# Patient Record
Sex: FEMALE | Race: BLACK OR AFRICAN AMERICAN | URBAN - METROPOLITAN AREA
[De-identification: names, ages, dates, MRNs, and addresses within clinical notes are randomized per-mention and may not be internally consistent; named-entity substitution may affect disease eponyms.]

---

## 2017-02-01 ENCOUNTER — OFFICE VISIT (OUTPATIENT)
Dept: FAMILY MEDICINE CLINIC | Age: 77
End: 2017-02-01

## 2017-02-01 VITALS
HEART RATE: 78 BPM | DIASTOLIC BLOOD PRESSURE: 88 MMHG | HEIGHT: 67 IN | WEIGHT: 196.8 LBS | RESPIRATION RATE: 20 BRPM | SYSTOLIC BLOOD PRESSURE: 132 MMHG | TEMPERATURE: 98.6 F | BODY MASS INDEX: 30.89 KG/M2

## 2017-02-01 DIAGNOSIS — M51.36 DDD (DEGENERATIVE DISC DISEASE), LUMBAR: Primary | Chronic | ICD-10-CM

## 2017-02-01 DIAGNOSIS — M54.50 CHRONIC RIGHT-SIDED LOW BACK PAIN WITHOUT SCIATICA: ICD-10-CM

## 2017-02-01 DIAGNOSIS — G89.29 CHRONIC RIGHT-SIDED LOW BACK PAIN WITHOUT SCIATICA: ICD-10-CM

## 2017-02-01 RX ORDER — ACETAMINOPHEN AND CODEINE PHOSPHATE 300; 15 MG/1; MG/1
1 TABLET ORAL
Qty: 30 TAB | Refills: 0 | Status: SHIPPED | OUTPATIENT
Start: 2017-02-01 | End: 2018-02-13 | Stop reason: SDUPTHER

## 2017-02-01 NOTE — PROGRESS NOTES
Assessment/Plan:    1. DDD (degenerative disc disease), lumbar and 2. Chronic right-sided low back pain without sciatica  -norco 5/325 ineffective. nsaids limited by CKD3. Unable to afford lidoderm patch. Failed PT. Trial tylenol #2  - acetaminophen-codeine (TYLENOL #2) 300-15 mg per tablet; Take 1 Tab by mouth daily as needed for Pain. Dispense: 30 Tab; Refill: 0      The plan was discussed with the patient. The patient verbalized understanding and is in agreement with the plan. All medication potential side effects were discussed with the patient. Health Maintenance:   Health Maintenance   Topic Date Due    Pneumococcal 65+ Low/Medium Risk (2 of 2 - PCV13) 05/22/2015    MEDICARE YEARLY EXAM  08/12/2017    GLAUCOMA SCREENING Q2Y  06/01/2018    COLONOSCOPY  12/14/2019    DTaP/Tdap/Td series (2 - Td) 10/20/2026    OSTEOPOROSIS SCREENING (DEXA)  Completed    ZOSTER VACCINE AGE 60>  Addressed    INFLUENZA AGE 9 TO ADULT  Completed     Mary Goodrich is a 78 y.o. female and presents with Back Pain     Subjective:  Pt has h/o R chronic back pain w/o sciatica secondary to DDD. Failed PT.  NSAIDs limited by CKD. She was given lidoderm patch, but was unable to afford. Stated norco 5/325 didn't work for her pain. She was given tylenol #2 but the script was reportedly stolen. She would like to give that a try. ROS:  Constitutional: No recent weight change. No weakness/fatigue. No f/c. Cardiovascular: No CP/palpitations. No CASTRO/orthopnea/PND. Respiratory: No cough/sputum, dyspnea, wheezing. Musculoskeletal: + joint pain/stiffness. + muscle pain/tenderness. Neurological: No seizures/numbness/weakness. No paresthesias. The problem list was updated as a part of today's visit.   Patient Active Problem List   Diagnosis Code    Muscle spasms of lower extremity M62.838    DDD (degenerative disc disease), lumbar M51.36    Hypertension I10    Hyperlipidemia E78.5    Post-menopausal Z78.0    Vitamin D deficiency E55.9    Keloid skin disorder L91.0    Osteoarthritis of right knee M17.9    Osteopenia M85.80    Complete rotator cuff tear of left shoulder M75.122    CKD (chronic kidney disease) N18.9    Chronic pain syndrome G89.4    Constipation K59.00    Advance care planning Z71.89    Ear itching L29.9       The PSH, FH were reviewed. SH:  Social History   Substance Use Topics    Smoking status: Never Smoker    Smokeless tobacco: Never Used    Alcohol use No       Medications/Allergies:  Current Outpatient Prescriptions on File Prior to Visit   Medication Sig Dispense Refill    acetaminophen-codeine (TYLENOL #2) 300-15 mg per tablet Take 1 Tab by mouth daily as needed for Pain. 30 Tab 0    metoprolol succinate (TOPROL-XL) 100 mg tablet TAKE 1 TABLET EVERY DAY 90 Tab 1    baclofen (LIORESAL) 10 mg tablet TAKE 1 TABLET THREE TIMES DAILY 90 Tab 0    hydroCHLOROthiazide (HYDRODIURIL) 25 mg tablet TAKE 1 TABLET EVERY DAY 90 Tab 1    lidocaine (LIDODERM) 5 % Apply patch to the affected area for 12 hours a day and remove for 12 hours a day. 30 Each 6    atorvastatin (LIPITOR) 10 mg tablet Take 1 Tab by mouth daily. 90 Tab 3    potassium chloride (K-DUR, KLOR-CON) 20 mEq tablet TAKE 1 TABLET TWICE DAILY (Patient taking differently: TAKE 1 TABLET daily) 180 Tab 1    furosemide (LASIX) 20 mg tablet TAKE 1 TABLET BY MOUTH EVERY DAY AS NEEDED FOR SWELLING 90 Tab 1    traZODone (DESYREL) 100 mg tablet Take 1 Tab by mouth nightly. 90 Tab 1    INULIN (FIBER GUMMIES PO) Take 2 Each by mouth daily.  MULTIVITAMIN (MULTIPLE VITAMINS PO) Take 2 Each by mouth daily. No current facility-administered medications on file prior to visit.          No Known Allergies    Objective:  Visit Vitals    /88 (BP 1 Location: Right arm, BP Patient Position: Sitting)    Pulse 78    Temp 98.6 °F (37 °C) (Oral)    Resp 20    Ht 5' 7\" (1.702 m)    Wt 196 lb 12.8 oz (89.3 kg)    BMI 30.82 kg/m2 Constitutional: Well developed, nourished, no distress, alert, obese habitus   CV: S1, S2.  RRR. No murmurs/rubs. No thrills palpated. No carotid bruits. Intact distal pulses. No edema. Pulm: No abnormalities on inspection. Clear to auscultation bilaterally. No wheezing/rhonchi. Normal effort. MS: Gait normal.  No vertebral body or paraspinal tenderness. Neuro: A/O x 3. No focal motor or sensory deficits.  Speech normal.

## 2017-02-01 NOTE — PROGRESS NOTES
Constance Singh is a 78 y.o. female  Chief Complaint   Patient presents with    Back Pain     1. Have you been to the ER, urgent care clinic since your last visit? Hospitalized since your last visit? No    2. Have you seen or consulted any other health care providers outside of the [address] [place] [address] since your last visit? Include any pap smears or colon screening.  No

## 2017-02-17 ENCOUNTER — TELEPHONE (OUTPATIENT)
Dept: FAMILY MEDICINE CLINIC | Age: 77
End: 2017-02-17

## 2017-02-17 NOTE — TELEPHONE ENCOUNTER
Pt is requesting another referral to Nephrology, Dr. Alexandrea Cheema. Pt has an appt 3/15/17. Pt has AndaO.

## 2017-05-18 ENCOUNTER — OFFICE VISIT (OUTPATIENT)
Dept: FAMILY MEDICINE CLINIC | Age: 77
End: 2017-05-18

## 2017-05-18 ENCOUNTER — HOSPITAL ENCOUNTER (OUTPATIENT)
Dept: LAB | Age: 77
Discharge: HOME | End: 2017-05-18

## 2017-05-18 VITALS
SYSTOLIC BLOOD PRESSURE: 132 MMHG | TEMPERATURE: 98.3 F | RESPIRATION RATE: 18 BRPM | WEIGHT: 188 LBS | DIASTOLIC BLOOD PRESSURE: 82 MMHG | HEART RATE: 64 BPM | HEIGHT: 67 IN | BODY MASS INDEX: 29.51 KG/M2 | OXYGEN SATURATION: 98 %

## 2017-05-18 DIAGNOSIS — E78.00 PURE HYPERCHOLESTEROLEMIA: Chronic | ICD-10-CM

## 2017-05-18 DIAGNOSIS — R05.9 COUGH: ICD-10-CM

## 2017-05-18 DIAGNOSIS — M17.11 PRIMARY OSTEOARTHRITIS OF RIGHT KNEE: Primary | ICD-10-CM

## 2017-05-18 DIAGNOSIS — L30.9 DERMATITIS: ICD-10-CM

## 2017-05-18 DIAGNOSIS — I10 ESSENTIAL HYPERTENSION: Chronic | ICD-10-CM

## 2017-05-18 DIAGNOSIS — G47.00 INSOMNIA, UNSPECIFIED TYPE: ICD-10-CM

## 2017-05-18 PROCEDURE — 99001 SPECIMEN HANDLING PT-LAB: CPT | Performed by: INTERNAL MEDICINE

## 2017-05-18 RX ORDER — CHLORPHENIRAMINE MALEATE 4 MG
TABLET ORAL 2 TIMES DAILY
Qty: 15 G | Refills: 0 | Status: SHIPPED | OUTPATIENT
Start: 2017-05-18 | End: 2017-05-30 | Stop reason: SDUPTHER

## 2017-05-18 RX ORDER — TRAZODONE HYDROCHLORIDE 100 MG/1
100 TABLET ORAL
Qty: 90 TAB | Refills: 1 | Status: SHIPPED | OUTPATIENT
Start: 2017-05-18 | End: 2018-04-24 | Stop reason: SDUPTHER

## 2017-05-18 RX ORDER — DOXYCYCLINE 100 MG/1
100 CAPSULE ORAL 2 TIMES DAILY
Qty: 14 CAP | Refills: 0 | Status: SHIPPED | OUTPATIENT
Start: 2017-05-18 | End: 2017-05-25

## 2017-05-18 NOTE — PROGRESS NOTES
Chief Complaint   Patient presents with    Cough     productive x 2 weeks    Knee Swelling       Assessment/Plan  1. Primary osteoarthritis of right knee- previously seen by Dr. Brooke Salcedo    2. Cough  - doxycycline (MONODOX) 100 mg capsule; Take 1 Cap by mouth two (2) times a day for 7 days. Dispense: 14 Cap; Refill: 0    3. Dermatitis  - clotrimazole (LOTRIMIN) 1 % topical cream; Apply  to affected area two (2) times a day. Dispense: 15 g; Refill: 0    4. Insomnia, unspecified type  -refilled  - traZODone (DESYREL) 100 mg tablet; Take 1 Tab by mouth nightly. Dispense: 90 Tab; Refill: 1      The plan was discussed with the patient. The patient verbalized understanding and is in agreement with the plan. All medication potential side effects were discussed with the patient. SUBJECTIVE:   Yumiko Meraz is a 78 y.o. female who complains of cough x 2 weeks. Started as a cold, but now has persistent cough. Cough is minimally productive. No f/c. No dyspnea. Pt c/o rash on neck and L arm. Itches. Has been present about a week. Pt c/o R knee pain. Pain is mostly on medial aspect. Has known OA of the knee. Worse with rainy weather. Review of Systems - ENT ROS: negative  Respiratory ROS: positive for - cough  Cardiovascular ROS: no chest pain or dyspnea on exertion  Gastrointestinal ROS: no abdominal pain, change in bowel habits, or black or bloody stools  Musculoskeletal ROS: negative  Neurological ROS: negative    Physical Examination:   Visit Vitals    /82 (BP 1 Location: Right arm, BP Patient Position: Sitting)    Pulse 64    Temp 98.3 °F (36.8 °C) (Oral)    Resp 18    Ht 5' 7\" (1.702 m)    Wt 188 lb (85.3 kg)    SpO2 98%    BMI 29.44 kg/m2       Constitutional: Well developed, nourished, no distress, alert   HENT: Exterior ears and tympanic membranes normal bilaterally. Supple neck. No thyromegaly or lymphadenopathy.  Oropharynx clear and moist mucous membranes. Eyes: Conjunctiva normal. PERRL. CV: S1, S2.  RRR. No murmurs/rubs. No thrills palpated. No carotid bruits. Intact distal pulses. No edema. Pulm: No abnormalities on inspection. Clear to auscultation bilaterally. No wheezing/rhonchi. Normal effort.      Skin: erythematous raised patches on neck with mild erythema        Frederick Gautam MD

## 2017-05-18 NOTE — PROGRESS NOTES
1. Have you been to the ER, urgent care clinic since your last visit? Hospitalized since your last visit? No     2. Have you seen or consulted any other health care providers outside of the [address] [place] [address] since your last visit? Include any pap smears or colon screening. No         Health Maintenance Due   Topic Date Due    Pneumococcal 65+ Low/Medium Risk (2 of 2 - PCV13) 05/22/2015         Pt also complains of ear itching. Pt is fasting.

## 2017-05-18 NOTE — MR AVS SNAPSHOT
Visit Information Date & Time Provider Department Dept. Phone Encounter #  
 5/18/2017  7:45 AM Arianne Alvarez, 3 [facility] [phone] Upcoming Health Maintenance Date Due Pneumococcal 65+ Low/Medium Risk (2 of 2 - PCV13) 5/22/2015 INFLUENZA AGE 9 TO ADULT 8/1/2017 MEDICARE YEARLY EXAM 8/12/2017 GLAUCOMA SCREENING Q2Y 6/1/2018 COLONOSCOPY 12/14/2019 DTaP/Tdap/Td series (2 - Td) 10/20/2026 Allergies as of 5/18/2017  Review Complete On: 5/18/2017 By: Arianne Alvarez MD  
 No Known Allergies Current Immunizations  Reviewed on 10/20/2016 Name Date Influenza High Dose Vaccine PF 10/20/2016, 9/8/2015  1:42 PM  
 Pneumococcal Polysaccharide (PPSV-23) 5/22/2014 TB Skin Test (PPD) Intradermal 9/8/2015  1:43 PM  
  
 Not reviewed this visit You Were Diagnosed With   
  
 Codes Comments Primary osteoarthritis of right knee    -  Primary ICD-10-CM: M17.11 ICD-9-CM: 715.16 Cough     ICD-10-CM: R05 ICD-9-CM: 786.2 Dermatitis     ICD-10-CM: L30.9 ICD-9-CM: 692.9 Insomnia, unspecified type     ICD-10-CM: G47.00 ICD-9-CM: 780.52 Pure hypercholesterolemia     ICD-10-CM: E78.00 ICD-9-CM: 272.0 Essential hypertension     ICD-10-CM: I10 
ICD-9-CM: 401.9 Vitals BP Pulse Temp Resp Height(growth percentile) Weight(growth percentile) 132/82 (BP 1 Location: Right arm, BP Patient Position: Sitting) 64 98.3 °F (36.8 °C) (Oral) 18 5' 7\" (1.702 m) 188 lb (85.3 kg) SpO2 BMI OB Status Smoking Status 98% 29.44 kg/m2 Hysterectomy Never Smoker BMI and BSA Data Body Mass Index Body Surface Area  
 29.44 kg/m 2 2.01 m 2 Preferred Pharmacy Pharmacy Name Phone [address], [address] [place] AT [address] [phone] Your Updated Medication List  
  
   
 This list is accurate as of: 5/18/17  8:02 AM.  Always use your most recent med list.  
  
  
  
  
 acetaminophen-codeine 300-15 mg per tablet Commonly known as:  TYLENOL #2 Take 1 Tab by mouth daily as needed for Pain. atorvastatin 10 mg tablet Commonly known as:  LIPITOR Take 1 Tab by mouth daily. baclofen 10 mg tablet Commonly known as:  LIORESAL  
TAKE 1 TABLET THREE TIMES DAILY  
  
 clotrimazole 1 % topical cream  
Commonly known as:  Martell Mcguire Apply  to affected area two (2) times a day. doxycycline 100 mg capsule Commonly known as:  Radha Lewis Take 1 Cap by mouth two (2) times a day for 7 days. FIBER GUMMIES PO Take 2 Each by mouth daily. furosemide 20 mg tablet Commonly known as:  LASIX TAKE 1 TABLET BY MOUTH EVERY DAY AS NEEDED FOR SWELLING  
  
 hydroCHLOROthiazide 25 mg tablet Commonly known as:  HYDRODIURIL  
TAKE 1 TABLET EVERY DAY  
  
 metoprolol succinate 100 mg tablet Commonly known as:  TOPROL-XL  
TAKE 1 TABLET EVERY DAY  
  
 MULTIPLE VITAMINS PO Take 2 Each by mouth daily. potassium chloride 20 mEq tablet Commonly known as:  K-DUR, KLOR-CON  
TAKE 1 TABLET TWICE DAILY  
  
 traZODone 100 mg tablet Commonly known as:  Washington Belling Take 1 Tab by mouth nightly. Prescriptions Sent to Pharmacy Refills  
 traZODone (DESYREL) 100 mg tablet 1 Sig: Take 1 Tab by mouth nightly. Class: Normal  
 Pharmacy: [facility] [address] [place] [address], [address] [place] [address] DR AT [facility] Ph #: [phone] Route: Oral  
 clotrimazole (LOTRIMIN) 1 % topical cream 0 Sig: Apply  to affected area two (2) times a day. Class: Normal  
 Pharmacy: [facility] [address] [place] [address], [address] [place] [address] DR AT [address] Ph #: [phone] Route: Topical  
 doxycycline (MONODOX) 100 mg capsule 0 Sig: Take 1 Cap by mouth two (2) times a day for 7 days. Class: Normal  
 Pharmacy: [facility] [id number] [facility], [address] [place] [address] DR AT [address] Ph #: [phone] Route: Oral  
  
We Performed the Following REFERRAL TO ORTHOPEDIC SURGERY [REF62 Custom] Comments:  
 Please evaluate patient for OA R knee. To-Do List   
 05/18/2017 Lab:  CBC W/O DIFF   
  
 05/18/2017 Lab:  LIPID PANEL   
  
 05/18/2017 Lab:  METABOLIC PANEL, COMPREHENSIVE Referral Information Referral ID Referred By Referred To  
  
 [id number] [facility], [address] [facility], [place]   
   [address] [facility] [address] [place] [address] 59, 70 [facility] Phone: [phone] Fax: [phone] Visits Status Start Date End Date 1 New Request 5/18/17 5/18/18 If your referral has a status of pending review or denied, additional information will be sent to support the outcome of this decision. Introducing [facility] HE[facility]! Norberto Santiago introduces [employer] patient portal. Now you can access parts of your medical record, email your doctor's office, and request medication refills online. 1. In your internet browser, go to [email or web address] 2. Click on the First Time User? Click Here link in the Sign In box. You will see the New Member Sign Up page. 3. Enter your [employer] Access Code exactly as it appears below. You will not need to use this code after youve completed the sign-up process. If you do not sign up before the expiration date, you must request a new code. · [employer] Access Code: [id number] Expires: 8/16/2017  7:36 AM 
 
4. Enter the last four digits of your Social Security Number (xxxx) and Date of Birth (mm/dd/yyyy) as indicated and click Submit. You will be taken to the next sign-up page. 5. Create a [employer] ID. This will be your [employer] login ID and cannot be changed, so think of one that is secure and easy to remember. 6. Create a [employer] password. You can change your password at any time. 7. Enter your Password Reset Question and Answer. This can be used at a later time if you forget your password. 8. Enter your e-mail address. You will receive e-mail notification when new information is available in [address] 9. Click Sign Up. You can now view and download portions of your medical record. 10. Click the Download Summary menu link to download a portable copy of your medical information. If you have questions, please visit the Frequently Asked Questions section of the [employer] website. Remember, [employer] is NOT to be used for urgent needs. For medical emergencies, dial 911. Now available from your iPhone and Android! Please provide this summary of care documentation to your next provider. Your primary care clinician is listed as Shanita 13. If you have any questions after today's visit, please call [phone].

## 2017-05-19 ENCOUNTER — TELEPHONE (OUTPATIENT)
Dept: FAMILY MEDICINE CLINIC | Age: 77
End: 2017-05-19

## 2017-05-19 DIAGNOSIS — R73.9 ELEVATED BLOOD SUGAR: Primary | ICD-10-CM

## 2017-05-19 LAB
ALBUMIN SERPL-MCNC: 3.9 G/DL (ref 3.5–4.8)
ALBUMIN/GLOB SERPL: 1.1 {RATIO} (ref 1.2–2.2)
ALP SERPL-CCNC: 54 IU/L (ref 39–117)
ALT SERPL-CCNC: 10 IU/L (ref 0–32)
AST SERPL-CCNC: 11 IU/L (ref 0–40)
BILIRUB SERPL-MCNC: 0.2 MG/DL (ref 0–1.2)
BUN SERPL-MCNC: 13 MG/DL (ref 8–27)
BUN/CREAT SERPL: 13 (ref 12–28)
CALCIUM SERPL-MCNC: 9.3 MG/DL (ref 8.7–10.3)
CHLORIDE SERPL-SCNC: 99 MMOL/L (ref 96–106)
CHOLEST SERPL-MCNC: 208 MG/DL (ref 100–199)
CO2 SERPL-SCNC: 27 MMOL/L (ref 18–29)
CREAT SERPL-MCNC: 0.98 MG/DL (ref 0.57–1)
ERYTHROCYTE [DISTWIDTH] IN BLOOD BY AUTOMATED COUNT: 13.7 % (ref 12.3–15.4)
GLOBULIN SER CALC-MCNC: 3.6 G/DL (ref 1.5–4.5)
GLUCOSE SERPL-MCNC: 109 MG/DL (ref 65–99)
HCT VFR BLD AUTO: 31.9 % (ref 34–46.6)
HDLC SERPL-MCNC: 41 MG/DL
HGB BLD-MCNC: 10.6 G/DL (ref 11.1–15.9)
INTERPRETATION, 910389: NORMAL
INTERPRETATION: NORMAL
LDLC SERPL CALC-MCNC: 132 MG/DL (ref 0–99)
MCH RBC QN AUTO: 29.4 PG (ref 26.6–33)
MCHC RBC AUTO-ENTMCNC: 33.2 G/DL (ref 31.5–35.7)
MCV RBC AUTO: 88 FL (ref 79–97)
PDF IMAGE, 910387: NORMAL
PLATELET # BLD AUTO: 227 X10E3/UL (ref 150–379)
POTASSIUM SERPL-SCNC: 3.9 MMOL/L (ref 3.5–5.2)
PROT SERPL-MCNC: 7.5 G/DL (ref 6–8.5)
RBC # BLD AUTO: 3.61 X10E6/UL (ref 3.77–5.28)
SODIUM SERPL-SCNC: 142 MMOL/L (ref 134–144)
TRIGL SERPL-MCNC: 175 MG/DL (ref 0–149)
VLDLC SERPL CALC-MCNC: 35 MG/DL (ref 5–40)
WBC # BLD AUTO: 3.6 X10E3/UL (ref 3.4–10.8)

## 2017-05-23 PROBLEM — R73.03 PREDIABETES: Status: ACTIVE | Noted: 2017-05-23

## 2017-05-23 LAB
HBA1C MFR BLD: 6 % (ref 4.8–5.6)
SPECIMEN STATUS REPORT, ROLRST: NORMAL

## 2017-05-23 NOTE — PROGRESS NOTES
Tell pt her labs show she has prediabetes, or is at risk for developing diabetes. She should watch her intake of sweets and carbs. We will recheck in 6 months.

## 2017-05-30 DIAGNOSIS — L30.9 DERMATITIS: ICD-10-CM

## 2017-05-30 NOTE — TELEPHONE ENCOUNTER
Pt called she states she needs another refill of clotrimazole and she also needs something different called in to help with her cough. She states that the doxycycline did not help her at all please advise.

## 2017-05-31 RX ORDER — CHLORPHENIRAMINE MALEATE 4 MG
TABLET ORAL 2 TIMES DAILY
Qty: 15 G | Refills: 0 | Status: SHIPPED | OUTPATIENT
Start: 2017-05-31 | End: 2018-05-03 | Stop reason: ALTCHOICE

## 2017-05-31 RX ORDER — PREDNISONE 10 MG/1
TABLET ORAL
Qty: 21 TAB | Refills: 0 | Status: SHIPPED | OUTPATIENT
Start: 2017-05-31 | End: 2017-09-01 | Stop reason: ALTCHOICE

## 2017-06-28 ENCOUNTER — TELEPHONE (OUTPATIENT)
Dept: FAMILY MEDICINE CLINIC | Age: 77
End: 2017-06-28

## 2017-06-28 NOTE — TELEPHONE ENCOUNTER
Pt called with Shortness of Breath complaint, transferred to Nurse [facility] to triage.

## 2017-06-28 NOTE — TELEPHONE ENCOUNTER
Spoke to pt and stated that she feels dizzy earlier but feels a little better. Advised pt to go to call 911 if feels worse or go to the ER if able. She agreed with the plan.

## 2017-07-03 DIAGNOSIS — I10 ESSENTIAL HYPERTENSION: ICD-10-CM

## 2017-07-03 RX ORDER — HYDROCHLOROTHIAZIDE 25 MG/1
TABLET ORAL
Qty: 90 TAB | Refills: 1 | Status: SHIPPED | OUTPATIENT
Start: 2017-07-03 | End: 2018-04-24 | Stop reason: SDUPTHER

## 2017-08-14 NOTE — TELEPHONE ENCOUNTER
Pt requesting RX refill(s) for:  Requested Prescriptions     Pending Prescriptions Disp Refills    furosemide (LASIX) 20 mg tablet 90 Tab 1     Sig: TAKE 1 TABLET BY MOUTH EVERY DAY AS NEEDED FOR SWELLING     Last refill: 05/16/16    Last visit: 05/18/17      Thank you    Graham Das LPN

## 2017-08-15 RX ORDER — FUROSEMIDE 20 MG/1
TABLET ORAL
Qty: 90 TAB | Refills: 1 | Status: SHIPPED | OUTPATIENT
Start: 2017-08-15 | End: 2018-02-15 | Stop reason: SDUPTHER

## 2017-09-01 ENCOUNTER — OFFICE VISIT (OUTPATIENT)
Dept: FAMILY MEDICINE CLINIC | Age: 77
End: 2017-09-01

## 2017-09-01 VITALS
BODY MASS INDEX: 30.39 KG/M2 | WEIGHT: 193.6 LBS | HEART RATE: 58 BPM | DIASTOLIC BLOOD PRESSURE: 68 MMHG | RESPIRATION RATE: 16 BRPM | OXYGEN SATURATION: 98 % | HEIGHT: 67 IN | TEMPERATURE: 98.8 F | SYSTOLIC BLOOD PRESSURE: 110 MMHG

## 2017-09-01 DIAGNOSIS — L30.9 DERMATITIS: Primary | ICD-10-CM

## 2017-09-01 DIAGNOSIS — I87.2 VENOUS INSUFFICIENCY: ICD-10-CM

## 2017-09-01 DIAGNOSIS — N64.4 BREAST PAIN: ICD-10-CM

## 2017-09-01 RX ORDER — TRIAMCINOLONE ACETONIDE 1 MG/G
CREAM TOPICAL
Qty: 15 G | Refills: 1 | Status: SHIPPED | OUTPATIENT
Start: 2017-09-01 | End: 2018-05-03 | Stop reason: ALTCHOICE

## 2017-09-01 RX ORDER — MELOXICAM 15 MG/1
15 TABLET ORAL DAILY
COMMUNITY
End: 2018-05-03 | Stop reason: ALTCHOICE

## 2017-09-01 NOTE — PATIENT INSTRUCTIONS
Try not to scratch itchy spots, apply cold compresses instead  Elevate the legs above horizontal as often as possible. Avoid salt and prolonged sitting and standing. Consider support hose or compression stockings. Triamcinolone cream - Apply sparingly to affected areas twice daily, do not use for more than 14 days consecutively without a break     Venous Skin Ulcer: Care Instructions  Your Care Instructions  A venous skin ulcer is a shallow wound that develops when the leg veins do not move blood back to the heart normally. Your veins have one-way valves that keep blood flowing toward the heart. When the valves are damaged, the blood can back up and pool in the vein. The blood may leak out of the vein into tissue around the vein. The tissue can break down and form an ulcer. The first sign of a venous skin ulcer is skin that turns dark red or purple over the area where the blood is leaking out of the vein. The skin also may become thick, dry, and itchy. Without treatment, an ulcer may form. The ulcer may be painful. Your leg also may swell and ache. If the ulcer becomes infected, the infection may cause an odor, and pus may drain from the ulcer. The area around the ulcer also may be more tender and red. Follow-up care is a key part of your treatment and safety. Be sure to make and go to all appointments, and call your doctor if you are having problems. It's also a good idea to know your test results and keep a list of the medicines you take. How can you care for yourself at home? · Follow your doctor's instructions on how to clean the ulcer and change the bandage. · If your doctor prescribed antibiotics, take them as directed. Do not stop taking them just because you feel better. You need to take the full course of antibiotics. · Lift your legs above the level of your heart as often as possible. For example, lie down and then prop up your legs with pillows. · Wear compression stockings or bandages.  They help the blood circulate in your legs. And they help prevent blood from pooling in your legs. But there are different types of stockings, and they need to fit right. So your doctor will recommend what you need. · After your ulcer has healed, continue to wear compression stockings. Take them off only when you bathe and sleep. Compression helps your blood circulate and helps prevent other ulcers from forming. · Walk daily. Walking helps your blood circulation. When should you call for help? Call your doctor now or seek immediate medical care if:  · You have symptoms of infection, such as:  ¨ Increased pain, swelling, warmth, or redness. ¨ Red streaks leading from the ulcer. ¨ Pus draining from the ulcer. ¨ A fever. Watch closely for changes in your health, and be sure to contact your doctor if:  · Your ulcer is not healing. · You have new ulcers. · The ulcer starts to bleed, and blood soaks through the bandage. Oozing small amounts of a mix of blood and fluid is normal.  · You have new bleeding. · You do not get better as expected. Where can you learn more? Go to http://kurtis-radha.info/. Enter Q195 in the search box to learn more about \"Venous Skin Ulcer: Care Instructions. \"  Current as of: March 20, 2017  Content Version: 11.3  © 6234-3433 [employer]. Care instructions adapted under license by [employer] (which disclaims liability or warranty for this information). If you have questions about a medical condition or this instruction, always ask your healthcare professional. [place] [address] any warranty or liability for your use of this information.

## 2017-09-01 NOTE — MR AVS SNAPSHOT
Visit Information Date & Time Provider Department Dept. Phone Encounter #  
 9/1/2017  3:30 PM Manasa Hewitt, 3 [facility] [phone] [id number] Upcoming Health Maintenance Date Due Pneumococcal 65+ Low/Medium Risk (2 of 2 - PCV13) 5/22/2015 INFLUENZA AGE 9 TO ADULT 8/1/2017 MEDICARE YEARLY EXAM 8/12/2017 GLAUCOMA SCREENING Q2Y 6/1/2018 COLONOSCOPY 12/14/2019 DTaP/Tdap/Td series (2 - Td) 10/20/2026 Allergies as of 9/1/2017  Review Complete On: 9/1/2017 By: Manasa Hewitt MD  
 No Known Allergies Current Immunizations  Reviewed on 10/20/2016 Name Date Influenza High Dose Vaccine PF 10/20/2016, 9/8/2015  1:42 PM  
 Pneumococcal Polysaccharide (PPSV-23) 5/22/2014 TB Skin Test (PPD) Intradermal 9/8/2015  1:43 PM  
  
 Not reviewed this visit You Were Diagnosed With   
  
 Codes Comments Dermatitis    -  Primary ICD-10-CM: L30.9 ICD-9-CM: 692.9 Venous insufficiency     ICD-10-CM: I87.2 ICD-9-CM: 459.81 Vitals BP Pulse Temp Resp Height(growth percentile) Weight(growth percentile) 110/68 (BP 1 Location: Left arm, BP Patient Position: Sitting) (!) 58 98.8 °F (37.1 °C) (Oral) 16 5' 7\" (1.702 m) 193 lb 9.6 oz (87.8 kg) SpO2 BMI OB Status Smoking Status 98% 30.32 kg/m2 Hysterectomy Never Smoker BMI and BSA Data Body Mass Index Body Surface Area  
 30.32 kg/m 2 2.04 m 2 Preferred Pharmacy Pharmacy Name Phone [address] [facility], [address] [phone] Your Updated Medication List  
  
   
This list is accurate as of: 9/1/17  4:02 PM.  Always use your most recent med list.  
  
  
  
  
 acetaminophen-codeine 300-15 mg per tablet Commonly known as:  TYLENOL #2 Take 1 Tab by mouth daily as needed for Pain. atorvastatin 10 mg tablet Commonly known as:  LIPITOR Take 1 Tab by mouth daily. clotrimazole 1 % topical cream  
Commonly known as:  Ashley Benton Apply  to affected area two (2) times a day. FIBER GUMMIES PO Take 2 Each by mouth daily. furosemide 20 mg tablet Commonly known as:  LASIX TAKE 1 TABLET BY MOUTH EVERY DAY AS NEEDED FOR SWELLING  
  
 hydroCHLOROthiazide 25 mg tablet Commonly known as:  HYDRODIURIL  
TAKE 1 TABLET EVERY DAY  
  
 meloxicam 15 mg tablet Commonly known as:  MOBIC Take 15 mg by mouth daily. metoprolol succinate 100 mg tablet Commonly known as:  TOPROL-XL  
TAKE 1 TABLET EVERY DAY  
  
 MULTIPLE VITAMINS PO Take 2 Each by mouth daily. potassium chloride 20 mEq tablet Commonly known as:  K-DUR, KLOR-CON  
TAKE 1 TABLET TWICE DAILY  
  
 traZODone 100 mg tablet Commonly known as:  Alanna Jensen Take 1 Tab by mouth nightly. triamcinolone acetonide 0.1 % topical cream  
Commonly known as:  KENALOG Apply sparingly to affected areas twice daily, do not use for more than 14 days consecutively without a break Prescriptions Sent to Pharmacy Refills  
 triamcinolone acetonide (KENALOG) 0.1 % topical cream 1 Sig: Apply sparingly to affected areas twice daily, do not use for more than 14 days consecutively without a break Class: Normal  
 Pharmacy: [facility] [address] [place] [address] [address] AT [address] [facility] [address] Ph #: [phone] Patient Instructions Try not to scratch itchy spots, apply cold compresses instead Elevate the legs above horizontal as often as possible. Avoid salt and prolonged sitting and standing. Consider support hose or compression stockings. Triamcinolone cream - Apply sparingly to affected areas twice daily, do not use for more than 14 days consecutively without a break Venous Skin Ulcer: Care Instructions Your Care Instructions A venous skin ulcer is a shallow wound that develops when the leg veins do not move blood back to the heart normally. Your veins have one-way valves that keep blood flowing toward the heart. When the valves are damaged, the blood can back up and pool in the vein. The blood may leak out of the vein into tissue around the vein. The tissue can break down and form an ulcer. The first sign of a venous skin ulcer is skin that turns dark red or purple over the area where the blood is leaking out of the vein. The skin also may become thick, dry, and itchy. Without treatment, an ulcer may form. The ulcer may be painful. Your leg also may swell and ache. If the ulcer becomes infected, the infection may cause an odor, and pus may drain from the ulcer. The area around the ulcer also may be more tender and red. Follow-up care is a key part of your treatment and safety. Be sure to make and go to all appointments, and call your doctor if you are having problems. It's also a good idea to know your test results and keep a list of the medicines you take. How can you care for yourself at home? · Follow your doctor's instructions on how to clean the ulcer and change the bandage. · If your doctor prescribed antibiotics, take them as directed. Do not stop taking them just because you feel better. You need to take the full course of antibiotics. · Lift your legs above the level of your heart as often as possible. For example, lie down and then prop up your legs with pillows. · Wear compression stockings or bandages. They help the blood circulate in your legs. And they help prevent blood from pooling in your legs. But there are different types of stockings, and they need to fit right. So your doctor will recommend what you need. · After your ulcer has healed, continue to wear compression stockings. Take them off only when you bathe and sleep. Compression helps your blood circulate and helps prevent other ulcers from forming. · Walk daily. Walking helps your blood circulation. When should you call for help? Call your doctor now or seek immediate medical care if: 
· You have symptoms of infection, such as: 
¨ Increased pain, swelling, warmth, or redness. ¨ Red streaks leading from the ulcer. ¨ Pus draining from the ulcer. ¨ A fever. Watch closely for changes in your health, and be sure to contact your doctor if: 
· Your ulcer is not healing. · You have new ulcers. · The ulcer starts to bleed, and blood soaks through the bandage. Oozing small amounts of a mix of blood and fluid is normal. 
· You have new bleeding. · You do not get better as expected. Where can you learn more? Go to http://kurtis-radha.info/. Enter R505 in the search box to learn more about \"Venous Skin Ulcer: Care Instructions. \" Current as of: March 20, 2017 Content Version: 11.3 © 3652-2123 [employer]. Care instructions adapted under license by [employer] (which disclaims liability or warranty for this information). If you have questions about a medical condition or this instruction, always ask your healthcare professional. [address] any warranty or liability for your use of this information. Introducing [facility]! Leslie Edgar introduces [employer] patient portal. Now you can access parts of your medical record, email your doctor's office, and request medication refills online. 1. In your internet browser, go to [email or web address] 2. Click on the First Time User? Click Here link in the Sign In box. You will see the New Member Sign Up page. 3. Enter your [employer] Access Code exactly as it appears below. You will not need to use this code after youve completed the sign-up process. If you do not sign up before the expiration date, you must request a new code. · [employer] Access Code: [email or web address] Expires: 11/30/2017  3:37 PM 
 
4.  Enter the last four digits of your Social Security Number (xxxx) and Date of Birth (mm/dd/yyyy) as indicated and click Submit. You will be taken to the next sign-up page. 5. Create a [facility] ID. This will be your [facility] login ID and cannot be changed, so think of one that is secure and easy to remember. 6. Create a [facility] password. You can change your password at any time. 7. Enter your Password Reset Question and Answer. This can be used at a later time if you forget your password. 8. Enter your e-mail address. You will receive e-mail notification when new information is available in [address] 9. Click Sign Up. You can now view and download portions of your medical record. 10. Click the Download Summary menu link to download a portable copy of your medical information. If you have questions, please visit the Frequently Asked Questions section of the [facility] website. Remember, [facility] is NOT to be used for urgent needs. For medical emergencies, dial 911. Now available from your iPhone and Android! Please provide this summary of care documentation to your next provider. Your primary care clinician is listed as Shanita 13. If you have any questions after today's visit, please call [phone].

## 2017-09-01 NOTE — PROGRESS NOTES
HISTORY OF PRESENT ILLNESS  Fredy Reddy is a 78 y.o. female. Skin Problem   The history is provided by the patient and medical records. This is a chronic problem. Episode onset: about a year ago. Patient Active Problem List   Diagnosis Code    Muscle spasms of lower extremity M62.838    DDD (degenerative disc disease), lumbar M51.36    Hypertension I10    Hyperlipidemia E78.5    Post-menopausal Z78.0    Vitamin D deficiency E55.9    Keloid skin disorder L91.0    Osteoarthritis of right knee M17.11    Osteopenia M85.80    Complete rotator cuff tear of left shoulder M75.122    CKD (chronic kidney disease) N18.9    Chronic pain syndrome G89.4    Constipation K59.00    Advance care planning Z71.89    Ear itching L29.9    Prediabetes R73.03       Current Outpatient Prescriptions:     meloxicam (MOBIC) 15 mg tablet, Take 15 mg by mouth daily. , Disp: , Rfl:     furosemide (LASIX) 20 mg tablet, TAKE 1 TABLET BY MOUTH EVERY DAY AS NEEDED FOR SWELLING, Disp: 90 Tab, Rfl: 1    hydroCHLOROthiazide (HYDRODIURIL) 25 mg tablet, TAKE 1 TABLET EVERY DAY, Disp: 90 Tab, Rfl: 1    clotrimazole (LOTRIMIN) 1 % topical cream, Apply  to affected area two (2) times a day., Disp: 15 g, Rfl: 0    traZODone (DESYREL) 100 mg tablet, Take 1 Tab by mouth nightly., Disp: 90 Tab, Rfl: 1    acetaminophen-codeine (TYLENOL #2) 300-15 mg per tablet, Take 1 Tab by mouth daily as needed for Pain., Disp: 30 Tab, Rfl: 0    metoprolol succinate (TOPROL-XL) 100 mg tablet, TAKE 1 TABLET EVERY DAY, Disp: 90 Tab, Rfl: 1    atorvastatin (LIPITOR) 10 mg tablet, Take 1 Tab by mouth daily. , Disp: 90 Tab, Rfl: 3    potassium chloride (K-DUR, KLOR-CON) 20 mEq tablet, TAKE 1 TABLET TWICE DAILY (Patient taking differently: TAKE 1 TABLET daily), Disp: 180 Tab, Rfl: 1    INULIN (FIBER GUMMIES PO), Take 2 Each by mouth daily. , Disp: , Rfl:     MULTIVITAMIN (MULTIPLE VITAMINS PO), Take 2 Each by mouth daily. , Disp: , Rfl:       Review of Systems   Skin:        Itchy rd spot left leg     Visit Vitals    /68 (BP 1 Location: Left arm, BP Patient Position: Sitting)    Pulse (!) 58    Temp 98.8 °F (37.1 °C) (Oral)    Resp 16    Ht 5' 7\" (1.702 m)    Wt 193 lb 9.6 oz (87.8 kg)    SpO2 98%    BMI 30.32 kg/m2       Physical Exam   Constitutional: She is oriented to person, place, and time. She appears well-developed and well-nourished. HENT:   Head: Normocephalic. Eyes: EOM are normal.   Neck: Neck supple. Cardiovascular: Normal rate and intact distal pulses. Varicosities, possibly developing venous perforators. Pulmonary/Chest: Effort normal.   Musculoskeletal: She exhibits edema (trace-1+ distal LE). Neurological: She is alert and oriented to person, place, and time. Skin: Skin is warm and dry. Annular, dime size area of hyperpigmentation/erythema medial left lower leg, possible early evidence of a developing stasis ulcer   Psychiatric: She has a normal mood and affect. Her behavior is normal.   Nursing note and vitals reviewed. ASSESSMENT and PLAN    ICD-10-CM ICD-9-CM    1. Dermatitis L30.9 692.9 triamcinolone acetonide (KENALOG) 0.1 % topical cream   2. Venous insufficiency I87.2 459.81    Try not to scratch itchy spots, apply cold compresses instead  Elevate the legs above horizontal as often as possible. Avoid salt and prolonged sitting and standing. Consider support hose or compression stockings. Triamcinolone cream - Apply very sparingly to affected areas twice daily, do not use for more than 14 days consecutively without a break-over use could thin the skin and exacerbate venous stasis skin damage.

## 2017-09-01 NOTE — PROGRESS NOTES
Laila Lin is a 78 y.o. female here for skin problem      1. Have you been to the ER, urgent care clinic or hospitalized since your last visit? YES urgent care    2. Have you seen or consulted any other health care providers outside of the [address] [place] [address] since your last visit (Include any pap smears or colon screening)? NO      Do you have an Advanced Directive? NO    Would you like information on Advanced Directives?  NO

## 2017-10-03 ENCOUNTER — TELEPHONE (OUTPATIENT)
Dept: FAMILY MEDICINE CLINIC | Age: 77
End: 2017-10-03

## 2017-10-03 NOTE — TELEPHONE ENCOUNTER
Pt called to request a referral to see Dr. [facility]. Pt states she is having breast pain and could not get in to see her normal doctor, Dr. Radha Dennison at that office.     Please generate appropriate referral.  Pt believes the phone # [phone]  Appt on 10/9

## 2017-10-09 NOTE — TELEPHONE ENCOUNTER
Pt called back very upset checking on this referral. Doesn't appear to have been completed and the receiving office states they haven't received. Pt is at their office now for her appointment.      Provided fax number as [phone]

## 2018-02-13 ENCOUNTER — OFFICE VISIT (OUTPATIENT)
Dept: FAMILY MEDICINE CLINIC | Age: 78
End: 2018-02-13

## 2018-02-13 VITALS
OXYGEN SATURATION: 98 % | HEART RATE: 64 BPM | TEMPERATURE: 98.1 F | BODY MASS INDEX: 29.66 KG/M2 | HEIGHT: 67 IN | DIASTOLIC BLOOD PRESSURE: 70 MMHG | SYSTOLIC BLOOD PRESSURE: 120 MMHG | RESPIRATION RATE: 17 BRPM | WEIGHT: 189 LBS

## 2018-02-13 DIAGNOSIS — M25.60 JOINT STIFFNESS: ICD-10-CM

## 2018-02-13 DIAGNOSIS — I10 ESSENTIAL HYPERTENSION: Chronic | ICD-10-CM

## 2018-02-13 DIAGNOSIS — E66.3 OVERWEIGHT: ICD-10-CM

## 2018-02-13 DIAGNOSIS — Z00.00 ROUTINE GENERAL MEDICAL EXAMINATION AT A HEALTH CARE FACILITY: ICD-10-CM

## 2018-02-13 DIAGNOSIS — E78.00 PURE HYPERCHOLESTEROLEMIA: Chronic | ICD-10-CM

## 2018-02-13 DIAGNOSIS — Z23 ENCOUNTER FOR IMMUNIZATION: ICD-10-CM

## 2018-02-13 DIAGNOSIS — R73.9 ELEVATED BLOOD SUGAR: Primary | ICD-10-CM

## 2018-02-13 DIAGNOSIS — M17.11 PRIMARY OSTEOARTHRITIS OF RIGHT KNEE: Chronic | ICD-10-CM

## 2018-02-13 PROBLEM — Z79.899 CONTROLLED SUBSTANCE AGREEMENT SIGNED: Status: ACTIVE | Noted: 2018-02-13

## 2018-02-13 LAB — HBA1C MFR BLD HPLC: 5.4 %

## 2018-02-13 RX ORDER — ACETAMINOPHEN AND CODEINE PHOSPHATE 300; 15 MG/1; MG/1
1 TABLET ORAL
Qty: 30 TAB | Refills: 0 | Status: SHIPPED | OUTPATIENT
Start: 2018-02-13 | End: 2018-04-24 | Stop reason: SDUPTHER

## 2018-02-13 NOTE — PROGRESS NOTES
Assessment/Plan:    1. Elevated blood sugar  -A1x 5.4.  - AMB POC HEMOGLOBIN A1C    2. Essential hypertension  -cont current  - METABOLIC PANEL, COMPREHENSIVE; Future  - LIPID PANEL; Future  - CBC W/O DIFF; Future    3. Routine general medical examination at a health care facility  - METABOLIC PANEL, COMPREHENSIVE; Future  - LIPID PANEL; Future  - CBC W/O DIFF; Future    4. Pure hypercholesterolemia    - LIPID PANEL; Future    5. Overweight  -work on wt loss    6. OA R knee and joint stiffness  - acetaminophen-codeine (TYLENOL #2) 300-15 mg tab; Take 1 Tab by mouth daily as needed for Pain. Dispense: 30 Tab; Refill: 0  - CYCLIC CITRUL PEPTIDE AB, IGG; Future  - RHEUMATOID FACTOR, IGM; Future      The plan was discussed with the patient. The patient verbalized understanding and is in agreement with the plan. All medication potential side effects were discussed with the patient. Health Maintenance:   Health Maintenance   Topic Date Due    Pneumococcal 65+ Low/Medium Risk (2 of 2 - PCV13) 05/22/2015    Influenza Age 9 to Adult  08/01/2017    MEDICARE YEARLY EXAM  08/12/2017    GLAUCOMA SCREENING Q2Y  06/01/2018    COLONOSCOPY  12/14/2019    DTaP/Tdap/Td series (2 - Td) 10/20/2026    OSTEOPOROSIS SCREENING (DEXA)  Completed    ZOSTER VACCINE AGE 60>  Addressed       Melecio Sagastume is a 78 y.o. female and presents with Arthritis     Subjective:  Prediabetes - A1c is 5.4. HTN - bp controlled. Compliant with meds. OA - on tylenol #2, she uses it sparingly. She has know DDD lumbar spine and OA R knee. States she has joint stiffness and pain. Has previously been seen by Dr. Lacie Spurling- given steroid injections w/o improvement. She also c/o finger pain. ROS:  Constitutional: No recent weight change. No weakness/fatigue. No f/c. Cardiovascular: No CP/palpitations. No CASTRO/orthopnea/PND. Respiratory: No cough/sputum, dyspnea, wheezing. Gastointestinal: No dysphagia, reflux. No n/v.   No constipation/diarrhea. No melena/rectal bleeding. Musculoskeletal: + joint pain/stiffness. No muscle pain/tenderness. The problem list was updated as a part of today's visit. Patient Active Problem List   Diagnosis Code    Muscle spasms of lower extremity M62.838    DDD (degenerative disc disease), lumbar M51.36    Hypertension I10    Hyperlipidemia E78.5    Post-menopausal Z78.0    Vitamin D deficiency E55.9    Keloid skin disorder L91.0    Osteoarthritis of right knee M17.11    Osteopenia M85.80    Complete rotator cuff tear of left shoulder M75.122    CKD (chronic kidney disease) N18.9    Chronic pain syndrome G89.4    Constipation K59.00    Advance care planning Z71.89    Ear itching L29.9    Prediabetes R73.03       The PSH, FH were reviewed. SH:  Social History   Substance Use Topics    Smoking status: Never Smoker    Smokeless tobacco: Never Used    Alcohol use No       Medications/Allergies:  Current Outpatient Prescriptions on File Prior to Visit   Medication Sig Dispense Refill    metoprolol succinate (TOPROL-XL) 100 mg tablet TAKE 1 TABLET BY MOUTH EVERY DAY 90 Tab 1    meloxicam (MOBIC) 15 mg tablet Take 15 mg by mouth daily.  triamcinolone acetonide (KENALOG) 0.1 % topical cream Apply sparingly to affected areas twice daily, do not use for more than 14 days consecutively without a break 15 g 1    furosemide (LASIX) 20 mg tablet TAKE 1 TABLET BY MOUTH EVERY DAY AS NEEDED FOR SWELLING 90 Tab 1    hydroCHLOROthiazide (HYDRODIURIL) 25 mg tablet TAKE 1 TABLET EVERY DAY 90 Tab 1    clotrimazole (LOTRIMIN) 1 % topical cream Apply  to affected area two (2) times a day. 15 g 0    traZODone (DESYREL) 100 mg tablet Take 1 Tab by mouth nightly. 90 Tab 1    acetaminophen-codeine (TYLENOL #2) 300-15 mg per tablet Take 1 Tab by mouth daily as needed for Pain. 30 Tab 0    atorvastatin (LIPITOR) 10 mg tablet Take 1 Tab by mouth daily.  90 Tab 3    potassium chloride (K-DUR, KLOR-CON) 20 mEq tablet TAKE 1 TABLET TWICE DAILY (Patient taking differently: TAKE 1 TABLET daily) 180 Tab 1    INULIN (FIBER GUMMIES PO) Take 2 Each by mouth daily.  MULTIVITAMIN (MULTIPLE VITAMINS PO) Take 2 Each by mouth daily. No current facility-administered medications on file prior to visit. No Known Allergies    Objective:  Visit Vitals    /70 (BP 1 Location: Left arm, BP Patient Position: Sitting)    Pulse 64    Temp 98.1 °F (36.7 °C) (Oral)    Resp 17    Ht 5' 7\" (1.702 m)    Wt 189 lb (85.7 kg)    SpO2 98%    BMI 29.6 kg/m2      Constitutional: Well developed, nourished, no distress, alert, obese habitus   CV: S1, S2.  RRR. No murmurs/rubs. No thrills palpated. No carotid bruits. Intact distal pulses. No edema. Pulm: No abnormalities on inspection. Clear to auscultation bilaterally. No wheezing/rhonchi. Normal effort. GI: Soft, nontender, nondistended. Normal active bowel sounds. Psych: Appropriate affect, judgement and insight. Short-term memory intact. MS: +crepitus R knee. No effusion. No medial or lateral joint line tenderness.

## 2018-02-13 NOTE — PROGRESS NOTES
Immunization/s administered 2/13/2018 by Kirsten Butt LPN with guardian's consent. Patient tolerated procedure well. No reactions noted. Influenza, left deltoid.

## 2018-02-13 NOTE — MR AVS SNAPSHOT
[address] [place] [address] [address] [facility] [address] [phone] Patient: Nelia Cruz MRN: [id number] :1940 Visit Information Date & Time Provider Department Dept. Phone Encounter #  
 2018 11:15 AM Dorothy Vyas, 3 [facility] [address] [phone] [id number] Follow-up Instructions Return in about 3 months (around 2018) for physical/mwv. Upcoming Health Maintenance Date Due Pneumococcal 65+ Low/Medium Risk (2 of 2 - PCV13) 2015 Influenza Age 5 to Adult 2017 MEDICARE YEARLY EXAM 2017 GLAUCOMA SCREENING Q2Y 2018 COLONOSCOPY 2019 DTaP/Tdap/Td series (2 - Td) 10/20/2026 Allergies as of 2018  Review Complete On: 2018 By: Dorothy Vyas MD  
 No Known Allergies Current Immunizations  Reviewed on 10/20/2016 Name Date Influenza High Dose Vaccine PF 10/20/2016, 2015  1:42 PM  
 Pneumococcal Polysaccharide (PPSV-23) 2014 TB Skin Test (PPD) Intradermal 2015  1:43 PM  
  
 Not reviewed this visit You Were Diagnosed With   
  
 Codes Comments Elevated blood sugar    -  Primary ICD-10-CM: R73.9 ICD-9-CM: 790.29 Essential hypertension     ICD-10-CM: I10 
ICD-9-CM: 401.9 Routine general medical examination at a health care facility     ICD-10-CM: Z00.00 ICD-9-CM: V70.0 Pure hypercholesterolemia     ICD-10-CM: E78.00 ICD-9-CM: 272.0 Overweight     ICD-10-CM: M41.4 ICD-9-CM: 278.02   
 DDD (degenerative disc disease), lumbar     ICD-10-CM: M51.36 
ICD-9-CM: 722.52 Chronic right-sided low back pain without sciatica     ICD-10-CM: M54.5, G89.29 ICD-9-CM: 724.2, 338.29   
 Joint stiffness     ICD-10-CM: M25.60 ICD-9-CM: 719.50 Vitals BP Pulse Temp Resp Height(growth percentile) Weight(growth percentile)  120/70 (BP 1 Location: Left arm, BP Patient Position: Sitting) 64 98.1 °F (36.7 °C) (Oral) 17 5' 7\" (1.702 m) 189 lb (85.7 kg) SpO2 BMI OB Status Smoking Status 98% 29.6 kg/m2 Hysterectomy Never Smoker Vitals History BMI and BSA Data Body Mass Index Body Surface Area  
 29.6 kg/m 2 2.01 m 2 Preferred Pharmacy Pharmacy Name Phone [id number] , [address] [facility] [place] AT [address] [facility] [address] [phone] Your Updated Medication List  
  
   
This list is accurate as of: 2/13/18 11:20 AM.  Always use your most recent med list.  
  
  
  
  
 acetaminophen-codeine 300-15 mg Tab Commonly known as:  TYLENOL #2 Take 1 Tab by mouth daily as needed for Pain. atorvastatin 10 mg tablet Commonly known as:  LIPITOR Take 1 Tab by mouth daily. clotrimazole 1 % topical cream  
Commonly known as:  Bonita Perez Apply  to affected area two (2) times a day. FIBER GUMMIES PO Take 2 Each by mouth daily. furosemide 20 mg tablet Commonly known as:  LASIX TAKE 1 TABLET BY MOUTH EVERY DAY AS NEEDED FOR SWELLING  
  
 hydroCHLOROthiazide 25 mg tablet Commonly known as:  HYDRODIURIL  
TAKE 1 TABLET EVERY DAY  
  
 meloxicam 15 mg tablet Commonly known as:  MOBIC Take 15 mg by mouth daily. metoprolol succinate 100 mg tablet Commonly known as:  TOPROL-XL  
TAKE 1 TABLET BY MOUTH EVERY DAY  
  
 MULTIPLE VITAMINS PO Take 2 Each by mouth daily. potassium chloride 20 mEq tablet Commonly known as:  K-DUR, KLOR-CON  
TAKE 1 TABLET TWICE DAILY  
  
 traZODone 100 mg tablet Commonly known as:  Hunter Katarzyna Take 1 Tab by mouth nightly. triamcinolone acetonide 0.1 % topical cream  
Commonly known as:  KENALOG Apply sparingly to affected areas twice daily, do not use for more than 14 days consecutively without a break Prescriptions Printed  Refills  
 acetaminophen-codeine (TYLENOL #2) 300-15 mg tab 0  
 Sig: Take 1 Tab by mouth daily as needed for Pain. Class: Print Route: Oral  
  
We Performed the Following AMB POC HEMOGLOBIN A1C [66484 CPT(R)] Follow-up Instructions Return in about 3 months (around 5/13/2018) for physical/mwv. To-Do List   
 02/13/2018 Lab:  CYCLIC CITRUL PEPTIDE AB, IGG   
  
 02/13/2018 Lab:  RHEUMATOID FACTOR, IGM   
  
 05/13/2018 Lab:  CBC W/O DIFF   
  
 05/13/2018 Lab:  LIPID PANEL   
  
 05/13/2018 Lab:  METABOLIC PANEL, COMPREHENSIVE Introducing [facility]! Honey Price introduces [employer] patient portal. Now you can access parts of your medical record, email your doctor's office, and request medication refills online. 1. In your internet browser, go to [email or web address] 2. Click on the First Time User? Click Here link in the Sign In box. You will see the New Member Sign Up page. 3. Enter your [employer] Access Code exactly as it appears below. You will not need to use this code after youve completed the sign-up process. If you do not sign up before the expiration date, you must request a new code. · [employer] Access Code: [email or web address] Expires: 5/14/2018 11:20 AM 
 
4. Enter the last four digits of your Social Security Number (xxxx) and Date of Birth (mm/dd/yyyy) as indicated and click Submit. You will be taken to the next sign-up page. 5. Create a [employer] ID. This will be your [employer] login ID and cannot be changed, so think of one that is secure and easy to remember. 6. Create a [employer] password. You can change your password at any time. 7. Enter your Password Reset Question and Answer. This can be used at a later time if you forget your password. 8. Enter your e-mail address. You will receive e-mail notification when new information is available in [address] 9. Click Sign Up. You can now view and download portions of your medical record. 10. Click the Download Summary menu link to download a portable copy of your medical information. If you have questions, please visit the Frequently Asked Questions section of the [employer] website. Remember, [employer] is NOT to be used for urgent needs. For medical emergencies, dial 911. Now available from your iPhone and Android! Please provide this summary of care documentation to your next provider. Your primary care clinician is listed as Shanita 13. If you have any questions after today's visit, please call [phone].

## 2018-02-16 RX ORDER — FUROSEMIDE 20 MG/1
TABLET ORAL
Qty: 90 TAB | Refills: 1 | Status: SHIPPED | OUTPATIENT
Start: 2018-02-16 | End: 2018-04-24 | Stop reason: SDUPTHER

## 2018-04-08 DIAGNOSIS — I10 ESSENTIAL HYPERTENSION: ICD-10-CM

## 2018-04-09 RX ORDER — METOPROLOL SUCCINATE 100 MG/1
TABLET, EXTENDED RELEASE ORAL
Qty: 90 TAB | Refills: 0 | Status: SHIPPED | OUTPATIENT
Start: 2018-04-09 | End: 2018-06-08 | Stop reason: SDUPTHER

## 2018-04-24 DIAGNOSIS — M17.11 PRIMARY OSTEOARTHRITIS OF RIGHT KNEE: Chronic | ICD-10-CM

## 2018-04-24 DIAGNOSIS — I10 ESSENTIAL HYPERTENSION: ICD-10-CM

## 2018-04-24 DIAGNOSIS — G47.00 INSOMNIA, UNSPECIFIED TYPE: ICD-10-CM

## 2018-04-25 NOTE — TELEPHONE ENCOUNTER
Called pt in regards to needing stronger pain med. Pt states she does use the tylenol 2 sparingly but it's all gone and she has continued knee pain and also finger pain. She said the tylenol doesn't do much and she is hoping you have an alternative. Pt also was scheduled for f/u 5/15 but she thought it is was a cpe. Rescheduled pt to 5/3/18, she will do labs prior.

## 2018-04-27 ENCOUNTER — HOSPITAL ENCOUNTER (OUTPATIENT)
Dept: LAB | Age: 78
Discharge: HOME | End: 2018-04-27

## 2018-04-27 PROCEDURE — 99001 SPECIMEN HANDLING PT-LAB: CPT | Performed by: INTERNAL MEDICINE

## 2018-04-29 LAB
ALBUMIN SERPL-MCNC: 4.2 G/DL (ref 3.5–4.8)
ALBUMIN/GLOB SERPL: 1.1 {RATIO} (ref 1.2–2.2)
ALP SERPL-CCNC: 61 IU/L (ref 39–117)
ALT SERPL-CCNC: 16 IU/L (ref 0–32)
AST SERPL-CCNC: 24 IU/L (ref 0–40)
BILIRUB SERPL-MCNC: 0.3 MG/DL (ref 0–1.2)
BUN SERPL-MCNC: 11 MG/DL (ref 8–27)
BUN/CREAT SERPL: 12 (ref 12–28)
CALCIUM SERPL-MCNC: 9.3 MG/DL (ref 8.7–10.3)
CCP IGA+IGG SERPL IA-ACNC: 12 UNITS (ref 0–19)
CHLORIDE SERPL-SCNC: 101 MMOL/L (ref 96–106)
CHOLEST SERPL-MCNC: 232 MG/DL (ref 100–199)
CO2 SERPL-SCNC: 24 MMOL/L (ref 18–29)
CREAT SERPL-MCNC: 0.89 MG/DL (ref 0.57–1)
ERYTHROCYTE [DISTWIDTH] IN BLOOD BY AUTOMATED COUNT: 14.1 % (ref 12.3–15.4)
GFR SERPLBLD CREATININE-BSD FMLA CKD-EPI: 64 ML/MIN/1.73
GFR SERPLBLD CREATININE-BSD FMLA CKD-EPI: 73 ML/MIN/1.73
GLOBULIN SER CALC-MCNC: 3.9 G/DL (ref 1.5–4.5)
GLUCOSE SERPL-MCNC: 101 MG/DL (ref 65–99)
HCT VFR BLD AUTO: 31.2 % (ref 34–46.6)
HDLC SERPL-MCNC: 39 MG/DL
HGB BLD-MCNC: 10.1 G/DL (ref 11.1–15.9)
INTERPRETATION, 910389: NORMAL
LDLC SERPL CALC-MCNC: 160 MG/DL (ref 0–99)
MCH RBC QN AUTO: 29.7 PG (ref 26.6–33)
MCHC RBC AUTO-ENTMCNC: 32.4 G/DL (ref 31.5–35.7)
MCV RBC AUTO: 92 FL (ref 79–97)
PLATELET # BLD AUTO: 256 X10E3/UL (ref 150–379)
POTASSIUM SERPL-SCNC: 4.4 MMOL/L (ref 3.5–5.2)
PROT SERPL-MCNC: 8.1 G/DL (ref 6–8.5)
RBC # BLD AUTO: 3.4 X10E6/UL (ref 3.77–5.28)
SODIUM SERPL-SCNC: 143 MMOL/L (ref 134–144)
TRIGL SERPL-MCNC: 165 MG/DL (ref 0–149)
VLDLC SERPL CALC-MCNC: 33 MG/DL (ref 5–40)
WBC # BLD AUTO: 3.4 X10E3/UL (ref 3.4–10.8)

## 2018-04-30 RX ORDER — HYDROCHLOROTHIAZIDE 25 MG/1
TABLET ORAL
Qty: 90 TAB | Refills: 1 | Status: SHIPPED | OUTPATIENT
Start: 2018-04-30 | End: 2018-05-03 | Stop reason: ALTCHOICE

## 2018-04-30 RX ORDER — FUROSEMIDE 20 MG/1
TABLET ORAL
Qty: 90 TAB | Refills: 1 | Status: SHIPPED | OUTPATIENT
Start: 2018-04-30 | End: 2018-09-06 | Stop reason: SDUPTHER

## 2018-04-30 RX ORDER — ACETAMINOPHEN AND CODEINE PHOSPHATE 300; 15 MG/1; MG/1
1 TABLET ORAL
Qty: 30 TAB | Refills: 0 | Status: SHIPPED | OUTPATIENT
Start: 2018-04-30 | End: 2018-05-03 | Stop reason: ALTCHOICE

## 2018-04-30 RX ORDER — POTASSIUM CHLORIDE 20 MEQ/1
TABLET, EXTENDED RELEASE ORAL
Qty: 180 TAB | Refills: 1 | Status: SHIPPED | OUTPATIENT
Start: 2018-04-30 | End: 2018-06-08 | Stop reason: SDUPTHER

## 2018-04-30 RX ORDER — TRAZODONE HYDROCHLORIDE 100 MG/1
100 TABLET ORAL
Qty: 90 TAB | Refills: 1 | Status: SHIPPED | OUTPATIENT
Start: 2018-04-30 | End: 2018-06-08 | Stop reason: SDUPTHER

## 2018-05-03 ENCOUNTER — OFFICE VISIT (OUTPATIENT)
Dept: FAMILY MEDICINE CLINIC | Age: 78
End: 2018-05-03

## 2018-05-03 VITALS
SYSTOLIC BLOOD PRESSURE: 132 MMHG | TEMPERATURE: 98.5 F | HEIGHT: 67 IN | HEART RATE: 59 BPM | DIASTOLIC BLOOD PRESSURE: 70 MMHG | BODY MASS INDEX: 29.66 KG/M2 | WEIGHT: 189 LBS | RESPIRATION RATE: 18 BRPM | OXYGEN SATURATION: 95 %

## 2018-05-03 DIAGNOSIS — Z00.00 MEDICARE ANNUAL WELLNESS VISIT, SUBSEQUENT: ICD-10-CM

## 2018-05-03 DIAGNOSIS — R25.2 MUSCLE CRAMP: ICD-10-CM

## 2018-05-03 DIAGNOSIS — M17.11 PRIMARY OSTEOARTHRITIS OF RIGHT KNEE: Chronic | ICD-10-CM

## 2018-05-03 DIAGNOSIS — I10 ESSENTIAL HYPERTENSION: Chronic | ICD-10-CM

## 2018-05-03 DIAGNOSIS — M25.561 CHRONIC PAIN OF RIGHT KNEE: Primary | ICD-10-CM

## 2018-05-03 DIAGNOSIS — E78.00 PURE HYPERCHOLESTEROLEMIA: Chronic | ICD-10-CM

## 2018-05-03 DIAGNOSIS — N18.30 STAGE 3 CHRONIC KIDNEY DISEASE (HCC): ICD-10-CM

## 2018-05-03 DIAGNOSIS — Z71.89 DNR (DO NOT RESUSCITATE) DISCUSSION: ICD-10-CM

## 2018-05-03 DIAGNOSIS — G89.29 CHRONIC PAIN OF RIGHT KNEE: Primary | ICD-10-CM

## 2018-05-03 PROBLEM — Z79.899 CONTROLLED SUBSTANCE AGREEMENT SIGNED: Status: RESOLVED | Noted: 2018-02-13 | Resolved: 2018-05-03

## 2018-05-03 RX ORDER — CALCIUM CARBONATE 600 MG
600 TABLET ORAL 2 TIMES DAILY
COMMUNITY

## 2018-05-03 RX ORDER — GLUCOSAMINE SULFATE 1500 MG
POWDER IN PACKET (EA) ORAL DAILY
COMMUNITY

## 2018-05-03 RX ORDER — ATORVASTATIN CALCIUM 10 MG/1
10 TABLET, FILM COATED ORAL DAILY
Qty: 90 TAB | Refills: 3 | Status: SHIPPED | OUTPATIENT
Start: 2018-05-03 | End: 2018-06-08 | Stop reason: SDUPTHER

## 2018-05-03 NOTE — PATIENT INSTRUCTIONS

## 2018-05-03 NOTE — PROGRESS NOTES
Assessment/Plan:    1. Chronic pain of right knee from OA knee- pt wishes to avoid surgery but states tylenol #2 is inadequate to control pain. She tries to avoid NSAIDs due to CKD. Will refer to pain management.   -     REFERRAL TO PAIN MANAGEMENT    2. Muscle cramp- ?side effect from hctz. Stop hctz. F/u in 1mo. 3. Pure hypercholesterolemia  -resume statin  -     atorvastatin (LIPITOR) 10 mg tablet; Take 1 Tab by mouth daily. 4. Essential hypertension  -f/u in 1mo for bp ck off of hctz    5. Medicare annual wellness visit, subsequent    6. Stage 3 chronic kidney disease  -stable    7. DNR (do not resuscitate) discussion  -     DO NOT RESUSCITATE        The plan was discussed with the patient. The patient verbalized understanding and is in agreement with the plan. All medication potential side effects were discussed with the patient. Health Maintenance:   Health Maintenance   Topic Date Due    Pneumococcal 65+ Low/Medium Risk (2 of 2 - PCV13) 05/22/2015    GLAUCOMA SCREENING Q2Y  06/01/2018    Influenza Age 5 to Adult  08/01/2018    MEDICARE YEARLY EXAM  05/04/2019    COLONOSCOPY  12/14/2019    DTaP/Tdap/Td series (2 - Td) 10/20/2026    Bone Densitometry (Dexa) Screening  Completed    ZOSTER VACCINE AGE 60>  Addressed       Dax Londono is a 78 y.o. female and presents with Hypertension and Leg Pain (cramping at night)     Subjective:  Pt c/o R knee pain, chronic. She also has chronic low back pain. She was given tylenol #2 2/2018, but now pt requesting something stronger. She saw Dr. Roxie Willis last year, given joint injection but never returned. She is trying to avoid surgery. Wants to manage her pain with pain meds. She also c/o leg cramping at night. On hctz and potassium. HTN - controlled. ROS:  Constitutional: No recent weight change. No weakness/fatigue. No f/c. Cardiovascular: No CP/palpitations. No CASTRO/orthopnea/PND. Respiratory: No cough/sputum, dyspnea, wheezing. Musculoskeletal: + joint pain/stiffness. No muscle pain/tenderness. Heme: + h/o anemia. No easy bleeding/bruising. Allergy/Immunology: No seasonal rhinitis. Denies frequent colds, sinus/ear infections. Neurological: No seizures/numbness/weakness. No paresthesias. Psychiatric:  No depression, anxiety. The problem list was updated as a part of today's visit. Patient Active Problem List   Diagnosis Code    Muscle spasms of lower extremity M62.838    DDD (degenerative disc disease), lumbar M51.36    Hypertension I10    Hyperlipidemia E78.5    Post-menopausal Z78.0    Vitamin D deficiency E55.9    Osteoarthritis of right knee M17.11    Osteopenia M85.80    Complete rotator cuff tear of left shoulder M75.122    CKD (chronic kidney disease) N18.9    Chronic pain syndrome G89.4    Constipation K59.00    Prediabetes R73.03    Overweight E66.3       The PSH, FH were reviewed. SH:  Social History   Substance Use Topics    Smoking status: Never Smoker    Smokeless tobacco: Never Used    Alcohol use No       Medications/Allergies:  Current Outpatient Prescriptions on File Prior to Visit   Medication Sig Dispense Refill    traZODone (DESYREL) 100 mg tablet Take 1 Tab by mouth nightly. 90 Tab 1    furosemide (LASIX) 20 mg tablet TAKE 1 TABLET EVERY DAY AS NEEDED FOR SWELLING 90 Tab 1    potassium chloride (K-DUR, KLOR-CON) 20 mEq tablet TAKE 1 TABLET daily 180 Tab 1    metoprolol succinate (TOPROL-XL) 100 mg tablet TAKE 1 TABLET BY MOUTH EVERY DAY 90 Tab 0     No current facility-administered medications on file prior to visit.          No Known Allergies    Objective:  Visit Vitals    /70 (BP 1 Location: Left arm, BP Patient Position: Sitting)    Pulse (!) 59    Temp 98.5 °F (36.9 °C) (Oral)    Resp 18    Ht 5' 7\" (1.702 m)    Wt 189 lb (85.7 kg)    SpO2 95%    BMI 29.6 kg/m2      Constitutional: Well developed, nourished, no distress, alert   HENT: Exterior ears and tympanic membranes normal bilaterally. Supple neck. No thyromegaly or lymphadenopathy. Oropharynx clear and moist mucous membranes. Eyes: Conjunctiva normal. PERRL. CV: S1, S2.  RRR. No murmurs/rubs. No thrills palpated. No carotid bruits. Intact distal pulses. No edema. Pulm: No abnormalities on inspection. Clear to auscultation bilaterally. No wheezing/rhonchi. Normal effort. GI: Soft, nontender, nondistended. Normal active bowel sounds. Neuro: A/O x 3. No focal motor or sensory deficits. Speech normal.   Skin: No lesions/rashes on inspection. Psych: Appropriate affect, judgement and insight. Short-term memory intact. Labwork and Ancillary Studies:    CBC w/Diff  Lab Results   Component Value Date/Time    WBC 3.4 04/27/2018 12:00 AM    HGB 10.1 (L) 04/27/2018 12:00 AM    PLATELET 662 04/66/1092 12:00 AM         Basic Metabolic Profile/LFTs  Lab Results   Component Value Date/Time    Sodium 143 04/27/2018 12:00 AM    Potassium 4.4 04/27/2018 12:00 AM    Chloride 101 04/27/2018 12:00 AM    CO2 24 04/27/2018 12:00 AM    Anion gap 7 02/29/2016 04:10 PM    Glucose 101 (H) 04/27/2018 12:00 AM    BUN 11 04/27/2018 12:00 AM    Creatinine 0.89 04/27/2018 12:00 AM    BUN/Creatinine ratio 12 04/27/2018 12:00 AM    GFR est AA 73 04/27/2018 12:00 AM    GFR est non-AA 64 04/27/2018 12:00 AM    Calcium 9.3 04/27/2018 12:00 AM      Lab Results   Component Value Date/Time    ALT (SGPT) 16 04/27/2018 12:00 AM    AST (SGOT) 24 04/27/2018 12:00 AM    Alk.  phosphatase 61 04/27/2018 12:00 AM    Bilirubin, total 0.3 04/27/2018 12:00 AM       Cholesterol  Lab Results   Component Value Date/Time    Cholesterol, total 232 (H) 04/27/2018 12:00 AM    HDL Cholesterol 39 (L) 04/27/2018 12:00 AM    LDL, calculated 160 (H) 04/27/2018 12:00 AM    Triglyceride 165 (H) 04/27/2018 12:00 AM           This is the Subsequent Medicare Annual Wellness Exam, performed 12 months or more after the Initial AWV or the last Subsequent OLGA    I have reviewed the patient's medical history in detail and updated the computerized patient record. History     Past Medical History:   Diagnosis Date    Arthritis     Cataract     Hypercholesterolemia     Hypertension       Past Surgical History:   Procedure Laterality Date    HX APPENDECTOMY      HX CATARACT REMOVAL      HX COLONOSCOPY  12/2009    Dr. Joon Sellers, 10 yr follow up    HX TOTAL ABDOMINAL HYSTERECTOMY       Current Outpatient Prescriptions   Medication Sig Dispense Refill    calcium carbonate (CALTREX) 600 mg calcium (1,500 mg) tablet Take 600 mg by mouth two (2) times a day.  folic acid/multivit-min/lutein (CENTRUM SILVER PO) Take  by mouth.  cholecalciferol (VITAMIN D3) 1,000 unit cap Take  by mouth daily.  atorvastatin (LIPITOR) 10 mg tablet Take 1 Tab by mouth daily. 90 Tab 3    traZODone (DESYREL) 100 mg tablet Take 1 Tab by mouth nightly.  90 Tab 1    furosemide (LASIX) 20 mg tablet TAKE 1 TABLET EVERY DAY AS NEEDED FOR SWELLING 90 Tab 1    potassium chloride (K-DUR, KLOR-CON) 20 mEq tablet TAKE 1 TABLET daily 180 Tab 1    metoprolol succinate (TOPROL-XL) 100 mg tablet TAKE 1 TABLET BY MOUTH EVERY DAY 90 Tab 0     No Known Allergies  Family History   Problem Relation Age of Onset    Diabetes Mother     Hypertension Mother     Cancer Sister      breast    Cancer Maternal Aunt      breast    Stroke Sister      Social History   Substance Use Topics    Smoking status: Never Smoker    Smokeless tobacco: Never Used    Alcohol use No     Patient Active Problem List   Diagnosis Code    Muscle spasms of lower extremity M62.838    DDD (degenerative disc disease), lumbar M51.36    Hypertension I10    Hyperlipidemia E78.5    Post-menopausal Z78.0    Vitamin D deficiency E55.9    Keloid skin disorder L91.0    Osteoarthritis of right knee M17.11    Osteopenia M85.80    Complete rotator cuff tear of left shoulder M75.122    CKD (chronic kidney disease) N18.9    Chronic pain syndrome G89.4    Constipation K59.00    Advance care planning Z71.89    Ear itching L29.9    Prediabetes R73.03    Overweight E66.3    Controlled substance agreement signed Z79.899       Depression Risk Factor Screening:     PHQ over the last two weeks 2/13/2018   Little interest or pleasure in doing things Not at all   Feeling down, depressed or hopeless Not at all   Total Score PHQ 2 0     Alcohol Risk Factor Screening: You do not drink alcohol or very rarely. Functional Ability and Level of Safety:   Hearing Loss  Hearing is good. Activities of Daily Living  The home contains: no safety equipment. Patient does total self care    Fall Risk  Fall Risk Assessment, last 12 mths 5/3/2018   Able to walk? Yes   Fall in past 12 months? No       Abuse Screen  Patient is not abused    Cognitive Screening   Evaluation of Cognitive Function:  Has your family/caregiver stated any concerns about your memory: no  Normal    Patient Care Team   Patient Care Team:  Jcarlos Rojas MD as PCP - General (Internal Medicine)  Yojana Calloway MD (Nephrology)  Miguelina Strickland MD as Consulting Provider (Obstetrics & Gynecology)  Shukri Champion LPN as Staff Nurse    Assessment/Plan   Education and counseling provided:  Are appropriate based on today's review and evaluation  End-of-Life planning (with patient's consent)    Diagnoses and all orders for this visit:    1. Chronic pain of right knee from OA knee- pt wishes to avoid surgery but states tylenol #2 is inadequate to control pain. She tries to avoid NSAIDs due to CKD. Will refer to pain management.   -     REFERRAL TO PAIN MANAGEMENT    2. Muscle cramp- ?side effect from hctz. Stop hctz. F/u in 1mo. 3. Pure hypercholesterolemia  -resume statin  -     atorvastatin (LIPITOR) 10 mg tablet; Take 1 Tab by mouth daily. 4. Essential hypertension  -f/u in 1mo for bp ck off of hctz    5.  Medicare annual wellness visit, subsequent    6. Stage 3 chronic kidney disease  -stable    7.  DNR (do not resuscitate) discussion  -     DO NOT RESUSCITATE      Health Maintenance Due   Topic Date Due    Pneumococcal 65+ Low/Medium Risk (2 of 2 - PCV13) 05/22/2015    GLAUCOMA SCREENING Q2Y  06/01/2018

## 2018-05-03 NOTE — MR AVS SNAPSHOT
Saul Roca 
 
 
 [address] [facility] [address] 
[phone] Patient: Iesha Mitchell MRN: [id number] :1940 Visit Information Date & Time Provider Department Dept. Phone Encounter #  
 5/3/2018  8:30 AM Merle Block [phone] [id number] Upcoming Health Maintenance Date Due Pneumococcal 65+ Low/Medium Risk (2 of 2 - PCV13) 2015 GLAUCOMA SCREENING Q2Y 2018 Influenza Age 5 to Adult 2018 MEDICARE YEARLY EXAM 2019 COLONOSCOPY 2019 DTaP/Tdap/Td series (2 - Td) 10/20/2026 Allergies as of 5/3/2018  Review Complete On: 5/3/2018 By: August ALON Trevizo No Known Allergies Current Immunizations  Reviewed on 2018 Name Date Influenza High Dose Vaccine PF 10/20/2016, 2015  1:42 PM  
 Influenza Vaccine (Quad) PF 2018 12:42 PM  
 Pneumococcal Polysaccharide (PPSV-23) 2014 TB Skin Test (PPD) Intradermal 2015  1:43 PM  
  
 Not reviewed this visit You Were Diagnosed With   
  
 Codes Comments Chronic pain of right knee    -  Primary ICD-10-CM: M25.561, S49.86 ICD-9-CM: 719.46, 338.29 Muscle cramp     ICD-10-CM: R25.2 ICD-9-CM: 729.82 Primary osteoarthritis of right knee     ICD-10-CM: M17.11 ICD-9-CM: 715.16 Pure hypercholesterolemia     ICD-10-CM: E78.00 ICD-9-CM: 272.0 Essential hypertension     ICD-10-CM: I10 
ICD-9-CM: 401.9 Medicare annual wellness visit, subsequent     ICD-10-CM: Z00.00 ICD-9-CM: V70.0 Stage 3 chronic kidney disease     ICD-10-CM: N18.3 ICD-9-CM: 342. 3 DNR (do not resuscitate) discussion     ICD-10-CM: Z71.89 ICD-9-CM: V65.49 Vitals BP Pulse Temp Resp Height(growth percentile) Weight(growth percentile) 132/70 (BP 1 Location: Left arm, BP Patient Position: Sitting) (!) 59 98.5 °F (36.9 °C) (Oral) 18 5' 7\" (1.702 m) 189 lb (85.7 kg) SpO2 BMI OB Status Smoking Status 95% 29.6 kg/m2 Hysterectomy Never Smoker Vitals History BMI and BSA Data Body Mass Index Body Surface Area  
 29.6 kg/m 2 2.01 m 2 Preferred Pharmacy Pharmacy Name Phone [address] [facility] AT [address] [phone] Your Updated Medication List  
  
   
This list is accurate as of 5/3/18  9:03 AM.  Always use your most recent med list.  
  
  
  
  
 atorvastatin 10 mg tablet Commonly known as:  LIPITOR Take 1 Tab by mouth daily. calcium carbonate 600 mg calcium (1,500 mg) tablet Commonly known as:  Ross Damme Take 600 mg by mouth two (2) times a day. CENTRUM SILVER PO Take  by mouth. furosemide 20 mg tablet Commonly known as:  LASIX TAKE 1 TABLET EVERY DAY AS NEEDED FOR SWELLING  
  
 metoprolol succinate 100 mg tablet Commonly known as:  TOPROL-XL  
TAKE 1 TABLET BY MOUTH EVERY DAY  
  
 potassium chloride 20 mEq tablet Commonly known as:  K-DUR, KLOR-CON  
TAKE 1 TABLET daily  
  
 traZODone 100 mg tablet Commonly known as:  Noreene Morteza Take 1 Tab by mouth nightly. VITAMIN D3 1,000 unit Cap Generic drug:  cholecalciferol Take  by mouth daily. Prescriptions Sent to Pharmacy Refills  
 atorvastatin (LIPITOR) 10 mg tablet 3 Sig: Take 1 Tab by mouth daily. Class: Normal  
 Pharmacy: [facility] [address] AT [address] Ph #: [phone] Route: Oral  
  
We Performed the Following DO NOT RESUSCITATE Belen Ofe REFERRAL TO PAIN MANAGEMENT [[id number] Custom] Referral Information Referral ID Referred By Referred To  
  
 [id number] [facility], [place] MD   
   [address] [place] [address] [facility] Phone: [phone] Fax: [phone] Visits Status Start Date End Date 1 New Request 5/3/18 5/3/19 If your referral has a status of pending review or denied, additional information will be sent to support the outcome of this decision. Patient Instructions Medicare Wellness Visit, Female The best way to live healthy is to have a healthy lifestyle by eating a well-balanced diet, exercising regularly, limiting alcohol and stopping smoking. Regular physical exams and screening tests are another way to keep healthy. Preventive exams provided by your health care provider can find health problems before they become diseases or illnesses. Preventive services including immunizations, screening tests, monitoring and exams can help you take care of your own health. All people over age 72 should have a pneumovax  and and a prevnar shot to prevent pneumonia. These are once in a lifetime unless you and your provider decide differently. All people over 65 should have a yearly flu shot and a tetanus vaccine every 10 years. A bone mass density to screen for osteoporosis or thinning of the bones should be done every 2 years after 65. Screening for diabetes mellitus with a blood sugar test should be done every year. Glaucoma is a disease of the eye due to increased ocular pressure that can lead to blindness and it should be done every year by an eye professional. 
 
Cardiovascular screening tests that check for elevated lipids (fatty part of blood) which can lead to heart disease and strokes should be done every 5 years. Colorectal screening that evaluates for blood or polyps in your colon should be done yearly as a stool test or every five years as a flexible sigmoidoscope or every 10 years as a colonoscopy up to age 76. Breast cancer screening with a mammogram is recommended biennially  for women age 54-69.  
 
Screening for cervical cancer with a pap smear and pelvic exam is recommended for women after age 72 years every 2 years up to age 79 or when the provider and patient decide to stop. If there is a history of cervical abnormalities or other increased risk for cancer then the test is recommended yearly. Hepatitis C screening is also recommended for anyone born between [birth year] through [address]. A shingles vaccine is also recommended once in a lifetime after age 61. Your Medicare Wellness Exam is recommended annually. Here is a list of your current Health Maintenance items with a due date: 
Health Maintenance Due Topic Date Due  Pneumococcal Vaccine (2 of 2 - PCV13) 05/22/2015 Debra Berkowitz Annual Well Visit  03/14/2018  Glaucoma Screening   06/01/2018 Introducing [facility]! [employer] introduces [employer] patient portal. Now you can access parts of your medical record, email your doctor's office, and request medication refills online. 1. In your internet browser, go to [email or web address] 2. Click on the First Time User? Click Here link in the Sign In box. You will see the New Member Sign Up page. 3. Enter your TransNet Access Code exactly as it appears below. You will not need to use this code after youve completed the sign-up process. If you do not sign up before the expiration date, you must request a new code. · TransNet Access Code: [email or web address] Expires: 5/14/2018 12:20 PM 
 
4. Enter the last four digits of your Social Security Number (xxxx) and Date of Birth (mm/dd/yyyy) as indicated and click Submit. You will be taken to the next sign-up page. 5. Create a Dynist ID. This will be your TransNet login ID and cannot be changed, so think of one that is secure and easy to remember. 6. Create a Dynist password. You can change your password at any time. 7. Enter your Password Reset Question and Answer. This can be used at a later time if you forget your password. 8. Enter your e-mail address. You will receive e-mail notification when new information is available in [address] 9. Click Sign Up. You can now view and download portions of your medical record. 10. Click the Download Summary menu link to download a portable copy of your medical information. If you have questions, please visit the Frequently Asked Questions section of the codesy website. Remember, codesy is NOT to be used for urgent needs. For medical emergencies, dial 911. Now available from your iPhone and Android! Please provide this summary of care documentation to your next provider. Your primary care clinician is listed as Shanita 13. If you have any questions after today's visit, please call [phone].

## 2018-05-03 NOTE — ACP (ADVANCE CARE PLANNING)
Advance Care Planning (ACP) Provider Conversation Snapshot    Date of ACP Conversation: 05/03/18  Persons included in Conversation:  patient  Length of ACP Conversation in minutes:  <16 minutes (Non-Billable)    Authorized Decision Maker (if patient is incapable of making informed decisions):    This person is:   Eric Howeut [phone]          For Patients with Decision Making Capacity:   DNR    Conversation Outcomes / Follow-Up Plan:   Recommended completion of Advance Directive form after review of ACP materials and conversation with prospective healthcare agent   Entered DNR order (If yes, complete Durable DNR form)

## 2018-05-17 ENCOUNTER — TELEPHONE (OUTPATIENT)
Dept: FAMILY MEDICINE CLINIC | Age: 78
End: 2018-05-17

## 2018-05-17 NOTE — TELEPHONE ENCOUNTER
Patient left msg at nurse station. She wants an urgent referral to ENT to eval R ear pain. She states she had bad pain and fullness, R ear, went to ED Sunday 5/12. She was given abx ear drops and treated poorly she states. I gave her the number for [facility], she will call to schedule.

## 2018-05-23 ENCOUNTER — TELEPHONE (OUTPATIENT)
Dept: FAMILY MEDICINE CLINIC | Age: 78
End: 2018-05-23

## 2018-05-23 NOTE — TELEPHONE ENCOUNTER
Dr Thomas Doctor office called to inform us that unfortunately they do not accept pt's ins [facility]. They do accept [facility] PPO however.

## 2018-06-07 ENCOUNTER — OFFICE VISIT (OUTPATIENT)
Dept: FAMILY MEDICINE CLINIC | Age: 78
End: 2018-06-07

## 2018-06-07 VITALS
BODY MASS INDEX: 29.51 KG/M2 | OXYGEN SATURATION: 99 % | TEMPERATURE: 98.9 F | HEART RATE: 72 BPM | DIASTOLIC BLOOD PRESSURE: 78 MMHG | SYSTOLIC BLOOD PRESSURE: 130 MMHG | WEIGHT: 188 LBS | HEIGHT: 67 IN | RESPIRATION RATE: 20 BRPM

## 2018-06-07 DIAGNOSIS — M17.11 PRIMARY OSTEOARTHRITIS OF RIGHT KNEE: Chronic | ICD-10-CM

## 2018-06-07 DIAGNOSIS — M51.36 DDD (DEGENERATIVE DISC DISEASE), LUMBAR: Chronic | ICD-10-CM

## 2018-06-07 DIAGNOSIS — B35.4 TINEA CORPORIS: ICD-10-CM

## 2018-06-07 DIAGNOSIS — I10 ESSENTIAL HYPERTENSION: Primary | Chronic | ICD-10-CM

## 2018-06-07 RX ORDER — CLOTRIMAZOLE AND BETAMETHASONE DIPROPIONATE 10; .64 MG/G; MG/G
CREAM TOPICAL
Qty: 45 G | Refills: 0 | Status: SHIPPED | OUTPATIENT
Start: 2018-06-07 | End: 2018-10-09 | Stop reason: ALTCHOICE

## 2018-06-07 RX ORDER — ACETAMINOPHEN AND CODEINE PHOSPHATE 300; 30 MG/1; MG/1
1 TABLET ORAL
Qty: 20 TAB | Refills: 0 | Status: SHIPPED | OUTPATIENT
Start: 2018-06-07 | End: 2018-08-13 | Stop reason: SDUPTHER

## 2018-06-07 NOTE — PROGRESS NOTES
Alesha Adhikari is a 78 y.o. female (: 1940) presenting to address:    Chief Complaint   Patient presents with    Hypertension       Vitals:    18 0913   BP: 130/78   Pulse: 72   Resp: 20   Temp: 98.9 °F (37.2 °C)   TempSrc: Oral   SpO2: 99%   Weight: 188 lb (85.3 kg)   Height: 5' 7\" (1.702 m)   PainSc:   0 - No pain       Learning Assessment:     Learning Assessment 2/3/2015   PRIMARY LEARNER Patient   HIGHEST LEVEL OF EDUCATION - PRIMARY LEARNER  GRADUATED HIGH SCHOOL OR GED   BARRIERS PRIMARY LEARNER NONE   CO-LEARNER CAREGIVER No   PRIMARY LANGUAGE ENGLISH   LEARNER PREFERENCE PRIMARY READING   ANSWERED BY self   RELATIONSHIP SELF     Depression Screening:     PHQ over the last two weeks 2018   Little interest or pleasure in doing things Not at all   Feeling down, depressed or hopeless Not at all   Total Score PHQ 2 0     Fall Risk Assessment:     Fall Risk Assessment, last 12 mths 5/3/2018   Able to walk? Yes   Fall in past 12 months? No     Abuse Screening:     Abuse Screening Questionnaire 5/3/2018   Do you ever feel afraid of your partner? N   Are you in a relationship with someone who physically or mentally threatens you? N   Is it safe for you to go home? Y     Coordination of Care Questionaire:   1. Have you been to the ER, urgent care clinic since your last visit? Hospitalized since your last visit? NO    2. Have you seen or consulted any other health care providers outside of the [address] [place] [address] since your last visit? Include any pap smears or colon screening. NO    Advanced Directive:   1. Do you have an Advanced Directive? YES    2. Would you like information on Advanced Directives?  NO

## 2018-06-07 NOTE — PROGRESS NOTES
Assessment/Plan:    1. Essential hypertension  -stay off hctz.  bp ok    2. Tinea corporis  -lotrisone  - clotrimazole-betamethasone (LOTRISONE) topical cream; Apply pea-sized amount to lesions bid x 7-10d  Dispense: 45 g; Refill: 0    3. Primary osteoarthritis of right knee and DDD lumbar  -pt did not go to pain management as referred. Requesting tylenol #3. She only uses it about 2-3x/week. F/u in 3mos. - acetaminophen-codeine (TYLENOL #3) 300-30 mg per tablet; Take 1 Tab by mouth daily as needed for Pain. Dispense: 20 Tab; Refill: 0      The plan was discussed with the patient. The patient verbalized understanding and is in agreement with the plan. All medication potential side effects were discussed with the patient. Health Maintenance:   Health Maintenance   Topic Date Due    Pneumococcal 65+ Low/Medium Risk (2 of 2 - PCV13) 05/22/2015    GLAUCOMA SCREENING Q2Y  06/01/2018    Influenza Age 5 to Adult  08/01/2018    MEDICARE YEARLY EXAM  05/04/2019    COLONOSCOPY  12/14/2019    DTaP/Tdap/Td series (2 - Td) 10/20/2026    Bone Densitometry (Dexa) Screening  Completed    ZOSTER VACCINE AGE 60>  Addressed       Coy Armando is a 78 y.o. female and presents with Hypertension     Subjective:  HTN - hctz stopped last month due to side effect of muscle cramps. Her cramps have improved. bp is within range. She is requesting pain medication for her chronic OA knee pain and low back pain from DDD. Previously on tylenol #2, which was ineffective. I referred her to pain management, but she never went. She is requesting tylenol #3 to use sparingly 2x/week to help with pain. ROS:  Constitutional: No recent weight change. No weakness/fatigue. No f/c. Cardiovascular: No CP/palpitations. No CASTRO/orthopnea/PND. Respiratory: No cough/sputum, dyspnea, wheezing. Gastointestinal: No dysphagia, reflux. No n/v. No constipation/diarrhea. No melena/rectal bleeding.    Genitourinary: No dysuria, urinary hesitancy, nocturia, hematuria. No incontinence. Musculoskeletal: + joint pain/stiffness. No muscle pain/tenderness. The problem list was updated as a part of today's visit. Patient Active Problem List   Diagnosis Code    Muscle spasms of lower extremity M62.838    DDD (degenerative disc disease), lumbar M51.36    Hypertension I10    Hyperlipidemia E78.5    Post-menopausal Z78.0    Vitamin D deficiency E55.9    Osteoarthritis of right knee M17.11    Osteopenia M85.80    Complete rotator cuff tear of left shoulder M75.122    CKD (chronic kidney disease) N18.9    Chronic pain syndrome G89.4    Constipation K59.00    Prediabetes R73.03    Overweight E66.3       The PSH, FH were reviewed. SH:  Social History   Substance Use Topics    Smoking status: Never Smoker    Smokeless tobacco: Never Used    Alcohol use No       Medications/Allergies:  Current Outpatient Prescriptions on File Prior to Visit   Medication Sig Dispense Refill    calcium carbonate (CALTREX) 600 mg calcium (1,500 mg) tablet Take 600 mg by mouth two (2) times a day.  folic acid/multivit-min/lutein (CENTRUM SILVER PO) Take  by mouth.  cholecalciferol (VITAMIN D3) 1,000 unit cap Take  by mouth daily.  atorvastatin (LIPITOR) 10 mg tablet Take 1 Tab by mouth daily. 90 Tab 3    traZODone (DESYREL) 100 mg tablet Take 1 Tab by mouth nightly. 90 Tab 1    furosemide (LASIX) 20 mg tablet TAKE 1 TABLET EVERY DAY AS NEEDED FOR SWELLING 90 Tab 1    potassium chloride (K-DUR, KLOR-CON) 20 mEq tablet TAKE 1 TABLET daily 180 Tab 1    metoprolol succinate (TOPROL-XL) 100 mg tablet TAKE 1 TABLET BY MOUTH EVERY DAY 90 Tab 0     No current facility-administered medications on file prior to visit.          No Known Allergies    Objective:  Visit Vitals    /78 (BP 1 Location: Left arm, BP Patient Position: Sitting)    Pulse 72    Temp 98.9 °F (37.2 °C) (Oral)    Resp 20    Ht 5' 7\" (1.702 m)    Wt 188 lb (85.3 kg)    SpO2 99%    BMI 29.44 kg/m2      Constitutional: Well developed, nourished, no distress, alert   CV: S1, S2.  RRR. No murmurs/rubs. No thrills palpated. No carotid bruits. Intact distal pulses. No edema. Pulm: No abnormalities on inspection. Clear to auscultation bilaterally. No wheezing/rhonchi. Normal effort. GI: Soft, nontender, nondistended. Normal active bowel sounds. Skin: hypopigmented scaling patches on bilat arms. Labwork and Ancillary Studies:    CBC w/Diff  Lab Results   Component Value Date/Time    WBC 3.4 04/27/2018 12:00 AM    HGB 10.1 (L) 04/27/2018 12:00 AM    PLATELET 756 95/19/7791 12:00 AM         Basic Metabolic Profile/LFTs  Lab Results   Component Value Date/Time    Sodium 143 04/27/2018 12:00 AM    Potassium 4.4 04/27/2018 12:00 AM    Chloride 101 04/27/2018 12:00 AM    CO2 24 04/27/2018 12:00 AM    Anion gap 7 02/29/2016 04:10 PM    Glucose 101 (H) 04/27/2018 12:00 AM    BUN 11 04/27/2018 12:00 AM    Creatinine 0.89 04/27/2018 12:00 AM    BUN/Creatinine ratio 12 04/27/2018 12:00 AM    GFR est AA 73 04/27/2018 12:00 AM    GFR est non-AA 64 04/27/2018 12:00 AM    Calcium 9.3 04/27/2018 12:00 AM      Lab Results   Component Value Date/Time    ALT (SGPT) 16 04/27/2018 12:00 AM    AST (SGOT) 24 04/27/2018 12:00 AM    Alk.  phosphatase 61 04/27/2018 12:00 AM    Bilirubin, total 0.3 04/27/2018 12:00 AM       Cholesterol  Lab Results   Component Value Date/Time    Cholesterol, total 232 (H) 04/27/2018 12:00 AM    HDL Cholesterol 39 (L) 04/27/2018 12:00 AM    LDL, calculated 160 (H) 04/27/2018 12:00 AM    Triglyceride 165 (H) 04/27/2018 12:00 AM

## 2018-06-07 NOTE — TELEPHONE ENCOUNTER
Patient pharmacy is requesting a med refill of Acetaminophen 300 mg -codeine 15 mg 90 day supply    Last visit: 6/7/18    Last refill: 6/7/18-  bS

## 2018-06-07 NOTE — MR AVS SNAPSHOT
[address] [place] [address]  [address] [facility] [id number] [phone] Patient: Katherin Almaraz MRN: [id number] :1940 Visit Information Date & Time Provider Department Dept. Phone Encounter #  
 2018  9:30 [phone] [id number] Upcoming Health Maintenance Date Due Pneumococcal 65+ Low/Medium Risk (2 of 2 - PCV13) 2015 GLAUCOMA SCREENING Q2Y 2018 Influenza Age 5 to Adult 2018 MEDICARE YEARLY EXAM 2019 COLONOSCOPY 2019 DTaP/Tdap/Td series (2 - Td) 10/20/2026 Allergies as of 2018  Review Complete On: 2018 By: Josias Stanley No Known Allergies Current Immunizations  Reviewed on 2018 Name Date Influenza High Dose Vaccine PF 10/20/2016, 2015  1:42 PM  
 Influenza Vaccine (Quad) PF 2018 12:42 PM  
 Pneumococcal Polysaccharide (PPSV-23) 2014 TB Skin Test (PPD) Intradermal 2015  1:43 PM  
  
 Not reviewed this visit You Were Diagnosed With   
  
 Codes Comments Essential hypertension    -  Primary ICD-10-CM: I10 
ICD-9-CM: 401.9 Tinea corporis     ICD-10-CM: B35.4 ICD-9-CM: 110.5 Primary osteoarthritis of right knee     ICD-10-CM: M17.11 ICD-9-CM: 715.16   
 DDD (degenerative disc disease), lumbar     ICD-10-CM: M51.36 
ICD-9-CM: 722.52 Vitals BP Pulse Temp Resp Height(growth percentile) Weight(growth percentile) 130/78 (BP 1 Location: Left arm, BP Patient Position: Sitting) 72 98.9 °F (37.2 °C) (Oral) 20 5' 7\" (1.702 m) 188 lb (85.3 kg) SpO2 BMI OB Status Smoking Status 99% 29.44 kg/m2 Hysterectomy Never Smoker Vitals History BMI and BSA Data Body Mass Index Body Surface Area  
 29.44 kg/m 2 2.01 m 2 Preferred Pharmacy Pharmacy Name Phone  [address]  AT [facility] [address] [facility] [phone] Your Updated Medication List  
  
   
This list is accurate as of 6/7/18  9:33 AM.  Always use your most recent med list.  
  
  
  
  
 acetaminophen-codeine 300-30 mg per tablet Commonly known as:  TYLENOL #3 Take 1 Tab by mouth daily as needed for Pain. atorvastatin 10 mg tablet Commonly known as:  LIPITOR Take 1 Tab by mouth daily. calcium carbonate 600 mg calcium (1,500 mg) tablet Commonly known as:  Christine Last Take 600 mg by mouth two (2) times a day. CENTRUM SILVER PO Take  by mouth. clotrimazole-betamethasone topical cream  
Commonly known as:  Remigio Kussmaul Apply pea-sized amount to lesions bid x 7-10d  
  
 furosemide 20 mg tablet Commonly known as:  LASIX TAKE 1 TABLET EVERY DAY AS NEEDED FOR SWELLING  
  
 metoprolol succinate 100 mg tablet Commonly known as:  TOPROL-XL  
TAKE 1 TABLET BY MOUTH EVERY DAY  
  
 potassium chloride 20 mEq tablet Commonly known as:  K-DUR, KLOR-CON  
TAKE 1 TABLET daily  
  
 traZODone 100 mg tablet Commonly known as:  Natalie Hail Take 1 Tab by mouth nightly. VITAMIN D3 1,000 unit Cap Generic drug:  cholecalciferol Take  by mouth daily. Prescriptions Printed Refills  
 acetaminophen-codeine (TYLENOL #3) 300-30 mg per tablet 0 Sig: Take 1 Tab by mouth daily as needed for Pain. Class: Print Route: Oral  
  
Prescriptions Sent to Pharmacy Refills  
 clotrimazole-betamethasone (LOTRISONE) topical cream 0 Sig: Apply pea-sized amount to lesions bid x 7-10d Class: Normal  
 Pharmacy: [facility] [address] [place] [address]  AT [address] Ph #: [phone] Introducing [facility] & HEALTH SERVICES! [facility] introduces [employer] patient portal. Now you can access parts of your medical record, email your doctor's office, and request medication refills online.    
 
1. In your internet browser, go to [email or web address] 2. Click on the First Time User? Click Here link in the Sign In box. You will see the New Member Sign Up page. 3. Enter your AX[employer] Access Code exactly as it appears below. You will not need to use this code after youve completed the sign-up process. If you do not sign up before the expiration date, you must request a new code. · [employer] Access Code: [email or web address] Expires: 9/5/2018  9:33 AM 
 
4. Enter the last four digits of your Social Security Number (xxxx) and Date of Birth (mm/dd/yyyy) as indicated and click Submit. You will be taken to the next sign-up page. 5. Create a AXSionics ID. This will be your AX[employer] login ID and cannot be changed, so think of one that is secure and easy to remember. 6. Create a [employer] password. You can change your password at any time. 7. Enter your Password Reset Question and Answer. This can be used at a later time if you forget your password. 8. Enter your e-mail address. You will receive e-mail notification when new information is available in [address] 9. Click Sign Up. You can now view and download portions of your medical record. 10. Click the Download Summary menu link to download a portable copy of your medical information. If you have questions, please visit the Frequently Asked Questions section of the [employer] website. Remember, AXS[employer]ics is NOT to be used for urgent needs. For medical emergencies, dial 911. Now available from your iPhone and Android! Please provide this summary of care documentation to your next provider. Your primary care clinician is listed as Shanita 13. If you have any questions after today's visit, please call [phone].

## 2018-06-08 DIAGNOSIS — G47.00 INSOMNIA, UNSPECIFIED TYPE: ICD-10-CM

## 2018-06-08 DIAGNOSIS — I10 ESSENTIAL HYPERTENSION: ICD-10-CM

## 2018-06-08 RX ORDER — ACETAMINOPHEN AND CODEINE PHOSPHATE 300; 30 MG/1; MG/1
1 TABLET ORAL
Qty: 20 TAB | Refills: 0 | OUTPATIENT
Start: 2018-06-08

## 2018-06-08 RX ORDER — POTASSIUM CHLORIDE 20 MEQ/1
TABLET, EXTENDED RELEASE ORAL
Qty: 180 TAB | Refills: 1 | Status: SHIPPED | OUTPATIENT
Start: 2018-06-08 | End: 2019-07-18 | Stop reason: SDUPTHER

## 2018-06-08 RX ORDER — ATORVASTATIN CALCIUM 10 MG/1
10 TABLET, FILM COATED ORAL DAILY
Qty: 90 TAB | Refills: 1 | Status: SHIPPED | OUTPATIENT
Start: 2018-06-08 | End: 2018-10-09 | Stop reason: SDUPTHER

## 2018-06-08 RX ORDER — METOPROLOL SUCCINATE 100 MG/1
TABLET, EXTENDED RELEASE ORAL
Qty: 90 TAB | Refills: 1 | Status: SHIPPED | OUTPATIENT
Start: 2018-06-08 | End: 2018-10-09 | Stop reason: SDUPTHER

## 2018-06-08 RX ORDER — TRAZODONE HYDROCHLORIDE 100 MG/1
100 TABLET ORAL
Qty: 90 TAB | Refills: 1 | Status: SHIPPED | OUTPATIENT
Start: 2018-06-08 | End: 2018-11-20 | Stop reason: SDUPTHER

## 2018-06-12 ENCOUNTER — TELEPHONE (OUTPATIENT)
Dept: FAMILY MEDICINE CLINIC | Age: 78
End: 2018-06-12

## 2018-06-12 RX ORDER — HYDROCHLOROTHIAZIDE 25 MG/1
25 TABLET ORAL DAILY
Qty: 90 TAB | Refills: 1 | Status: SHIPPED | OUTPATIENT
Start: 2018-06-12 | End: 2018-10-09 | Stop reason: ALTCHOICE

## 2018-06-12 NOTE — TELEPHONE ENCOUNTER
Pt left msg on nurse VM asking if she can go back on original BP med, her ankles \"are swelling something terrible\".

## 2018-08-13 ENCOUNTER — OFFICE VISIT (OUTPATIENT)
Dept: FAMILY MEDICINE CLINIC | Age: 78
End: 2018-08-13

## 2018-08-13 VITALS
HEIGHT: 67 IN | OXYGEN SATURATION: 98 % | WEIGHT: 187 LBS | RESPIRATION RATE: 18 BRPM | TEMPERATURE: 98.1 F | SYSTOLIC BLOOD PRESSURE: 120 MMHG | DIASTOLIC BLOOD PRESSURE: 80 MMHG | BODY MASS INDEX: 29.35 KG/M2 | HEART RATE: 77 BPM

## 2018-08-13 DIAGNOSIS — I10 ESSENTIAL HYPERTENSION: Chronic | ICD-10-CM

## 2018-08-13 DIAGNOSIS — M17.11 PRIMARY OSTEOARTHRITIS OF RIGHT KNEE: Chronic | ICD-10-CM

## 2018-08-13 DIAGNOSIS — M51.36 DDD (DEGENERATIVE DISC DISEASE), LUMBAR: Chronic | ICD-10-CM

## 2018-08-13 DIAGNOSIS — M65.341 TRIGGER RING FINGER OF RIGHT HAND: Primary | ICD-10-CM

## 2018-08-13 DIAGNOSIS — J20.8 VIRAL BRONCHITIS: ICD-10-CM

## 2018-08-13 DIAGNOSIS — M19.042 ARTHRITIS OF BOTH HANDS: ICD-10-CM

## 2018-08-13 DIAGNOSIS — G89.4 CHRONIC PAIN SYNDROME: ICD-10-CM

## 2018-08-13 DIAGNOSIS — M19.041 ARTHRITIS OF BOTH HANDS: ICD-10-CM

## 2018-08-13 RX ORDER — ACETAMINOPHEN AND CODEINE PHOSPHATE 300; 30 MG/1; MG/1
1 TABLET ORAL
Qty: 20 TAB | Refills: 0 | Status: SHIPPED | OUTPATIENT
Start: 2018-08-13 | End: 2018-10-09 | Stop reason: SDUPTHER

## 2018-08-13 NOTE — PROGRESS NOTES
Siir Lazaro is a 78 y.o. female (: 1940) presenting to address:    Chief Complaint   Patient presents with    Hypertension    Medication Refill       Vitals:    18 1016   BP: 120/80   Pulse: 77   Resp: 18   Temp: 98.1 °F (36.7 °C)   TempSrc: Oral   SpO2: 98%   Weight: 187 lb (84.8 kg)   Height: 5' 7\" (1.702 m)   PainSc:   0 - No pain         Learning Assessment:     Learning Assessment 2/3/2015   PRIMARY LEARNER Patient   HIGHEST LEVEL OF EDUCATION - PRIMARY LEARNER  GRADUATED HIGH SCHOOL OR GED   BARRIERS PRIMARY LEARNER NONE   CO-LEARNER CAREGIVER No   PRIMARY LANGUAGE ENGLISH   LEARNER PREFERENCE PRIMARY READING   ANSWERED BY self   RELATIONSHIP SELF     Depression Screening:     PHQ over the last two weeks 2018   Little interest or pleasure in doing things Not at all   Feeling down, depressed, irritable, or hopeless Not at all   Total Score PHQ 2 0     Fall Risk Assessment:     Fall Risk Assessment, last 12 mths 5/3/2018   Able to walk? Yes   Fall in past 12 months? No     Abuse Screening:     Abuse Screening Questionnaire 5/3/2018   Do you ever feel afraid of your partner? N   Are you in a relationship with someone who physically or mentally threatens you? N   Is it safe for you to go home? Y     Coordination of Care Questionaire:   1. Have you been to the ER, urgent care clinic since your last visit? Hospitalized since your last visit? NO    2. Have you seen or consulted any other health care providers outside of the [address] [place] [address] since your last visit? Include any pap smears or colon screening. YES ENT 2018    Advanced Directive:   1. Do you have an Advanced Directive? YES    2. Would you like information on Advanced Directives?  NO

## 2018-08-13 NOTE — MR AVS SNAPSHOT
[address] [place] [address] 
 
 
 [address] [facility] [id number] [phone] Patient: Rosalie Bruce MRN: [id number] :1940 Visit Information Date & Time Provider Department Dept. Phone Encounter #  
 2018 10:15 AM Jose Gibbs  E Rigoberto  [phone] [id number] Your Appointments 2018  9:30 AM  
Follow Up with Jose Gibbs MD  
[address] [facility] Note: 3 month f/u appt [address] [facility] [address] [phone]  
  
   
 [address] [address] [address] Upcoming Health Maintenance Date Due Pneumococcal 65+ Low/Medium Risk (2 of 2 - PCV13) 2015 Influenza Age 5 to Adult 2018 MEDICARE YEARLY EXAM 2019 COLONOSCOPY 2019 GLAUCOMA SCREENING Q2Y 2020 DTaP/Tdap/Td series (2 - Td) 10/20/2026 Allergies as of 2018  Review Complete On: 2018 By: Jose Gibbs MD  
 No Known Allergies Current Immunizations  Reviewed on 2018 Name Date Influenza High Dose Vaccine PF 10/20/2016, 2015  1:42 PM  
 Influenza Vaccine (Quad) PF 2018 12:42 PM  
 Pneumococcal Polysaccharide (PPSV-23) 2014 TB Skin Test (PPD) Intradermal 2015  1:43 PM  
  
 Not reviewed this visit You Were Diagnosed With   
  
 Codes Comments Trigger ring finger of right hand    -  Primary ICD-10-CM: M65.341 ICD-9-CM: 727.03 Viral bronchitis     ICD-10-CM: J20.8 ICD-9-CM: 466.0 Essential hypertension     ICD-10-CM: I10 
ICD-9-CM: 401.9 Vitals BP Pulse Temp Resp Height(growth percentile) Weight(growth percentile) 120/80 (BP 1 Location: Left arm, BP Patient Position: Sitting) 77 98.1 °F (36.7 °C) (Oral) 18 5' 7\" (1.702 m) 187 lb (84.8 kg) SpO2 BMI OB Status Smoking Status 98% 29.29 kg/m2 Hysterectomy Never Smoker Vitals History BMI and BSA Data Body Mass Index Body Surface Area  
 29.29 kg/m 2 2 m 2 Preferred Pharmacy Pharmacy Name Phone [facility] [address] [place] [address] [phone] Your Updated Medication List  
  
   
This list is accurate as of 8/13/18 10:36 AM.  Always use your most recent med list.  
  
  
  
  
 acetaminophen-codeine 300-30 mg per tablet Commonly known as:  TYLENOL #3 Take 1 Tab by mouth daily as needed for Pain. atorvastatin 10 mg tablet Commonly known as:  LIPITOR Take 1 Tab by mouth daily. calcium carbonate 600 mg calcium (1,500 mg) tablet Commonly known as:  Lurlean Abdullahi Take 600 mg by mouth two (2) times a day. CENTRUM SILVER PO Take  by mouth. clotrimazole-betamethasone topical cream  
Commonly known as:  Valla Bail Apply pea-sized amount to lesions bid x 7-10d  
  
 furosemide 20 mg tablet Commonly known as:  LASIX TAKE 1 TABLET EVERY DAY AS NEEDED FOR SWELLING  
  
 hydroCHLOROthiazide 25 mg tablet Commonly known as:  HYDRODIURIL Take 1 Tab by mouth daily. metoprolol succinate 100 mg tablet Commonly known as:  TOPROL-XL  
TAKE 1 TABLET BY MOUTH EVERY DAY  
  
 potassium chloride 20 mEq tablet Commonly known as:  K-DUR, KLOR-CON  
TAKE 1 TABLET daily  
  
 traZODone 100 mg tablet Commonly known as:  Pinckard Askew Take 1 Tab by mouth nightly. VITAMIN D3 1,000 unit Cap Generic drug:  cholecalciferol Take  by mouth daily. We Performed the Following REFERRAL TO ORTHOPEDICS [UGI839 Custom] Referral Information Referral ID Referred By Referred To  
  
 [id number] 104 Kelly Andrea, [address] [place] [address] MD [address] [place], [address] [place] [address] Phone: [phone] Fax: [phone] Visits Status Start Date End Date 1 New Request 8/13/18 8/13/19  If your referral has a status of pending review or denied, additional information will be sent to support the outcome of this decision. Introducing [facility]! [employer] introduces [employer] patient portal. Now you can access parts of your medical record, email your doctor's office, and request medication refills online. 1. In your internet browser, go to [email or web address] 2. Click on the First Time User? Click Here link in the Sign In box. You will see the New Member Sign Up page. 3. Enter your [employer] Access Code exactly as it appears below. You will not need to use this code after youve completed the sign-up process. If you do not sign up before the expiration date, you must request a new code. · [employer] Access Code: [email or web address] Expires: 9/5/2018  9:33 AM 
 
4. Enter the last four digits of your Social Security Number (xxxx) and Date of Birth (mm/dd/yyyy) as indicated and click Submit. You will be taken to the next sign-up page. 5. Create a [employer] ID. This will be your [employer] login ID and cannot be changed, so think of one that is secure and easy to remember. 6. Create a [employer] password. You can change your password at any time. 7. Enter your Password Reset Question and Answer. This can be used at a later time if you forget your password. 8. Enter your e-mail address. You will receive e-mail notification when new information is available in [address] 9. Click Sign Up. You can now view and download portions of your medical record. 10. Click the Download Summary menu link to download a portable copy of your medical information. If you have questions, please visit the Frequently Asked Questions section of the [employer] website. Remember, [employer] is NOT to be used for urgent needs. For medical emergencies, dial 911. Now available from your iPhone and Android! Please provide this summary of care documentation to your next provider. Your primary care clinician is listed as Shanita 13.  If you have any questions after today's visit, please call [phone].

## 2018-08-13 NOTE — PROGRESS NOTES
Assessment/Plan:    1. Trigger ring finger of right hand  - REFERRAL TO ORTHOPEDICS    2. Viral bronchitis  -consider albuterol if sx persist. No indication for antibiotics at this time. 3. Essential hypertension  -cont current. 4. Chronic pain due to OA - on tylenol #2 prn. Uses sparingly, last refilled 6 mos ago    The plan was discussed with the patient. The patient verbalized understanding and is in agreement with the plan. All medication potential side effects were discussed with the patient. Health Maintenance:   Health Maintenance   Topic Date Due    Pneumococcal 65+ Low/Medium Risk (2 of 2 - PCV13) 05/22/2015    GLAUCOMA SCREENING Q2Y  06/01/2018    Influenza Age 5 to Adult  08/01/2018    MEDICARE YEARLY EXAM  05/04/2019    COLONOSCOPY  12/14/2019    DTaP/Tdap/Td series (2 - Td) 10/20/2026    Bone Densitometry (Dexa) Screening  Completed    ZOSTER VACCINE AGE 60>  Addressed       Camilo Stovall is a 78 y.o. female and presents with Hypertension and Medication Refill     Subjective:  Pt notes one week h/o intermittently productive cough (yellow sputum). No f/c. HTN- bp good. Compliant with meds. Pt notes R ring finger locking in flexed position. ROS:  Constitutional: No recent weight change. No weakness/fatigue. No f/c. Skin: No rashes, change in nails/hair, itching   HENT: No HA, dizziness. No hearing loss/tinnitus. No nasal congestion/discharge. Eyes: No change in vision, double/blurred vision or eye pain/redness. Cardiovascular: No CP/palpitations. No CASTRO/orthopnea/PND. Respiratory: No cough/sputum, dyspnea, wheezing. Gastointestinal: No dysphagia, reflux. No n/v. No constipation/diarrhea. No melena/rectal bleeding. Genitourinary: No dysuria, urinary hesitancy, nocturia, hematuria. No incontinence. Musculoskeletal: No joint pain/stiffness. No muscle pain/tenderness. Endo: No heat/cold intolerance, no polyuria/polydypsia. Heme: No h/o anemia.   No easy bleeding/bruising. Allergy/Immunology: No seasonal rhinitis. Denies frequent colds, sinus/ear infections. Neurological: No seizures/numbness/weakness. No paresthesias. Psychiatric:  No depression, anxiety. The problem list was updated as a part of today's visit. Patient Active Problem List   Diagnosis Code    Muscle spasms of lower extremity M62.838    DDD (degenerative disc disease), lumbar M51.36    Hypertension I10    Hyperlipidemia E78.5    Post-menopausal Z78.0    Vitamin D deficiency E55.9    Osteoarthritis of right knee M17.11    Osteopenia M85.80    Complete rotator cuff tear of left shoulder M75.122    CKD (chronic kidney disease) N18.9    Chronic pain syndrome G89.4    Constipation K59.00    Prediabetes R73.03    Overweight E66.3       The PSH, FH were reviewed. SH:  Social History   Substance Use Topics    Smoking status: Never Smoker    Smokeless tobacco: Never Used    Alcohol use No       Medications/Allergies:  Current Outpatient Prescriptions on File Prior to Visit   Medication Sig Dispense Refill    hydroCHLOROthiazide (HYDRODIURIL) 25 mg tablet Take 1 Tab by mouth daily. 90 Tab 1    traZODone (DESYREL) 100 mg tablet Take 1 Tab by mouth nightly. 90 Tab 1    potassium chloride (K-DUR, KLOR-CON) 20 mEq tablet TAKE 1 TABLET daily 180 Tab 1    atorvastatin (LIPITOR) 10 mg tablet Take 1 Tab by mouth daily. 90 Tab 1    metoprolol succinate (TOPROL-XL) 100 mg tablet TAKE 1 TABLET BY MOUTH EVERY DAY 90 Tab 1    clotrimazole-betamethasone (LOTRISONE) topical cream Apply pea-sized amount to lesions bid x 7-10d 45 g 0    acetaminophen-codeine (TYLENOL #3) 300-30 mg per tablet Take 1 Tab by mouth daily as needed for Pain. 20 Tab 0    calcium carbonate (CALTREX) 600 mg calcium (1,500 mg) tablet Take 600 mg by mouth two (2) times a day.  folic acid/multivit-min/lutein (CENTRUM SILVER PO) Take  by mouth.       cholecalciferol (VITAMIN D3) 1,000 unit cap Take  by mouth daily.  furosemide (LASIX) 20 mg tablet TAKE 1 TABLET EVERY DAY AS NEEDED FOR SWELLING 90 Tab 1     No current facility-administered medications on file prior to visit. No Known Allergies    Objective:  Visit Vitals    /80 (BP 1 Location: Left arm, BP Patient Position: Sitting)    Pulse 77    Temp 98.1 °F (36.7 °C) (Oral)    Resp 18    Ht 5' 7\" (1.702 m)    Wt 187 lb (84.8 kg)    SpO2 98%    BMI 29.29 kg/m2      Constitutional: Well developed, nourished, no distress, alert   HENT: Exterior ears and tympanic membranes normal bilaterally. Supple neck. No thyromegaly or lymphadenopathy. Oropharynx clear and moist mucous membranes. Normal inferior turbinates. Septum midline. Eyes: Conjunctiva normal. PERRL. Eyelids normal.   CV: S1, S2.  RRR. No murmurs/rubs. No thrills palpated. No carotid bruits. Intact distal pulses. No edema. No aortic bruits. Pulm: No abnormalities on inspection. Clear to auscultation bilaterally. No wheezing/rhonchi. Normal effort. MS: Gait normal.  +ulnar deviation R fingers       Labwork and Ancillary Studies:    CBC w/Diff  Lab Results   Component Value Date/Time    WBC 3.4 04/27/2018 12:00 AM    HGB 10.1 (L) 04/27/2018 12:00 AM    PLATELET 248 69/50/0885 12:00 AM         Basic Metabolic Profile/LFTs  Lab Results   Component Value Date/Time    Sodium 143 04/27/2018 12:00 AM    Potassium 4.4 04/27/2018 12:00 AM    Chloride 101 04/27/2018 12:00 AM    CO2 24 04/27/2018 12:00 AM    Anion gap 7 02/29/2016 04:10 PM    Glucose 101 (H) 04/27/2018 12:00 AM    BUN 11 04/27/2018 12:00 AM    Creatinine 0.89 04/27/2018 12:00 AM    BUN/Creatinine ratio 12 04/27/2018 12:00 AM    GFR est AA 73 04/27/2018 12:00 AM    GFR est non-AA 64 04/27/2018 12:00 AM    Calcium 9.3 04/27/2018 12:00 AM      Lab Results   Component Value Date/Time    ALT (SGPT) 16 04/27/2018 12:00 AM    AST (SGOT) 24 04/27/2018 12:00 AM    Alk.  phosphatase 61 04/27/2018 12:00 AM Bilirubin, total 0.3 04/27/2018 12:00 AM       Cholesterol  Lab Results   Component Value Date/Time    Cholesterol, total 232 (H) 04/27/2018 12:00 AM    HDL Cholesterol 39 (L) 04/27/2018 12:00 AM    LDL, calculated 160 (H) 04/27/2018 12:00 AM    Triglyceride 165 (H) 04/27/2018 12:00 AM

## 2018-08-20 ENCOUNTER — TELEPHONE (OUTPATIENT)
Dept: FAMILY MEDICINE CLINIC | Age: 78
End: 2018-08-20

## 2018-08-20 NOTE — TELEPHONE ENCOUNTER
[facility] referred pt to get a referral for rheumatology for arthritis in hands and knees. Pt was seen there for trigger finger, please advise.     Dr. Rodriguez Mercado  [address] [place] [address], [address] [place] [address]

## 2018-09-06 RX ORDER — FUROSEMIDE 20 MG/1
TABLET ORAL
Qty: 90 TAB | Refills: 1 | Status: SHIPPED | OUTPATIENT
Start: 2018-09-06 | End: 2018-10-09 | Stop reason: SDUPTHER

## 2018-10-09 ENCOUNTER — OFFICE VISIT (OUTPATIENT)
Dept: FAMILY MEDICINE CLINIC | Age: 78
End: 2018-10-09

## 2018-10-09 VITALS
HEIGHT: 67 IN | WEIGHT: 190.8 LBS | BODY MASS INDEX: 29.95 KG/M2 | SYSTOLIC BLOOD PRESSURE: 118 MMHG | OXYGEN SATURATION: 95 % | DIASTOLIC BLOOD PRESSURE: 80 MMHG | HEART RATE: 64 BPM | RESPIRATION RATE: 18 BRPM | TEMPERATURE: 98.2 F

## 2018-10-09 DIAGNOSIS — Z79.899 HIGH RISK MEDICATION USE: Primary | ICD-10-CM

## 2018-10-09 DIAGNOSIS — M51.36 DDD (DEGENERATIVE DISC DISEASE), LUMBAR: Chronic | ICD-10-CM

## 2018-10-09 DIAGNOSIS — M17.11 PRIMARY OSTEOARTHRITIS OF RIGHT KNEE: Chronic | ICD-10-CM

## 2018-10-09 DIAGNOSIS — N18.30 STAGE 3 CHRONIC KIDNEY DISEASE (HCC): ICD-10-CM

## 2018-10-09 DIAGNOSIS — Z23 ENCOUNTER FOR IMMUNIZATION: ICD-10-CM

## 2018-10-09 DIAGNOSIS — I10 ESSENTIAL HYPERTENSION: ICD-10-CM

## 2018-10-09 RX ORDER — METOPROLOL SUCCINATE 100 MG/1
TABLET, EXTENDED RELEASE ORAL
Qty: 90 TAB | Refills: 1 | Status: SHIPPED | OUTPATIENT
Start: 2018-10-09 | End: 2019-07-18 | Stop reason: SDUPTHER

## 2018-10-09 RX ORDER — ATORVASTATIN CALCIUM 10 MG/1
10 TABLET, FILM COATED ORAL DAILY
Qty: 90 TAB | Refills: 1 | Status: SHIPPED | OUTPATIENT
Start: 2018-10-09 | End: 2019-07-18 | Stop reason: SDUPTHER

## 2018-10-09 RX ORDER — ACETAMINOPHEN AND CODEINE PHOSPHATE 300; 30 MG/1; MG/1
1 TABLET ORAL
Qty: 20 TAB | Refills: 0 | Status: SHIPPED | OUTPATIENT
Start: 2018-10-09 | End: 2018-11-21 | Stop reason: SDUPTHER

## 2018-10-09 RX ORDER — FUROSEMIDE 20 MG/1
TABLET ORAL
Qty: 90 TAB | Refills: 1 | Status: SHIPPED | OUTPATIENT
Start: 2018-10-09 | End: 2018-11-21 | Stop reason: ALTCHOICE

## 2018-10-09 NOTE — PROGRESS NOTES
Ladonna Gonzalez is a 78 y.o. female (: 1940) presenting to address:    Chief Complaint   Patient presents with    Hypertension    Cholesterol Problem       Vitals:    10/09/18 1413   BP: 118/80   Pulse: 64   Resp: 18   Temp: 98.2 °F (36.8 °C)   TempSrc: Oral   SpO2: 95%   Weight: 190 lb 12.8 oz (86.5 kg)   Height: 5' 7\" (1.702 m)   PainSc:   6   PainLoc: Knee       Learning Assessment:     Learning Assessment 2/3/2015   PRIMARY LEARNER Patient   HIGHEST LEVEL OF EDUCATION - PRIMARY LEARNER  GRADUATED HIGH SCHOOL OR GED   BARRIERS PRIMARY LEARNER NONE   CO-LEARNER CAREGIVER No   PRIMARY LANGUAGE ENGLISH   LEARNER PREFERENCE PRIMARY READING   ANSWERED BY self   RELATIONSHIP SELF     Depression Screening:     PHQ over the last two weeks 2018   Little interest or pleasure in doing things Not at all   Feeling down, depressed, irritable, or hopeless Not at all   Total Score PHQ 2 0     Fall Risk Assessment:     Fall Risk Assessment, last 12 mths 5/3/2018   Able to walk? Yes   Fall in past 12 months? No     Abuse Screening:     Abuse Screening Questionnaire 5/3/2018   Do you ever feel afraid of your partner? N   Are you in a relationship with someone who physically or mentally threatens you? N   Is it safe for you to go home? Y     Coordination of Care Questionaire:   1. Have you been to the ER, urgent care clinic since your last visit? Hospitalized since your last visit? NO    2. Have you seen or consulted any other health care providers outside of the [address] [place] [address] since your last visit? Include any pap smears or colon screening. YES ortho and nephrology 2018     Advanced Directive:   1. Do you have an Advanced Directive? YES    2. Would you like information on Advanced Directives?  NO

## 2018-10-09 NOTE — PROGRESS NOTES
Assessment/Plan:    1. Essential hypertension  -refilled  - metoprolol succinate (TOPROL-XL) 100 mg tablet; TAKE 1 TABLET BY MOUTH EVERY DAY  Dispense: 90 Tab; Refill: 1    2. Stage 3 chronic kidney disease ([address]  -followed by renal    3. Primary osteoarthritis of right knee and DDD- refilled tylenol #3. Controlled substance contract on file. F/u in 3-6 mos, depending on when due for next refill  - acetaminophen-codeine (TYLENOL #3) 300-30 mg per tablet; Take 1 Tab by mouth daily as needed for Pain. Dispense: 20 Tab; Refill: 0      4. Encounter for immunization  - Influenza Vaccine Inactivated (IIV)(FLUAD), Subunit, Adjuvanted, IM, (89738)  - Administration fee () for Medicare insured patients      The plan was discussed with the patient. The patient verbalized understanding and is in agreement with the plan. All medication potential side effects were discussed with the patient. Health Maintenance:   Health Maintenance   Topic Date Due    Shingrix Vaccine Age 49> (1 of 2) 11/27/1992    Pneumococcal 65+ Low/Medium Risk (2 of 2 - PCV13) 05/22/2015    Influenza Age 9 to Adult  08/01/2018    MEDICARE YEARLY EXAM  05/04/2019    COLONOSCOPY  12/14/2019    GLAUCOMA SCREENING Q2Y  06/01/2020    DTaP/Tdap/Td series (2 - Td) 10/20/2026    Bone Densitometry (Dexa) Screening  Completed       Lisa Kramer is a 78 y.o. female and presents with Hypertension and Cholesterol Problem     Subjective:  HTN- bp good. Renal recommended stopping hctz and just doing lasix for swelling (she's using this intermittently). OA knee- requesting refill of tylenol #3    ROS:  Constitutional: No recent weight change. No weakness/fatigue. No f/c. Cardiovascular: No CP/palpitations. No CASTRO/orthopnea/PND. Respiratory: No cough/sputum, dyspnea, wheezing. Gastointestinal: No dysphagia, reflux. No n/v. No constipation/diarrhea. No melena/rectal bleeding.    Genitourinary: No dysuria, urinary hesitancy, nocturia, hematuria. No incontinence. Musculoskeletal: + joint pain/stiffness. No muscle pain/tenderness. The problem list was updated as a part of today's visit. Patient Active Problem List   Diagnosis Code    DDD (degenerative disc disease), lumbar M51.36    Hypertension I10    Hyperlipidemia E78.5    Post-menopausal Z78.0    Vitamin D deficiency E55.9    Osteoarthritis of right knee M17.11    Osteopenia M85.80    Complete rotator cuff tear of left shoulder M75.122    CKD (chronic kidney disease) N18.9    Chronic pain syndrome G89.4    Constipation K59.00    Prediabetes R73.03    Overweight E66.3       The PSH, FH were reviewed. SH:  Social History   Substance Use Topics    Smoking status: Never Smoker    Smokeless tobacco: Never Used    Alcohol use No       Medications/Allergies:  Current Outpatient Prescriptions on File Prior to Visit   Medication Sig Dispense Refill    acetaminophen-codeine (TYLENOL #3) 300-30 mg per tablet Take 1 Tab by mouth daily as needed for Pain. 20 Tab 0    traZODone (DESYREL) 100 mg tablet Take 1 Tab by mouth nightly. 90 Tab 1    potassium chloride (K-DUR, KLOR-CON) 20 mEq tablet TAKE 1 TABLET daily 180 Tab 1    calcium carbonate (CALTREX) 600 mg calcium (1,500 mg) tablet Take 600 mg by mouth two (2) times a day.  folic acid/multivit-min/lutein (CENTRUM SILVER PO) Take  by mouth.  cholecalciferol (VITAMIN D3) 1,000 unit cap Take  by mouth daily. No current facility-administered medications on file prior to visit. No Known Allergies    Objective:  Visit Vitals    /80 (BP 1 Location: Right arm, BP Patient Position: Sitting)    Pulse 64    Temp 98.2 °F (36.8 °C) (Oral)    Resp 18    Ht 5' 7\" (1.702 m)    Wt 190 lb 12.8 oz (86.5 kg)    SpO2 95%    BMI 29.88 kg/m2      Constitutional: Well developed, nourished, no distress, alert, obese habitus   CV: S1, S2.  RRR. No murmurs/rubs. No thrills palpated. No carotid bruits.   Intact distal pulses. No edema. No aortic bruits. Pulm: No abnormalities on inspection. Clear to auscultation bilaterally. No wheezing/rhonchi. Normal effort. Labwork and Ancillary Studies:    CBC w/Diff  Lab Results   Component Value Date/Time    WBC 3.4 04/27/2018 12:00 AM    HGB 10.1 (L) 04/27/2018 12:00 AM    PLATELET 172 16/84/6844 12:00 AM         Basic Metabolic Profile/LFTs  Lab Results   Component Value Date/Time    Sodium 143 04/27/2018 12:00 AM    Potassium 4.4 04/27/2018 12:00 AM    Chloride 101 04/27/2018 12:00 AM    CO2 24 04/27/2018 12:00 AM    Anion gap 7 02/29/2016 04:10 PM    Glucose 101 (H) 04/27/2018 12:00 AM    BUN 11 04/27/2018 12:00 AM    Creatinine 0.89 04/27/2018 12:00 AM    BUN/Creatinine ratio 12 04/27/2018 12:00 AM    GFR est AA 73 04/27/2018 12:00 AM    GFR est non-AA 64 04/27/2018 12:00 AM    Calcium 9.3 04/27/2018 12:00 AM      Lab Results   Component Value Date/Time    ALT (SGPT) 16 04/27/2018 12:00 AM    AST (SGOT) 24 04/27/2018 12:00 AM    Alk.  phosphatase 61 04/27/2018 12:00 AM    Bilirubin, total 0.3 04/27/2018 12:00 AM       Cholesterol  Lab Results   Component Value Date/Time    Cholesterol, total 232 (H) 04/27/2018 12:00 AM    HDL Cholesterol 39 (L) 04/27/2018 12:00 AM    LDL, calculated 160 (H) 04/27/2018 12:00 AM    Triglyceride 165 (H) 04/27/2018 12:00 AM

## 2018-10-09 NOTE — MR AVS SNAPSHOT
[address] [place] [address]  [address] [facility] [id number] [phone] Patient: Frankey Goldberg MRN: [id number] :1940 Visit Information Date & Time Provider Department Dept. Phone Encounter #  
 10/9/2018  2:30 PM Gala Rivas, 3 [facility] [phone] [id number] Upcoming Health Maintenance Date Due Shingrix Vaccine Age 50> (1 of 2) 1992 Pneumococcal 65+ Low/Medium Risk (2 of 2 - PCV13) 2015 Influenza Age 5 to Adult 2018 MEDICARE YEARLY EXAM 2019 COLONOSCOPY 2019 GLAUCOMA SCREENING Q2Y 2020 DTaP/Tdap/Td series (2 - Td) 10/20/2026 Allergies as of 10/9/2018  Review Complete On: 10/9/2018 By: Mariel Lopez No Known Allergies Current Immunizations  Reviewed on 2018 Name Date Influenza High Dose Vaccine PF 10/20/2016, 2015  1:42 PM  
 Influenza Vaccine (Quad) PF 2018 12:42 PM  
 Pneumococcal Polysaccharide (PPSV-23) 2014 TB Skin Test (PPD) Intradermal 2015  1:43 PM  
  
 Not reviewed this visit You Were Diagnosed With   
  
 Codes Comments Essential hypertension     ICD-10-CM: I10 
ICD-9-CM: 401.9 Stage 3 chronic kidney disease (HCC)     ICD-10-CM: N18.3 ICD-9-CM: 585.3 Primary osteoarthritis of right knee     ICD-10-CM: M17.11 ICD-9-CM: 715.16   
 DDD (degenerative disc disease), lumbar     ICD-10-CM: M51.36 
ICD-9-CM: 722.52 Vitals BP Pulse Temp Resp Height(growth percentile) Weight(growth percentile) 118/80 (BP 1 Location: Right arm, BP Patient Position: Sitting) 64 98.2 °F (36.8 °C) (Oral) 18 5' 7\" (1.702 m) 190 lb 12.8 oz (86.5 kg) SpO2 BMI OB Status Smoking Status 95% 29.88 kg/m2 Hysterectomy Never Smoker Vitals History BMI and BSA Data Body Mass Index Body Surface Area  
 29.88 kg/m 2 2.02 m 2 Preferred Pharmacy Pharmacy Name Phone [address] [phone] Your Updated Medication List  
  
   
This list is accurate as of 10/9/18  2:34 PM.  Always use your most recent med list.  
  
  
  
  
 acetaminophen-codeine 300-30 mg per tablet Commonly known as:  TYLENOL #3 Take 1 Tab by mouth daily as needed for Pain. atorvastatin 10 mg tablet Commonly known as:  LIPITOR Take 1 Tab by mouth daily. calcium carbonate 600 mg calcium (1,500 mg) tablet Commonly known as:  Guevara Rho Take 600 mg by mouth two (2) times a day. CENTRUM SILVER PO Take  by mouth. furosemide 20 mg tablet Commonly known as:  LASIX TAKE 1-2 TABLET EVERY DAY AS NEEDED FOR  SWELLING  
  
 metoprolol succinate 100 mg tablet Commonly known as:  TOPROL-XL  
TAKE 1 TABLET BY MOUTH EVERY DAY  
  
 potassium chloride 20 mEq tablet Commonly known as:  K-DUR, KLOR-CON  
TAKE 1 TABLET daily  
  
 traZODone 100 mg tablet Commonly known as:  Paulette Dries Take 1 Tab by mouth nightly. VITAMIN D3 1,000 unit Cap Generic drug:  cholecalciferol Take  by mouth daily. Prescriptions Printed Refills  
 acetaminophen-codeine (TYLENOL #3) 300-30 mg per tablet 0 Sig: Take 1 Tab by mouth daily as needed for Pain. Class: Print Route: Oral  
  
Prescriptions Sent to Pharmacy Refills  
 furosemide (LASIX) 20 mg tablet 1 Sig: TAKE 1-2 TABLET EVERY DAY AS NEEDED FOR  SWELLING Class: Normal  
 Pharmacy: [address] [place] [address] Ph #: [phone]  
 atorvastatin (LIPITOR) 10 mg tablet 1 Sig: Take 1 Tab by mouth daily. Class: Normal  
 Pharmacy: [address] [place] [address] Ph #: [phone] Route: Oral  
 metoprolol succinate (TOPROL-XL) 100 mg tablet 1 Sig: TAKE 1 TABLET BY MOUTH EVERY DAY  Class: Normal  
 Pharmacy: [address], [address] [place] [address] Ph #: [phone] Introducing [facility]! Bonita Baca introduces [employer] patient portal. Now you can access parts of your medical record, email your doctor's office, and request medication refills online. 1. In your internet browser, go to [email or web address] 2. Click on the First Time User? Click Here link in the Sign In box. You will see the New Member Sign Up page. 3. Enter your Adfaces Access Code exactly as it appears below. You will not need to use this code after youve completed the sign-up process. If you do not sign up before the expiration date, you must request a new code. · [employer] Access Code: [email or web address] Expires: 1/7/2019  2:34 PM 
 
4. Enter the last four digits of your Social Security Number (xxxx) and Date of Birth (mm/dd/yyyy) as indicated and click Submit. You will be taken to the next sign-up page. 5. Create a Adfaces ID. This will be your Adfaces login ID and cannot be changed, so think of one that is secure and easy to remember. 6. Create a Adfaces password. You can change your password at any time. 7. Enter your Password Reset Question and Answer. This can be used at a later time if you forget your password. 8. Enter your e-mail address. You will receive e-mail notification when new information is available in [address] 9. Click Sign Up. You can now view and download portions of your medical record. 10. Click the Download Summary menu link to download a portable copy of your medical information. If you have questions, please visit the Frequently Asked Questions section of the [employer] website. Remember, Adfaces is NOT to be used for urgent needs. For medical emergencies, dial 911. Now available from your iPhone and Android! Please provide this summary of care documentation to your next provider. Your primary care clinician is listed as Shanita Vines. If you have any questions after today's visit, please call [phone].

## 2018-10-09 NOTE — PROGRESS NOTES
Immunization/s administered 10/9/2018 by Sergio Christina LPN with guardian's consent. Patient tolerated procedure well. No reactions noted.     Left deltoid, FLUAD

## 2018-11-20 DIAGNOSIS — G47.00 INSOMNIA, UNSPECIFIED TYPE: ICD-10-CM

## 2018-11-20 RX ORDER — HYDROCHLOROTHIAZIDE 25 MG/1
TABLET ORAL
Qty: 90 TAB | Refills: 1 | Status: SHIPPED | OUTPATIENT
Start: 2018-11-20 | End: 2018-11-21 | Stop reason: SDUPTHER

## 2018-11-20 RX ORDER — TRAZODONE HYDROCHLORIDE 100 MG/1
TABLET ORAL
Qty: 90 TAB | Refills: 1 | Status: SHIPPED | OUTPATIENT
Start: 2018-11-20 | End: 2020-07-10 | Stop reason: ALTCHOICE

## 2018-11-21 ENCOUNTER — OFFICE VISIT (OUTPATIENT)
Dept: FAMILY MEDICINE CLINIC | Age: 78
End: 2018-11-21

## 2018-11-21 VITALS
RESPIRATION RATE: 20 BRPM | OXYGEN SATURATION: 99 % | WEIGHT: 195.8 LBS | DIASTOLIC BLOOD PRESSURE: 90 MMHG | HEART RATE: 75 BPM | SYSTOLIC BLOOD PRESSURE: 162 MMHG | BODY MASS INDEX: 30.73 KG/M2 | HEIGHT: 67 IN | TEMPERATURE: 97.8 F

## 2018-11-21 DIAGNOSIS — Z12.39 SCREENING FOR BREAST CANCER: ICD-10-CM

## 2018-11-21 DIAGNOSIS — I10 ESSENTIAL HYPERTENSION: ICD-10-CM

## 2018-11-21 DIAGNOSIS — M85.80 OSTEOPENIA, UNSPECIFIED LOCATION: ICD-10-CM

## 2018-11-21 DIAGNOSIS — Z78.0 POSTMENOPAUSAL: ICD-10-CM

## 2018-11-21 DIAGNOSIS — L91.0 KELOID: Primary | ICD-10-CM

## 2018-11-21 DIAGNOSIS — M51.36 DDD (DEGENERATIVE DISC DISEASE), LUMBAR: Chronic | ICD-10-CM

## 2018-11-21 DIAGNOSIS — M17.11 PRIMARY OSTEOARTHRITIS OF RIGHT KNEE: Chronic | ICD-10-CM

## 2018-11-21 RX ORDER — HYDROCHLOROTHIAZIDE 25 MG/1
TABLET ORAL
Qty: 90 TAB | Refills: 1 | Status: SHIPPED | OUTPATIENT
Start: 2018-11-21 | End: 2019-07-18 | Stop reason: SDUPTHER

## 2018-11-21 RX ORDER — ACETAMINOPHEN AND CODEINE PHOSPHATE 300; 30 MG/1; MG/1
1 TABLET ORAL
Qty: 20 TAB | Refills: 0 | Status: SHIPPED | OUTPATIENT
Start: 2018-11-21 | End: 2019-02-13 | Stop reason: ALTCHOICE

## 2018-11-21 RX ORDER — LIDOCAINE 40 MG/G
CREAM TOPICAL
Qty: 45 G | Refills: 0 | Status: SHIPPED | OUTPATIENT
Start: 2018-11-21 | End: 2019-02-13 | Stop reason: ALTCHOICE

## 2018-11-21 NOTE — PROGRESS NOTES
Assessment/Plan: 1. Keloid 
- lidocaine (XYLOCAINE) 4 % topical cream; Apply  to affected area four (4) times daily as needed for Pain. Dispense: 45 g; Refill: 0 
 
2. Essential hypertension 
-resume hctz. Stop lasix. F/u in 3 mos 3. Screening for breast cancer - [facility] MAMMO BI SCREENING INCL CAD; Future 4. Osteopenia, unspecified location, postmenopausal 
- DEXA BONE DENSITY STUDY AXIAL; Future The plan was discussed with the patient. The patient verbalized understanding and is in agreement with the plan. All medication potential side effects were discussed with the patient. Health Maintenance:  
Health Maintenance Topic Date Due  Shingrix Vaccine Age 50> (1 of 2) 11/27/1992  Pneumococcal 65+ Low/Medium Risk (2 of 2 - PCV13) 05/22/2015  MEDICARE YEARLY EXAM  05/04/2019  COLONOSCOPY  12/14/2019  GLAUCOMA SCREENING Q2Y  06/01/2020  
 DTaP/Tdap/Td series (2 - Td) 10/20/2026  Bone Densitometry (Dexa) Screening  Completed  Influenza Age 5 to Adult  Completed Win Sampson is a 78 y.o. female and presents with Breast pain (Right) and Medication Refill Subjective: 
Pt c/o ongoing R breast pain at site of keloids. Pain is sharp. Pain has been present since last year and is worsening. No nipple discharge. Last mammo (10/2017) nml. HTN - bp elevated. We had stopped hctz (b/c she was also taking lasix). bp high today. ROS: 
Constitutional: No recent weight change. No weakness/fatigue. No f/c. Cardiovascular: No CP/palpitations. No CASTRO/orthopnea/PND. Respiratory: No cough/sputum, dyspnea, wheezing. Gastointestinal: No dysphagia, reflux. No n/v. No constipation/diarrhea. No melena/rectal bleeding. The problem list was updated as a part of today's visit. Patient Active Problem List  
Diagnosis Code  DDD (degenerative disc disease), lumbar M51.36  
 Hypertension I10  
 Hyperlipidemia E78.5  Vitamin D deficiency E55.9  Osteoarthritis of right knee M17.11  
 Osteopenia M85.80  Complete rotator cuff tear of left shoulder M75.122  
 CKD (chronic kidney disease) N18.9  Prediabetes R73.03  
 Overweight E66.3 The PSH, FH were reviewed. SH: Social History Tobacco Use  Smoking status: Never Smoker  Smokeless tobacco: Never Used Substance Use Topics  Alcohol use: No  
 Drug use: No  
 
 
Medications/Allergies: 
Current Outpatient Medications on File Prior to Visit Medication Sig Dispense Refill  traZODone (DESYREL) 100 mg tablet TAKE 1 TABLET EVERY NIGHT 90 Tab 1  
 hydroCHLOROthiazide (HYDRODIURIL) 25 mg tablet TAKE 1 TABLET EVERY DAY 90 Tab 1  
 furosemide (LASIX) 20 mg tablet TAKE 1-2 TABLET EVERY DAY AS NEEDED FOR  SWELLING 90 Tab 1  
 atorvastatin (LIPITOR) 10 mg tablet Take 1 Tab by mouth daily. 90 Tab 1  
 metoprolol succinate (TOPROL-XL) 100 mg tablet TAKE 1 TABLET BY MOUTH EVERY DAY 90 Tab 1  
 acetaminophen-codeine (TYLENOL #3) 300-30 mg per tablet Take 1 Tab by mouth daily as needed for Pain. 20 Tab 0  
 potassium chloride (K-DUR, KLOR-CON) 20 mEq tablet TAKE 1 TABLET daily 180 Tab 1  calcium carbonate (CALTREX) 600 mg calcium (1,500 mg) tablet Take 600 mg by mouth two (2) times a day.  folic acid/multivit-min/lutein (CENTRUM SILVER PO) Take  by mouth.  cholecalciferol (VITAMIN D3) 1,000 unit cap Take  by mouth daily. No current facility-administered medications on file prior to visit. No Known Allergies Objective: 
Visit Vitals /90 (BP 1 Location: Left arm, BP Patient Position: Sitting) Pulse 75 Temp 97.8 °F (36.6 °C) (Oral) Resp 20 Ht 5' 7\" (1.702 m) Wt 195 lb 12.8 oz (88.8 kg) SpO2 99% BMI 30.67 kg/m² Constitutional: Well developed, nourished, no distress, alert, obese habitus CV: S1, S2.  RRR. No murmurs/rubs. No thrills palpated. No carotid bruits. Intact distal pulses. No edema. No aortic bruits. Pulm: No abnormalities on inspection. Clear to auscultation bilaterally. No wheezing/rhonchi. Normal effort. Breasts: breasts appear normal, no suspicious masses, no skin or nipple changes or axillary nodes. +keloid R breast that is exquisitely tender

## 2018-11-21 NOTE — PROGRESS NOTES
Isabelle Castelan is a 78 y.o. female (: 1940) presenting to address: Chief Complaint Patient presents with  Breast pain Right  Medication Refill Vitals:  
 18 1256 BP: 162/90 Pulse: 75 Resp: 20 Temp: 97.8 °F (36.6 °C) TempSrc: Oral  
SpO2: 99% Weight: 195 lb 12.8 oz (88.8 kg) Height: 5' 7\" (1.702 m) PainSc:   3 PainLoc: Breast  
 
Learning Assessment:  
 
Learning Assessment 2/3/2015 PRIMARY LEARNER Patient HIGHEST LEVEL OF EDUCATION - PRIMARY LEARNER  GRADUATED HIGH SCHOOL OR GED  
BARRIERS PRIMARY LEARNER NONE  
CO-LEARNER CAREGIVER No  
PRIMARY LANGUAGE ENGLISH  
LEARNER PREFERENCE PRIMARY READING  
ANSWERED BY self RELATIONSHIP SELF Depression Screening: PHQ over the last two weeks 2018 Little interest or pleasure in doing things Not at all Feeling down, depressed, irritable, or hopeless Not at all Total Score PHQ 2 0 Fall Risk Assessment:  
 
Fall Risk Assessment, last 12 mths 5/3/2018 Able to walk? Yes Fall in past 12 months? No  
 
Abuse Screening:  
 
Abuse Screening Questionnaire 5/3/2018 Do you ever feel afraid of your partner? Roderick Distance Are you in a relationship with someone who physically or mentally threatens you? Roderick Distance Is it safe for you to go home? Esperanza Morocho Coordination of Care Questionaire: 1. Have you been to the ER, urgent care clinic since your last visit? Hospitalized since your last visit? NO 
 
2. Have you seen or consulted any other health care providers outside of the [address] [place] [address] since your last visit? Include any pap smears or colon screening. NO Advanced Directive: 1. Do you have an Advanced Directive? YES 
 
2. Would you like information on Advanced Directives?  NO

## 2018-11-26 ENCOUNTER — TELEPHONE (OUTPATIENT)
Dept: FAMILY MEDICINE CLINIC | Age: 78
End: 2018-11-26

## 2018-11-26 DIAGNOSIS — N64.4 BREAST PAIN: Primary | ICD-10-CM

## 2018-11-26 NOTE — TELEPHONE ENCOUNTER
Patient called to request a new order for Mammogram she is having right breast pain and before they will scheduled her a order for Diagnostic mammogram needs to be placed . Patient goes to [facility].

## 2018-11-26 NOTE — TELEPHONE ENCOUNTER
Marino from [address] [place] [address] called to request that the mammogram that was ordered be for a Bilateral Diagnostic instead of just a right breast diagnostic because the PT hasn't had a mammogram since 2016.

## 2018-12-06 DIAGNOSIS — N64.4 BREAST PAIN: Primary | ICD-10-CM

## 2018-12-21 DIAGNOSIS — M85.80 OSTEOPENIA, UNSPECIFIED LOCATION: ICD-10-CM

## 2018-12-21 DIAGNOSIS — Z78.0 POSTMENOPAUSAL: ICD-10-CM

## 2018-12-21 DIAGNOSIS — Z12.39 SCREENING FOR BREAST CANCER: ICD-10-CM

## 2018-12-31 RX ORDER — FUROSEMIDE 20 MG/1
TABLET ORAL
Qty: 90 TAB | Refills: 1 | Status: SHIPPED | OUTPATIENT
Start: 2018-12-31 | End: 2019-08-21 | Stop reason: SDUPTHER

## 2019-02-13 ENCOUNTER — OFFICE VISIT (OUTPATIENT)
Dept: FAMILY MEDICINE CLINIC | Age: 79
End: 2019-02-13

## 2019-02-13 ENCOUNTER — TELEPHONE (OUTPATIENT)
Dept: FAMILY MEDICINE CLINIC | Age: 79
End: 2019-02-13

## 2019-02-13 VITALS
SYSTOLIC BLOOD PRESSURE: 140 MMHG | RESPIRATION RATE: 18 BRPM | HEIGHT: 67 IN | HEART RATE: 64 BPM | DIASTOLIC BLOOD PRESSURE: 80 MMHG | OXYGEN SATURATION: 96 % | TEMPERATURE: 98.2 F | BODY MASS INDEX: 30.67 KG/M2

## 2019-02-13 DIAGNOSIS — G89.29 CHRONIC PAIN OF RIGHT KNEE: Primary | ICD-10-CM

## 2019-02-13 DIAGNOSIS — M17.11 PRIMARY OSTEOARTHRITIS OF RIGHT KNEE: Chronic | ICD-10-CM

## 2019-02-13 DIAGNOSIS — M25.561 CHRONIC PAIN OF RIGHT KNEE: Primary | ICD-10-CM

## 2019-02-13 DIAGNOSIS — S93.401A SPRAIN OF RIGHT ANKLE, UNSPECIFIED LIGAMENT, INITIAL ENCOUNTER: ICD-10-CM
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
## 2019-02-13 DIAGNOSIS — M51.36 DDD (DEGENERATIVE DISC DISEASE), LUMBAR: Chronic | ICD-10-CM

## 2019-02-13 RX ORDER — ACETAMINOPHEN AND CODEINE PHOSPHATE 300; 30 MG/1; MG/1
1 TABLET ORAL
Qty: 20 TAB | Refills: 0 | Status: SHIPPED | OUTPATIENT
Start: 2019-02-13 | End: 2019-05-16 | Stop reason: ALTCHOICE

## 2019-02-13 RX ORDER — NAPROXEN 500 MG/1
500 TABLET ORAL 2 TIMES DAILY WITH MEALS
Qty: 60 TAB | Refills: 0 | Status: SHIPPED | OUTPATIENT
Start: 2019-02-13 | End: 2019-03-05 | Stop reason: ALTCHOICE

## 2019-02-13 NOTE — TELEPHONE ENCOUNTER
Went to  Tylenol and the pharmacy said that her PCP needs to call the pharmacy and tell them she needs to get 20 tablets instead of 7. Says that they will not increase amount of tablets unless the insurance approves it.  Pt also called back to check up on referral for Dr. Emily Izquierdo instead of Dr. Anna Rush

## 2019-02-13 NOTE — PATIENT INSTRUCTIONS
[address] [facility],[address] [place] [address] [place], [address] [place] [address], [address] [place] [address] Hand/shoulder: Dr. Priscilla Camacho, Dr. Carlos Phillips Foot/Ankle: Dr. Keith Ortiz, Dr. Martin Chakraborty Knee/Hip: Dr. Dipak Wolfe, Dr. Martin Chakraborty Ankle Sprain: Rehab Exercises Your Care Instructions Here are some examples of typical rehabilitation exercises for your condition. Start each exercise slowly. Ease off the exercise if you start to have pain. Your doctor or physical therapist will tell you when you can start these exercises and which ones will work best for you. How to do the exercises \"Alphabet\" exercise 1. Trace the alphabet with your toe. This helps your ankle move in all directions. Side-to-side knee swing exercise 1. Sit in a chair with your foot flat on the floor. 2. Slowly move your knee from side to side. Keep your foot pressed flat. 3. Continue this exercise for 2 to 3 minutes. Towel curl 1. While sitting, place your foot on a towel on the floor. Scrunch the towel toward you with your toes. 2. Then use your toes to push the towel away from you. 3. To make this exercise more challenging you can put something on the other end of the towel. A can of soup is about the right weight for this. Towel stretch 1. Sit with your legs extended and knees straight. 2. Place a towel around your foot just under the toes. 3. Hold each end of the towel in each hand, with your hands above your knees. 4. Pull back with the towel so that your foot stretches toward you. 5. Hold the position for at least 15 to 30 seconds. 6. Repeat 2 to 4 times a session. Do up to 5 sessions a day. Ankle eversion exercise 1. Start by sitting with your foot flat on the floor. Push your foot outward against a wall or a piece of furniture that doesn't move. Hold for about 6 seconds, and relax. Repeat 8 to 12 times. 2. After you feel comfortable with this, try using rubber tubing looped around the outside of your feet for resistance. Push your foot out to the side against the tubing, and then count to 10 as you slowly bring your foot back to the middle. Repeat 8 to 12 times. Isometric opposition exercises 1. While sitting, put your feet together flat on the floor. 2. Press your injured foot inward against your other foot. Hold for about 6 seconds, and relax. Repeat 8 to 12 times. 3. Then place the heel of your other foot on top of the injured one. Push down with the top heel while trying to push up with your injured foot. Hold for about 6 seconds, and relax. Repeat 8 to 12 times. Resisted ankle inversion 1. Sit on the floor with your good leg crossed over your other leg. 2. Hold both ends of an exercise band and loop the band around the inside of your affected foot. Then press your other foot against the band. 3. Keeping your legs crossed, slowly push your affected foot against the band so that foot moves away from your other foot. Then slowly relax. 4. Repeat 8 to 12 times. Resisted ankle eversion 1. Sit on the floor with your legs straight. 2. Hold both ends of an exercise band and loop the band around the outside of your affected foot. Then press your other foot against the band. 3. Keeping your leg straight, slowly push your affected foot outward against the band and away from your other foot without letting your leg rotate. Then slowly relax. 4. Repeat 8 to 12 times. Resisted ankle dorsiflexion 1. Tie the ends of an exercise band together to form a loop. Attach one end of the loop to a secure object or shut a door on it to hold it in place. (Or you can have someone hold one end of the loop to provide resistance.) 2. While sitting on the floor or in a chair, loop the other end of the band over the top of your affected foot. 3. Keeping your knee and leg straight, slowly flex your foot to pull back on the exercise band, and then slowly relax. 4. Repeat 8 to 12 times. Single-leg balance 1. Stand on a flat surface with your arms stretched out to your sides like you are making the letter \"T. \" Then lift your good leg off the floor, bending it at the knee. If you are not steady on your feet, use one hand to hold on to a chair, counter, or wall. 2. Standing on the leg with your affected ankle, keep that knee straight. Try to balance on that leg for up to 30 seconds. Then rest for up to 10 seconds. 3. Repeat 6 to 8 times. 4. When you can balance on your affected leg for 30 seconds with your eyes open, try to balance on it with your eyes closed. 5. When you can do this exercise with your eyes closed for 30 seconds and with ease and no pain, try standing on a pillow or piece of foam, and repeat steps 1 through 4. Follow-up care is a key part of your treatment and safety. Be sure to make and go to all appointments, and call your doctor if you are having problems. It's also a good idea to know your test results and keep a list of the medicines you take. Where can you learn more? Go to http://kurtis-radha.info/. Gabriel Jean in the search box to learn more about \"Ankle Sprain: Rehab Exercises. \" Current as of: September 20, 2018 Content Version: 11.9 © 0804-0478 [employer]. Care instructions adapted under license by [employer] (which disclaims liability or warranty for this information). If you have questions about a medical condition or this instruction, always ask your healthcare professional. [place] [address] any warranty or liability for your use of this information.

## 2019-02-13 NOTE — PROGRESS NOTES
Srini Puckett is a 70 y.o. female (: 1940) presenting to address: Chief Complaint Patient presents with  Leg Pain Bilateral- worst on the right Vitals:  
 19 1009 BP: 140/80 Pulse: 64 Resp: 18 Temp: 98.2 °F (36.8 °C) TempSrc: Oral  
SpO2: 96% Height: 5' 7\" (1.702 m) PainSc:   8 PainLoc: Leg  
 
 
Learning Assessment:  
 
Learning Assessment 2/3/2015 PRIMARY LEARNER Patient HIGHEST LEVEL OF EDUCATION - PRIMARY LEARNER  GRADUATED HIGH SCHOOL OR GED  
BARRIERS PRIMARY LEARNER NONE  
CO-LEARNER CAREGIVER No  
PRIMARY LANGUAGE ENGLISH  
LEARNER PREFERENCE PRIMARY READING  
ANSWERED BY self RELATIONSHIP SELF Depression Screening:  
 
3 most recent PHQ Screens 2019 Little interest or pleasure in doing things Not at all Feeling down, depressed, irritable, or hopeless Not at all Total Score PHQ 2 0 Fall Risk Assessment:  
 
Fall Risk Assessment, last 12 mths 2019 Able to walk? Yes Fall in past 12 months? Yes Fall with injury? Yes  
Number of falls in past 12 months 1 Fall Risk Score 2 Abuse Screening:  
 
Abuse Screening Questionnaire 5/3/2018 Do you ever feel afraid of your partner? Dudarian Varela Are you in a relationship with someone who physically or mentally threatens you? Merry Varela Is it safe for you to go home? Davida Dance Coordination of Care Questionaire: 1. Have you been to the ER, urgent care clinic since your last visit? Hospitalized since your last visit? NO 
 
2. Have you seen or consulted any other health care providers outside of the [address] [place] [address] since your last visit? Include any pap smears or colon screening. NO Advanced Directive: 1. Do you have an Advanced Directive? YES 
 
2. Would you like information on Advanced Directives?  NO

## 2019-02-13 NOTE — TELEPHONE ENCOUNTER
Pt called today saying that she goes to see Dr. Arianna Camp for her hand issues and therapy and that she was recently referred to another doctor for her ankle issues (Dr. Walters Courser). Pt is stating that she would like to go to Dr. Arianna Camp for both issues because she is more comfortable with him. Can another referral be generated for Pt?  I not please call back #[phone]

## 2019-02-13 NOTE — PROGRESS NOTES
Assessment/Plan: 1. Chronic pain of right knee- make appt with ortho. Bring cd of images. Start naprosyn. - XR KNEE RT MIN 4 V; Future 
- naproxen (NAPROSYN) 500 mg tablet; Take 1 Tab by mouth two (2) times daily (with meals). Dispense: 60 Tab; Refill: 0 
 
2. Fall, initial encounter 
-with worsening ankle and knee pain. - XR ANKLE RT MIN 3 V; Future 3. Sprain of right ankle, unspecified ligament, initial encounter 
-home exercises. - XR ANKLE RT MIN 3 V; Future 4. Primary osteoarthritis of right knee 
-refilled 
- acetaminophen-codeine (TYLENOL #3) 300-30 mg per tablet; Take 1 Tab by mouth daily as needed for Pain. Dispense: 20 Tab; Refill: 0 
 
5. DDD (degenerative disc disease), lumbar 
- acetaminophen-codeine (TYLENOL #3) 300-30 mg per tablet; Take 1 Tab by mouth daily as needed for Pain. Dispense: 20 Tab; Refill: 0 The plan was discussed with the patient. The patient verbalized understanding and is in agreement with the plan. All medication potential side effects were discussed with the patient. Health Maintenance:  
Health Maintenance Topic Date Due  Shingrix Vaccine Age 50> (1 of 2) 11/27/1992  Pneumococcal 65+ Low/Medium Risk (2 of 2 - PCV13) 05/22/2015  MEDICARE YEARLY EXAM  05/04/2019  COLONOSCOPY  12/14/2019  GLAUCOMA SCREENING Q2Y  06/01/2020  
 DTaP/Tdap/Td series (2 - Td) 10/20/2026  Bone Densitometry (Dexa) Screening  Completed  Influenza Age 5 to Adult  Completed Tommas Radha is a 70 y.o. female and presents with Leg Pain (Bilateral- worst on the right) Subjective: Pt with known DDD of lumbar spine. Pt c/o R knee and ankle pain x months. Has known mild OA. Then yesterday, she had a fall onto her R side. Now having R ankle and knee swelling. Standing makes it worse. She is also requesting refill of tylenol #3, last filled 11/2018. ROS: 
Constitutional: No recent weight change. No weakness/fatigue. No f/c. Cardiovascular: No CP/palpitations. No CASTRO/orthopnea/PND. Respiratory: No cough/sputum, dyspnea, wheezing. Gastointestinal: No dysphagia, reflux. No n/v. No constipation/diarrhea. No melena/rectal bleeding. Genitourinary: No dysuria, urinary hesitancy, nocturia, hematuria. No incontinence. Musculoskeletal: + joint pain/stiffness. No muscle pain/tenderness. Neurological: No seizures/numbness/weakness. No paresthesias. The problem list was updated as a part of today's visit. Patient Active Problem List  
Diagnosis Code  DDD (degenerative disc disease), lumbar M51.36  
 Hypertension I10  
 Hyperlipidemia E78.5  Vitamin D deficiency E55.9  Osteoarthritis of right knee M17.11  
 Osteopenia M85.80  Complete rotator cuff tear of left shoulder M75.122  
 CKD (chronic kidney disease) N18.9  Prediabetes R73.03  
 Overweight E66.3 The PSH, FH were reviewed. SH: Social History Tobacco Use  Smoking status: Never Smoker  Smokeless tobacco: Never Used Substance Use Topics  Alcohol use: No  
 Drug use: No  
 
 
Medications/Allergies: 
Current Outpatient Medications on File Prior to Visit Medication Sig Dispense Refill  furosemide (LASIX) 20 mg tablet TAKE 1 TO 2 TABLETS EVERY DAY AS NEEDED FOR SWELLING 90 Tab 1  
 hydroCHLOROthiazide (HYDRODIURIL) 25 mg tablet TAKE 1 TABLET EVERY DAY 90 Tab 1  
 traZODone (DESYREL) 100 mg tablet TAKE 1 TABLET EVERY NIGHT 90 Tab 1  
 atorvastatin (LIPITOR) 10 mg tablet Take 1 Tab by mouth daily. 90 Tab 1  
 metoprolol succinate (TOPROL-XL) 100 mg tablet TAKE 1 TABLET BY MOUTH EVERY DAY 90 Tab 1  potassium chloride (K-DUR, KLOR-CON) 20 mEq tablet TAKE 1 TABLET daily 180 Tab 1  calcium carbonate (CALTREX) 600 mg calcium (1,500 mg) tablet Take 600 mg by mouth two (2) times a day.  folic acid/multivit-min/lutein (CENTRUM SILVER PO) Take  by mouth.  cholecalciferol (VITAMIN D3) 1,000 unit cap Take  by mouth daily.  lidocaine (XYLOCAINE) 4 % topical cream Apply  to affected area four (4) times daily as needed for Pain. 45 g 0  
 acetaminophen-codeine (TYLENOL #3) 300-30 mg per tablet Take 1 Tab by mouth daily as needed for Pain. 20 Tab 0 No current facility-administered medications on file prior to visit. No Known Allergies Objective: 
Visit Vitals /80 (BP 1 Location: Right arm, BP Patient Position: Sitting) Pulse 64 Temp 98.2 °F (36.8 °C) (Oral) Resp 18 Ht 5' 7\" (1.702 m) SpO2 96% BMI 30.67 kg/m² Constitutional: Well developed, nourished, no distress, alert, obese habitus CV: S1, S2.  RRR. No murmurs/rubs. No thrills palpated. No carotid bruits. Intact distal pulses. No edema. No aortic bruits. Pulm: No abnormalities on inspection. Clear to auscultation bilaterally. No wheezing/rhonchi. Normal effort. MS: Gait antalgic. Pt would barely let me examine her ankle. Has lateral malleolus swelling and tenderness. Minimal pain with dorsiflexion. Has some pain with internal rotation of L ankle. +lateral joint line tenderness of R knee, no effusion or swelling. Skin: No lesions/rashes on inspection. No ecchymoses

## 2019-02-13 NOTE — TELEPHONE ENCOUNTER
Pt was told that she can only fill 7 at this time per pcp. Regulations have increased for these types of medications. Pt verbalized understanding.  She will keep Dr Vladimir Price appt since Dr Migdalia Rubio is a rheum .

## 2019-02-18 NOTE — TELEPHONE ENCOUNTER
Pt has seen Dr. Crowder Links so far. She only picked up 7 tabs. She is trying to space them out and only take one for intense pain. She is worried about side effects on her kidneys from these and the naproxen.

## 2019-03-05 ENCOUNTER — TELEPHONE (OUTPATIENT)
Dept: FAMILY MEDICINE CLINIC | Age: 79
End: 2019-03-05

## 2019-04-29 DIAGNOSIS — G89.29 CHRONIC PAIN OF RIGHT KNEE: ICD-10-CM

## 2019-04-29 DIAGNOSIS — M25.561 CHRONIC PAIN OF RIGHT KNEE: ICD-10-CM

## 2019-04-29 RX ORDER — NAPROXEN 500 MG/1
TABLET ORAL
Qty: 180 TAB | Refills: 0 | Status: SHIPPED | OUTPATIENT
Start: 2019-04-29 | End: 2019-05-16 | Stop reason: ALTCHOICE

## 2019-05-16 ENCOUNTER — OFFICE VISIT (OUTPATIENT)
Dept: FAMILY MEDICINE CLINIC | Age: 79
End: 2019-05-16

## 2019-05-16 ENCOUNTER — TELEPHONE (OUTPATIENT)
Dept: FAMILY MEDICINE CLINIC | Age: 79
End: 2019-05-16

## 2019-05-16 VITALS
OXYGEN SATURATION: 98 % | HEART RATE: 72 BPM | BODY MASS INDEX: 30.64 KG/M2 | TEMPERATURE: 98.2 F | SYSTOLIC BLOOD PRESSURE: 140 MMHG | HEIGHT: 67 IN | DIASTOLIC BLOOD PRESSURE: 72 MMHG | WEIGHT: 195.2 LBS | RESPIRATION RATE: 18 BRPM

## 2019-05-16 DIAGNOSIS — R06.09 DOE (DYSPNEA ON EXERTION): Primary | ICD-10-CM

## 2019-05-16 DIAGNOSIS — M25.562 ACUTE PAIN OF LEFT KNEE: ICD-10-CM

## 2019-05-16 DIAGNOSIS — R73.03 PREDIABETES: ICD-10-CM

## 2019-05-16 DIAGNOSIS — D64.9 NORMOCYTIC ANEMIA: ICD-10-CM

## 2019-05-16 DIAGNOSIS — Z00.00 INITIAL MEDICARE ANNUAL WELLNESS VISIT: ICD-10-CM

## 2019-05-16 DIAGNOSIS — N18.30 STAGE 3 CHRONIC KIDNEY DISEASE (HCC): ICD-10-CM

## 2019-05-16 DIAGNOSIS — E78.00 PURE HYPERCHOLESTEROLEMIA: ICD-10-CM

## 2019-05-16 DIAGNOSIS — I10 ESSENTIAL HYPERTENSION: ICD-10-CM

## 2019-05-16 PROBLEM — E66.3 OVERWEIGHT: Status: RESOLVED | Noted: 2018-02-13 | Resolved: 2019-05-16

## 2019-05-16 RX ORDER — TRAMADOL HYDROCHLORIDE 50 MG/1
50 TABLET ORAL
Qty: 21 TAB | Refills: 0 | Status: SHIPPED | OUTPATIENT
Start: 2019-05-16 | End: 2019-05-23

## 2019-05-16 NOTE — PROGRESS NOTES
Assessment/Plan:   
1. CASTRO (dyspnea on exertion) 
-ck echo and bnp. 
- AMB POC EKG ROUTINE W/ 12 LEADS, INTER & REP 
- ECHO ADULT FOLLOW-UP OR LIMITED; Future 
- NT-PRO BNP; Future 2. Acute pain of left knee 
-pt requesting \"something for pain\" and tylenol #3 was \"ineffective\". Given tramadol x 1 week 
- traMADol (ULTRAM) 50 mg tablet; Take 1 Tab by mouth every eight (8) hours as needed for Pain (will cause sedation) for up to 7 days. Max Daily Amount: 150 mg. Dispense: 21 Tab; Refill: 0 
 
3. Initial Medicare annual wellness visit 4. Essential hypertension 
-elevated in pain 5. Pure hypercholesterolemia 6. Prediabetes 
- HEMOGLOBIN A1C W/O EAG; Future 7. Stage 3 chronic kidney disease (HCC) 
-ck labs 8. Normocytic anemia 
- IRON PROFILE; Future - FERRITIN; Future - VITAMIN B12; Future The plan was discussed with the patient. The patient verbalized understanding and is in agreement with the plan. All medication potential side effects were discussed with the patient. Health Maintenance:  
Health Maintenance Topic Date Due  Shingrix Vaccine Age 50> (1 of 2) 05/30/2020 (Originally 11/27/1992)  Influenza Age 5 to Adult  08/01/2019  COLONOSCOPY  12/14/2019  MEDICARE YEARLY EXAM  05/16/2020  GLAUCOMA SCREENING Q2Y  06/01/2020  
 DTaP/Tdap/Td series (2 - Td) 10/20/2026  Bone Densitometry (Dexa) Screening  Completed  Pneumococcal 65+ years  Completed Bertin Ramos is a 70 y.o. female and presents with Annual Wellness Visit Subjective: 
Pt c/o L knee pain that started 1 weeks ago while on vacation. Saw ortho on Monday, who gave her a shot in her knee with some relief. States she has pain with weight bearing. Pain localized to medial aspect. Doesn't worsen with certain activities. No inciting event. Has known OA in R knee, but L knee not usually a cause of sx. Pt c/o CASTRO with minimal activity. No orthopnea, PND. This has been been ongoing x 1 mo. ROS: 
Constitutional: No recent weight change. No weakness/fatigue. No f/c. Cardiovascular: No CP/palpitations.  + CASTRO/no orthopnea/PND. Respiratory: No cough/sputum, dyspnea, wheezing. Gastointestinal: No dysphagia, reflux. No n/v. No constipation/diarrhea. No melena/rectal bleeding. Genitourinary: No dysuria, urinary hesitancy, nocturia, hematuria. No incontinence. Musculoskeletal: + joint pain/stiffness. No muscle pain/tenderness. Endo: No heat/cold intolerance, no polyuria/polydypsia. Heme: + h/o anemia. No easy bleeding/bruising. Allergy/Immunology: No seasonal rhinitis. Denies frequent colds, sinus/ear infections. Neurological: No seizures/numbness/weakness. No paresthesias. Psychiatric:  No depression, anxiety. The problem list was updated as a part of today's visit. Patient Active Problem List  
Diagnosis Code  DDD (degenerative disc disease), lumbar M51.36  
 Hypertension I10  
 Hyperlipidemia E78.5  Vitamin D deficiency E55.9  Osteoarthritis of right knee M17.11  
 Osteopenia M85.80  Complete rotator cuff tear of left shoulder M75.122  
 CKD (chronic kidney disease) N18.9  Prediabetes R73.03 The PSH, FH were reviewed. SH: Social History Tobacco Use  Smoking status: Never Smoker  Smokeless tobacco: Never Used Substance Use Topics  Alcohol use: No  
 Drug use: No  
 
 
Medications/Allergies: 
Current Outpatient Medications on File Prior to Visit Medication Sig Dispense Refill  glucosam/brooke-msm1/C/luciano/bosw (OSTEO BI-FLEX TRIPLE STRENGTH PO) Take  by mouth daily. With Vit D3     
 furosemide (LASIX) 20 mg tablet TAKE 1 TO 2 TABLETS EVERY DAY AS NEEDED FOR SWELLING 90 Tab 1  
 hydroCHLOROthiazide (HYDRODIURIL) 25 mg tablet TAKE 1 TABLET EVERY DAY 90 Tab 1  
 traZODone (DESYREL) 100 mg tablet TAKE 1 TABLET EVERY NIGHT 90 Tab 1  
 atorvastatin (LIPITOR) 10 mg tablet Take 1 Tab by mouth daily.  90 Tab 1  
  metoprolol succinate (TOPROL-XL) 100 mg tablet TAKE 1 TABLET BY MOUTH EVERY DAY 90 Tab 1  potassium chloride (K-DUR, KLOR-CON) 20 mEq tablet TAKE 1 TABLET daily 180 Tab 1  calcium carbonate (CALTREX) 600 mg calcium (1,500 mg) tablet Take 600 mg by mouth two (2) times a day.  folic acid/multivit-min/lutein (CENTRUM SILVER PO) Take  by mouth.  cholecalciferol (VITAMIN D3) 1,000 unit cap Take  by mouth daily. No current facility-administered medications on file prior to visit. No Known Allergies Objective: 
Visit Vitals /72 (BP 1 Location: Left arm, BP Patient Position: Sitting) Pulse 72 Temp 98.2 °F (36.8 °C) (Oral) Resp 18 Ht 5' 7\" (1.702 m) Wt 195 lb 3.2 oz (88.5 kg) SpO2 98% BMI 30.57 kg/m² Constitutional: Well developed, nourished, no distress, alert, obese habitus Eyes: Conjunctiva normal. PERRL. Eyelids normal.  
CV: S1, S2.  RRR. No murmurs/rubs. No thrills palpated. No carotid bruits. Intact distal pulses. No edema. No aortic bruits. Pulm: No abnormalities on inspection. Clear to auscultation bilaterally. No wheezing/rhonchi. Normal effort. GI: Soft, nontender, nondistended. Normal active bowel sounds. MS: Gait antalgic. L knee with medial effusion. No medial or lateral joint line tenderness. Skin: No lesions/rashes on inspection. Psych: Appropriate affect, judgement and insight. Short-term memory intact. EKG: NSR, no ST changes, no sign of ischemia This is an Initial Medicare Annual Wellness Exam (AWV) (Performed 12 months after IPPE or effective date of Medicare Part B enrollment, Once in a lifetime) I have reviewed the patient's medical history in detail and updated the computerized patient record. History Past Medical History:  
Diagnosis Date  Arthritis  Cataract  Hypercholesterolemia  Hypertension Past Surgical History:  
Procedure Laterality Date  HX APPENDECTOMY  HX CATARACT REMOVAL    
 HX COLONOSCOPY  12/2009 Dr. Melissa Sanchez, 10 yr follow up  HX TOTAL ABDOMINAL HYSTERECTOMY Current Outpatient Medications Medication Sig Dispense Refill  furosemide (LASIX) 20 mg tablet TAKE 1 TO 2 TABLETS EVERY DAY AS NEEDED FOR SWELLING 90 Tab 1  
 hydroCHLOROthiazide (HYDRODIURIL) 25 mg tablet TAKE 1 TABLET EVERY DAY 90 Tab 1  
 traZODone (DESYREL) 100 mg tablet TAKE 1 TABLET EVERY NIGHT 90 Tab 1  
 atorvastatin (LIPITOR) 10 mg tablet Take 1 Tab by mouth daily. 90 Tab 1  
 metoprolol succinate (TOPROL-XL) 100 mg tablet TAKE 1 TABLET BY MOUTH EVERY DAY 90 Tab 1  potassium chloride (K-DUR, KLOR-CON) 20 mEq tablet TAKE 1 TABLET daily 180 Tab 1  calcium carbonate (CALTREX) 600 mg calcium (1,500 mg) tablet Take 600 mg by mouth two (2) times a day.  folic acid/multivit-min/lutein (CENTRUM SILVER PO) Take  by mouth.  cholecalciferol (VITAMIN D3) 1,000 unit cap Take  by mouth daily. No Known Allergies Family History Problem Relation Age of Onset  Diabetes Mother  Hypertension Mother  Cancer Sister   
     breast  
 Cancer Maternal Aunt   
     breast  
 Stroke Sister Social History Tobacco Use  Smoking status: Never Smoker  Smokeless tobacco: Never Used Substance Use Topics  Alcohol use: No  
 
Patient Active Problem List  
Diagnosis Code  DDD (degenerative disc disease), lumbar M51.36  
 Hypertension I10  
 Hyperlipidemia E78.5  Vitamin D deficiency E55.9  Osteoarthritis of right knee M17.11  
 Osteopenia M85.80  Complete rotator cuff tear of left shoulder M75.122  
 CKD (chronic kidney disease) N18.9  Prediabetes R73.03  
 Overweight E66.3 Depression Risk Factor Screening:  
 
3 most recent PHQ Screens 5/16/2019 Little interest or pleasure in doing things Not at all Feeling down, depressed, irritable, or hopeless Not at all Total Score PHQ 2 0 Alcohol Risk Factor Screening: You do not drink alcohol or very rarely. Functional Ability and Level of Safety:  
 
Hearing Loss Hearing is good. Activities of Daily Living The home contains: no safety equipment. Patient does total self care Fall Risk Fall Risk Assessment, last 12 mths 5/16/2019 Able to walk? Yes Fall in past 12 months? Yes Fall with injury? Yes  
Number of falls in past 12 months 1 Fall Risk Score 2 Abuse Screen Patient is not abused Cognitive Screening Evaluation of Cognitive Function: 
Has your family/caregiver stated any concerns about your memory: no 
Normal 
 
Patient Care Team  
Patient Care Team: 
Sindy Norman MD as PCP - General (Internal Medicine) Dory Sheppard MD (Nephrology) Inna Garcia MD as Consulting Provider (Obstetrics & Gynecology) Chauncey Lambert LPN as Staff Nurse Assessment/Plan Education and counseling provided: 
Are appropriate based on today's review and evaluation End-of-Life planning (with patient's consent) Diagnoses and all orders for this visit: 
 
1. CASTRO (dyspnea on exertion) -     AMB POC EKG ROUTINE W/ 12 LEADS, INTER & REP 
-     ECHO ADULT FOLLOW-UP OR LIMITED; Future 
-     NT-PRO BNP; Future 2. Acute pain of left knee 
-     traMADol (ULTRAM) 50 mg tablet; Take 1 Tab by mouth every eight (8) hours as needed for Pain (will cause sedation) for up to 7 days. Max Daily Amount: 150 mg. 
 
3. Initial Medicare annual wellness visit 4. Essential hypertension 5. Pure hypercholesterolemia 6. Prediabetes 
-     HEMOGLOBIN A1C W/O EAG; Future 7. Stage 3 chronic kidney disease ([facility] [address] 8. Normocytic anemia 
-     IRON PROFILE; Future -     FERRITIN; Future -     VITAMIN B12; Future There are no preventive care reminders to display for this patient.

## 2019-05-16 NOTE — PROGRESS NOTES
Krista Alejandre is a 70 y.o. female (: 1940) presenting to address: Chief Complaint Patient presents with [address] [facility] Annual Wellness Visit Vitals:  
 19 1110 BP: 140/72 Pulse: 72 Resp: 18 Temp: 98.2 °F (36.8 °C) TempSrc: Oral  
SpO2: 98% Weight: 195 lb 3.2 oz (88.5 kg) Height: 5' 7\" (1.702 m) PainSc:   7 PainLoc: Knee Hearing/Vision:  
 
 Visual Acuity Screening Right eye Left eye Both eyes Without correction: 20/20-1 20/40 20/20 With correction:     
 
 
Learning Assessment:  
 
Learning Assessment 2/3/2015 PRIMARY LEARNER Patient HIGHEST LEVEL OF EDUCATION - PRIMARY LEARNER  GRADUATED HIGH SCHOOL OR GED  
BARRIERS PRIMARY LEARNER NONE  
CO-LEARNER CAREGIVER No  
PRIMARY LANGUAGE ENGLISH  
LEARNER PREFERENCE PRIMARY READING  
ANSWERED BY self RELATIONSHIP SELF Depression Screening:  
 
3 most recent PHQ Screens 2019 Little interest or pleasure in doing things Not at all Feeling down, depressed, irritable, or hopeless Not at all Total Score PHQ 2 0 Fall Risk Assessment:  
 
Fall Risk Assessment, last 12 mths 2019 Able to walk? Yes Fall in past 12 months? Yes Fall with injury? Yes  
Number of falls in past 12 months 1 Fall Risk Score 2 Abuse Screening:  
 
Abuse Screening Questionnaire 2019 Do you ever feel afraid of your partner? Precious Allison Are you in a relationship with someone who physically or mentally threatens you? Precious Allison Is it safe for you to go home? Steve Diaz Coordination of Care Questionaire: 1. Have you been to the ER, urgent care clinic since your last visit? Hospitalized since your last visit? NO 
 
2. Have you seen or consulted any other health care providers outside of the [address] [place] [address] since your last visit? Include any pap smears or colon screening. YES orthopedic Advanced Directive: 1. Do you have an Advanced Directive? YES 
 
2. Would you like information on Advanced Directives?  NO

## 2019-05-16 NOTE — PATIENT INSTRUCTIONS
Medicare Wellness Visit, Female The best way to live healthy is to have a lifestyle where you eat a well-balanced diet, exercise regularly, limit alcohol use, and quit all forms of tobacco/nicotine, if applicable. Regular preventive services are another way to keep healthy. Preventive services (vaccines, screening tests, monitoring & exams) can help personalize your care plan, which helps you manage your own care. Screening tests can find health problems at the earliest stages, when they are easiest to treat. [facility] follows the current, evidence-based guidelines published by the [facility] Des[facility] ([facility]) when recommending preventive services for our patients. Because we follow these guidelines, sometimes recommendations change over time as research supports it. (For example, mammograms used to be recommended annually. Even though Medicare will still pay for an annual mammogram, the newer guidelines recommend a mammogram every two years for women of average risk.) Of course, you and your doctor may decide to screen more often for some diseases, based on your risk and your health status. Preventive services for you include: - Medicare offers their members a free annual wellness visit, which is time for you and your primary care provider to discuss and plan for your preventive service needs. Take advantage of this benefit every year! 
-All adults over the age of 72 should receive the recommended pneumonia vaccines. Current USPSTF guidelines recommend a series of two vaccines for the best pneumonia protection.  
-All adults should have a flu vaccine yearly and a tetanus vaccine every 10 years. All adults age 61 and older should receive a shingles vaccine once in their lifetime.   
-A bone mass density test is recommended when a woman turns 65 to screen for osteoporosis. This test is only recommended one time, as a screening. Some providers will use this same test as a disease monitoring tool if you already have osteoporosis. -All adults age 38-68 who are overweight should have a diabetes screening test once every three years.  
-Other screening tests and preventive services for persons with diabetes include: an eye exam to screen for diabetic retinopathy, a kidney function test, a foot exam, and stricter control over your cholesterol.  
-Cardiovascular screening for adults with routine risk involves an electrocardiogram (ECG) at intervals determined by your doctor.  
-Colorectal cancer screenings should be done for adults age 54-65 with no increased risk factors for colorectal cancer. There are a number of acceptable methods of screening for this type of cancer. Each test has its own benefits and drawbacks. Discuss with your doctor what is most appropriate for you during your annual wellness visit. The different tests include: colonoscopy (considered the best screening method), a fecal occult blood test, a fecal DNA test, and sigmoidoscopy. -Breast cancer screenings are recommended every other year for women of normal risk, age 54-69. 
-Cervical cancer screenings for women over age 72 are only recommended with certain risk factors.  
-All adults born between [facility] should be screened once for Hepatitis C. Here is a list of your current Health Maintenance items (your personalized list of preventive services) with a due date: 
Health Maintenance Due Topic Date Due  Shingles Vaccine (1 of 2) 11/27/1992  Pneumococcal Vaccine (2 of 2 - PCV13) 05/22/2015 Kenzie Pavon Annual Well Visit  05/04/2019 Meniscus Tear: Exercises Your Care Instructions Here are some examples of typical rehabilitation exercises for your condition. Start each exercise slowly. Ease off the exercise if you start to have pain.  
Your doctor or physical therapist will tell you when you can start these exercises and which ones will work best for you. How to do the exercises Calf wall stretch 1. Stand facing a wall with your hands on the wall at about eye level. Put your affected leg about a step behind your other leg. 2. Keeping your back leg straight and your back heel on the floor, bend your front knee and gently bring your hip and chest toward the wall until you feel a stretch in the calf of your back leg. 3. Hold the stretch for at least 15 to 30 seconds. 4. Repeat 2 to 4 times. 5. Repeat steps 1 through 4, but this time keep your back knee bent. Hamstring wall stretch 1. Lie on your back in a doorway, with your good leg through the open door. 2. Slide your affected leg up the wall to straighten your knee. You should feel a gentle stretch down the back of your leg. 1. Do not arch your back. 2. Do not bend either knee. 3. Keep one heel touching the floor and the other heel touching the wall. Do not point your toes. 3. Hold the stretch for at least 1 minute. Then over time, try to lengthen the time you hold the stretch to as long as 6 minutes. 4. Repeat 2 to 4 times. 5. If you do not have a place to do this exercise in a doorway, there is another way to do it: 6. Lie on your back, and bend your affected leg. 7. Loop a towel under the ball and toes of that foot, and hold the ends of the towel in your hands. 8. Straighten your knee, and slowly pull back on the towel. You should feel a gentle stretch down the back of your leg. 9. Hold the stretch for at least 15 to 30 seconds. Or even better, hold the stretch for 1 minute if you can. 10. Repeat 2 to 4 times. [employer] 1. Sit with your leg straight and supported on the floor or a firm bed. (If you feel discomfort in the front or back of your knee, place a small towel roll under your knee.) 2. Tighten the muscles on top of your thigh by pressing the back of your knee flat down to the floor.  (If you feel discomfort under your kneecap, place a small towel roll under your knee.) 3. Hold for about 6 seconds, then rest up to 10 seconds. 4. Do 8 to 12 repetitions several times a day. Straight-leg raises to the front 1. Lie on your back with your good knee bent so that your foot rests flat on the floor. Your injured leg should be straight. Make sure that your low back has a normal curve. You should be able to slip your flat hand in between the floor and the small of your back, with your palm touching the floor and your back touching the back of your hand. 2. Tighten the thigh muscles in the injured leg by pressing the back of your knee flat down to the floor. Hold your knee straight. 3. Keeping the thigh muscles tight, lift your injured leg up so that your heel is about 12 inches off the floor. Hold for 5 seconds and then lower slowly. 4. Do 8 to 12 repetitions. Straight-leg raises to the back 1. Lie on your stomach, and lift your leg straight up behind you (toward the ceiling). 2. Lift your toes about 6 inches off the floor, hold for about 6 seconds, then lower slowly. 3. Do 8 to 12 repetitions. Hamstring curls 1. Lie on your stomach with your knees straight. If your kneecap is uncomfortable, roll up a washcloth and put it under your leg just above your kneecap. 2. Lift the foot of your injured leg by bending the knee so that you bring the foot up toward your buttock. If this motion hurts, try it without bending your knee quite as far. This may help you avoid any painful motion. 3. Slowly lower your leg back to the floor. 4. Do 8 to 12 repetitions. 5. With permission from your doctor or physical therapist, you may also want to add a cuff weight to your ankle (not more than 5 pounds). With weight, you do not have to lift your leg more than 12 inches to get a hamstring workout. Wall slide with ball squeeze 1.  Stand with your back against a wall and with your feet about shoulder-width apart. Your feet should be about 12 inches away from the wall. 2. Put a ball about the size of a soccer ball between your knees. Then slowly slide down the wall until your knees are bent about 20 to 30 degrees. 3. Tighten your thigh muscles by squeezing the ball between your knees. Hold that position for about 10 seconds, then stop squeezing. Rest for up to 10 seconds between repetitions. 4. Repeat 8 to 12 times. Heel raises 1. Stand with your feet a few inches apart, with your hands lightly resting on a counter or chair in front of you. 2. Slowly raise your heels off the floor while keeping your knees straight. 3. Hold for about 6 seconds, then slowly lower your heels to the floor. 4. Do 8 to 12 repetitions several times during the day. Heel dig bridging 1. Lie on your back with both knees bent and your ankles bent so that only your heels are digging into the floor. Your knees should be bent about 90 degrees. 2. Then push your heels into the floor, squeeze your buttocks, and lift your hips off the floor until your shoulders, hips, and knees are all in a straight line. 3. Hold for about 6 seconds as you continue to breathe normally, and then slowly lower your hips back down to the floor and rest for up to 10 seconds. 4. Do 8 to 12 repetitions. Shallow standing knee bends 1. Stand with your hands lightly resting on a counter or chair in front of you. Put your feet shoulder-width apart. 2. Slowly bend your knees so that you squat down like you are going to sit in a chair. Make sure your knees do not go in front of your toes. 3. Lower yourself about 6 inches. Your heels should remain on the floor at all times. 4. Rise slowly to a standing position. Follow-up care is a key part of your treatment and safety. Be sure to make and go to all appointments, and call your doctor if you are having problems.  It's also a good idea to know your test results and keep a list of the medicines you take. Where can you learn more? Go to http://kurtis-radha.info/. Enter C183 in the search box to learn more about \"Meniscus Tear: Exercises. \" Current as of: September 20, 2018 Content Version: 11.9 © 0883-3558 [employer]. Care instructions adapted under license by [employer] (which disclaims liability or warranty for this information). If you have questions about a medical condition or this instruction, always ask your healthcare professional. [address] any warranty or liability for your use of this information.

## 2019-05-17 LAB
AVG GLU, 10930: 127 MG/DL (ref 91–123)
BNP SERPL-MCNC: 77 PG/ML
DRUGS UR: NORMAL
FE % SATURATION,PSAT: 25 % (ref 20–50)
FERRITIN SERPL-MCNC: 253 NG/ML (ref 10–291)
HBA1C MFR BLD HPLC: 6.1 % (ref 4.8–5.9)
IRON,IRN: 72 MCG/DL (ref 30–160)
SPECIMEN STATUS REPORT, ROLRST: NORMAL
TIBC,TIBC: 283 MCG/DL (ref 228–428)
UIBC SERPL-MCNC: 211 MCG/DL (ref 110–370)
VIT B12 SERPL-MCNC: 786 PG/ML (ref 211–911)

## 2019-05-19 LAB
ALBUMIN SERPL-MCNC: 4.1 G/DL (ref 3.5–4.8)
ALBUMIN/GLOB SERPL: 1.1 {RATIO} (ref 1.2–2.2)
ALP SERPL-CCNC: 64 IU/L (ref 39–117)
ALT SERPL-CCNC: 9 IU/L (ref 0–32)
AST SERPL-CCNC: 14 IU/L (ref 0–40)
BILIRUB SERPL-MCNC: 0.2 MG/DL (ref 0–1.2)
BUN SERPL-MCNC: 13 MG/DL (ref 8–27)
BUN/CREAT SERPL: 13 (ref 12–28)
CALCIUM SERPL-MCNC: 9.2 MG/DL (ref 8.7–10.3)
CHLORIDE SERPL-SCNC: 105 MMOL/L (ref 96–106)
CHOLEST SERPL-MCNC: 193 MG/DL (ref 100–199)
CO2 SERPL-SCNC: 21 MMOL/L (ref 20–29)
CREAT SERPL-MCNC: 0.97 MG/DL (ref 0.57–1)
ERYTHROCYTE [DISTWIDTH] IN BLOOD BY AUTOMATED COUNT: 14 % (ref 12.3–15.4)
GLOBULIN SER CALC-MCNC: 3.7 G/DL (ref 1.5–4.5)
GLUCOSE SERPL-MCNC: 119 MG/DL (ref 65–99)
HCT VFR BLD AUTO: 32 % (ref 34–46.6)
HDLC SERPL-MCNC: 38 MG/DL
HGB BLD-MCNC: 10.3 G/DL (ref 11.1–15.9)
LDLC SERPL CALC-MCNC: 122 MG/DL (ref 0–99)
MCH RBC QN AUTO: 30.5 PG (ref 26.6–33)
MCHC RBC AUTO-ENTMCNC: 32.2 G/DL (ref 31.5–35.7)
MCV RBC AUTO: 95 FL (ref 79–97)
PLATELET # BLD AUTO: 269 X10E3/UL (ref 150–379)
POTASSIUM SERPL-SCNC: 3.8 MMOL/L (ref 3.5–5.2)
PROT SERPL-MCNC: 7.8 G/DL (ref 6–8.5)
RBC # BLD AUTO: 3.38 X10E6/UL (ref 3.77–5.28)
SODIUM SERPL-SCNC: 143 MMOL/L (ref 134–144)
TRIGL SERPL-MCNC: 163 MG/DL (ref 0–149)
VLDLC SERPL CALC-MCNC: 33 MG/DL (ref 5–40)
WBC # BLD AUTO: 3.2 X10E3/UL (ref 3.4–10.8)

## 2019-05-21 ENCOUNTER — TELEPHONE (OUTPATIENT)
Dept: FAMILY MEDICINE CLINIC | Age: 79
End: 2019-05-21

## 2019-05-21 NOTE — TELEPHONE ENCOUNTER
Pt called during lunch stating that Dr Marily Jameson wanted her to call a number in regards to getting a test scheduled. She states she did call the number and that they don't have anything from us. I don't see any notes about a test so I am unsure of what she is supposed to be getting done. Please advise.

## 2019-05-28 RX ORDER — IBUPROFEN 800 MG/1
800 TABLET ORAL
Qty: 90 TAB | Refills: 1 | Status: SHIPPED | OUTPATIENT
Start: 2019-05-28 | End: 2020-07-10 | Stop reason: SDUPTHER

## 2019-05-28 NOTE — TELEPHONE ENCOUNTER
Pt called to request Dr Angy Draper prescribe her ibuprofen 800mg to her [facility] on [address] because she states that that is the only medication that helps her knee. Please advise.

## 2019-06-07 LAB — LEFT VENTRICULAR EJECTION FRACTION, EXTERNAL: 64

## 2019-06-11 ENCOUNTER — TELEPHONE (OUTPATIENT)
Dept: FAMILY MEDICINE CLINIC | Age: 79
End: 2019-06-11

## 2019-06-11 NOTE — TELEPHONE ENCOUNTER
Tell pt echo looked fine. Her heart is not source of dyspnea. If she is still having dyspnea with exertion, I'd recommend PFT to eval for pulm etiology. Does she want me to order?  If so, she can call [phone] to schedule

## 2019-06-11 NOTE — TELEPHONE ENCOUNTER
Pt called returning a call for her ECHO results. The nurses were unavailable at the moment so I read of Dr Sancho Reeves message. She voiced understanding and does not wish for Dr Lucia Fulton to order a PFT at this time.

## 2019-06-17 DIAGNOSIS — R06.09 DOE (DYSPNEA ON EXERTION): ICD-10-CM

## 2019-07-18 DIAGNOSIS — I10 ESSENTIAL HYPERTENSION: ICD-10-CM

## 2019-07-18 RX ORDER — ATORVASTATIN CALCIUM 10 MG/1
TABLET, FILM COATED ORAL
Qty: 90 TAB | Refills: 1 | Status: SHIPPED | OUTPATIENT
Start: 2019-07-18 | End: 2020-07-10 | Stop reason: SDUPTHER

## 2019-07-18 RX ORDER — POTASSIUM CHLORIDE 20 MEQ/1
TABLET, EXTENDED RELEASE ORAL
Qty: 90 TAB | Refills: 1 | Status: SHIPPED | OUTPATIENT
Start: 2019-07-18 | End: 2019-07-18 | Stop reason: SDUPTHER

## 2019-07-18 RX ORDER — HYDROCHLOROTHIAZIDE 25 MG/1
TABLET ORAL
Qty: 90 TAB | Refills: 1 | Status: SHIPPED | OUTPATIENT
Start: 2019-07-18 | End: 2020-11-11 | Stop reason: SDUPTHER

## 2019-07-18 RX ORDER — METOPROLOL SUCCINATE 100 MG/1
TABLET, EXTENDED RELEASE ORAL
Qty: 90 TAB | Refills: 1 | Status: SHIPPED | OUTPATIENT
Start: 2019-07-18 | End: 2020-01-31

## 2019-07-18 NOTE — TELEPHONE ENCOUNTER
Requested Prescriptions     Pending Prescriptions Disp Refills    potassium chloride (K-DUR, KLOR-CON) 20 mEq tablet 90 Tab 1     Sig: TAKE 1 TABLET EVERY DAY     Patient calling to request refill on: potassium chloride      Remaining pills: about 7   Last appt: 5/16/2019  Next appt: none    Pharmacy: [facility] mail delivery      Patient aware of 72 hour time frame.

## 2019-07-19 RX ORDER — POTASSIUM CHLORIDE 20 MEQ/1
TABLET, EXTENDED RELEASE ORAL
Qty: 90 TAB | Refills: 1 | Status: SHIPPED | OUTPATIENT
Start: 2019-07-19 | End: 2019-12-20

## 2019-07-24 ENCOUNTER — OFFICE VISIT (OUTPATIENT)
Dept: FAMILY MEDICINE CLINIC | Age: 79
End: 2019-07-24

## 2019-07-24 VITALS
HEART RATE: 72 BPM | HEIGHT: 67 IN | WEIGHT: 193.6 LBS | RESPIRATION RATE: 20 BRPM | BODY MASS INDEX: 30.39 KG/M2 | TEMPERATURE: 98.2 F | DIASTOLIC BLOOD PRESSURE: 71 MMHG | SYSTOLIC BLOOD PRESSURE: 105 MMHG | OXYGEN SATURATION: 97 %

## 2019-07-24 DIAGNOSIS — M17.0 PRIMARY OSTEOARTHRITIS OF BOTH KNEES: Primary | ICD-10-CM

## 2019-07-24 NOTE — PROGRESS NOTES
Kirstin Dennison is a 70 y.o. female (: 1940) presenting to address:    Chief Complaint   Patient presents with    Knee Swelling     Pain-Bilateral       Vitals:    19 1016   BP: 105/71   Pulse: 72   Resp: 20   Temp: 98.2 °F (36.8 °C)   TempSrc: Oral   SpO2: 97%   Weight: 193 lb 9.6 oz (87.8 kg)   Height: 5' 7\" (1.702 m)   PainSc:   8   PainLoc: Knee       Learning Assessment:     Learning Assessment 2/3/2015   PRIMARY LEARNER Patient   HIGHEST LEVEL OF EDUCATION - PRIMARY LEARNER  GRADUATED HIGH SCHOOL OR GED   BARRIERS PRIMARY LEARNER NONE   CO-LEARNER CAREGIVER No   PRIMARY LANGUAGE ENGLISH   LEARNER PREFERENCE PRIMARY READING   ANSWERED BY self   RELATIONSHIP SELF     Depression Screening:     3 most recent PHQ Screens 2019   Little interest or pleasure in doing things Not at all   Feeling down, depressed, irritable, or hopeless Not at all   Total Score PHQ 2 0     Fall Risk Assessment:     Fall Risk Assessment, last 12 mths 2019   Able to walk? Yes   Fall in past 12 months? Yes   Fall with injury? Yes   Number of falls in past 12 months 1   Fall Risk Score 2     Abuse Screening:     Abuse Screening Questionnaire 2019   Do you ever feel afraid of your partner? N   Are you in a relationship with someone who physically or mentally threatens you? N   Is it safe for you to go home? Y     Coordination of Care Questionaire:   1. Have you been to the ER, urgent care clinic since your last visit? Hospitalized since your last visit? NO    2. Have you seen or consulted any other health care providers outside of the [address] [place] [address] since your last visit? Include any pap smears or colon screening. YES- Rejuvinix Non surgical joint therapy    Advanced Directive:   1. Do you have an Advanced Directive? YES    2. Would you like information on Advanced Directives?  NO

## 2019-07-24 NOTE — PROGRESS NOTES
Assessment/Plan:    1. Primary osteoarthritis of both knees- referral ortho. Failed nsaids, PT and rejuvinix. - REFERRAL TO ORTHOPEDICS      The plan was discussed with the patient. The patient verbalized understanding and is in agreement with the plan. All medication potential side effects were discussed with the patient. Health Maintenance:   Health Maintenance   Topic Date Due    COLONOSCOPY  12/14/2019    Shingrix Vaccine Age 50> (1 of 2) 05/30/2020 (Originally 11/27/1992)    Influenza Age 5 to Adult  08/01/2019    MEDICARE YEARLY EXAM  05/16/2020    GLAUCOMA SCREENING Q2Y  06/01/2020    DTaP/Tdap/Td series (2 - Td) 10/20/2026    Bone Densitometry (Dexa) Screening  Completed    Pneumococcal 65+ years  Completed       Maurice Alcala is a 70 y.o. female and presents with Knee Swelling (Pain-Bilateral)     Subjective:  Pt with h/o moderate tricompartmental OA bilat knee presents with c/o L knee . She started rejuvinix 4 weeks ago (last shot today). She has had no improvement from her pain. She saw ortho, Dr. Rasheed Castillo, who just recommended PT which she did w/o relief. She is on nsaids, glucosamine. ROS:  Constitutional: No recent weight change. No weakness/fatigue. No f/c. Cardiovascular: No CP/palpitations. No CASTRO/orthopnea/PND. Respiratory: No cough/sputum, dyspnea, wheezing. Musculoskeletal: + joint pain/stiffness. No muscle pain/tenderness. The problem list was updated as a part of today's visit. Patient Active Problem List   Diagnosis Code    DDD (degenerative disc disease), lumbar M51.36    Hypertension I10    Hyperlipidemia E78.5    Vitamin D deficiency E55.9    Osteoarthritis of right knee M17.11    Osteopenia M85.80    Complete rotator cuff tear of left shoulder M75.122    CKD (chronic kidney disease) N18.9    Prediabetes R73.03       The PSH, FH were reviewed.     SH:  Social History     Tobacco Use    Smoking status: Never Smoker    Smokeless tobacco: Never Used   Substance Use Topics    Alcohol use: No    Drug use: No       Medications/Allergies:  Current Outpatient Medications on File Prior to Visit   Medication Sig Dispense Refill    potassium chloride (K-DUR, KLOR-CON) 20 mEq tablet TAKE 1 TABLET EVERY DAY 90 Tab 1    metoprolol succinate (TOPROL-XL) 100 mg tablet TAKE 1 TABLET EVERY DAY 90 Tab 1    hydroCHLOROthiazide (HYDRODIURIL) 25 mg tablet TAKE 1 TABLET EVERY DAY 90 Tab 1    atorvastatin (LIPITOR) 10 mg tablet TAKE 1 TABLET EVERY DAY 90 Tab 1    ibuprofen (MOTRIN) 800 mg tablet Take 1 Tab by mouth every eight (8) hours as needed for Pain. 90 Tab 1    glucosam/brooke-msm1/C/luciano/bosw (OSTEO BI-FLEX TRIPLE STRENGTH PO) Take  by mouth daily. With Vit D3       furosemide (LASIX) 20 mg tablet TAKE 1 TO 2 TABLETS EVERY DAY AS NEEDED FOR SWELLING 90 Tab 1    traZODone (DESYREL) 100 mg tablet TAKE 1 TABLET EVERY NIGHT 90 Tab 1    calcium carbonate (CALTREX) 600 mg calcium (1,500 mg) tablet Take 600 mg by mouth two (2) times a day.  folic acid/multivit-min/lutein (CENTRUM SILVER PO) Take  by mouth.  cholecalciferol (VITAMIN D3) 1,000 unit cap Take  by mouth daily. No current facility-administered medications on file prior to visit. No Known Allergies    Objective:  Visit Vitals  /71 (BP 1 Location: Left arm, BP Patient Position: Sitting)   Pulse 72   Temp 98.2 °F (36.8 °C) (Oral)   Resp 20   Ht 5' 7\" (1.702 m)   Wt 193 lb 9.6 oz (87.8 kg)   SpO2 97%   BMI 30.32 kg/m²      Constitutional: Well developed, nourished, no distress, alert, obese habitus   CV: S1, S2.  RRR. No murmurs/rubs. No edema. Pulm: No abnormalities on inspection. Clear to auscultation bilaterally. No wheezing/rhonchi. Normal effort. MS: Gait normal.  ++medial and lateral joint line tenderness L knee. +mild effusion     Neuro: A/O x 3. No focal motor or sensory deficits.  Speech normal.

## 2019-08-22 RX ORDER — FUROSEMIDE 20 MG/1
TABLET ORAL
Qty: 90 TAB | Refills: 1 | Status: SHIPPED | OUTPATIENT
Start: 2019-08-22 | End: 2019-11-22 | Stop reason: SDUPTHER

## 2019-11-25 RX ORDER — FUROSEMIDE 20 MG/1
TABLET ORAL
Qty: 90 TAB | Refills: 0 | Status: SHIPPED | OUTPATIENT
Start: 2019-11-25 | End: 2020-01-02

## 2019-12-10 PROBLEM — R92.8 ABNORMAL MAMMOGRAM: Status: ACTIVE | Noted: 2019-12-10

## 2019-12-12 ENCOUNTER — TELEPHONE (OUTPATIENT)
Dept: FAMILY MEDICINE CLINIC | Age: 79
End: 2019-12-12

## 2019-12-12 DIAGNOSIS — R92.8 ABNORMAL MAMMOGRAM: Primary | ICD-10-CM

## 2019-12-12 NOTE — TELEPHONE ENCOUNTER
[id number] called stating pt was brought back for left mammogram and ultrasound and the radiologist found a distortion they would like to biopsy. Please generate referral to Dr Kerry Asif or Repole.

## 2019-12-18 ENCOUNTER — OFFICE VISIT (OUTPATIENT)
Dept: FAMILY MEDICINE CLINIC | Age: 79
End: 2019-12-18

## 2019-12-18 ENCOUNTER — HOSPITAL ENCOUNTER (OUTPATIENT)
Dept: LAB | Age: 79
Discharge: HOME | End: 2019-12-18
Payer: MEDICARE

## 2019-12-18 VITALS
HEIGHT: 67 IN | BODY MASS INDEX: 30.92 KG/M2 | RESPIRATION RATE: 16 BRPM | SYSTOLIC BLOOD PRESSURE: 136 MMHG | TEMPERATURE: 97.5 F | DIASTOLIC BLOOD PRESSURE: 79 MMHG | HEART RATE: 61 BPM | OXYGEN SATURATION: 99 % | WEIGHT: 197 LBS

## 2019-12-18 DIAGNOSIS — Z23 ENCOUNTER FOR IMMUNIZATION: ICD-10-CM

## 2019-12-18 DIAGNOSIS — R20.2 PARESTHESIA AND PAIN OF BOTH UPPER EXTREMITIES: ICD-10-CM

## 2019-12-18 DIAGNOSIS — M79.602 PARESTHESIA AND PAIN OF BOTH UPPER EXTREMITIES: Primary | ICD-10-CM

## 2019-12-18 DIAGNOSIS — R20.2 PARESTHESIA AND PAIN OF BOTH UPPER EXTREMITIES: Primary | ICD-10-CM

## 2019-12-18 DIAGNOSIS — M79.602 PARESTHESIA AND PAIN OF BOTH UPPER EXTREMITIES: ICD-10-CM

## 2019-12-18 DIAGNOSIS — R73.03 PREDIABETES: ICD-10-CM

## 2019-12-18 DIAGNOSIS — M79.601 PARESTHESIA AND PAIN OF BOTH UPPER EXTREMITIES: ICD-10-CM

## 2019-12-18 DIAGNOSIS — M79.601 PARESTHESIA AND PAIN OF BOTH UPPER EXTREMITIES: Primary | ICD-10-CM

## 2019-12-18 LAB
ANION GAP SERPL CALC-SCNC: 4 MMOL/L (ref 3–18)
BUN SERPL-MCNC: 14 MG/DL (ref 7–18)
BUN/CREAT SERPL: 14 (ref 12–20)
CALCIUM SERPL-MCNC: 9.2 MG/DL (ref 8.5–10.1)
CHLORIDE SERPL-SCNC: 101 MMOL/L (ref 100–111)
CO2 SERPL-SCNC: 34 MMOL/L (ref 21–32)
CREAT SERPL-MCNC: 0.97 MG/DL (ref 0.6–1.3)
GLUCOSE SERPL-MCNC: 111 MG/DL (ref 74–99)
HBA1C MFR BLD: 6 % (ref 4.2–5.6)
POTASSIUM SERPL-SCNC: 3.3 MMOL/L (ref 3.5–5.5)
SODIUM SERPL-SCNC: 139 MMOL/L (ref 136–145)
TSH SERPL DL<=0.05 MIU/L-ACNC: 1.73 UIU/ML (ref 0.36–3.74)

## 2019-12-18 PROCEDURE — 84443 ASSAY THYROID STIM HORMONE: CPT

## 2019-12-18 PROCEDURE — 80048 BASIC METABOLIC PNL TOTAL CA: CPT

## 2019-12-18 PROCEDURE — 36415 COLL VENOUS BLD VENIPUNCTURE: CPT

## 2019-12-18 PROCEDURE — 83036 HEMOGLOBIN GLYCOSYLATED A1C: CPT

## 2019-12-18 NOTE — PROGRESS NOTES
Stevie Clancy is a 70 y.o. female (: 1940) presenting to address:    No chief complaint on file. Vitals:    19 1016   Resp: 16   Weight: 197 lb (89.4 kg)   Height: 5' 7\" (1.702 m)   PainSc:   0 - No pain       Hearing/Vision:   No exam data present    Learning Assessment:     Learning Assessment 2/3/2015   PRIMARY LEARNER Patient   HIGHEST LEVEL OF EDUCATION - PRIMARY LEARNER  GRADUATED HIGH SCHOOL OR GED   BARRIERS PRIMARY LEARNER NONE   CO-LEARNER CAREGIVER No   PRIMARY LANGUAGE ENGLISH   LEARNER PREFERENCE PRIMARY READING   ANSWERED BY self   RELATIONSHIP SELF     Depression Screening:     3 most recent PHQ Screens 2019   Little interest or pleasure in doing things Not at all   Feeling down, depressed, irritable, or hopeless Not at all   Total Score PHQ 2 0     Fall Risk Assessment:     Fall Risk Assessment, last 12 mths 2019   Able to walk? Yes   Fall in past 12 months? -   Fall with injury? -   Number of falls in past 12 months -   Fall Risk Score -     Abuse Screening:     Abuse Screening Questionnaire 2019   Do you ever feel afraid of your partner? N   Are you in a relationship with someone who physically or mentally threatens you? N   Is it safe for you to go home? Y     Coordination of Care Questionaire:   1. Have you been to the ER, urgent care clinic since your last visit? Hospitalized since your last visit? NO    2. Have you seen or consulted any other health care providers outside of the [address] [place] [address] since your last visit? Include any pap smears or colon screening. YES    Advanced Directive:   1. Do you have an Advanced Directive? NO    2. Would you like information on Advanced Directives? NO    Flu  Immunization/s administered 2019 by Jason Perez with patient's consent. Patient tolerated procedure well. No reactions noted.

## 2019-12-19 ENCOUNTER — TELEPHONE (OUTPATIENT)
Dept: FAMILY MEDICINE CLINIC | Age: 79
End: 2019-12-19

## 2019-12-19 NOTE — PROGRESS NOTES
Called pt, at pharmacy, couldn't understand the message, asked that I call her home phone and leave vm. Called home, started to leave vm but it cut me off stating vm was full. Pt has a rx for klor-con. Is she taking it?

## 2019-12-19 NOTE — PROGRESS NOTES
Tell pt labs are stable overall. However, potassium is low. Is she taking potassium daily? If not, needs to start. If she is, need to increase to bid.

## 2019-12-19 NOTE — PROGRESS NOTES
Pt is taking her potassium always once per day and now and then she remembers to take it twice per day. Her kidney dr suggested bid because her values have been low there as well. Pt understands she needs to make it a priority to take it bid since her labs are low.

## 2019-12-19 NOTE — TELEPHONE ENCOUNTER
Joelle marion aide called to report glucose 250 today. Was told to call her from her manager. Pt is on 27 units lantus daily. Pt eats breakfast at 9am , lunch at 12 and dinner by 430. Pt's morning glucose has been 75,132,91,117,68,84,138,119 the last eight days. Her afternoon readings are 418,[phone] for the last three days. Pt is not in distress and told the tech she felt fine. Gave information to Dr Sisi Bowling to review. No med changes at this time. Asked staff member to make appt for f/u visit in January with pcp.

## 2019-12-20 RX ORDER — POTASSIUM CHLORIDE 20 MEQ/1
20 TABLET, EXTENDED RELEASE ORAL 2 TIMES DAILY
Qty: 180 TAB | Refills: 1 | Status: SHIPPED | OUTPATIENT
Start: 2019-12-20 | End: 2020-07-09

## 2019-12-26 DIAGNOSIS — N64.4 BREAST PAIN: ICD-10-CM

## 2020-01-02 RX ORDER — FUROSEMIDE 20 MG/1
TABLET ORAL
Qty: 90 TAB | Refills: 0 | Status: SHIPPED | OUTPATIENT
Start: 2020-01-02 | End: 2020-11-11 | Stop reason: SDUPTHER

## 2020-01-31 DIAGNOSIS — I10 ESSENTIAL HYPERTENSION: ICD-10-CM

## 2020-01-31 RX ORDER — METOPROLOL SUCCINATE 100 MG/1
TABLET, EXTENDED RELEASE ORAL
Qty: 90 TAB | Refills: 1 | Status: SHIPPED | OUTPATIENT
Start: 2020-01-31 | End: 2020-07-24

## 2020-04-06 ENCOUNTER — TELEPHONE (OUTPATIENT)
Dept: FAMILY MEDICINE CLINIC | Age: 80
End: 2020-04-06

## 2020-04-06 NOTE — TELEPHONE ENCOUNTER
Pt called stating she has gotten a few higher bp readings the past few days. Systolic 479 , 571. She is asking what the dr would want to do about it. She limited her salt intake. She is going to track bp for a week and call back for virtual visit.

## 2020-07-09 RX ORDER — POTASSIUM CHLORIDE 20 MEQ/1
TABLET, EXTENDED RELEASE ORAL
Qty: 180 TAB | Refills: 1 | Status: SHIPPED | OUTPATIENT
Start: 2020-07-09 | End: 2021-02-05 | Stop reason: SDUPTHER

## 2020-07-10 ENCOUNTER — VIRTUAL VISIT (OUTPATIENT)
Dept: FAMILY MEDICINE CLINIC | Age: 80
End: 2020-07-10

## 2020-07-10 DIAGNOSIS — R73.03 PREDIABETES: ICD-10-CM

## 2020-07-10 DIAGNOSIS — R20.2 PARESTHESIA OF HAND, BILATERAL: ICD-10-CM

## 2020-07-10 DIAGNOSIS — Z20.822 EXPOSURE TO COVID-19 VIRUS: ICD-10-CM

## 2020-07-10 DIAGNOSIS — G56.03 BILATERAL CARPAL TUNNEL SYNDROME: ICD-10-CM

## 2020-07-10 DIAGNOSIS — I10 ESSENTIAL HYPERTENSION: Primary | Chronic | ICD-10-CM

## 2020-07-10 DIAGNOSIS — I10 ESSENTIAL HYPERTENSION: Chronic | ICD-10-CM

## 2020-07-10 RX ORDER — IBUPROFEN 800 MG/1
800 TABLET ORAL
Qty: 90 TAB | Refills: 1 | Status: SHIPPED | OUTPATIENT
Start: 2020-07-10 | End: 2020-08-28

## 2020-07-10 RX ORDER — ATORVASTATIN CALCIUM 10 MG/1
TABLET, FILM COATED ORAL
Qty: 90 TAB | Refills: 1 | Status: SHIPPED | OUTPATIENT
Start: 2020-07-10 | End: 2020-11-20

## 2020-07-10 NOTE — PROGRESS NOTES
Nima Wolfe is a 70 y.o. female who was seen by synchronous (real-time) audio-video technology on 7/10/2020 for Follow-up      Assessment & Plan:   Diagnoses and all orders for this visit:    1. Essential hypertension- ck labs for hypokalemia. She is on potassium bid.  -     METABOLIC PANEL, BASIC; Future  -     LIPID PANEL W/ REFLX DIRECT LDL; Future    2. Prediabetes  -     HEMOGLOBIN A1C W/O EAG; Future    3. Exposure to COVID-19 virus  -     NOVEL CORONAVIRUS (COVID-19); Future    4. Paresthesia of hand, bilateral- worsening sx over more than 6mos. Ck ncs and then referral hand specialist.  -     NCV SENSORY OR MIXED; Future    Other orders  -     ibuprofen (MOTRIN) 800 mg tablet; Take 1 Tab by mouth every eight (8) hours as needed for Pain.  -     atorvastatin (LIPITOR) 10 mg tablet; TAKE 1 TABLET EVERY DAY          Subjective:     htn -  Due for labs. Is supposed to be taking potassium bid. Notes pain an numbness in hands R>L. Wearing special gloves at night which helps partially. Has been ongoing for >6mos, worsening recently. Pt also had possible covid exposure at [employer]. Requesting testing. Prior to Admission medications    Medication Sig Start Date End Date Taking? Authorizing Provider   potassium chloride (K-DUR, KLOR-CON) 20 mEq tablet TAKE 1 TABLET TWICE DAILY 20   Hal Viveros MD   metoprolol succinate (TOPROL-XL) 100 mg tablet TAKE 1 TABLET EVERY DAY 20   Hal Viveros MD   furosemide (LASIX) 20 mg tablet TAKE 1 TO 2 TABLETS EVERY DAY AS NEEDED FOR SWELLING 20   Linda Stone MD   hydroCHLOROthiazide (HYDRODIURIL) 25 mg tablet TAKE 1 TABLET EVERY DAY 19   Hal Viveros MD   atorvastatin (LIPITOR) 10 mg tablet TAKE 1 TABLET EVERY DAY 19   Hal Viveros MD   ibuprofen (MOTRIN) 800 mg tablet Take 1 Tab by mouth every eight (8) hours as needed for Pain.  19   Linda Youngblood MD   glucosam/brooke-msm1/C/luciano/bosw (OSTEO BI-FLEX TRIPLE STRENGTH PO) Take  by mouth daily. With Vit D3     Provider, Historical   traZODone (DESYREL) 100 mg tablet TAKE 1 TABLET EVERY NIGHT 11/20/18   Kurt Youngblood MD   calcium carbonate (CALTREX) 600 mg calcium (1,500 mg) tablet Take 600 mg by mouth two (2) times a day. Provider, Historical   folic acid/multivit-min/lutein (CENTRUM SILVER PO) Take  by mouth. Provider, Historical   cholecalciferol (VITAMIN D3) 1,000 unit cap Take  by mouth daily. Provider, Historical     Patient Active Problem List   Diagnosis Code    DDD (degenerative disc disease), lumbar M51.36    Hypertension I10    Hyperlipidemia E78.5    Vitamin D deficiency E55.9    Osteoarthritis of right knee M17.11    Osteopenia M85.80    Complete rotator cuff tear of left shoulder M75.122    CKD (chronic kidney disease) N18.9    Prediabetes R73.03    Abnormal mammogram R92.8       Review of Systems   Constitutional: Negative for chills and fever. Neurological: Positive for tingling. Objective:   No flowsheet data found.      [INSTRUCTIONS:  \"[x]\" Indicates a positive item  \"[]\" Indicates a negative item  -- DELETE ALL ITEMS NOT EXAMINED]    Constitutional: [x] Appears well-developed and well-nourished [x] No apparent distress      [] Abnormal -     Mental status: [x] Alert and awake  [x] Oriented to person/place/time [x] Able to follow commands    [] Abnormal -     Eyes:   EOM    [x]  Normal    [] Abnormal -   Sclera  [x]  Normal    [] Abnormal -          Discharge [x]  None visible   [] Abnormal -     HENT: [x] Normocephalic, atraumatic  [] Abnormal -   [x] Mouth/Throat: Mucous membranes are moist    External Ears [x] Normal  [] Abnormal -    Neck: [x] No visualized mass [] Abnormal -     Pulmonary/Chest: [x] Respiratory effort normal   [x] No visualized signs of difficulty breathing or respiratory distress        [] Abnormal -      Musculoskeletal:   [] Normal gait with no signs of ataxia         [] Normal range of motion of neck        [] Abnormal -     Neurological:        [] No Facial Asymmetry (Cranial nerve 7 motor function) (limited exam due to video visit)          [] No gaze palsy        [] Abnormal -          Skin:        [] No significant exanthematous lesions or discoloration noted on facial skin         [] Abnormal -            Psychiatric:       [x] Normal Affect [] Abnormal -        [x] No Hallucinations    Other pertinent observable physical exam findings:-        We discussed the expected course, resolution and complications of the diagnosis(es) in detail. Medication risks, benefits, costs, interactions, and alternatives were discussed as indicated. I advised her to contact the office if her condition worsens, changes or fails to improve as anticipated. She expressed understanding with the diagnosis(es) and plan. Freddie Jensen, who was evaluated through a patient-initiated, synchronous (real-time) audio-video encounter, and/or her healthcare decision maker, is aware that it is a billable service, with coverage as determined by her insurance carrier. She provided verbal consent to proceed: Yes, and patient identification was verified. It was conducted pursuant to the emergency declaration under the [address] [place] [address] authority and the [employer] General Act. A caregiver was present when appropriate. Ability to conduct physical exam was limited. I was at home. The patient was at home.       Jame Martin MD

## 2020-07-15 LAB
ANION GAP SERPL CALC-SCNC: 12 MMOL/L (ref 3–15)
AVG GLU, 10930: 120 MG/DL (ref 91–123)
BUN SERPL-MCNC: 17 MG/DL (ref 6–22)
CALCIUM SERPL-MCNC: 9.4 MG/DL (ref 8.4–10.5)
CHLORIDE SERPL-SCNC: 99 MMOL/L (ref 98–110)
CHOLEST SERPL-MCNC: 179 MG/DL (ref 110–200)
CO2 SERPL-SCNC: 28 MMOL/L (ref 20–32)
CREAT SERPL-MCNC: 1 MG/DL (ref 0.8–1.4)
GFRAA, 66117: >60
GFRNA, 66118: 51.4
GLUCOSE SERPL-MCNC: 114 MG/DL (ref 70–99)
HBA1C MFR BLD HPLC: 5.8 % (ref 4.8–5.6)
HDLC SERPL-MCNC: 33 MG/DL
HDLC SERPL-MCNC: 5.4 MG/DL (ref 0–5)
LDL/HDL RATIO,LDHD: 3.2
LDLC SERPL CALC-MCNC: 106 MG/DL (ref 50–99)
NON-HDL CHOLESTEROL, 011976: 146 MG/DL
POTASSIUM SERPL-SCNC: 3.6 MMOL/L (ref 3.5–5.5)
SODIUM SERPL-SCNC: 139 MMOL/L (ref 133–145)
TRIGL SERPL-MCNC: 201 MG/DL (ref 40–149)
VLDLC SERPL CALC-MCNC: 40 MG/DL (ref 8–30)

## 2020-07-24 DIAGNOSIS — I10 ESSENTIAL HYPERTENSION: ICD-10-CM

## 2020-07-24 RX ORDER — METOPROLOL SUCCINATE 100 MG/1
TABLET, EXTENDED RELEASE ORAL
Qty: 90 TAB | Refills: 1 | Status: SHIPPED | OUTPATIENT
Start: 2020-07-24 | End: 2020-12-03

## 2020-08-19 ENCOUNTER — TELEPHONE (OUTPATIENT)
Dept: FAMILY MEDICINE CLINIC | Age: 80
End: 2020-08-19

## 2020-08-20 NOTE — TELEPHONE ENCOUNTER
Called pt. No answer. Left vm to call back for results. Referral faxed today to [facility]. Dr Bruno Blum doesn't accept her insurance.

## 2020-08-24 NOTE — TELEPHONE ENCOUNTER
Pt returned call, left msg. I returned call, got her vm again, left the reason for calling and referral details.

## 2020-08-25 ENCOUNTER — VIRTUAL VISIT (OUTPATIENT)
Dept: FAMILY MEDICINE CLINIC | Age: 80
End: 2020-08-25

## 2020-08-25 DIAGNOSIS — G89.29 CHRONIC PAIN OF BREAST: Primary | ICD-10-CM

## 2020-08-25 DIAGNOSIS — Z00.00 MEDICARE ANNUAL WELLNESS VISIT, SUBSEQUENT: ICD-10-CM

## 2020-08-25 DIAGNOSIS — N64.4 CHRONIC PAIN OF BREAST: Primary | ICD-10-CM

## 2020-08-25 RX ORDER — LIDOCAINE 40 MG/G
CREAM TOPICAL
Qty: 30 G | Refills: 0 | Status: SHIPPED | OUTPATIENT
Start: 2020-08-25 | End: 2021-02-08

## 2020-08-25 NOTE — PROGRESS NOTES
Harry Mcginnis is a 70 y.o. female who was seen by synchronous (real-time) audio technology on 8/25/2020 for Breast pain    Assessment & Plan:   Diagnoses and all orders for this visit:    1. Chronic pain of breast- trial topical lidocaine. If not effective, trial voltaren. -     lidocaine (XYLOCAINE) 4 % topical cream; Apply  to affected area daily as needed for Pain. 2. Medicare annual wellness visit, subsequent          Subjective:     Pt has L breast pain. nml mammo 729/20. This is chronic issue. Topical linament helps. States it aches. Has pain where her keloids are. Prior to Admission medications    Medication Sig Start Date End Date Taking? Authorizing Provider   metoprolol succinate (TOPROL-XL) 100 mg tablet TAKE 1 TABLET EVERY DAY 7/24/20   Kyler Youngblood MD   ibuprofen (MOTRIN) 800 mg tablet Take 1 Tab by mouth every eight (8) hours as needed for Pain. 7/10/20   Florida Roper MD   atorvastatin (LIPITOR) 10 mg tablet TAKE 1 TABLET EVERY DAY 7/10/20   Kyler Youngblood MD   potassium chloride (K-DUR, KLOR-CON) 20 mEq tablet TAKE 1 TABLET TWICE DAILY 7/9/20   Joesphine Kole Kyler Hook MD   furosemide (LASIX) 20 mg tablet TAKE 1 TO 2 TABLETS EVERY DAY AS NEEDED FOR SWELLING 1/2/20   Kyler Manzano MD   hydroCHLOROthiazide (HYDRODIURIL) 25 mg tablet TAKE 1 TABLET EVERY DAY 7/18/19   Kyler Youngblood MD   glucosam/brooke-msm1/C/luciano/bosw (OSTEO BI-FLEX TRIPLE STRENGTH PO) Take  by mouth daily. With Vit D3     Provider, Historical   calcium carbonate (CALTREX) 600 mg calcium (1,500 mg) tablet Take 600 mg by mouth two (2) times a day. Provider, Historical   folic acid/multivit-min/lutein (CENTRUM SILVER PO) Take  by mouth. Provider, Historical   cholecalciferol (VITAMIN D3) 1,000 unit cap Take  by mouth daily.     Provider, Historical     Patient Active Problem List   Diagnosis Code    DDD (degenerative disc disease), lumbar M51.36    Hypertension I10    Hyperlipidemia E78.5    Vitamin D deficiency E55.9  Osteoarthritis of right knee M17.11    Osteopenia M85.80    Complete rotator cuff tear of left shoulder M75.122    CKD (chronic kidney disease) N18.9    Prediabetes R73.03    Abnormal mammogram R92.8       Review of Systems   Constitutional: Negative. Skin: Negative. We discussed the expected course, resolution and complications of the diagnosis(es) in detail. Medication risks, benefits, costs, interactions, and alternatives were discussed as indicated. I advised her to contact the office if her condition worsens, changes or fails to improve as anticipated. She expressed understanding with the diagnosis(es) and plan. Augusto Velez, who was evaluated through a patient-initiated, synchronous (real-time) audio encounter, and/or her healthcare decision maker, is aware that it is a billable service, with coverage as determined by her insurance carrier. She provided verbal consent to proceed: Yes, and patient identification was verified. It was conducted pursuant to the emergency declaration under the [address] [place] [address] and the [employer] General Act. A caregiver was present when appropriate. Ability to conduct physical exam was limited. I was at home. The patient was at home. Chema Flowers MD      This is the Subsequent Medicare Annual Wellness Exam, performed 12 months or more after the Initial AWV or the last Subsequent AWV    I have reviewed the patient's medical history in detail and updated the computerized patient record.      History     Patient Active Problem List   Diagnosis Code    DDD (degenerative disc disease), lumbar M51.36    Hypertension I10    Hyperlipidemia E78.5    Vitamin D deficiency E55.9    Osteoarthritis of right knee M17.11    Osteopenia M85.80    Complete rotator cuff tear of left shoulder M75.122    CKD (chronic kidney disease) N18.9    Prediabetes R73.03  Abnormal mammogram R92.8     Past Medical History:   Diagnosis Date    Arthritis     Cataract     Hypercholesterolemia     Hypertension       Past Surgical History:   Procedure Laterality Date    HX APPENDECTOMY      HX CATARACT REMOVAL      HX COLONOSCOPY  12/2009    Dr. John Del Valle, 10 yr follow up    HX TOTAL ABDOMINAL HYSTERECTOMY       Current Outpatient Medications   Medication Sig Dispense Refill    metoprolol succinate (TOPROL-XL) 100 mg tablet TAKE 1 TABLET EVERY DAY 90 Tab 1    ibuprofen (MOTRIN) 800 mg tablet Take 1 Tab by mouth every eight (8) hours as needed for Pain. 90 Tab 1    atorvastatin (LIPITOR) 10 mg tablet TAKE 1 TABLET EVERY DAY 90 Tab 1    potassium chloride (K-DUR, KLOR-CON) 20 mEq tablet TAKE 1 TABLET TWICE DAILY 180 Tab 1    furosemide (LASIX) 20 mg tablet TAKE 1 TO 2 TABLETS EVERY DAY AS NEEDED FOR SWELLING 90 Tab 0    hydroCHLOROthiazide (HYDRODIURIL) 25 mg tablet TAKE 1 TABLET EVERY DAY 90 Tab 1    glucosam/brooke-msm1/C/luciano/bosw (OSTEO BI-FLEX TRIPLE STRENGTH PO) Take  by mouth daily. With Vit D3       calcium carbonate (CALTREX) 600 mg calcium (1,500 mg) tablet Take 600 mg by mouth two (2) times a day.  folic acid/multivit-min/lutein (CENTRUM SILVER PO) Take  by mouth.  cholecalciferol (VITAMIN D3) 1,000 unit cap Take  by mouth daily.        No Known Allergies    Family History   Problem Relation Age of Onset    Diabetes Mother     Hypertension Mother     Cancer Sister         breast    Cancer Maternal Aunt         breast    Stroke Sister      Social History     Tobacco Use    Smoking status: Never Smoker    Smokeless tobacco: Never Used   Substance Use Topics    Alcohol use: No       Depression Risk Factor Screening:     3 most recent PHQ Screens 8/25/2020   Little interest or pleasure in doing things Not at all   Feeling down, depressed, irritable, or hopeless Not at all   Total Score PHQ 2 0       Alcohol Risk Factor Screening:   Do you average 1 drink per night or more than 7 drinks a week:  No    On any one occasion in the past three months have you have had more than 3 drinks containing alcohol:  No      Functional Ability and Level of Safety:   Hearing: Hearing is good. Activities of Daily Living: The home contains: handrails  Patient does total self care     Ambulation: with mild difficulty     Fall Risk:  Fall Risk Assessment, last 12 mths 8/25/2020   Able to walk? Yes   Fall in past 12 months? No   Fall with injury? -   Number of falls in past 12 months -   Fall Risk Score -     Abuse Screen:  Patient is not abused       Cognitive Screening   Has your family/caregiver stated any concerns about your memory: no    Cognitive Screening: . Patient Care Team   Patient Care Team:  Vipul Locke MD as PCP - General (Internal Medicine)  Vipul Locke MD as PCP - REHABILITATION [facility] Empaneled Provider  Miguel Tam MD (Nephrology)  Henry Cash MD as Consulting Provider (Obstetrics & Gynecology)  Vernon Mccain LPN as Staff Nurse    Assessment/Plan   Education and counseling provided:  Are appropriate based on today's review and evaluation  End-of-Life planning (with patient's consent)    Diagnoses and all orders for this visit:    1. Medicare annual wellness visit, subsequent    2. Chronic pain of breast  -     lidocaine (XYLOCAINE) 4 % topical cream; Apply  to affected area daily as needed for Pain. There are no preventive care reminders to display for this patient.   Advance Care Planning       Advance Care Planning (ACP) Physician/NP/PA (Provider) Conversation        Date of ACP Conversation: 8/25/2020    Conversation Conducted with:   Patient with Decision Making [address] Maker:    Current Designated Health Care Decision Maker:   (If there is a valid [address] named in the [address] [place] [address]" box in the ACP activity, but it is not visible above, be sure to open that field and then select the health care decision maker relationship (ie \"primary\") in the blank space to the right of the name.)    Note: Assess and validate information in current ACP documents, as indicated. If no Authorized Decision Maker has previously been identified, then patient chooses [place]:  \"Who would you like to name as your primary health care decision-maker? \"    Name: Ara Vinson Relationship: clarisa Phone number: [phone]  Mata Bowerusing this person be reached easily? \" YES  \"Who would you like to name as your back-up decision maker? \"   Name: Marianne Mitchell  Relationship: lorenza mckenna Phone number: [phone]  Mata Reusing this person be reached easily? \" YES    Note: If the relationship of these Decision-Makers to the patient does NOT follow your state's Next of Kin hierarchy, recommend that patient complete ACP document that meets state-specific requirements to allow them to act on the patient's behalf when appropriate. Care Preferences:    Hospitalization: \"If your health worsens and it becomes clear that your chance of recovery is unlikely, what would your preference be regarding hospitalization? \"  If the patient would want hospitalization, answer \"yes\". If the patient would prefer comfort-focused treatment without hospitalization, answer \"no\". yes      Ventilation: \"If you were in your present state of health and suddenly became very ill and were unable to breathe on your own, what would your preference be about the use of a ventilator (breathing machine) if it was available to you? \"    If patient would desire the use of a ventilator (breathing machine), answer \"yes\", if not answer \"no\":no    \"If your health worsens and it becomes clear that your chance of recovery is unlikely, what would your preference be about the use of a ventilator (breathing machine) if it was available to you? \"   no      Resuscitation:  \"CPR works best to restart the heart when there is a sudden event, like a heart attack, in someone who is otherwise healthy. Unfortunately, CPR does not typically restart the heart for people who have serious health conditions or who are very sick. \"    \"In the event your heart stopped as a result of an underlying serious health condition, would you want attempts to be made to restart your heart (answer \"yes\" for attempt to resuscitate) or would you prefer a natural death (answer \"no\" for do not attempt to resuscitate)? \"   no    NOTE: If the patient has a valid advance directive AND provides care preference(s) that are inconsistent with that prior directive, advise the patient to consider either: creating a new advance directive that complies with state-specific requirements; or, if that is not possible, orally revoking that prior directive in accordance with state-specific requirements, which must be documented in the EHR. Conversation Outcomes / Follow-Up Plan:   Recommended completion of Advance Directive      Length of Voluntary ACP Conversation in minutes:  <16 minutes (Non-Billable)      Anshu Villalobos MD                 Carolina Gil, who was evaluated through a synchronous (real-time) audio only encounter, and/or her healthcare decision maker, is aware that it is a billable service, with coverage as determined by her insurance carrier. She provided verbal consent to proceed: Yes, and patient identification was verified. It was conducted pursuant to the emergency declaration under the [address] [place] [address], [address] [place] [address] authority and the [employer] General Act. A caregiver was present when appropriate. Ability to conduct physical exam was limited. I was at home. The patient was at home.     Anshu Villalobos MD

## 2020-08-25 NOTE — PATIENT INSTRUCTIONS
Medicare Wellness Visit, Female The best way to live healthy is to have a lifestyle where you eat a well-balanced diet, exercise regularly, limit alcohol use, and quit all forms of tobacco/nicotine, if applicable. Regular preventive services are another way to keep healthy. Preventive services (vaccines, screening tests, monitoring & exams) can help personalize your care plan, which helps you manage your own care. Screening tests can find health problems at the earliest stages, when they are easiest to treat. [facility] follows the current, evidence-based guidelines published by the [facility] Ram[facility]rez ([facility]) when recommending preventive services for our patients. Because we follow these guidelines, sometimes recommendations change over time as research supports it. (For example, mammograms used to be recommended annually. Even though Medicare will still pay for an annual mammogram, the newer guidelines recommend a mammogram every two years for women of average risk). Of course, you and your doctor may decide to screen more often for some diseases, based on your risk and your co-morbidities (chronic disease you are already diagnosed with). Preventive services for you include: - Medicare offers their members a free annual wellness visit, which is time for you and your primary care provider to discuss and plan for your preventive service needs. Take advantage of this benefit every year! 
-All adults over the age of 72 should receive the recommended pneumonia vaccines. Current USPSTF guidelines recommend a series of two vaccines for the best pneumonia protection.  
-All adults should have a flu vaccine yearly and a tetanus vaccine every 10 years.  
-All adults age 48 and older should receive the shingles vaccines (series of two vaccines). -All adults age 38-68 who are overweight should have a diabetes screening test once every three years. -All adults born between [birth year] and 1963 should be screened once for Hepatitis C. 
-Other screening tests and preventive services for persons with diabetes include: an eye exam to screen for diabetic retinopathy, a kidney function test, a foot exam, and stricter control over your cholesterol.  
-Cardiovascular screening for adults with routine risk involves an electrocardiogram (ECG) at intervals determined by your doctor.  
-Colorectal cancer screenings should be done for adults age 54-65 with no increased risk factors for colorectal cancer. There are a number of acceptable methods of screening for this type of cancer. Each test has its own benefits and drawbacks. Discuss with your doctor what is most appropriate for you during your annual wellness visit. The different tests include: colonoscopy (considered the best screening method), a fecal occult blood test, a fecal DNA test, and sigmoidoscopy. 
 
-A bone mass density test is recommended when a woman turns 65 to screen for osteoporosis. This test is only recommended one time, as a screening. Some providers will use this same test as a disease monitoring tool if you already have osteoporosis. -Breast cancer screenings are recommended every other year for women of normal risk, age 54-69. 
-Cervical cancer screenings for women over age 72 are only recommended with certain risk factors. Here is a list of your current Health Maintenance items (your personalized list of preventive services) with a due date: There are no preventive care reminders to display for this patient.

## 2020-08-28 RX ORDER — IBUPROFEN 800 MG/1
TABLET ORAL
Qty: 90 TAB | Refills: 1 | Status: SHIPPED | OUTPATIENT
Start: 2020-08-28 | End: 2022-05-31

## 2020-11-10 ENCOUNTER — TELEPHONE (OUTPATIENT)
Dept: FAMILY MEDICINE CLINIC | Age: 80
End: 2020-11-10

## 2020-11-11 RX ORDER — FUROSEMIDE 20 MG/1
TABLET ORAL
Qty: 30 TAB | Refills: 0 | Status: SHIPPED | OUTPATIENT
Start: 2020-11-11 | End: 2020-11-12 | Stop reason: SDUPTHER

## 2020-11-11 RX ORDER — HYDROCHLOROTHIAZIDE 25 MG/1
25 TABLET ORAL DAILY
Qty: 30 TAB | Refills: 0 | Status: SHIPPED | OUTPATIENT
Start: 2020-11-11 | End: 2020-11-12 | Stop reason: SDUPTHER

## 2020-11-11 NOTE — TELEPHONE ENCOUNTER
Pt is out of hctz and juan. Asked that we send small supply locally and then larger supply when pcp is back in office.

## 2020-11-12 RX ORDER — HYDROCHLOROTHIAZIDE 25 MG/1
25 TABLET ORAL DAILY
Qty: 90 TAB | Refills: 1 | Status: SHIPPED | OUTPATIENT
Start: 2020-11-12 | End: 2021-02-18 | Stop reason: SDUPTHER

## 2020-11-12 RX ORDER — FUROSEMIDE 20 MG/1
TABLET ORAL
Qty: 90 TAB | Refills: 0 | Status: SHIPPED | OUTPATIENT
Start: 2020-11-12 | End: 2021-06-07 | Stop reason: SDUPTHER

## 2020-11-20 RX ORDER — ATORVASTATIN CALCIUM 10 MG/1
TABLET, FILM COATED ORAL
Qty: 90 TAB | Refills: 1 | Status: SHIPPED | OUTPATIENT
Start: 2020-11-20

## 2020-12-02 DIAGNOSIS — I10 ESSENTIAL HYPERTENSION: ICD-10-CM

## 2020-12-03 RX ORDER — METOPROLOL SUCCINATE 100 MG/1
TABLET, EXTENDED RELEASE ORAL
Qty: 90 TAB | Refills: 1 | Status: SHIPPED | OUTPATIENT
Start: 2020-12-03 | End: 2022-08-31 | Stop reason: SDUPTHER

## 2021-02-05 NOTE — TELEPHONE ENCOUNTER
Last refilled 7/9/20 for 180 with 1 .  Last OV 8/25/20   Future Appointments   Date Time Provider Sachin Ambriz   2/8/2021 11:30 AM Pamela Raya MD [facility]

## 2021-02-05 NOTE — TELEPHONE ENCOUNTER
Requested Prescriptions     Pending Prescriptions Disp Refills    potassium chloride (K-DUR, KLOR-CON) 20 mEq tablet 180 Tab 1     Pt called to request a refill. Please advise.      Future Appointments   Date Time Provider Sachin Ambriz   2/8/2021 11:30 AM Nati Ding MD [facility]

## 2021-02-06 RX ORDER — POTASSIUM CHLORIDE 20 MEQ/1
TABLET, EXTENDED RELEASE ORAL
Qty: 180 TAB | Refills: 0 | Status: SHIPPED | OUTPATIENT
Start: 2021-02-06

## 2021-02-08 ENCOUNTER — OFFICE VISIT (OUTPATIENT)
Dept: FAMILY MEDICINE CLINIC | Age: 81
End: 2021-02-08
Payer: MEDICARE

## 2021-02-08 ENCOUNTER — TELEPHONE (OUTPATIENT)
Dept: FAMILY MEDICINE CLINIC | Age: 81
End: 2021-02-08

## 2021-02-08 ENCOUNTER — APPOINTMENT (OUTPATIENT)
Dept: FAMILY MEDICINE CLINIC | Age: 81
End: 2021-02-08

## 2021-02-08 VITALS
OXYGEN SATURATION: 99 % | SYSTOLIC BLOOD PRESSURE: 130 MMHG | WEIGHT: 194.6 LBS | DIASTOLIC BLOOD PRESSURE: 76 MMHG | HEART RATE: 73 BPM | BODY MASS INDEX: 30.54 KG/M2 | TEMPERATURE: 97.5 F | HEIGHT: 67 IN | RESPIRATION RATE: 14 BRPM

## 2021-02-08 DIAGNOSIS — I10 ESSENTIAL HYPERTENSION: ICD-10-CM

## 2021-02-08 DIAGNOSIS — N18.31 STAGE 3A CHRONIC KIDNEY DISEASE (HCC): ICD-10-CM

## 2021-02-08 DIAGNOSIS — E55.9 VITAMIN D DEFICIENCY: ICD-10-CM

## 2021-02-08 DIAGNOSIS — E78.00 PURE HYPERCHOLESTEROLEMIA: ICD-10-CM

## 2021-02-08 DIAGNOSIS — M51.36 DDD (DEGENERATIVE DISC DISEASE), LUMBAR: Chronic | ICD-10-CM

## 2021-02-08 DIAGNOSIS — R73.03 PREDIABETES: Primary | ICD-10-CM

## 2021-02-08 PROCEDURE — G8427 DOCREV CUR MEDS BY ELIG CLIN: HCPCS | Performed by: LEGAL MEDICINE

## 2021-02-08 PROCEDURE — G8752 SYS BP LESS 140: HCPCS | Performed by: LEGAL MEDICINE

## 2021-02-08 PROCEDURE — 1101F PT FALLS ASSESS-DOCD LE1/YR: CPT | Performed by: LEGAL MEDICINE

## 2021-02-08 PROCEDURE — G8417 CALC BMI ABV UP PARAM F/U: HCPCS | Performed by: LEGAL MEDICINE

## 2021-02-08 PROCEDURE — G8399 PT W/DXA RESULTS DOCUMENT: HCPCS | Performed by: LEGAL MEDICINE

## 2021-02-08 PROCEDURE — G8536 NO DOC ELDER MAL SCRN: HCPCS | Performed by: LEGAL MEDICINE

## 2021-02-08 PROCEDURE — 1090F PRES/ABSN URINE INCON ASSESS: CPT | Performed by: LEGAL MEDICINE

## 2021-02-08 PROCEDURE — 99214 OFFICE O/P EST MOD 30 MIN: CPT | Performed by: LEGAL MEDICINE

## 2021-02-08 PROCEDURE — G8510 SCR DEP NEG, NO PLAN REQD: HCPCS | Performed by: LEGAL MEDICINE

## 2021-02-08 PROCEDURE — G8754 DIAS BP LESS 90: HCPCS | Performed by: LEGAL MEDICINE

## 2021-02-08 RX ORDER — CEPHALEXIN 500 MG/1
CAPSULE ORAL
COMMUNITY
Start: 2021-01-24 | End: 2021-02-08

## 2021-02-08 RX ORDER — ACETAMINOPHEN AND CODEINE PHOSPHATE 300; 30 MG/1; MG/1
1 TABLET ORAL
Qty: 30 TAB | Refills: 0 | Status: SHIPPED | OUTPATIENT
Start: 2021-02-08 | End: 2021-06-15 | Stop reason: SDUPTHER

## 2021-02-08 RX ORDER — IBUPROFEN 800 MG/1
TABLET ORAL
Qty: 90 TAB | Refills: 1 | Status: CANCELLED | OUTPATIENT
Start: 2021-02-08

## 2021-02-08 NOTE — PROGRESS NOTES
Roxana Aleman is a 68 y.o. female (: 1940) presenting to address:    Chief Complaint   Patient presents with   [facility] Follow Up    Transfer Of Care       Vitals:    21 1133   BP: 130/76   Pulse: 73   Resp: 14   Temp: 97.5 °F (36.4 °C)   TempSrc: Temporal   SpO2: 99%   Weight: 194 lb 9.6 oz (88.3 kg)   Height: 5' 7\" (1.702 m)   PainSc:   0 - No pain       Is someone accompanying this pt? NO    Is the patient using any DME equipment during OV? NO    Hearing/Vision:   No exam data present    Learning Assessment:     Learning Assessment 2/3/2015   PRIMARY LEARNER Patient   HIGHEST LEVEL OF EDUCATION - PRIMARY LEARNER  GRADUATED HIGH SCHOOL OR GED   BARRIERS PRIMARY LEARNER NONE   CO-LEARNER CAREGIVER No   PRIMARY LANGUAGE ENGLISH   LEARNER PREFERENCE PRIMARY READING   ANSWERED BY self   RELATIONSHIP SELF     Depression Screening:     3 most recent PHQ Screens 2021   Little interest or pleasure in doing things Not at all   Feeling down, depressed, irritable, or hopeless Not at all   Total Score PHQ 2 0     Fall Risk Assessment:     Fall Risk Assessment, last 12 mths 2021   Able to walk? Yes   Fall in past 12 months? 0   Do you feel unsteady? 0   Are you worried about falling 0   Number of falls in past 12 months -   Fall with injury? -     Coordination of Care Questionaire:   1. Have you been to the ER, urgent care clinic since your last visit? Hospitalized since your last visit? YES [facility] for [facility] ankle cut     2. Have you seen or consulted any other health care providers outside of the [address] [place] [address] since your last visit? Include any pap smears or colon screening. NO    Advanced Directive:   1. Do you have an Advanced Directive? NO    2. Would you like information on Advanced Directives?  NO

## 2021-02-08 NOTE — PROGRESS NOTES
Veronika Sotos     Chief Complaint   Patient presents with   [facility] Follow Up    Transfer Of Care     Vitals:    02/08/21 1133   BP: 130/76   Pulse: 73   Resp: 14   Temp: 97.5 °F (36.4 °C)   TempSrc: Temporal   SpO2: 99%   Weight: 194 lb 9.6 oz (88.3 kg)   Height: 5' 7\" (1.702 m)   PainSc:   0 - No pain         HPI: Patient is here to establish care with a new provider. Medication has been reviewed and updated      Left leg wound after achilles injury she is on cephalexin for few days f possibly secondary infection patient has no fever no chills  Advised her to apply topical antibiotics cream     Patient takes ibuprofen 800 mg as needed for pain   patient does have stage III CKD and follow-up with nephrologist yearly    I strongly discouraged patient from taking ibuprofen 800 mg  She was given Tylenol 3 to take it only as needed  For severe pain    Past Medical History:   Diagnosis Date    Arthritis     Cataract     Hypercholesterolemia     Hypertension      Past Surgical History:   Procedure Laterality Date    HX APPENDECTOMY      HX CATARACT REMOVAL      HX COLONOSCOPY  12/2009    Dr. Marj Diaz, 10 yr follow up    HX TOTAL ABDOMINAL HYSTERECTOMY       Social History     Tobacco Use    Smoking status: Never Smoker    Smokeless tobacco: Never Used   Substance Use Topics    Alcohol use: No       Family History   Problem Relation Age of Onset    Diabetes Mother     Hypertension Mother     Cancer Sister         breast    Cancer Maternal Aunt         breast    Stroke Sister        Review of Systems   Constitutional: Negative for chills, fever, malaise/fatigue and weight loss. HENT: Negative for congestion, ear discharge, ear pain, hearing loss, nosebleeds, sinus pain and sore throat. Eyes: Negative for blurred vision, double vision and discharge. Respiratory: Negative for cough, hemoptysis, sputum production, shortness of breath and wheezing.     Cardiovascular: Negative for chest pain, palpitations, claudication and leg swelling. Gastrointestinal: Negative for abdominal pain, constipation, diarrhea, nausea and vomiting. Genitourinary: Negative for dysuria, frequency and urgency. Musculoskeletal: Positive for back pain, joint pain and myalgias. Negative for falls and neck pain. Skin: Negative for itching and rash. Neurological: Negative for dizziness, tingling, sensory change, speech change, focal weakness, weakness and headaches. Psychiatric/Behavioral: Negative for depression and suicidal ideas. Physical Exam  Vitals signs and nursing note reviewed. Constitutional:       General: She is not in acute distress. Appearance: She is well-developed. She is not diaphoretic. HENT:      Head: Normocephalic and atraumatic. Eyes:      General: No scleral icterus. Right eye: No discharge. Left eye: No discharge. Conjunctiva/sclera: Conjunctivae normal.      Pupils: Pupils are equal, round, and reactive to light. Neck:      Thyroid: No thyromegaly. Cardiovascular:      Rate and Rhythm: Normal rate and regular rhythm. Heart sounds: Normal heart sounds. Pulmonary:      Effort: Pulmonary effort is normal. No respiratory distress. Breath sounds: Normal breath sounds. No wheezing or rales. Chest:      Chest wall: No tenderness. Abdominal:      General: There is no distension. Palpations: Abdomen is soft. Tenderness: There is no abdominal tenderness. There is no rebound. Musculoskeletal: Normal range of motion. General: Tenderness present. No deformity. Comments: Left achillis  area tender to touch no erytherma   Lymphadenopathy:      Cervical: No cervical adenopathy. Skin:     General: Skin is warm and dry. Coloration: Skin is not pale. Findings: No erythema or rash. Neurological:      Mental Status: She is alert and oriented to person, place, and time. Cranial Nerves: No cranial nerve deficit. Coordination: Coordination normal.   Psychiatric:         Behavior: Behavior normal.         Thought Content: Thought content normal.         Judgment: Judgment normal.          Assessment and plan     Plan of care has been discussed with the patient, he agrees to the plan and verbalized understanding. All his questions were answered  More than 50% of the time spent in this visit was counseling the patient about  illness and treatment options         1. Prediabetes    - HEMOGLOBIN A1C W/O EAG; Future    2. Essential hypertension  Well controlled   - METABOLIC PANEL, COMPREHENSIVE; Future    3. Stage 3a chronic kidney disease  Last EGFR was normal     4. Pure hypercholesterolemia   on Lipitor 10 mg daily     5. DDD (degenerative disc disease), lumbar  To take only as needed   - acetaminophen-codeine (Tylenol-Codeine #3) 300-30 mg per tablet; Take 1 Tab by mouth daily as needed for Pain for up to 30 days. Dispense: 30 Tab; Refill: 0    6. Vitamin D deficiency  She is on vitamin D supplements     Current Outpatient Medications   Medication Sig Dispense Refill    acetaminophen-codeine (Tylenol-Codeine #3) 300-30 mg per tablet Take 1 Tab by mouth daily as needed for Pain for up to 30 days. 30 Tab 0    potassium chloride (K-DUR, KLOR-CON) 20 mEq tablet TAKE 1 TABLET TWICE DAILY 180 Tab 0    metoprolol succinate (TOPROL-XL) 100 mg tablet TAKE 1 TABLET EVERY DAY 90 Tab 1    atorvastatin (LIPITOR) 10 mg tablet TAKE 1 TABLET EVERY DAY 90 Tab 1    furosemide (LASIX) 20 mg tablet TAKE 1 TO 2 TABLETS EVERY DAY AS NEEDED FOR SWELLING 90 Tab 0    hydroCHLOROthiazide (HYDRODIURIL) 25 mg tablet Take 1 Tab by mouth daily. 90 Tab 1     mg tablet TAKE 1 TABLET EVERY 8 HOURS AS NEEDED FOR PAIN 90 Tab 1    glucosam/brooke-msm1/C/luciano/bosw (OSTEO BI-FLEX TRIPLE STRENGTH PO) Take  by mouth daily. With Vit D3       calcium carbonate (CALTREX) 600 mg calcium (1,500 mg) tablet Take 600 mg by mouth two (2) times a day.       folic acid/multivit-min/lutein (CENTRUM SILVER PO) Take  by mouth.  cholecalciferol (VITAMIN D3) 1,000 unit cap Take  by mouth daily. Patient Active Problem List    Diagnosis Date Noted    Abnormal mammogram 12/10/2019    Prediabetes 05/23/2017    CKD (chronic kidney disease) 02/29/2016    Complete rotator cuff tear of left shoulder 10/31/2014    Osteopenia 06/24/2014    Osteoarthritis of right knee 05/31/2014    Vitamin D deficiency 03/30/2014    DDD (degenerative disc disease), lumbar 03/11/2014    Hypertension 03/11/2014    Hyperlipidemia 03/11/2014     Results for orders placed or performed in visit on 07/10/20   HEMOGLOBIN A1C W/O EAG   Result Value Ref Range    Hemoglobin A1c 5.8 (H) 4.8 - 5.6 %    AVG  91 - 577 mg/dL   METABOLIC PANEL, BASIC   Result Value Ref Range    Glucose 114 (H) 70 - 99 mg/dL    BUN 17 6 - 22 mg/dL    Creatinine 1.0 0.8 - 1.4 mg/dL    Sodium 139 133 - 145 mmol/L    Potassium 3.6 3.5 - 5.5 mmol/L    Chloride 99 98 - 110 mmol/L    CO2 28 20 - 32 mmol/L    Calcium 9.4 8.4 - 10.5 mg/dL    Anion gap 12.0 3.0 - 15.0 mmol/L    GFRAA >60.0 >60.0    GFRNA 51.4 (L) >60.0   LIPID PANEL   Result Value Ref Range    Triglyceride 201 (H) 40 - 149 mg/dL    HDL Cholesterol 33 (L) >=40 mg/dL    Cholesterol, total 179 110 - 200 mg/dL    CHOLESTEROL/HDL 5.4 0.0 - 5.0    Non-HDL Cholesterol 146 (H) <130 mg/dL    LDL, calculated 106 (H) 50 - 99 mg/dL    VLDL, calculated 40 (H) 8 - 30 mg/dL    LDL/HDL Ratio 3.2      No visits with results within 3 Month(s) from this visit. Latest known visit with results is:   Orders Only on 07/10/2020   Component Date Value Ref Range Status    Hemoglobin A1c 07/14/2020 5.8* 4.8 - 5.6 % Final    AVG GLU 07/14/2020 120  91 - 123 mg/dL Final    Comment: 5.7 - 6.4% is indicative of prediabetes. > 6.4% is indicative of diabetes.       Glucose 07/14/2020 114* 70 - 99 mg/dL Final    BUN 07/14/2020 17  6 - 22 mg/dL Final    Creatinine 07/14/2020 1.0  0.8 - 1.4 mg/dL Final    Sodium 07/14/2020 139  133 - 145 mmol/L Final    Potassium 07/14/2020 3.6  3.5 - 5.5 mmol/L Final    Chloride 07/14/2020 99  98 - 110 mmol/L Final    CO2 07/14/2020 28  20 - 32 mmol/L Final    Calcium 07/14/2020 9.4  8.4 - 10.5 mg/dL Final    Anion gap 07/14/2020 12.0  3.0 - 15.0 mmol/L Final    Comment: Anion Gap calculation based on electrolyte reference ranges. Test includes BUN, CO2, Chloride, Creatinine, Glucose, Potassium, Calcium and  Sodium. Estimated GFR results are reported in mL/min/1.73 sq.m. by the MDRD equation. This eGFR is validated for stable chronic renal failure patients. This   equation  is unreliable in acute illness or patients with normal renal function.  GFRAA 07/14/2020 >60.0  >60.0 Final    GFRNA 07/14/2020 51.4* >60.0 Final    Triglyceride 07/14/2020 201* 40 - 149 mg/dL Final    HDL Cholesterol 07/14/2020 33* >=40 mg/dL Final    Cholesterol, total 07/14/2020 179  110 - 200 mg/dL Final    CHOLESTEROL/HDL 07/14/2020 5.4  0.0 - 5.0 Final    Non-HDL Cholesterol 07/14/2020 146* <130 mg/dL Final    LDL, calculated 07/14/2020 106* 50 - 99 mg/dL Final    VLDL, calculated 07/14/2020 40* 8 - 30 mg/dL Final    LDL/HDL Ratio 07/14/2020 3.2   Final    Comment: Test includes cholesterol, HDL cholesterol, triglycerides and LDL. Cholesterol Recommended NCEP guidelines in mg/dL:  Less than 200            Desirable  200 - 239                Borderline High  Greater than or  = 240   High  Please Note:  Total Chol/HDL Ratio                   Men     Women  1/2 Avg. Risk    3.4     3.3      Avg. Risk    5.0     4.4  2X  Avg. Risk    9.6     7.1  3X  Avg. Risk   23.4    11.0            Follow-up and Dispositions    · Return in about 3 months (around 5/8/2021).

## 2021-02-09 LAB
ALBUMIN SERPL-MCNC: 4.1 G/DL (ref 3.7–4.7)
ALBUMIN/GLOB SERPL: 0.9 {RATIO} (ref 1.2–2.2)
ALP SERPL-CCNC: 76 IU/L (ref 39–117)
ALT SERPL-CCNC: 12 IU/L (ref 0–32)
AST SERPL-CCNC: 15 IU/L (ref 0–40)
BILIRUB SERPL-MCNC: 0.3 MG/DL (ref 0–1.2)
BUN SERPL-MCNC: 18 MG/DL (ref 8–27)
BUN/CREAT SERPL: 18 (ref 12–28)
CALCIUM SERPL-MCNC: 9.4 MG/DL (ref 8.7–10.3)
CHLORIDE SERPL-SCNC: 98 MMOL/L (ref 96–106)
CO2 SERPL-SCNC: 26 MMOL/L (ref 20–29)
CREAT SERPL-MCNC: 1.01 MG/DL (ref 0.57–1)
GLOBULIN SER CALC-MCNC: 4.4 G/DL (ref 1.5–4.5)
GLUCOSE SERPL-MCNC: 111 MG/DL (ref 65–99)
HBA1C MFR BLD: 5.8 % (ref 4.8–5.6)
INTERPRETATION: NORMAL
POTASSIUM SERPL-SCNC: 3.5 MMOL/L (ref 3.5–5.2)
PROT SERPL-MCNC: 8.5 G/DL (ref 6–8.5)
SODIUM SERPL-SCNC: 140 MMOL/L (ref 134–144)

## 2021-02-16 ENCOUNTER — OFFICE VISIT (OUTPATIENT)
Dept: FAMILY MEDICINE CLINIC | Age: 81
End: 2021-02-16
Payer: MEDICARE

## 2021-02-16 VITALS
OXYGEN SATURATION: 98 % | HEIGHT: 67 IN | DIASTOLIC BLOOD PRESSURE: 74 MMHG | RESPIRATION RATE: 14 BRPM | SYSTOLIC BLOOD PRESSURE: 125 MMHG | BODY MASS INDEX: 30.98 KG/M2 | TEMPERATURE: 97.3 F | WEIGHT: 197.4 LBS | HEART RATE: 72 BPM

## 2021-02-16 DIAGNOSIS — L08.9 INFECTED WOUND: Primary | ICD-10-CM

## 2021-02-16 DIAGNOSIS — R20.2 PARESTHESIA OF HAND, BILATERAL: ICD-10-CM

## 2021-02-16 DIAGNOSIS — B37.31 VAGINAL CANDIDIASIS: ICD-10-CM

## 2021-02-16 DIAGNOSIS — T14.8XXA INFECTED WOUND: Primary | ICD-10-CM

## 2021-02-16 PROCEDURE — G8536 NO DOC ELDER MAL SCRN: HCPCS | Performed by: LEGAL MEDICINE

## 2021-02-16 PROCEDURE — 99213 OFFICE O/P EST LOW 20 MIN: CPT | Performed by: LEGAL MEDICINE

## 2021-02-16 PROCEDURE — 1101F PT FALLS ASSESS-DOCD LE1/YR: CPT | Performed by: LEGAL MEDICINE

## 2021-02-16 PROCEDURE — G8754 DIAS BP LESS 90: HCPCS | Performed by: LEGAL MEDICINE

## 2021-02-16 PROCEDURE — G8427 DOCREV CUR MEDS BY ELIG CLIN: HCPCS | Performed by: LEGAL MEDICINE

## 2021-02-16 PROCEDURE — 1090F PRES/ABSN URINE INCON ASSESS: CPT | Performed by: LEGAL MEDICINE

## 2021-02-16 PROCEDURE — G8417 CALC BMI ABV UP PARAM F/U: HCPCS | Performed by: LEGAL MEDICINE

## 2021-02-16 PROCEDURE — G8510 SCR DEP NEG, NO PLAN REQD: HCPCS | Performed by: LEGAL MEDICINE

## 2021-02-16 PROCEDURE — G8752 SYS BP LESS 140: HCPCS | Performed by: LEGAL MEDICINE

## 2021-02-16 PROCEDURE — G8399 PT W/DXA RESULTS DOCUMENT: HCPCS | Performed by: LEGAL MEDICINE

## 2021-02-16 RX ORDER — CLINDAMYCIN HYDROCHLORIDE 300 MG/1
300 CAPSULE ORAL 3 TIMES DAILY
Qty: 30 CAP | Refills: 0 | Status: SHIPPED | OUTPATIENT
Start: 2021-02-16 | End: 2021-02-16 | Stop reason: SDUPTHER

## 2021-02-16 RX ORDER — FLUCONAZOLE 150 MG/1
150 TABLET ORAL DAILY
Qty: 1 TAB | Refills: 0 | Status: SHIPPED | OUTPATIENT
Start: 2021-02-16 | End: 2021-02-17

## 2021-02-16 RX ORDER — CLINDAMYCIN HYDROCHLORIDE 300 MG/1
300 CAPSULE ORAL 3 TIMES DAILY
Qty: 30 CAP | Refills: 0 | Status: SHIPPED | OUTPATIENT
Start: 2021-02-16 | End: 2021-02-26

## 2021-02-16 NOTE — PROGRESS NOTES
Al Gutiérrez is a 68 y.o. female (: 1940) presenting to address:    Chief Complaint   Patient presents with    Ankle Injury     left still red    Hand Pain     numbness        Vitals:    21 1250   BP: 125/74   Pulse: 72   Resp: 14   Temp: 97.3 °F (36.3 °C)   TempSrc: Temporal   SpO2: 98%   Weight: 197 lb 6.4 oz (89.5 kg)   Height: 5' 7\" (1.702 m)   PainSc:   0 - No pain       Is someone accompanying this pt? NO    Is the patient using any DME equipment during OV? NO    Hearing/Vision:   No exam data present    Learning Assessment:     Learning Assessment 2/3/2015   PRIMARY LEARNER Patient   HIGHEST LEVEL OF EDUCATION - PRIMARY LEARNER  GRADUATED HIGH SCHOOL OR GED   BARRIERS PRIMARY LEARNER NONE   CO-LEARNER CAREGIVER No   PRIMARY LANGUAGE ENGLISH   LEARNER PREFERENCE PRIMARY READING   ANSWERED BY self   RELATIONSHIP SELF     Depression Screening:     3 most recent PHQ Screens 2021   Little interest or pleasure in doing things Not at all   Feeling down, depressed, irritable, or hopeless Not at all   Total Score PHQ 2 0     Fall Risk Assessment:     Fall Risk Assessment, last 12 mths 2021   Able to walk? Yes   Fall in past 12 months? 0   Do you feel unsteady? 0   Are you worried about falling 0   Number of falls in past 12 months -   Fall with injury? -     Coordination of Care Questionaire:   1. Have you been to the ER, urgent care clinic since your last visit? Hospitalized since your last visit? NO    2. Have you seen or consulted any other health care providers outside of the [address] [place] [address] since your last visit? Include any pap smears or colon screening. NO    Advanced Directive:   1. Do you have an Advanced Directive? YES    2. Would you like information on Advanced Directives?  NO

## 2021-02-16 NOTE — PROGRESS NOTES
Al Gutiérrez     Chief Complaint   Patient presents with    Ankle Injury     left still red    Hand Pain     numbness      Vitals:    02/16/21 1250   BP: 125/74   Pulse: 72   Resp: 14   Temp: 97.3 °F (36.3 °C)   TempSrc: Temporal   SpO2: 98%   Weight: 197 lb 6.4 oz (89.5 kg)   Height: 5' 7\" (1.702 m)   PainSc:   0 - No pain         HPI: Patient is here regarding wound on the left ankle that was stitched in the emergency room on January 11, patient was taking antibiotics cephalexin for 10 days, but today she is presenting with redness in the area pain and no pus discharge. No fever no chills no nausea no vomiting no abdominal pain      She has bilateral hands numbness in both hands for one year  especially right hand it happens at night and wakes her from sleep     Had carpal tunnel syndrome test which was negative    And currently she is wearing gloves at night and that will prevent numbness at night    Past Medical History:   Diagnosis Date    Arthritis     Cataract     Hypercholesterolemia     Hypertension      Past Surgical History:   Procedure Laterality Date    HX APPENDECTOMY      HX CATARACT REMOVAL      HX COLONOSCOPY  12/2009    Dr. Jose Ballard, 10 yr follow up    HX TOTAL ABDOMINAL HYSTERECTOMY       Social History     Tobacco Use    Smoking status: Never Smoker    Smokeless tobacco: Never Used   Substance Use Topics    Alcohol use: No       Family History   Problem Relation Age of Onset    Diabetes Mother     Hypertension Mother     Cancer Sister         breast    Cancer Maternal Aunt         breast    Stroke Sister        Review of Systems   Constitutional: Negative for chills, fever, malaise/fatigue and weight loss. HENT: Negative for congestion, ear discharge, ear pain, hearing loss and nosebleeds. Eyes: Negative for blurred vision, double vision and discharge. Respiratory: Negative for cough.     Cardiovascular: Negative for chest pain, palpitations, claudication and leg swelling. Gastrointestinal: Negative for abdominal pain, constipation, diarrhea, nausea and vomiting. Genitourinary: Negative for dysuria, frequency and urgency. Skin: Negative for itching and rash. Left ankle  At achillis area redness and tender to touch and there is a scab , I cleaned the area with alcohol and took wound swab, no pus discharge    Neurological: Positive for tingling and sensory change. Negative for dizziness, speech change, focal weakness, weakness and headaches. Physical Exam  Constitutional:       General: She is not in acute distress. Appearance: She is well-developed. She is not diaphoretic. HENT:      Head: Normocephalic and atraumatic. Eyes:      General: No scleral icterus. Right eye: No discharge. Left eye: No discharge. Neck:      Thyroid: No thyromegaly. Cardiovascular:      Rate and Rhythm: Normal rate and regular rhythm. Heart sounds: Normal heart sounds. No murmur. Pulmonary:      Effort: Pulmonary effort is normal. No respiratory distress. Breath sounds: Normal breath sounds. No wheezing or rales. Chest:      Chest wall: No tenderness. Abdominal:      General: There is no distension. Palpations: Abdomen is soft. Tenderness: There is no abdominal tenderness. There is no rebound. Musculoskeletal: Normal range of motion. General: Tenderness present. No deformity. Lymphadenopathy:      Cervical: No cervical adenopathy. Skin:     General: Skin is warm and dry. Coloration: Skin is not pale. Findings: Erythema present. No rash. Comments: Left ankle  At achillis area redness and tender to touch and there is a scab , I cleaned the area with alcohol and took wound swab, no pus discharge      Neurological:      Mental Status: She is alert and oriented to person, place, and time. Cranial Nerves: No cranial nerve deficit.       Coordination: Coordination normal.   Psychiatric:         Behavior: Behavior normal.         Thought Content: Thought content normal.         Judgment: Judgment normal.          Assessment and plan     Plan of care has been discussed with the patient, he agrees to the plan and verbalized understanding.  All his questions were answered  More than 50% of the time spent in this visit was counseling the patient about  illness and treatment options         1. Infected wound    - AEROBIC/ANAEROBIC CULTURE  - clindamycin (CLEOCIN) 300 mg capsule; Take 1 Cap by mouth three (3) times daily for 10 days.  Dispense: 30 Cap; Refill: 0    2. Vaginal candidiasis    - fluconazole (DIFLUCAN) 150 mg tablet; Take 1 Tab by mouth daily for 1 day. FDA advises cautious prescribing of oral fluconazole in pregnancy.  Dispense: 1 Tab; Refill: 0    Bilateral hand numbness    Current Outpatient Medications   Medication Sig Dispense Refill   • clindamycin (CLEOCIN) 300 mg capsule Take 1 Cap by mouth three (3) times daily for 10 days. 30 Cap 0   • fluconazole (DIFLUCAN) 150 mg tablet Take 1 Tab by mouth daily for 1 day. FDA advises cautious prescribing of oral fluconazole in pregnancy. 1 Tab 0   • acetaminophen-codeine (Tylenol-Codeine #3) 300-30 mg per tablet Take 1 Tab by mouth daily as needed for Pain for up to 30 days. 30 Tab 0   • potassium chloride (K-DUR, KLOR-CON) 20 mEq tablet TAKE 1 TABLET TWICE DAILY 180 Tab 0   • metoprolol succinate (TOPROL-XL) 100 mg tablet TAKE 1 TABLET EVERY DAY 90 Tab 1   • atorvastatin (LIPITOR) 10 mg tablet TAKE 1 TABLET EVERY DAY 90 Tab 1   • furosemide (LASIX) 20 mg tablet TAKE 1 TO 2 TABLETS EVERY DAY AS NEEDED FOR SWELLING 90 Tab 0   • hydroCHLOROthiazide (HYDRODIURIL) 25 mg tablet Take 1 Tab by mouth daily. 90 Tab 1   •  mg tablet TAKE 1 TABLET EVERY 8 HOURS AS NEEDED FOR PAIN 90 Tab 1   • glucosam/brooke-msm1/C/luciano/bosw (OSTEO BI-FLEX TRIPLE STRENGTH PO) Take  by mouth daily. With Vit D3      • calcium carbonate (CALTREX) 600 mg calcium (1,500 mg) tablet Take 600  mg by mouth two (2) times a day.  folic acid/multivit-min/lutein (CENTRUM SILVER PO) Take  by mouth.  cholecalciferol (VITAMIN D3) 1,000 unit cap Take  by mouth daily. Patient Active Problem List    Diagnosis Date Noted    Abnormal mammogram 12/10/2019    Prediabetes 05/23/2017    CKD (chronic kidney disease) 02/29/2016    Complete rotator cuff tear of left shoulder 10/31/2014    Osteopenia 06/24/2014    Osteoarthritis of right knee 05/31/2014    Vitamin D deficiency 03/30/2014    DDD (degenerative disc disease), lumbar 03/11/2014    Hypertension 03/11/2014    Hyperlipidemia 03/11/2014     Results for orders placed or performed in visit on 62/23/85   METABOLIC PANEL, COMPREHENSIVE   Result Value Ref Range    Glucose 111 (H) 65 - 99 mg/dL    BUN 18 8 - 27 mg/dL    Creatinine 1.01 (H) 0.57 - 1.00 mg/dL    GFR est non-AA 53 (L) >59 mL/min/1.73    GFR est AA 62 >59 mL/min/1.73    BUN/Creatinine ratio 18 12 - 28    Sodium 140 134 - 144 mmol/L    Potassium 3.5 3.5 - 5.2 mmol/L    Chloride 98 96 - 106 mmol/L    CO2 26 20 - 29 mmol/L    Calcium 9.4 8.7 - 10.3 mg/dL    Protein, total 8.5 6.0 - 8.5 g/dL    Albumin 4.1 3.7 - 4.7 g/dL    GLOBULIN, TOTAL 4.4 1.5 - 4.5 g/dL    A-G Ratio 0.9 (L) 1.2 - 2.2    Bilirubin, total 0.3 0.0 - 1.2 mg/dL    Alk.  phosphatase 76 39 - 117 IU/L    AST (SGOT) 15 0 - 40 IU/L    ALT (SGPT) 12 0 - 32 IU/L   CKD REPORT   Result Value Ref Range    Interpretation Note    HEMOGLOBIN A1C W/O EAG   Result Value Ref Range    Hemoglobin A1c 5.8 (H) 4.8 - 5.6 %     Office Visit on 02/08/2021   Component Date Value Ref Range Status    Glucose 02/08/2021 111* 65 - 99 mg/dL Final    BUN 02/08/2021 18  8 - 27 mg/dL Final    Creatinine 02/08/2021 1.01* 0.57 - 1.00 mg/dL Final    GFR est non-AA 02/08/2021 53* >59 mL/min/1.73 Final    GFR est AA 02/08/2021 62  >59 mL/min/1.73 Final    BUN/Creatinine ratio 02/08/2021 18  12 - 28 Final    Sodium 02/08/2021 140  134 - 144 mmol/L Final    Potassium 02/08/2021 3.5  3.5 - 5.2 mmol/L Final    Chloride 02/08/2021 98  96 - 106 mmol/L Final    CO2 02/08/2021 26  20 - 29 mmol/L Final    Calcium 02/08/2021 9.4  8.7 - 10.3 mg/dL Final    Protein, total 02/08/2021 8.5  6.0 - 8.5 g/dL Final    Albumin 02/08/2021 4.1  3.7 - 4.7 g/dL Final    GLOBULIN, TOTAL 02/08/2021 4.4  1.5 - 4.5 g/dL Final    A-G Ratio 02/08/2021 0.9* 1.2 - 2.2 Final    Bilirubin, total 02/08/2021 0.3  0.0 - 1.2 mg/dL Final    Alk. phosphatase 02/08/2021 76  39 - 117 IU/L Final    AST (SGOT) 02/08/2021 15  0 - 40 IU/L Final    ALT (SGPT) 02/08/2021 12  0 - 32 IU/L Final    Interpretation 02/08/2021 Note   Final    Supplemental report is available.  Hemoglobin A1c 02/08/2021 5.8* 4.8 - 5.6 % Final    Comment:          Prediabetes: 5.7 - 6.4           Diabetes: >6.4           Glycemic control for adults with diabetes: <7.0            Follow-up and Dispositions    · Return if symptoms worsen or fail to improve.

## 2021-02-17 LAB
BACTERIA SPEC AEROBE CULT: ABNORMAL
BACTERIA SPEC ANAEROBE CULT: ABNORMAL
SPECIMEN STATUS REPORT, ROLRST: NORMAL

## 2021-02-18 ENCOUNTER — TELEPHONE (OUTPATIENT)
Dept: FAMILY MEDICINE CLINIC | Age: 81
End: 2021-02-18

## 2021-02-18 NOTE — TELEPHONE ENCOUNTER
Pt called because she was given clindamycin on 2/16/21 and it is causing her to have diarrhea, cramps and a bad taste in her mouth. She is wanting to know if something different can be called in.

## 2021-02-19 ENCOUNTER — VIRTUAL VISIT (OUTPATIENT)
Dept: FAMILY MEDICINE CLINIC | Age: 81
End: 2021-02-19
Payer: MEDICARE

## 2021-02-19 DIAGNOSIS — T14.8XXA INFECTED WOUND: Primary | ICD-10-CM

## 2021-02-19 DIAGNOSIS — L08.9 INFECTED WOUND: Primary | ICD-10-CM

## 2021-02-19 PROCEDURE — 99441 PR PHYS/QHP TELEPHONE EVALUATION 5-10 MIN: CPT | Performed by: LEGAL MEDICINE

## 2021-02-19 RX ORDER — DOXYCYCLINE 100 MG/1
100 TABLET ORAL 2 TIMES DAILY
Qty: 20 TAB | Refills: 0 | Status: SHIPPED | OUTPATIENT
Start: 2021-02-19 | End: 2021-03-01

## 2021-02-19 RX ORDER — HYDROCHLOROTHIAZIDE 25 MG/1
25 TABLET ORAL DAILY
Qty: 90 TAB | Refills: 1 | Status: SHIPPED | OUTPATIENT
Start: 2021-02-19 | End: 2021-08-31

## 2021-02-19 NOTE — PROGRESS NOTES
Figueroa Hurst is a 68 y.o. female, evaluated via audio-only technology on 2/19/2021 for Diarrhea (medication side effect)  . patient was called cause she could not use video  She was given clindamycin it caused her diarrhea and abdominal pain and felt like the tablet is stuck in her mouth       Assessment & Plan:   Diagnoses and all orders for this visit:    1. Infected wound  -     doxycycline (ADOXA) 100 mg tablet; Take 1 Tab by mouth two (2) times a day for 10 days. Stop calcium and MVI while on doxycycline     Advised to keep wound dry and clean  Keep it covered and avoid getting wet     12  Subjective:       Prior to Admission medications    Medication Sig Start Date End Date Taking? Authorizing Provider   doxycycline (ADOXA) 100 mg tablet Take 1 Tab by mouth two (2) times a day for 10 days. 2/19/21 3/1/21 Yes Chandler Hill MD   clindamycin (CLEOCIN) 300 mg capsule Take 1 Cap by mouth three (3) times daily for 10 days. 2/16/21 2/26/21  Chandler Hill MD   acetaminophen-codeine (Tylenol-Codeine #3) 300-30 mg per tablet Take 1 Tab by mouth daily as needed for Pain for up to 30 days. 2/8/21 3/10/21  Chandler Hill MD   potassium chloride (K-DUR, KLOR-CON) 20 mEq tablet TAKE 1 TABLET TWICE DAILY 2/6/21   Chandler Hill MD   metoprolol succinate (TOPROL-XL) 100 mg tablet TAKE 1 TABLET EVERY DAY 12/3/20   Jhoana Youngblood MD   atorvastatin (LIPITOR) 10 mg tablet TAKE 1 TABLET EVERY DAY 11/20/20   Tony Woods MD   furosemide (LASIX) 20 mg tablet TAKE 1 TO 2 TABLETS EVERY DAY AS NEEDED FOR SWELLING 11/12/20   Jhoana Youngblood MD   hydroCHLOROthiazide (HYDRODIURIL) 25 mg tablet Take 1 Tab by mouth daily. 11/12/20   Alana Ramirez MD    mg tablet TAKE 1 TABLET EVERY 8 HOURS AS NEEDED FOR PAIN 8/28/20   Tony Woods MD   glucosam/brooke-msm1/C/luciano/bosw (OSTEO BI-FLEX TRIPLE STRENGTH PO) Take  by mouth daily.  With Vit D3     Provider, Historical   calcium carbonate (CALTREX) 600 mg calcium (1,500 mg) tablet Take 600 mg by mouth two (2) times a day. Provider, Historical   folic acid/multivit-min/lutein (CENTRUM SILVER PO) Take  by mouth. Provider, Historical   cholecalciferol (VITAMIN D3) 1,000 unit cap Take  by mouth daily. Provider, Historical     Patient Active Problem List   Diagnosis Code    DDD (degenerative disc disease), lumbar M51.36    Hypertension I10    Hyperlipidemia E78.5    Vitamin D deficiency E55.9    Osteoarthritis of right knee M17.11    Osteopenia M85.80    Complete rotator cuff tear of left shoulder M75.122    CKD (chronic kidney disease) N18.9    Prediabetes R73.03    Abnormal mammogram R92.8     Patient Active Problem List    Diagnosis Date Noted    Abnormal mammogram 12/10/2019    Prediabetes 05/23/2017    CKD (chronic kidney disease) 02/29/2016    Complete rotator cuff tear of left shoulder 10/31/2014    Osteopenia 06/24/2014    Osteoarthritis of right knee 05/31/2014    Vitamin D deficiency 03/30/2014    DDD (degenerative disc disease), lumbar 03/11/2014    Hypertension 03/11/2014    Hyperlipidemia 03/11/2014     Current Outpatient Medications   Medication Sig Dispense Refill    doxycycline (ADOXA) 100 mg tablet Take 1 Tab by mouth two (2) times a day for 10 days. 20 Tab 0    clindamycin (CLEOCIN) 300 mg capsule Take 1 Cap by mouth three (3) times daily for 10 days. 30 Cap 0    acetaminophen-codeine (Tylenol-Codeine #3) 300-30 mg per tablet Take 1 Tab by mouth daily as needed for Pain for up to 30 days. 30 Tab 0    potassium chloride (K-DUR, KLOR-CON) 20 mEq tablet TAKE 1 TABLET TWICE DAILY 180 Tab 0    metoprolol succinate (TOPROL-XL) 100 mg tablet TAKE 1 TABLET EVERY DAY 90 Tab 1    atorvastatin (LIPITOR) 10 mg tablet TAKE 1 TABLET EVERY DAY 90 Tab 1    furosemide (LASIX) 20 mg tablet TAKE 1 TO 2 TABLETS EVERY DAY AS NEEDED FOR SWELLING 90 Tab 0    hydroCHLOROthiazide (HYDRODIURIL) 25 mg tablet Take 1 Tab by mouth daily.  90 Tab 1    IBU 800 mg tablet TAKE 1 TABLET EVERY 8 HOURS AS NEEDED FOR PAIN 90 Tab 1    glucosam/brooke-msm1/C/luciano/bosw (OSTEO BI-FLEX TRIPLE STRENGTH PO) Take  by mouth daily. With Vit D3       calcium carbonate (CALTREX) 600 mg calcium (1,500 mg) tablet Take 600 mg by mouth two (2) times a day.  folic acid/multivit-min/lutein (CENTRUM SILVER PO) Take  by mouth.  cholecalciferol (VITAMIN D3) 1,000 unit cap Take  by mouth daily. No Known Allergies  Past Medical History:   Diagnosis Date    Arthritis     Cataract     Hypercholesterolemia     Hypertension      Past Surgical History:   Procedure Laterality Date    HX APPENDECTOMY      HX CATARACT REMOVAL      HX COLONOSCOPY  12/2009    Dr. Iran Primrose, 8 yr follow up    HX TOTAL ABDOMINAL HYSTERECTOMY       Family History   Problem Relation Age of Onset    Diabetes Mother     Hypertension Mother     Cancer Sister         breast    Cancer Maternal Aunt         breast    Stroke Sister      Social History     Tobacco Use    Smoking status: Never Smoker    Smokeless tobacco: Never Used   Substance Use Topics    Alcohol use: No       Review of Systems   Constitutional: Negative for chills, fever, malaise/fatigue and weight loss. HENT: Negative for congestion, ear discharge, ear pain, hearing loss, nosebleeds, sinus pain and sore throat. Eyes: Negative for blurred vision, double vision and discharge. Respiratory: Negative for cough. Cardiovascular: Negative for chest pain, palpitations, claudication and leg swelling. Gastrointestinal: Positive for abdominal pain and diarrhea. Negative for blood in stool, constipation, heartburn, melena, nausea and vomiting. Genitourinary: Negative for dysuria. Musculoskeletal: Negative for myalgias. Skin: Negative for itching and rash. Neurological: Negative for dizziness, tingling, sensory change, speech change, focal weakness, weakness and headaches. No flowsheet data found.      Bailey Valdez, meli was evaluated through a patient-initiated, synchronous (real-time) audio only encounter, and/or her healthcare decision maker, is aware that it is a billable service, with coverage as determined by her insurance carrier. She provided verbal consent to proceed: Yes. She has not had a related appointment within my department in the past 7 days or scheduled within the next 24 hours.       Total Time: 7 minute     Mayco Mcwilliams MD

## 2021-02-22 ENCOUNTER — TELEPHONE (OUTPATIENT)
Dept: FAMILY MEDICINE CLINIC | Age: 81
End: 2021-02-22

## 2021-02-22 NOTE — TELEPHONE ENCOUNTER
Patient wanted to know if you wanted her to start taking the antibiotics again? Please advise, thank you.

## 2021-02-23 NOTE — TELEPHONE ENCOUNTER
I sent her new RX for doxycycline , unless the wound are is better then she dose not have to take it

## 2021-02-23 NOTE — TELEPHONE ENCOUNTER
LM for patient that script is at pharmacy and if her wound is better than she doesn't have to take it.

## 2021-06-07 DIAGNOSIS — M51.36 DDD (DEGENERATIVE DISC DISEASE), LUMBAR: Chronic | ICD-10-CM

## 2021-06-08 RX ORDER — FUROSEMIDE 20 MG/1
TABLET ORAL
Qty: 90 TABLET | Refills: 0 | Status: SHIPPED | OUTPATIENT
Start: 2021-06-08 | End: 2022-06-02 | Stop reason: SDUPTHER

## 2021-06-08 RX ORDER — ACETAMINOPHEN AND CODEINE PHOSPHATE 300; 30 MG/1; MG/1
1 TABLET ORAL
Qty: 30 TABLET | OUTPATIENT
Start: 2021-06-08 | End: 2021-07-08

## 2021-06-15 ENCOUNTER — OFFICE VISIT (OUTPATIENT)
Dept: FAMILY MEDICINE CLINIC | Age: 81
End: 2021-06-15
Payer: MEDICARE

## 2021-06-15 VITALS
DIASTOLIC BLOOD PRESSURE: 70 MMHG | RESPIRATION RATE: 16 BRPM | TEMPERATURE: 97.9 F | WEIGHT: 195 LBS | HEIGHT: 67 IN | HEART RATE: 71 BPM | OXYGEN SATURATION: 100 % | SYSTOLIC BLOOD PRESSURE: 128 MMHG | BODY MASS INDEX: 30.61 KG/M2

## 2021-06-15 DIAGNOSIS — N64.4 PAIN OF RIGHT BREAST: Primary | ICD-10-CM

## 2021-06-15 DIAGNOSIS — M51.36 DDD (DEGENERATIVE DISC DISEASE), LUMBAR: Chronic | ICD-10-CM

## 2021-06-15 PROCEDURE — G8427 DOCREV CUR MEDS BY ELIG CLIN: HCPCS | Performed by: LEGAL MEDICINE

## 2021-06-15 PROCEDURE — 1101F PT FALLS ASSESS-DOCD LE1/YR: CPT | Performed by: LEGAL MEDICINE

## 2021-06-15 PROCEDURE — 99213 OFFICE O/P EST LOW 20 MIN: CPT | Performed by: LEGAL MEDICINE

## 2021-06-15 PROCEDURE — G8417 CALC BMI ABV UP PARAM F/U: HCPCS | Performed by: LEGAL MEDICINE

## 2021-06-15 PROCEDURE — G8399 PT W/DXA RESULTS DOCUMENT: HCPCS | Performed by: LEGAL MEDICINE

## 2021-06-15 PROCEDURE — G8536 NO DOC ELDER MAL SCRN: HCPCS | Performed by: LEGAL MEDICINE

## 2021-06-15 PROCEDURE — G8754 DIAS BP LESS 90: HCPCS | Performed by: LEGAL MEDICINE

## 2021-06-15 PROCEDURE — G8752 SYS BP LESS 140: HCPCS | Performed by: LEGAL MEDICINE

## 2021-06-15 PROCEDURE — 1090F PRES/ABSN URINE INCON ASSESS: CPT | Performed by: LEGAL MEDICINE

## 2021-06-15 PROCEDURE — G8510 SCR DEP NEG, NO PLAN REQD: HCPCS | Performed by: LEGAL MEDICINE

## 2021-06-15 RX ORDER — ACETAMINOPHEN AND CODEINE PHOSPHATE 300; 30 MG/1; MG/1
1 TABLET ORAL
Qty: 30 TABLET | Refills: 0 | Status: SHIPPED | OUTPATIENT
Start: 2021-06-15 | End: 2021-12-14 | Stop reason: SDUPTHER

## 2021-06-15 NOTE — PROGRESS NOTES
Morales Pratt     Chief Complaint   Patient presents with    Medication Refill     follow up    Breast pain     Right Breast     Vitals:    06/15/21 1007   BP: 128/70   Pulse: 71   Resp: 16   Temp: 97.9 °F (36.6 °C)   TempSrc: Temporal   SpO2: 100%   Weight: 195 lb (88.5 kg)   Height: 5' 7\" (1.702 m)   PainSc:   0 - No pain         HPI: Patient is here for right breast pain has been on and off for few weeks, if not improving no nipple discharge, she did have a history of left breast biopsy last mammogram was done in 2019 , due to her current pain she did have a bilateral diagnostic mammogram  Patient had biopsy of her left breast in the past with  Eloisa Mendieta MD    Patient has chronic low back pain, I have not started advised her against using ibuprofen, she takes Tylenol with codeine as needs refill     Past Medical History:   Diagnosis Date    Arthritis     Cataract     Hypercholesterolemia     Hypertension      Past Surgical History:   Procedure Laterality Date    HX APPENDECTOMY      HX CATARACT REMOVAL      HX COLONOSCOPY  12/2009    Dr. Arabella Ortiz, 10 yr follow up    HX TOTAL ABDOMINAL HYSTERECTOMY       Social History     Tobacco Use    Smoking status: Never Smoker    Smokeless tobacco: Never Used   Substance Use Topics    Alcohol use: No       Family History   Problem Relation Age of Onset    Diabetes Mother     Hypertension Mother     Cancer Sister         breast    Cancer Maternal Aunt         breast    Stroke Sister        Review of Systems   Constitutional: Negative for chills, fever, malaise/fatigue and weight loss. HENT: Negative for congestion, ear discharge, ear pain, hearing loss, nosebleeds and sinus pain. Eyes: Negative for blurred vision, double vision and discharge. Respiratory: Negative for cough. Cardiovascular: Negative for chest pain, palpitations, claudication and leg swelling.    Gastrointestinal: Negative for abdominal pain, constipation, diarrhea, nausea and vomiting. Genitourinary: Negative for dysuria, frequency and urgency. Musculoskeletal: Positive for back pain and joint pain. Negative for falls and neck pain. Skin: Negative for itching and rash. Neurological: Negative for dizziness, tingling, sensory change, speech change, focal weakness, weakness and headaches. Psychiatric/Behavioral: Negative for depression and suicidal ideas. Physical Exam  Vitals and nursing note reviewed. Constitutional:       General: She is not in acute distress. Appearance: She is well-developed. She is not diaphoretic. HENT:      Head: Normocephalic and atraumatic. Eyes:      General: No scleral icterus. Right eye: No discharge. Left eye: No discharge. Conjunctiva/sclera: Conjunctivae normal.      Pupils: Pupils are equal, round, and reactive to light. Neck:      Thyroid: No thyromegaly. Cardiovascular:      Rate and Rhythm: Normal rate and regular rhythm. Heart sounds: Normal heart sounds. Pulmonary:      Effort: Pulmonary effort is normal. No respiratory distress. Breath sounds: Normal breath sounds. No wheezing or rales. Chest:      Chest wall: No tenderness. Abdominal:      General: There is no distension. Palpations: Abdomen is soft. Tenderness: There is no abdominal tenderness. There is no rebound. Musculoskeletal:         General: No tenderness or deformity. Lymphadenopathy:      Cervical: No cervical adenopathy. Skin:     General: Skin is warm and dry. Coloration: Skin is not pale. Findings: No erythema or rash. Neurological:      Mental Status: She is alert and oriented to person, place, and time. Cranial Nerves: No cranial nerve deficit. Coordination: Coordination normal.   Psychiatric:         Behavior: Behavior normal.         Thought Content:  Thought content normal.         Judgment: Judgment normal.            Breast exam    Right breast has few keloids, there is  left upper quadrant tenderness no axillary lymph nodes, and nipple is normal no nipple discharge    Left breast inverted nipple and no axillary lymph nodes no lumps  Assessment and plan     Plan of care has been discussed with the patient, he agrees to the plan and verbalized understanding. All his questions were answered  More than 50% of the time spent in this visit was counseling the patient about  illness and treatment options         1. Pain of right breast    - HALEY 3D PAULINE W MAMMO BI DX INCL CAD; Future    2. DDD (degenerative disc disease), lumbar    - acetaminophen-codeine (Tylenol-Codeine #3) 300-30 mg per tablet; Take 1 Tablet by mouth daily as needed for Pain for up to 30 days. Dispense: 30 Tablet; Refill: 0    Current Outpatient Medications   Medication Sig Dispense Refill    acetaminophen-codeine (Tylenol-Codeine #3) 300-30 mg per tablet Take 1 Tablet by mouth daily as needed for Pain for up to 30 days. 30 Tablet 0    furosemide (LASIX) 20 mg tablet TAKE 1 TO 2 TABLETS EVERY DAY AS NEEDED FOR SWELLING 90 Tablet 0    hydroCHLOROthiazide (HYDRODIURIL) 25 mg tablet Take 1 Tab by mouth daily. 90 Tab 1    potassium chloride (K-DUR, KLOR-CON) 20 mEq tablet TAKE 1 TABLET TWICE DAILY 180 Tab 0    metoprolol succinate (TOPROL-XL) 100 mg tablet TAKE 1 TABLET EVERY DAY 90 Tab 1    atorvastatin (LIPITOR) 10 mg tablet TAKE 1 TABLET EVERY DAY 90 Tab 1     mg tablet TAKE 1 TABLET EVERY 8 HOURS AS NEEDED FOR PAIN 90 Tab 1    glucosam/brooke-msm1/C/luciano/bosw (OSTEO BI-FLEX TRIPLE STRENGTH PO) Take  by mouth daily. With Vit D3       calcium carbonate (CALTREX) 600 mg calcium (1,500 mg) tablet Take 600 mg by mouth two (2) times a day.  folic acid/multivit-min/lutein (CENTRUM SILVER PO) Take  by mouth.  cholecalciferol (VITAMIN D3) 1,000 unit cap Take  by mouth daily.          Patient Active Problem List    Diagnosis Date Noted    Abnormal mammogram 12/10/2019    Prediabetes 05/23/2017    CKD (chronic kidney disease) 02/29/2016    Complete rotator cuff tear of left shoulder 10/31/2014    Osteopenia 06/24/2014    Osteoarthritis of right knee 05/31/2014    Vitamin D deficiency 03/30/2014    DDD (degenerative disc disease), lumbar 03/11/2014    Hypertension 03/11/2014    Hyperlipidemia 03/11/2014     Results for orders placed or performed in visit on 02/16/21   CULTURE, ANAEROBIC AND AEROBIC   Result Value Ref Range    Anerobic culture CANCELED (A)     Aerobic culture CANCELED (A)    SPECIMEN STATUS REPORT   Result Value Ref Range    SPECIMEN STATUS REPORT COMMENT      No visits with results within 3 Month(s) from this visit. Latest known visit with results is:   Office Visit on 02/16/2021   Component Date Value Ref Range Status    Anerobic culture 02/16/2021 CANCELED*  Final-Edited    Result canceled by the ancillary.  Aerobic culture 02/16/2021 CANCELED*  Final-Edited    Result canceled by the ancillary.  SPECIMEN STATUS REPORT 02/16/2021 COMMENT   Final    Comment: Please note  Please note  The date and/or time of collection was not indicated on the  requisition as required by state and federal law. The date of  receipt of the specimen was used as the collection date if not  supplied.

## 2021-06-15 NOTE — PROGRESS NOTES
Morales Pratt is a 68 y.o. female (: 1940) presenting to address:    Chief Complaint   Patient presents with    Medication Refill     follow up    Breast pain     Right Breast       Vitals:    06/15/21 1007   Resp: 16   Temp: 97.9 °F (36.6 °C)   TempSrc: Temporal   Weight: 195 lb (88.5 kg)   Height: 5' 7\" (1.702 m)   PainSc:   0 - No pain       Hearing/Vision:   No exam data present    Learning Assessment:     Learning Assessment 2/3/2015   PRIMARY LEARNER Patient   HIGHEST LEVEL OF EDUCATION - PRIMARY LEARNER  GRADUATED HIGH SCHOOL OR GED   BARRIERS PRIMARY LEARNER NONE   CO-LEARNER CAREGIVER No   PRIMARY LANGUAGE ENGLISH   LEARNER PREFERENCE PRIMARY READING   ANSWERED BY self   RELATIONSHIP SELF     Depression Screening:     3 most recent PHQ Screens 6/15/2021   Little interest or pleasure in doing things Not at all   Feeling down, depressed, irritable, or hopeless Not at all   Total Score PHQ 2 0     Fall Risk Assessment:     Fall Risk Assessment, last 12 mths 6/15/2021   Able to walk? Yes   Fall in past 12 months? 0   Do you feel unsteady? -   Are you worried about falling -   Number of falls in past 12 months -   Fall with injury? -     Abuse Screening:     Abuse Screening Questionnaire 2020   Do you ever feel afraid of your partner? N   Are you in a relationship with someone who physically or mentally threatens you? N   Is it safe for you to go home? Y     Coordination of Care Questionaire:   1. Have you been to the ER, urgent care clinic since your last visit? Hospitalized since your last visit? NO    2. Have you seen or consulted any other health care providers outside of the [address] [place] [address] since your last visit? Include any pap smears or colon screening. NO    Advanced Directive:   1. Do you have an Advanced Directive? YES    2. Would you like information on Advanced Directives?  NO

## 2021-08-31 RX ORDER — HYDROCHLOROTHIAZIDE 25 MG/1
TABLET ORAL
Qty: 90 TABLET | Refills: 1 | Status: SHIPPED | OUTPATIENT
Start: 2021-08-31 | End: 2022-02-16

## 2021-09-13 ENCOUNTER — OFFICE VISIT (OUTPATIENT)
Dept: FAMILY MEDICINE CLINIC | Age: 81
End: 2021-09-13
Payer: MEDICARE

## 2021-09-13 VITALS
HEIGHT: 67 IN | WEIGHT: 194.4 LBS | OXYGEN SATURATION: 99 % | RESPIRATION RATE: 16 BRPM | BODY MASS INDEX: 30.51 KG/M2 | TEMPERATURE: 98 F | HEART RATE: 62 BPM | SYSTOLIC BLOOD PRESSURE: 130 MMHG | DIASTOLIC BLOOD PRESSURE: 73 MMHG

## 2021-09-13 DIAGNOSIS — G89.29 CHRONIC BILATERAL LOW BACK PAIN WITHOUT SCIATICA: ICD-10-CM

## 2021-09-13 DIAGNOSIS — Z23 NEEDS FLU SHOT: ICD-10-CM

## 2021-09-13 DIAGNOSIS — M54.50 CHRONIC BILATERAL LOW BACK PAIN WITHOUT SCIATICA: ICD-10-CM

## 2021-09-13 DIAGNOSIS — G56.03 BILATERAL CARPAL TUNNEL SYNDROME: Primary | ICD-10-CM

## 2021-09-13 PROCEDURE — G8752 SYS BP LESS 140: HCPCS | Performed by: LEGAL MEDICINE

## 2021-09-13 PROCEDURE — 90694 VACC AIIV4 NO PRSRV 0.5ML IM: CPT | Performed by: LEGAL MEDICINE

## 2021-09-13 PROCEDURE — G8399 PT W/DXA RESULTS DOCUMENT: HCPCS | Performed by: LEGAL MEDICINE

## 2021-09-13 PROCEDURE — G8754 DIAS BP LESS 90: HCPCS | Performed by: LEGAL MEDICINE

## 2021-09-13 PROCEDURE — G8536 NO DOC ELDER MAL SCRN: HCPCS | Performed by: LEGAL MEDICINE

## 2021-09-13 PROCEDURE — G8427 DOCREV CUR MEDS BY ELIG CLIN: HCPCS | Performed by: LEGAL MEDICINE

## 2021-09-13 PROCEDURE — G0008 ADMIN INFLUENZA VIRUS VAC: HCPCS | Performed by: LEGAL MEDICINE

## 2021-09-13 PROCEDURE — G8510 SCR DEP NEG, NO PLAN REQD: HCPCS | Performed by: LEGAL MEDICINE

## 2021-09-13 PROCEDURE — 1101F PT FALLS ASSESS-DOCD LE1/YR: CPT | Performed by: LEGAL MEDICINE

## 2021-09-13 PROCEDURE — 99214 OFFICE O/P EST MOD 30 MIN: CPT | Performed by: LEGAL MEDICINE

## 2021-09-13 PROCEDURE — G8417 CALC BMI ABV UP PARAM F/U: HCPCS | Performed by: LEGAL MEDICINE

## 2021-09-13 PROCEDURE — 1090F PRES/ABSN URINE INCON ASSESS: CPT | Performed by: LEGAL MEDICINE

## 2021-09-13 RX ORDER — LEG BRACE
EACH MISCELLANEOUS
Qty: 2 EACH | Refills: 0 | Status: SHIPPED | OUTPATIENT
Start: 2021-09-13

## 2021-09-13 RX ORDER — LEG BRACE
EACH MISCELLANEOUS
Qty: 2 EACH | Refills: 0 | Status: SHIPPED | OUTPATIENT
Start: 2021-09-13 | End: 2021-09-13 | Stop reason: SDUPTHER

## 2021-09-13 NOTE — PROGRESS NOTES
Paulette Villarreal is a 68 y.o. female (: 1940) presenting to address:    Chief Complaint   Patient presents with    Hand Problem     bilateral hands fall alseep at night     Breast Problem     right breast pain     Immunization/Injection     FLU AD       Vitals:    21 0937   BP: 130/73   Pulse: 62   Resp: 16   Temp: 98 °F (36.7 °C)   TempSrc: Temporal   SpO2: 99%   Weight: 194 lb 6.4 oz (88.2 kg)   Height: 5' 7\" (1.702 m)   PainSc:   0 - No pain       Hearing/Vision:   No exam data present    Learning Assessment:     Learning Assessment 2/3/2015   PRIMARY LEARNER Patient   HIGHEST LEVEL OF EDUCATION - PRIMARY LEARNER  GRADUATED HIGH SCHOOL OR GED   BARRIERS PRIMARY LEARNER NONE   CO-LEARNER CAREGIVER No   PRIMARY LANGUAGE ENGLISH   LEARNER PREFERENCE PRIMARY READING   ANSWERED BY self   RELATIONSHIP SELF     Depression Screening:     3 most recent PHQ Screens 2021   Little interest or pleasure in doing things Not at all   Feeling down, depressed, irritable, or hopeless Not at all   Total Score PHQ 2 0     Fall Risk Assessment:     Fall Risk Assessment, last 12 mths 2021   Able to walk? Yes   Fall in past 12 months? 0   Do you feel unsteady? 0   Are you worried about falling 0   Number of falls in past 12 months -   Fall with injury? -     Abuse Screening:     Abuse Screening Questionnaire 2020   Do you ever feel afraid of your partner? N   Are you in a relationship with someone who physically or mentally threatens you? N   Is it safe for you to go home?  Y     ADL Assessment:     ADL Assessment 2020   Feeding yourself No Help Needed   Getting from bed to chair No Help Needed   Getting dressed No Help Needed   Bathing or showering No Help Needed   Walk across the room (includes cane/walker) No Help Needed   Using the telphone No Help Needed   Taking your medications No Help Needed   Preparing meals No Help Needed   Managing money (expenses/bills) No Help Needed   Moderately strenuous housework (laundry) No Help Needed   Shopping for personal items (toiletries/medicines) No Help Needed   Shopping for groceries No Help Needed   Driving No Help Needed   Climbing a flight of stairs No Help Needed   Getting to places beyond walking distances No Help Needed        Coordination of Care Questionaire:   1. Have you been to the ER, urgent care clinic since your last visit? Hospitalized since your last visit? NO    2. Have you seen or consulted any other health care providers outside of the [address] [place] [address] Michele since your last visit? Include any pap smears or colon screening. NO    Advanced Directive:   1. Do you have an Advanced Directive? YES    2. Would you like information on Advanced Directives?  NO

## 2021-09-13 NOTE — PROGRESS NOTES
Mckayla Hylton     Chief Complaint   Patient presents with    Hand Problem     bilateral hands fall alseep at night     Breast Problem     right breast pain     Immunization/Injection     FLU AD     Vitals:    09/13/21 0937   BP: 130/73   Pulse: 62   Resp: 16   Temp: 98 °F (36.7 °C)   TempSrc: Temporal   SpO2: 99%   Weight: 194 lb 6.4 oz (88.2 kg)   Height: 5' 7\" (1.702 m)   PainSc:   0 - No pain         HPI:Pateint is  Here for   Bilateral hand numbness for few month  More in the right ,and it happens during the day and mostly at night it wakes her  up  From sleep she had nerve conduction study with neurologist last year and was diagnosed with bilateral carpal tunnel syndrome  She is not currently interested in getting any surgical evaluation  I recommend conservative treatment with wearing wrist support      Right breast pain and tenderness and keloid , patient does follow-up with breast surgeon, and she had a diagnostic mammogram in June 2021 and recommendation was to continue annual screening        Low back pain  For many month it is getting worse by walking and better by rest , no radiation to lower extremities and no numbness no weakness    Past Medical History:   Diagnosis Date    Arthritis     Cataract     Hypercholesterolemia     Hypertension      Past Surgical History:   Procedure Laterality Date    HX APPENDECTOMY      HX CATARACT REMOVAL      HX COLONOSCOPY  12/2009    Dr. Florian Mars, 10 yr follow up    HX TOTAL ABDOMINAL HYSTERECTOMY       Social History     Tobacco Use    Smoking status: Never Smoker    Smokeless tobacco: Never Used   Substance Use Topics    Alcohol use: No       Family History   Problem Relation Age of Onset    Diabetes Mother     Hypertension Mother     Cancer Sister         breast    Cancer Maternal Aunt         breast    Stroke Sister        Review of Systems   Constitutional: Negative for chills, fever, malaise/fatigue and weight loss.    HENT: Negative for congestion, ear discharge, ear pain, hearing loss and nosebleeds. Eyes: Negative for blurred vision, double vision and discharge. Respiratory: Negative for cough, hemoptysis, sputum production and shortness of breath. Cardiovascular: Negative for chest pain, palpitations, claudication and leg swelling. Gastrointestinal: Negative for abdominal pain, constipation, diarrhea, nausea and vomiting. Genitourinary: Negative for dysuria, frequency and urgency. Musculoskeletal: Positive for back pain and joint pain. Negative for myalgias. Wrist pain    Skin: Negative for itching and rash. Neurological: Positive for tingling. Negative for dizziness, sensory change, speech change, focal weakness, weakness and headaches. Psychiatric/Behavioral: Negative for depression, hallucinations, substance abuse and suicidal ideas. The patient is not nervous/anxious. Physical Exam  Vitals and nursing note reviewed. Constitutional:       General: She is not in acute distress. Appearance: She is well-developed. She is not diaphoretic. HENT:      Head: Normocephalic and atraumatic. Mouth/Throat:      Pharynx: No oropharyngeal exudate. Eyes:      General: No scleral icterus. Right eye: No discharge. Left eye: No discharge. Conjunctiva/sclera: Conjunctivae normal.   Neck:      Thyroid: No thyromegaly. Cardiovascular:      Rate and Rhythm: Normal rate and regular rhythm. Heart sounds: Normal heart sounds. Pulmonary:      Effort: Pulmonary effort is normal. No respiratory distress. Breath sounds: Normal breath sounds. No wheezing or rales. Chest:      Chest wall: No tenderness. Abdominal:      General: There is no distension. Palpations: Abdomen is soft. Tenderness: There is no abdominal tenderness. There is no rebound. Musculoskeletal:         General: Tenderness present. No deformity. Normal range of motion.       Comments: Mild bilateral wrist tenderness   Lymphadenopathy:      Cervical: No cervical adenopathy. Skin:     General: Skin is warm and dry. Coloration: Skin is not pale. Findings: No erythema or rash. Comments: Right breast tenderness no nipple discharge no lumps or axillary lymph nodes appreciated, that are prominent keloid lesions in the outer aspect of left breast   Neurological:      Mental Status: She is alert and oriented to person, place, and time. Cranial Nerves: No cranial nerve deficit. Coordination: Coordination normal.   Psychiatric:         Mood and Affect: Mood normal.         Behavior: Behavior normal.         Thought Content: Thought content normal.         Judgment: Judgment normal.          Assessment and plan     Plan of care has been discussed with the patient, he agrees to the plan and verbalized understanding. All his questions were answered  More than 50% of the time spent in this visit was counseling the patient about  illness and treatment options         1. Bilateral carpal tunnel syndrome  Conservative treatment with wrist support    If no improvement referral for hand surgeon EVALUATION  - Arm Brace (Wrist Support Large-XLarge) misc; Wrist support for carpal tunnel  syndrome  Dispense: 2 Each; Refill: 0    2. Needs flu shot  Vaccine was given with no complications  - FLU (FLUAD QUAD INFLUENZA VACCINE,QUAD,ADJUVANTED)    3. Chronic bilateral low back pain without sciatica  Possible muscular pain, patient can take Tylenol as needed topical lidocaine cream and gentle stretching, if no improvement get x-ray next visit      Lumbar spine x-ray was done in 2016    1. Grade 1 spondylolisthesis L3-4 and L4-5.  2. No acute fracture. 3. Degenerative changes in the facet joints lower lumbar spine particularly on  the right.       Right breast pain    Breast keloid    Patient will contact for surgery for evaluation      Marva Rose  Surgery  [facility]         Current Outpatient Medications   Medication Sig Dispense Refill    Arm Brace (Wrist Support Large-XLarge) misc Wrist support for carpal tunnel  syndrome 2 Each 0    hydroCHLOROthiazide (HYDRODIURIL) 25 mg tablet TAKE 1 TABLET EVERY DAY 90 Tablet 1    acetaminophen-codeine (Tylenol-Codeine #3) 300-30 mg per tablet Take 1 Tablet by mouth daily as needed for Pain for up to 30 days. 30 Tablet 0    furosemide (LASIX) 20 mg tablet TAKE 1 TO 2 TABLETS EVERY DAY AS NEEDED FOR SWELLING 90 Tablet 0    potassium chloride (K-DUR, KLOR-CON) 20 mEq tablet TAKE 1 TABLET TWICE DAILY 180 Tab 0    metoprolol succinate (TOPROL-XL) 100 mg tablet TAKE 1 TABLET EVERY DAY 90 Tab 1    atorvastatin (LIPITOR) 10 mg tablet TAKE 1 TABLET EVERY DAY 90 Tab 1    glucosam/brooke-msm1/C/luciano/bosw (OSTEO BI-FLEX TRIPLE STRENGTH PO) Take  by mouth daily. With Vit D3       calcium carbonate (CALTREX) 600 mg calcium (1,500 mg) tablet Take 600 mg by mouth two (2) times a day.  folic acid/multivit-min/lutein (CENTRUM SILVER PO) Take  by mouth.  cholecalciferol (VITAMIN D3) 1,000 unit cap Take  by mouth daily.        mg tablet TAKE 1 TABLET EVERY 8 HOURS AS NEEDED FOR PAIN (Patient not taking: Reported on 9/13/2021) 90 Tab 1       Patient Active Problem List    Diagnosis Date Noted    Abnormal mammogram 12/10/2019    Prediabetes 05/23/2017    CKD (chronic kidney disease) 02/29/2016    Complete rotator cuff tear of left shoulder 10/31/2014    Osteopenia 06/24/2014    Osteoarthritis of right knee 05/31/2014    Vitamin D deficiency 03/30/2014    DDD (degenerative disc disease), lumbar 03/11/2014    Hypertension 03/11/2014    Hyperlipidemia 03/11/2014     Results for orders placed or performed in visit on 02/16/21   CULTURE, ANAEROBIC AND AEROBIC   Result Value Ref Range    Anerobic culture CANCELED (A)     Aerobic culture CANCELED (A)    SPECIMEN STATUS REPORT   Result Value Ref Range    SPECIMEN STATUS REPORT COMMENT      No visits with results within 3 Month(s) from this visit. Latest known visit with results is:   Office Visit on 02/16/2021   Component Date Value Ref Range Status    Anerobic culture 02/16/2021 CANCELED*  Final-Edited    Result canceled by the ancillary.  Aerobic culture 02/16/2021 CANCELED*  Final-Edited    Result canceled by the ancillary.  SPECIMEN STATUS REPORT 02/16/2021 COMMENT   Final    Comment: Please note  Please note  The date and/or time of collection was not indicated on the  requisition as required by state and federal law. The date of  receipt of the specimen was used as the collection date if not  supplied. Follow-up and Dispositions    · Return in about 3 months (around 12/13/2021) for for medicare wellness.

## 2021-09-13 NOTE — PROGRESS NOTES
Immunization of the high dose flu vaccine wasadministered 9/13/2021 by Alida Paige LPN. Patient tolerated procedure well. No reactions noted.

## 2021-09-14 ENCOUNTER — TELEPHONE (OUTPATIENT)
Dept: FAMILY MEDICINE CLINIC | Age: 81
End: 2021-09-14

## 2021-09-14 DIAGNOSIS — N64.4 PAIN OF RIGHT BREAST: Primary | ICD-10-CM

## 2021-09-14 DIAGNOSIS — N64.4 BREAST TENDERNESS: ICD-10-CM

## 2021-09-14 NOTE — TELEPHONE ENCOUNTER
Pt is requesting a referral to the following for the right breast tenderness/pain; pt had biopsy in June 2021:    Dr. Joel Mclean  [phone] phone  [phone] fax

## 2021-09-14 NOTE — TELEPHONE ENCOUNTER
Patient called in regards to arm brace that Dea Herminiasyd said she will be getting . She also stated that she needed a referral because she is having breast pain .

## 2021-09-16 ENCOUNTER — TELEPHONE (OUTPATIENT)
Dept: FAMILY MEDICINE CLINIC | Age: 81
End: 2021-09-16

## 2021-09-16 NOTE — TELEPHONE ENCOUNTER
Re-faxed mammogram order to [address]; they will call pt to schedule, once results are in and faxed to breast surgery, pt can be scheduled to see Dr. Ana Cristina Tipton,  Neetu Jean Baptiste.

## 2021-09-16 NOTE — TELEPHONE ENCOUNTER
Layne Mcginnis from [facility] wanted to know if the patient has had a recent mammogram in order to be seen with Dr. Va Alcantara.

## 2021-09-20 ENCOUNTER — TELEPHONE (OUTPATIENT)
Dept: FAMILY MEDICINE CLINIC | Age: 81
End: 2021-09-20

## 2021-09-20 NOTE — TELEPHONE ENCOUNTER
Faxed last mammogram to Dr. Tessie Arriaga office (breast surgeon); left message for pt that mammogram results were faxed.

## 2021-09-20 NOTE — TELEPHONE ENCOUNTER
Pt would like a call back in regards as to whether or not if she has already had a mammo done. Pt stated that Dr Zach Ramos will not see her until she has had a mammo done. Pt was informed that there is a referral to have mammo done. Pt stated that she thought that she had already had the mammo done this year.  Pt was also informed that on 6/24/21 there was a diagnostic testing for mammo done for breast pain but I wasn't for sure if that was for her annual. Please advise

## 2021-10-01 ENCOUNTER — TELEPHONE (OUTPATIENT)
Dept: FAMILY MEDICINE CLINIC | Age: 81
End: 2021-10-01

## 2021-10-01 DIAGNOSIS — N64.4 PAIN OF RIGHT BREAST: Primary | ICD-10-CM

## 2021-10-01 DIAGNOSIS — N64.4 BREAST TENDERNESS: ICD-10-CM

## 2021-10-01 NOTE — TELEPHONE ENCOUNTER
Dr. Ramiro Goodpasture office called in regards to the referral that was sent over to them . At the moment they are not accepting any new referrals or patients . She did mention other providers .     Rachelle Valadez   [facility]   Breast Surgeon   [phone]    [address]

## 2021-10-02 NOTE — TELEPHONE ENCOUNTER
I understand fr the patient that she had seen León Machado  Before ? ?   Please contact the patient to confirm

## 2021-10-04 NOTE — TELEPHONE ENCOUNTER
Contacted pt; states Dr. Avinash Drew office discharged her after her surgery; would be considered a new patient and they are not taking new patients at this time; please generate referral to the following:    Dr. Yonathan Ceron Surgeon  [address] [facility],

## 2021-10-14 ENCOUNTER — OFFICE VISIT (OUTPATIENT)
Dept: SURGERY | Age: 81
End: 2021-10-14
Payer: MEDICARE

## 2021-10-14 VITALS
SYSTOLIC BLOOD PRESSURE: 137 MMHG | WEIGHT: 194.8 LBS | HEART RATE: 78 BPM | DIASTOLIC BLOOD PRESSURE: 75 MMHG | OXYGEN SATURATION: 94 % | TEMPERATURE: 97.3 F | BODY MASS INDEX: 30.57 KG/M2 | HEIGHT: 67 IN

## 2021-10-14 DIAGNOSIS — N64.4 BREAST PAIN: Primary | ICD-10-CM

## 2021-10-14 PROCEDURE — 1101F PT FALLS ASSESS-DOCD LE1/YR: CPT | Performed by: SURGERY

## 2021-10-14 PROCEDURE — G8417 CALC BMI ABV UP PARAM F/U: HCPCS | Performed by: SURGERY

## 2021-10-14 PROCEDURE — G8427 DOCREV CUR MEDS BY ELIG CLIN: HCPCS | Performed by: SURGERY

## 2021-10-14 PROCEDURE — G8536 NO DOC ELDER MAL SCRN: HCPCS | Performed by: SURGERY

## 2021-10-14 PROCEDURE — G8432 DEP SCR NOT DOC, RNG: HCPCS | Performed by: SURGERY

## 2021-10-14 PROCEDURE — 99204 OFFICE O/P NEW MOD 45 MIN: CPT | Performed by: SURGERY

## 2021-10-14 PROCEDURE — 1090F PRES/ABSN URINE INCON ASSESS: CPT | Performed by: SURGERY

## 2021-10-14 PROCEDURE — G8752 SYS BP LESS 140: HCPCS | Performed by: SURGERY

## 2021-10-14 PROCEDURE — G8399 PT W/DXA RESULTS DOCUMENT: HCPCS | Performed by: SURGERY

## 2021-10-14 PROCEDURE — G8754 DIAS BP LESS 90: HCPCS | Performed by: SURGERY

## 2021-10-14 NOTE — PROGRESS NOTES
Yonis Jo is a 68 y.o. female (: 1940) presenting to address:    Chief Complaint   Patient presents with   [address] [facility] Patient     Right breast pain on and off for couple of months       Medication list and allergies have been reviewed with Yonis Jo and updated as of today's date. I have gone over all Medical, Surgical and Social History with Yonis Jo and updated/added the information accordingly.

## 2021-10-14 NOTE — LETTER
10/14/2021 12:34 PM    Patient:  Libby Light   YOB: 1940  Date of Visit: 10/14/2021      Telly Ge MD  [address] [facility]    Dear Telly Ge MD,      I had the pleasure of seeing Ms. Elijah Meyer in my office today for her breast pain. I am including a copy of my office visit today. If you have questions, please do not hesitate to call me. I look forward to following Ms. Blaine Dc along with you and will keep you updated as to her progress.            Sincerely,      Radha Maravilla MD

## 2021-10-14 NOTE — PROGRESS NOTES
Breast Pain Consult      Ms. Óscar Gutiérrez is a 68year old woman who was referred for right breast pain. It is located laterally. She notes keloids in her lateral right breast from previous benign biopsies and thinks they are contributing. She has not had any treatment for keloids. The pain is improved with ibuprofen, but she is not supposed to take it because of her kidney function. She  denied nipple discharge. There is family history of breast cancer in her sister        Breast/GYN history:    OB History        0    Para   0    Term   0       0    AB   0    Living   0       SAB   0    TAB   0    Ectopic   0    Molar        Multiple   0    Live Births                     Past Medical History:   Diagnosis Date    Arthritis     Cataract     Hypercholesterolemia     Hypertension        Past Surgical History:   Procedure Laterality Date    HX APPENDECTOMY      HX CATARACT REMOVAL      HX COLONOSCOPY  2009    Dr. Alecia Baca, 10 yr follow up    HX TOTAL ABDOMINAL HYSTERECTOMY         Current Outpatient Medications on File Prior to Visit   Medication Sig Dispense Refill    Arm Brace (Wrist Support Large-XLarge) misc Wrist support for carpal tunnel  syndrome 2 Each 0    hydroCHLOROthiazide (HYDRODIURIL) 25 mg tablet TAKE 1 TABLET EVERY DAY 90 Tablet 1    furosemide (LASIX) 20 mg tablet TAKE 1 TO 2 TABLETS EVERY DAY AS NEEDED FOR SWELLING 90 Tablet 0    potassium chloride (K-DUR, KLOR-CON) 20 mEq tablet TAKE 1 TABLET TWICE DAILY 180 Tab 0    metoprolol succinate (TOPROL-XL) 100 mg tablet TAKE 1 TABLET EVERY DAY 90 Tab 1     mg tablet TAKE 1 TABLET EVERY 8 HOURS AS NEEDED FOR PAIN 90 Tab 1    glucosam/brooke-msm1/C/luciano/bosw (OSTEO BI-FLEX TRIPLE STRENGTH PO) Take  by mouth daily. With Vit D3       calcium carbonate (CALTREX) 600 mg calcium (1,500 mg) tablet Take 600 mg by mouth two (2) times a day.  folic acid/multivit-min/lutein (CENTRUM SILVER PO) Take  by mouth.       cholecalciferol (VITAMIN D3) 1,000 unit cap Take  by mouth daily.  acetaminophen-codeine (Tylenol-Codeine #3) 300-30 mg per tablet Take 1 Tablet by mouth daily as needed for Pain for up to 30 days. 30 Tablet 0    atorvastatin (LIPITOR) 10 mg tablet TAKE 1 TABLET EVERY DAY (Patient not taking: Reported on 10/14/2021) 90 Tab 1     No current facility-administered medications on file prior to visit.        No Known Allergies    Social History     Tobacco Use    Smoking status: Never Smoker    Smokeless tobacco: Never Used   Substance Use Topics    Alcohol use: No    Drug use: No       Family History   Problem Relation Age of Onset    Diabetes Mother     Hypertension Mother     Cancer Sister 48        breast    Cancer Maternal Aunt         breast    Stroke Sister          ROS:   General: denies fevers, chills, night sweats, fatigue, weight loss, weight gain  GI: denies nausea, vomiting, abdominal pain, change in bowel habits, hematochezia, melena, GERD  Integ: denies dermatitis, abnormal moles  HEENT: denies visual changes, vertigo, epistaxis, dysphagia, hoarseness  Cardiac: denies chest pain, palpitations, HTN, edema, varicosities  Resp: denies cough, shortness of breath, wheezing, hemoptysis, snoring, reactive airway disease  : denies hematuria, dysuria, frequency, urgency, nocturia, stress urinary incontinence   MSK: denies weakness, joint pain, arthritis  Endocrine: denies diabetes, thyroid disease, polyuria, polydipsia, polyphagia, poor wound healing, heat intolerance, cold intolerance  Lymph/Heme: denies anemia, bruising, history of blood transfusions  Neuro: denies dizziness, headache, fainting, seizures, stroke  Psych: denies anxiety, depression    Physical Exam:  Visit Vitals  /75 (BP 1 Location: Right arm, BP Patient Position: Sitting, BP Cuff Size: Large adult)   Pulse 78   Temp 97.3 °F (36.3 °C) (Temporal)   Ht 5' 7\" (1.702 m)   Wt 88.4 kg (194 lb 12.8 oz)   SpO2 94%   BMI 30.51 kg/m² Gen: Well developed, well nourished woman in no acute distress  Head: normocephalic, atraumatic  Mouth: Clear, no overt lesions, oral mucosa pink and moist  Neck: supple, no masses, no adenopathy, trachea midline  Resp: clear bilaterally  Cardio: Regular rate and rhythm  Abdomen: soft, nontender, nondistended  Extremeties: warm, well-perfused  Neuro: sensation and strength grossly intact and symmetrical  Psych: alert and oriented to person, place and time  Breasts:   Right: Examined in both the supine and upright positions. There was no supraclavicular, infraclavicular, or axillary lympadenopathy. There were no dominant masses, no skin changes, no asymmetry identified size discrepancy right larger than left, keloid lateral breast, tender to palpation, ptotic bilaterally  Left: Examined in both the supine and upright positions. There was no supraclavicular, infraclavicular, or axillary lympadenopathy. There were no dominant masses, no skin changes, no asymmetry identified size discrepancy right larger than left, ptotic bilaterally       Imagin21 bilateral mammogram [address]  No evidence of malignancy     BI-RADS CATEGORY: BI-RADS 2 : Benign       FOLLOWUP RECOMMENDATIONS: Routine annual screening mammography       Signed By: Mary Santizo MD on 2021 12:53 PM      Impression:  Patient Active Problem List   Diagnosis Code    Breast pain N64.4    DDD (degenerative disc disease), lumbar M51.36    Hypertension I10    Hyperlipidemia E78.5    Vitamin D deficiency E55.9    Osteoarthritis of right knee M17.11    Osteopenia M85.80    Complete rotator cuff tear of left shoulder M75.122    CKD (chronic kidney disease) N18.9    Prediabetes R73.03    Abnormal mammogram R92.8          68year old woman with right breast pain. She was given information on and strategies for relief of breast pain. I have also recommended she check with her pharmacist prior to starting any new medications.  We discussed wearing well fitting bras. I have recommended she be fitted to determine appropriate size. I have named [facility], [facility] and [facility] as locations where fittings are performed. She does not have to buy her bras at these locations. We additionally discussed the role of caffeine and stress in breast pain. We did specifically address the fact that conservative management, while successful in most cases does not produce results overnight. I have also offered referral to plastics or dermatology for keloid therapy. She is not interested in reduction. She was asked to call with questions or concerns.

## 2021-12-14 ENCOUNTER — OFFICE VISIT (OUTPATIENT)
Dept: FAMILY MEDICINE CLINIC | Age: 81
End: 2021-12-14
Payer: MEDICARE

## 2021-12-14 VITALS
DIASTOLIC BLOOD PRESSURE: 80 MMHG | RESPIRATION RATE: 17 BRPM | TEMPERATURE: 97.4 F | OXYGEN SATURATION: 98 % | WEIGHT: 197 LBS | HEIGHT: 67 IN | HEART RATE: 92 BPM | SYSTOLIC BLOOD PRESSURE: 158 MMHG | BODY MASS INDEX: 30.92 KG/M2

## 2021-12-14 DIAGNOSIS — N18.31 STAGE 3A CHRONIC KIDNEY DISEASE (HCC): ICD-10-CM

## 2021-12-14 DIAGNOSIS — L91.0 KELOID SCAR OF SKIN: Primary | ICD-10-CM

## 2021-12-14 DIAGNOSIS — R73.03 PREDIABETES: ICD-10-CM

## 2021-12-14 DIAGNOSIS — M51.36 DDD (DEGENERATIVE DISC DISEASE), LUMBAR: Chronic | ICD-10-CM

## 2021-12-14 DIAGNOSIS — Z78.0 MENOPAUSE: ICD-10-CM

## 2021-12-14 DIAGNOSIS — G56.03 BILATERAL CARPAL TUNNEL SYNDROME: ICD-10-CM

## 2021-12-14 DIAGNOSIS — M17.0 PRIMARY OSTEOARTHRITIS OF BOTH KNEES: ICD-10-CM

## 2021-12-14 DIAGNOSIS — Z00.00 MEDICARE ANNUAL WELLNESS VISIT, SUBSEQUENT: ICD-10-CM

## 2021-12-14 DIAGNOSIS — E78.00 PURE HYPERCHOLESTEROLEMIA: ICD-10-CM

## 2021-12-14 PROCEDURE — G0439 PPPS, SUBSEQ VISIT: HCPCS | Performed by: LEGAL MEDICINE

## 2021-12-14 PROCEDURE — G8754 DIAS BP LESS 90: HCPCS | Performed by: LEGAL MEDICINE

## 2021-12-14 PROCEDURE — 1101F PT FALLS ASSESS-DOCD LE1/YR: CPT | Performed by: LEGAL MEDICINE

## 2021-12-14 PROCEDURE — G8417 CALC BMI ABV UP PARAM F/U: HCPCS | Performed by: LEGAL MEDICINE

## 2021-12-14 PROCEDURE — G8427 DOCREV CUR MEDS BY ELIG CLIN: HCPCS | Performed by: LEGAL MEDICINE

## 2021-12-14 PROCEDURE — G8753 SYS BP > OR = 140: HCPCS | Performed by: LEGAL MEDICINE

## 2021-12-14 PROCEDURE — G8432 DEP SCR NOT DOC, RNG: HCPCS | Performed by: LEGAL MEDICINE

## 2021-12-14 PROCEDURE — G8399 PT W/DXA RESULTS DOCUMENT: HCPCS | Performed by: LEGAL MEDICINE

## 2021-12-14 PROCEDURE — G8536 NO DOC ELDER MAL SCRN: HCPCS | Performed by: LEGAL MEDICINE

## 2021-12-14 RX ORDER — ACETAMINOPHEN AND CODEINE PHOSPHATE 300; 30 MG/1; MG/1
1 TABLET ORAL
Qty: 30 TABLET | Refills: 0 | Status: SHIPPED | OUTPATIENT
Start: 2021-12-14 | End: 2022-02-28

## 2021-12-14 NOTE — PROGRESS NOTES
Veronika hCris is a 80 y.o. female (: 1940) presenting to address:    Chief Complaint   Patient presents with    Annual Wellness Visit     breast pain     Tingling     at night hand go \" to sleep with pain\"    Request For New Medication     pain medication        Vitals:    21 0903 21 0909   BP: (!) 157/88 (!) 158/80   Pulse: 92    Resp: 17    Temp: 97.4 °F (36.3 °C)    TempSrc: Temporal    SpO2: 98%    Weight: 197 lb (89.4 kg)    Height: 5' 7\" (1.702 m)    PainSc:   5    PainLoc: Knee        Hearing/Vision:   No exam data present    Learning Assessment:     Learning Assessment 2/3/2015   PRIMARY LEARNER Patient   HIGHEST LEVEL OF EDUCATION - PRIMARY LEARNER  GRADUATED HIGH SCHOOL OR GED   BARRIERS PRIMARY LEARNER NONE   CO-LEARNER CAREGIVER No   PRIMARY LANGUAGE ENGLISH   LEARNER PREFERENCE PRIMARY READING   ANSWERED BY self   RELATIONSHIP SELF     Depression Screening:     3 most recent PHQ Screens 2021   Little interest or pleasure in doing things Several days   Feeling down, depressed, irritable, or hopeless Several days   Total Score PHQ 2 2     Fall Risk Assessment:     Fall Risk Assessment, last 12 mths 2021   Able to walk? Yes   Fall in past 12 months? 1   Do you feel unsteady? 1   Are you worried about falling 0   Is the gait abnormal? 0   Number of falls in past 12 months 1   Fall with injury? 0     Abuse Screening:     Abuse Screening Questionnaire 2021   Do you ever feel afraid of your partner? N   Are you in a relationship with someone who physically or mentally threatens you? N   Is it safe for you to go home?  Y     ADL Assessment:     ADL Assessment 2021   Feeding yourself No Help Needed   Getting from bed to chair No Help Needed   Getting dressed No Help Needed   Bathing or showering No Help Needed   Walk across the room (includes cane/walker) No Help Needed   Using the telphone No Help Needed   Taking your medications No Help Needed   Preparing meals No Help Needed   Managing money (expenses/bills) No Help Needed   Moderately strenuous housework (laundry) No Help Needed   Shopping for personal items (toiletries/medicines) No Help Needed   Shopping for groceries No Help Needed   Driving No Help Needed   Climbing a flight of stairs No Help Needed   Getting to places beyond walking distances No Help Needed        Coordination of Care Questionaire:   1. \"Have you been to the ER, urgent care clinic since your last visit? Hospitalized since your last visit? \" No    2. \"Have you seen or consulted any other health care providers outside of the [facility] [place] [address] Michele since your last visit? \" Yes When: 10/2021 Where: [facility] Reason for visit: follow up     3. For patients aged 39-70: Has the patient had a colonoscopy? Yes, HM satisfied with blue hyperlink     If the patient is female:    4. For patients aged 41-77: Has the patient had a mammogram within the past 2 years? Yes, HM satisfied with blue hyperlink    5. For patients aged 21-65: Has the patient had a pap smear? Yes, HM satisfied with blue hyperlink    Advanced Directive:   1. Do you have an Advanced Directive? NO    2. Would you like information on Advanced Directives?  NO

## 2021-12-14 NOTE — PROGRESS NOTES
(AWV) The Initial Medicare Annual Wellness Exam PROGRESS NOTE    This is an Initial Medicare Annual Wellness Exam (AWV) (Performed 12 months after IPPE or effective date of Medicare Part B enrollment, Once in a lifetime)    I have reviewed the patient's medical history in detail and updated the computerized patient record. Yvan López is a 80 y.o.  female and presents for an annual wellness exam      She has been referred to breast surgery  for breast pain and keloid she recommended that patient need to see possible dermatologist for keloid treatment    Patient Active Problem List   Diagnosis Code    Breast pain N64.4    DDD (degenerative disc disease), lumbar M51.36    Hypertension I10    Hyperlipidemia E78.5    Vitamin D deficiency E55.9    Osteoarthritis of right knee M17.11    Osteopenia M85.80    Complete rotator cuff tear of left shoulder M75.122    CKD (chronic kidney disease) N18.9    Prediabetes R73.03    Abnormal mammogram R92.8     Patient Active Problem List    Diagnosis Date Noted    Abnormal mammogram 12/10/2019    Prediabetes 05/23/2017    CKD (chronic kidney disease) 02/29/2016    Complete rotator cuff tear of left shoulder 10/31/2014    Osteopenia 06/24/2014    Osteoarthritis of right knee 05/31/2014    Vitamin D deficiency 03/30/2014    Breast pain 03/11/2014    DDD (degenerative disc disease), lumbar 03/11/2014    Hypertension 03/11/2014    Hyperlipidemia 03/11/2014     Current Outpatient Medications   Medication Sig Dispense Refill    acetaminophen-codeine (Tylenol-Codeine #3) 300-30 mg per tablet Take 1 Tablet by mouth daily as needed for Pain for up to 30 days.  30 Tablet 0    Arm Brace (Wrist Support Large-XLarge) misc Wrist support for carpal tunnel  syndrome 2 Each 0    hydroCHLOROthiazide (HYDRODIURIL) 25 mg tablet TAKE 1 TABLET EVERY DAY 90 Tablet 1    furosemide (LASIX) 20 mg tablet TAKE 1 TO 2 TABLETS EVERY DAY AS NEEDED FOR SWELLING 90 Tablet 0    potassium chloride (K-DUR, KLOR-CON) 20 mEq tablet TAKE 1 TABLET TWICE DAILY 180 Tab 0    metoprolol succinate (TOPROL-XL) 100 mg tablet TAKE 1 TABLET EVERY DAY 90 Tab 1    atorvastatin (LIPITOR) 10 mg tablet TAKE 1 TABLET EVERY DAY 90 Tab 1     mg tablet TAKE 1 TABLET EVERY 8 HOURS AS NEEDED FOR PAIN 90 Tab 1    glucosam/brooke-msm1/C/luciano/bosw (OSTEO BI-FLEX TRIPLE STRENGTH PO) Take  by mouth daily. With Vit D3       calcium carbonate (CALTREX) 600 mg calcium (1,500 mg) tablet Take 600 mg by mouth two (2) times a day.  folic acid/multivit-min/lutein (CENTRUM SILVER PO) Take  by mouth.  cholecalciferol (VITAMIN D3) 1,000 unit cap Take  by mouth daily.        No Known Allergies  Past Medical History:   Diagnosis Date    Arthritis     Cataract     Hypercholesterolemia     Hypertension      Past Surgical History:   Procedure Laterality Date    HX APPENDECTOMY      HX CATARACT REMOVAL      HX COLONOSCOPY  12/2009    Dr. Gasper Kelly, 8 yr follow up    HX TOTAL ABDOMINAL HYSTERECTOMY       Family History   Problem Relation Age of Onset    Diabetes Mother     Hypertension Mother     Cancer Sister 48        breast    Cancer Maternal Aunt         breast    Stroke Sister      Social History     Tobacco Use    Smoking status: Never Smoker    Smokeless tobacco: Never Used   Substance Use Topics    Alcohol use: No       ROS     History obtained from the patient  General ROS: negative for - chills or fever  Psychological ROS: positive for - depression  negative for - hallucinations, hostility or irritability  Ophthalmic ROS: negative for - blurry vision, decreased vision or double vision  ENT ROS: negative for - epistaxis or headaches  Allergy and Immunology ROS: negative  negative for - hives or itchy/watery eyes  Hematological and Lymphatic ROS: negative for - bleeding problems, blood clots, blood transfusions or bruising  Endocrine ROS: positive for - skin changes and breast pain     Breast ROS: right breast keloid   Respiratory ROS: no cough, shortness of breath, or wheezing  Cardiovascular ROS: no chest pain or dyspnea on exertion  Gastrointestinal ROS: no abdominal pain, change in bowel habits, or black or bloody stools  Genito-Urinary ROS: no dysuria, trouble voiding, or hematuria  Musculoskeletal ROS: positive for - joint pain, joint stiffness, muscle pain and pain in hand - bilateral  Neurological ROS: no TIA or stroke symptoms  Dermatological ROS: right breast keloid         History     Past Medical History:   Diagnosis Date    Arthritis     Cataract     Hypercholesterolemia     Hypertension       Past Surgical History:   Procedure Laterality Date    HX APPENDECTOMY      HX CATARACT REMOVAL      HX COLONOSCOPY  12/2009    Dr. Lynn Cruz, 10 yr follow up    HX TOTAL ABDOMINAL HYSTERECTOMY       Current Outpatient Medications   Medication Sig Dispense Refill    acetaminophen-codeine (Tylenol-Codeine #3) 300-30 mg per tablet Take 1 Tablet by mouth daily as needed for Pain for up to 30 days. 30 Tablet 0    Arm Brace (Wrist Support Large-XLarge) misc Wrist support for carpal tunnel  syndrome 2 Each 0    hydroCHLOROthiazide (HYDRODIURIL) 25 mg tablet TAKE 1 TABLET EVERY DAY 90 Tablet 1    furosemide (LASIX) 20 mg tablet TAKE 1 TO 2 TABLETS EVERY DAY AS NEEDED FOR SWELLING 90 Tablet 0    potassium chloride (K-DUR, KLOR-CON) 20 mEq tablet TAKE 1 TABLET TWICE DAILY 180 Tab 0    metoprolol succinate (TOPROL-XL) 100 mg tablet TAKE 1 TABLET EVERY DAY 90 Tab 1    atorvastatin (LIPITOR) 10 mg tablet TAKE 1 TABLET EVERY DAY 90 Tab 1     mg tablet TAKE 1 TABLET EVERY 8 HOURS AS NEEDED FOR PAIN 90 Tab 1    glucosam/brooke-msm1/C/luciano/bosw (OSTEO BI-FLEX TRIPLE STRENGTH PO) Take  by mouth daily. With Vit D3       calcium carbonate (CALTREX) 600 mg calcium (1,500 mg) tablet Take 600 mg by mouth two (2) times a day.       folic acid/multivit-min/lutein (CENTRUM SILVER PO) Take  by mouth.  cholecalciferol (VITAMIN D3) 1,000 unit cap Take  by mouth daily. No Known Allergies  Family History   Problem Relation Age of Onset    Diabetes Mother     Hypertension Mother     Cancer Sister 48        breast    Cancer Maternal Aunt         breast    Stroke Sister      Social History     Tobacco Use    Smoking status: Never Smoker    Smokeless tobacco: Never Used   Substance Use Topics    Alcohol use: No     Patient Active Problem List   Diagnosis Code    Breast pain N64.4    DDD (degenerative disc disease), lumbar M51.36    Hypertension I10    Hyperlipidemia E78.5    Vitamin D deficiency E55.9    Osteoarthritis of right knee M17.11    Osteopenia M85.80    Complete rotator cuff tear of left shoulder M75.122    CKD (chronic kidney disease) N18.9    Prediabetes R73.03    Abnormal mammogram R92.8       Health Maintenance History  Immunizations reviewed, up-to-date except for shingles  colonoscopy: Patient has a stopped colon cancer screening  Chest CT: never smoked   Eye exam:  Up to date   Mammo up todate   Dexascan ordered today         Depression Risk Factor Screening:      Patient Health Questionnaire (PHQ-2)   Over the last 2 weeks, how often have you been bothered by any of the following problems? · Little interest or pleasure in doing things? · Not at all. [0]  · Feeling down, depressed, or hopeless? · Not at all. [0]    Total Score: 0/6  PHQ-2 Assessment Scoring:   A score of 2 or more requires further screening with the PHQ-9    Alcohol Risk Factor Screening:     Women: On any occasion during the past 3 months, have you had more than 3 drinks containing alcohol? No    Do you average more than 7 drinks per week? No   Men: On any occasion during the past 3 months, have you had more than 4 drinks containing alcohol? Do you average more than 14 drinks per week? Functional Ability and Level of Safety:     Hearing Loss    Hearing is good.     Activities of Daily Living   Self-care. Requires assistance with: no ADLs    Fall Risk   History of fall(s) in the past 3 months (25 pts)    Abuse Screen   Patient is not abused  None    Examination   Physical Examination  Vitals:    12/14/21 0903 12/14/21 0909   BP: (!) 157/88 (!) 158/80   Pulse: 92    Resp: 17    Temp: 97.4 °F (36.3 °C)    TempSrc: Temporal    SpO2: 98%    Weight: 197 lb (89.4 kg)    Height: 5' 7\" (1.702 m)    PainSc:   5    PainLoc: Knee       Body mass index is 30.85 kg/m². Evaluation of Cognitive Function:  Mood/affect: Normal  Appearance: Very well-groomed and dressed  Family member/caregiver input there is no family member was accompanying her    alert, well appearing, and in no distress, oriented to person, place, and time and overweight    Patient Care Team:  Vignesh Hernandez MD as PCP - General (Internal Medicine)  Vignesh Hernandez MD as PCP - REHABILITATION [facility] Empaneled Provider  Nohemi Canas MD (Nephrology)  Kaley Cota MD as Consulting Provider (Obstetrics & Gynecology)  Jose Armando Denis MD (Surgery)  Jose Armando Denis MD (Surgery)    End-of-life planning  Advanced Directive in the case than an injury or illness causes the patient to be unable to make health care decisions    Health Care Directive or Living Will: yes    Advice/Referrals/Counselling/Plan:   Education and counseling provided:  Are appropriate based on today's review and evaluation  Pneumococcal Vaccine  Influenza Vaccine  Screening Mammography  Prostate cancer screening tests (PSA, covered annually)  Colorectal cancer screening tests  Bone mass measurement (DEXA)  Screening for glaucoma  Include in education list (weight loss, physical activity, smoking cessation, fall prevention, and nutrition)    ICD-10-CM ICD-9-CM    1. Keloid scar of skin  L91.0 701.4 REFERRAL TO DERMATOLOGY   2. Medicare annual wellness visit, subsequent  Z00.00 V70.0    3.  DDD (degenerative disc disease), lumbar  M51.36 722.52 acetaminophen-codeine (Tylenol-Codeine #3) 300-30 mg per tablet   4. Stage 3a chronic kidney disease (HCC)  Z14.00 777.8 METABOLIC PANEL, COMPREHENSIVE      CBC WITH AUTOMATED DIFF   5. Primary osteoarthritis of both knees  M17.0 715.16 REFERRAL TO SPORTS MEDICINE   6. Bilateral carpal tunnel syndrome  G56.03 354.0    7. Prediabetes  R73.03 790.29 HEMOGLOBIN A1C W/O EAG   8. Pure hypercholesterolemia  E78.00 272.0 LIPID PANEL   9. Menopause  Z78.0 627.2 DEXA BONE DENSITY STUDY AXIAL     Encounter Diagnoses   Name Primary?     Keloid scar of skin Yes    Medicare annual wellness visit, subsequent     DDD (degenerative disc disease), lumbar     Stage 3a chronic kidney disease ([facility] [address]     Primary osteoarthritis of both knees     Bilateral carpal tunnel syndrome     Prediabetes     Pure hypercholesterolemia     Menopause      Orders Placed This Encounter    DEXA BONE DENSITY STUDY AXIAL    METABOLIC PANEL, COMPREHENSIVE    CBC WITH AUTOMATED DIFF    LIPID PANEL    HEMOGLOBIN A1C W/O EAG    REFERRAL TO DERMATOLOGY    [facility]    acetaminophen-codeine (Tylenol-Codeine #3) 300-30 mg per tablet     Orders Placed This Encounter    DEXA BONE DENSITY STUDY AXIAL     Standing Status:   Future     Standing Expiration Date:   1/14/2023     Scheduling Instructions:      karine quinonez    METABOLIC PANEL, COMPREHENSIVE     Standing Status:   Future     Standing Expiration Date:   12/15/2022    CBC WITH AUTOMATED DIFF     Standing Status:   Future     Standing Expiration Date:   12/15/2022    LIPID PANEL     Standing Status:   Future     Standing Expiration Date:   12/15/2022    HEMOGLOBIN A1C W/O EAG     Standing Status:   Future     Standing Expiration Date:   12/15/2022    REFERRAL TO DERMATOLOGY     Referral Priority:   Urgent     Referral Type:   Consultation     Referral Reason:   Specialty Services Required     Referred to Provider:   Susana Nieves MD     Requested Specialty:   Dermatology     Number of Visits Requested:   [address] [facility]     Referral Priority:   Routine     Referral Type:   Consultation     Referral Reason:   Specialty Services Required     Referred to Provider:   Chika Pennington MD     Number of Visits Requested:   1    acetaminophen-codeine (Tylenol-Codeine #3) 300-30 mg per tablet     Sig: Take 1 Tablet by mouth daily as needed for Pain for up to 30 days. Dispense:  30 Tablet     Refill:  0     Orders Placed This Encounter    DEXA BONE DENSITY STUDY AXIAL    METABOLIC PANEL, COMPREHENSIVE    CBC WITH AUTOMATED DIFF    LIPID PANEL    HEMOGLOBIN A1C W/O EAG    REFERRAL TO DERMATOLOGY    [facility]    acetaminophen-codeine (Tylenol-Codeine #3) 300-30 mg per tablet     Diagnoses and all orders for this visit:    1. Keloid scar of skin  -     REFERRAL TO DERMATOLOGY    2. Medicare annual wellness visit, subsequent    3. DDD (degenerative disc disease), lumbar  -     acetaminophen-codeine (Tylenol-Codeine #3) 300-30 mg per tablet; Take 1 Tablet by mouth daily as needed for Pain for up to 30 days. 4. Stage 3a chronic kidney disease (HCC)  -     METABOLIC PANEL, COMPREHENSIVE; Future  -     CBC WITH AUTOMATED DIFF; Future    5. Primary osteoarthritis of both knees  -     REFERRAL TO SPORTS MEDICINE    6. Bilateral carpal tunnel syndrome  Currently she is wearing wrist supports for carpal tunnel please got worse she would consider hand surgery evaluation  7. Prediabetes  -     HEMOGLOBIN A1C W/O EAG; Future    8. Pure hypercholesterolemia  -     LIPID PANEL; Future    9. Menopause  -     DEXA BONE DENSITY STUDY AXIAL; Future      Follow-up and Dispositions    · Return in about 6 months (around 6/14/2022). current treatment plan is effective, no change in therapy. Brief written plan, checklist    I have discussed the diagnosis with the patient and the intended plan as seen in the above orders.   The patient has received an after-visit summary and questions were answered concerning future plans. I have discussed medication side effects and warnings with the patient as well. I have reviewed the plan of care with the patient, accepted their input and they are in agreement with the treatment goals. Follow-up and Dispositions    · Return in about 6 months (around 6/14/2022).            ____________________________________________________________    Problem Assessment    for treatment of   Chief Complaint   Patient presents with    Annual Wellness Visit     breast pain     Tingling     at night hand go \" to sleep with pain\"    Request For New Medication     pain medication

## 2021-12-27 DIAGNOSIS — N64.4 PAIN OF RIGHT BREAST: ICD-10-CM

## 2022-01-31 ENCOUNTER — TELEPHONE (OUTPATIENT)
Dept: FAMILY MEDICINE CLINIC | Age: 82
End: 2022-01-31

## 2022-01-31 NOTE — TELEPHONE ENCOUNTER
----- Message from Liss Boyd sent at 1/31/2022 11:25 AM EST -----  Subject: Message to Provider    QUESTIONS  Information for Provider? Patient would like to know the status of her   referral for shots in her knee. As well as her bone density scans. Please   call and advise.   ---------------------------------------------------------------------------  --------------  CALL BACK INFO  What is the best way for the office to contact you? OK to leave message on   voicemail  Preferred Call Back Phone Number? [phone]  ---------------------------------------------------------------------------  --------------  SCRIPT ANSWERS  Relationship to Patient?  Self

## 2022-01-31 NOTE — TELEPHONE ENCOUNTER
Spoke w/pt and pt is scheduled on:   Future Appointments   Date Time Provider Sachin Ambriz   2/3/2022  8:30 AM Anaid Estrada MD [facility]

## 2022-01-31 NOTE — TELEPHONE ENCOUNTER
----- Message from Eduinpuneet Logan sent at 1/31/2022 11:25 AM EST -----  Subject: Message to Provider    QUESTIONS  Information for Provider? Patient would like to know the status of her   referral for shots in her knee. As well as her bone density scans. Please   call and advise.   ---------------------------------------------------------------------------  --------------  CALL BACK INFO  What is the best way for the office to contact you? OK to leave message on   voicemail  Preferred Call Back Phone Number? [phone]  ---------------------------------------------------------------------------  --------------  SCRIPT ANSWERS  Relationship to Patient?  Self

## 2022-02-14 ENCOUNTER — OFFICE VISIT (OUTPATIENT)
Dept: FAMILY MEDICINE CLINIC | Age: 82
End: 2022-02-14
Payer: MEDICARE

## 2022-02-14 VITALS
HEART RATE: 67 BPM | TEMPERATURE: 97.3 F | BODY MASS INDEX: 31.33 KG/M2 | HEIGHT: 67 IN | OXYGEN SATURATION: 98 % | WEIGHT: 199.6 LBS | DIASTOLIC BLOOD PRESSURE: 68 MMHG | RESPIRATION RATE: 15 BRPM | SYSTOLIC BLOOD PRESSURE: 136 MMHG

## 2022-02-14 DIAGNOSIS — Z78.0 MENOPAUSE: Primary | ICD-10-CM

## 2022-02-14 DIAGNOSIS — N18.31 STAGE 3A CHRONIC KIDNEY DISEASE (HCC): ICD-10-CM

## 2022-02-14 DIAGNOSIS — Z11.59 NEED FOR HEPATITIS C SCREENING TEST: ICD-10-CM

## 2022-02-14 DIAGNOSIS — N64.4 PAIN OF RIGHT BREAST: ICD-10-CM

## 2022-02-14 DIAGNOSIS — I10 ESSENTIAL HYPERTENSION: ICD-10-CM

## 2022-02-14 DIAGNOSIS — R73.03 PREDIABETES: ICD-10-CM

## 2022-02-14 DIAGNOSIS — L91.0 KELOID SCAR OF SKIN: ICD-10-CM

## 2022-02-14 PROCEDURE — 99214 OFFICE O/P EST MOD 30 MIN: CPT | Performed by: LEGAL MEDICINE

## 2022-02-14 PROCEDURE — G8417 CALC BMI ABV UP PARAM F/U: HCPCS | Performed by: LEGAL MEDICINE

## 2022-02-14 PROCEDURE — G8427 DOCREV CUR MEDS BY ELIG CLIN: HCPCS | Performed by: LEGAL MEDICINE

## 2022-02-14 PROCEDURE — G8510 SCR DEP NEG, NO PLAN REQD: HCPCS | Performed by: LEGAL MEDICINE

## 2022-02-14 PROCEDURE — G8399 PT W/DXA RESULTS DOCUMENT: HCPCS | Performed by: LEGAL MEDICINE

## 2022-02-14 PROCEDURE — 1090F PRES/ABSN URINE INCON ASSESS: CPT | Performed by: LEGAL MEDICINE

## 2022-02-14 PROCEDURE — G8752 SYS BP LESS 140: HCPCS | Performed by: LEGAL MEDICINE

## 2022-02-14 PROCEDURE — 1101F PT FALLS ASSESS-DOCD LE1/YR: CPT | Performed by: LEGAL MEDICINE

## 2022-02-14 PROCEDURE — G8536 NO DOC ELDER MAL SCRN: HCPCS | Performed by: LEGAL MEDICINE

## 2022-02-14 PROCEDURE — G8754 DIAS BP LESS 90: HCPCS | Performed by: LEGAL MEDICINE

## 2022-02-14 RX ORDER — GABAPENTIN 100 MG/1
100 CAPSULE ORAL 3 TIMES DAILY
Qty: 90 CAPSULE | Refills: 0 | Status: SHIPPED | OUTPATIENT
Start: 2022-02-14 | End: 2022-05-31 | Stop reason: SDUPTHER

## 2022-02-14 NOTE — PROGRESS NOTES
Sharon Larose     Chief Complaint   Patient presents with    Knee Pain     bilat knee pain; pain scale of 5/10    Breast pain     right breast pain; pain scale of 7/10     Vitals:    02/14/22 1505   BP: 136/68   Pulse: 67   Resp: 15   Temp: 97.3 °F (36.3 °C)   TempSrc: Temporal   SpO2: 98%   Weight: 199 lb 9.6 oz (90.5 kg)   Height: 5' 7\" (1.702 m)   PainSc:   7   PainLoc: Knee         HPI: is here for follow up on painful right breast scar she need to schedule apportionment with dermatologist Dr. Magaly Rojo, she was seen by  breast surgery and recommended to be seen by dermatologist, patient got busy and did not schedule her appointment but she will call and get well, she is having pain on her left breast preventing her from sleeping on the right side, uncomfortable breast pain. Has bilateral knee osteoarthritis is daily pain referred to sport medicine need to schedule appointment    Past Medical History:   Diagnosis Date    Arthritis     Cataract     Hypercholesterolemia     Hypertension      Past Surgical History:   Procedure Laterality Date    HX APPENDECTOMY      HX CATARACT REMOVAL      HX COLONOSCOPY  12/2009    Dr. Karthik Chan, 10 yr follow up    HX TOTAL ABDOMINAL HYSTERECTOMY       Social History     Tobacco Use    Smoking status: Never Smoker    Smokeless tobacco: Never Used   Substance Use Topics    Alcohol use: No       Family History   Problem Relation Age of Onset    Diabetes Mother     Hypertension Mother     Cancer Sister 48        breast    Cancer Maternal Aunt         breast    Stroke Sister        Review of Systems   Constitutional: Negative for chills, fever, malaise/fatigue and weight loss. HENT: Negative for congestion, ear discharge, ear pain, hearing loss, nosebleeds, sinus pain and sore throat. Eyes: Negative for blurred vision, double vision and discharge. Respiratory: Negative for cough.     Cardiovascular: Negative for chest pain, palpitations, claudication and leg swelling. Gastrointestinal: Negative for abdominal pain, constipation, diarrhea, nausea and vomiting. Genitourinary: Negative for dysuria, frequency and urgency. Musculoskeletal: Positive for back pain and myalgias. Skin: Negative for itching and rash. Neurological: Negative for dizziness, tingling, sensory change, speech change, focal weakness, weakness and headaches. Physical Exam  Vitals and nursing note reviewed. Assessment and plan     Plan of care has been discussed with the patient, he agrees to the plan and verbalized understanding. All his questions were answered  More than 50% of the time spent in this visit was counseling the patient about  illness and treatment options         1. Stage 3a chronic kidney disease (HCC)  Stable need to avoid all NSAIDs    2. Menopause    - DEXA BONE DENSITY STUDY AXIAL; Future    3. Keloid scar of skin    - gabapentin (NEURONTIN) 100 mg capsule; Take 1 Capsule by mouth three (3) times daily for 30 days. Max Daily Amount: 300 mg. Dispense: 90 Capsule; Refill: 0    4. Pain of right breast  Patient has daily pain on her right breast is moderate to severe requesting pain medication  - gabapentin (NEURONTIN) 100 mg capsule; Take 1 Capsule by mouth three (3) times daily for 30 days. Max Daily Amount: 300 mg. Dispense: 90 Capsule; Refill: 0    5. Prediabetes      6. Need for hepatitis C screening test    - HEPATITIS C AB; Future    Essential hypertension  Blood pressures well controlled    Current Outpatient Medications   Medication Sig Dispense Refill    gabapentin (NEURONTIN) 100 mg capsule Take 1 Capsule by mouth three (3) times daily for 30 days. Max Daily Amount: 300 mg. 90 Capsule 0    acetaminophen-codeine (Tylenol-Codeine #3) 300-30 mg per tablet Take 1 Tablet by mouth daily as needed for Pain for up to 30 days.  30 Tablet 0    Arm Brace (Wrist Support Large-XLarge) misc Wrist support for carpal tunnel  syndrome 2 Each 0  hydroCHLOROthiazide (HYDRODIURIL) 25 mg tablet TAKE 1 TABLET EVERY DAY 90 Tablet 1    furosemide (LASIX) 20 mg tablet TAKE 1 TO 2 TABLETS EVERY DAY AS NEEDED FOR SWELLING 90 Tablet 0    potassium chloride (K-DUR, KLOR-CON) 20 mEq tablet TAKE 1 TABLET TWICE DAILY 180 Tab 0    metoprolol succinate (TOPROL-XL) 100 mg tablet TAKE 1 TABLET EVERY DAY 90 Tab 1    atorvastatin (LIPITOR) 10 mg tablet TAKE 1 TABLET EVERY DAY 90 Tab 1     mg tablet TAKE 1 TABLET EVERY 8 HOURS AS NEEDED FOR PAIN 90 Tab 1    glucosam/brooke-msm1/C/luciano/bosw (OSTEO BI-FLEX TRIPLE STRENGTH PO) Take  by mouth daily. With Vit D3       calcium carbonate (CALTREX) 600 mg calcium (1,500 mg) tablet Take 600 mg by mouth two (2) times a day.  folic acid/multivit-min/lutein (CENTRUM SILVER PO) Take  by mouth.  cholecalciferol (VITAMIN D3) 1,000 unit cap Take  by mouth daily. Patient Active Problem List    Diagnosis Date Noted    Abnormal mammogram 12/10/2019    Prediabetes 05/23/2017    CKD (chronic kidney disease) 02/29/2016    Complete rotator cuff tear of left shoulder 10/31/2014    Osteopenia 06/24/2014    Osteoarthritis of right knee 05/31/2014    Vitamin D deficiency 03/30/2014    Breast pain 03/11/2014    DDD (degenerative disc disease), lumbar 03/11/2014    Hypertension 03/11/2014    Hyperlipidemia 03/11/2014     Results for orders placed or performed in visit on 02/16/21   CULTURE, ANAEROBIC AND AEROBIC   Result Value Ref Range    Anerobic culture CANCELED (A)     Aerobic culture CANCELED (A)    SPECIMEN STATUS REPORT   Result Value Ref Range    SPECIMEN STATUS REPORT COMMENT      No visits with results within 3 Month(s) from this visit. Latest known visit with results is:   Office Visit on 02/16/2021   Component Date Value Ref Range Status    Anerobic culture 02/16/2021 CANCELED*  Final-Edited    Result canceled by the ancillary.     Aerobic culture 02/16/2021 CANCELED*  Final-Edited    Result canceled by the ancillary.  SPECIMEN STATUS REPORT 02/16/2021 COMMENT   Final    Comment: Please note  Please note  The date and/or time of collection was not indicated on the  requisition as required by state and federal law. The date of  receipt of the specimen was used as the collection date if not  supplied. Follow-up and Dispositions    · Return in about 4 months (around 6/14/2022).

## 2022-02-14 NOTE — PROGRESS NOTES
Leonila Hillman is a 80 y.o. female (: 1940) presenting to address:    Chief Complaint   Patient presents with    Knee Pain     bilat knee pain; pain scale of 5/10    Breast pain     right breast pain; pain scale of 7/10       Vitals:    22 1505   BP: 136/68   Pulse: 67   Resp: 15   Temp: 97.3 °F (36.3 °C)   TempSrc: Temporal   SpO2: 98%   Weight: 199 lb 9.6 oz (90.5 kg)   Height: 5' 7\" (1.702 m)   PainSc:   7   PainLoc: Knee       Hearing/Vision:   No exam data present    Learning Assessment:     Learning Assessment 2/3/2015   PRIMARY LEARNER Patient   HIGHEST LEVEL OF EDUCATION - PRIMARY LEARNER  GRADUATED HIGH SCHOOL OR GED   BARRIERS PRIMARY LEARNER NONE   CO-LEARNER CAREGIVER No   PRIMARY LANGUAGE ENGLISH   LEARNER PREFERENCE PRIMARY READING   ANSWERED BY self   RELATIONSHIP SELF     Depression Screening:     3 most recent PHQ Screens 2022   Little interest or pleasure in doing things Not at all   Feeling down, depressed, irritable, or hopeless Not at all   Total Score PHQ 2 0     Fall Risk Assessment:     Fall Risk Assessment, last 12 mths 2022   Able to walk? Yes   Fall in past 12 months? 1   Do you feel unsteady? 0   Are you worried about falling 0   Is the gait abnormal? 0   Number of falls in past 12 months 1   Fall with injury? 0     Abuse Screening:     Abuse Screening Questionnaire 2021   Do you ever feel afraid of your partner? N   Are you in a relationship with someone who physically or mentally threatens you? N   Is it safe for you to go home?  Y     ADL Assessment:     ADL Assessment 2021   Feeding yourself No Help Needed   Getting from bed to chair No Help Needed   Getting dressed No Help Needed   Bathing or showering No Help Needed   Walk across the room (includes cane/walker) No Help Needed   Using the telphone No Help Needed   Taking your medications No Help Needed   Preparing meals No Help Needed   Managing money (expenses/bills) No Help Needed   Moderately strenuous housework (laundry) No Help Needed   Shopping for personal items (toiletries/medicines) No Help Needed   Shopping for groceries No Help Needed   Driving No Help Needed   Climbing a flight of stairs No Help Needed   Getting to places beyond walking distances No Help Needed        Coordination of Care Questionaire:   1. \"Have you been to the ER, urgent care clinic since your last visit? Hospitalized since your last visit? \" No    2. \"Have you seen or consulted any other health care providers outside of the [address] [place] [address] [facility] since your last visit? \" No     3. For patients aged 39-70: Has the patient had a colonoscopy / FIT/ Cologuard? Yes - Care Gap present. OVERDUE! If the patient is female:    4. For patients aged 41-77: Has the patient had a mammogram within the past 2 years? Yes - no Care Gap present  See top three    5. For patients aged 21-65: Has the patient had a pap smear? No    Advanced Directive:   1. Do you have an Advanced Directive? YES    2. Would you like information on Advanced Directives?  NO

## 2022-02-16 RX ORDER — HYDROCHLOROTHIAZIDE 25 MG/1
TABLET ORAL
Qty: 90 TABLET | Refills: 1 | Status: SHIPPED | OUTPATIENT
Start: 2022-02-16 | End: 2022-05-31 | Stop reason: SDUPTHER

## 2022-02-17 DIAGNOSIS — D50.9 MICROCYTIC HYPOCHROMIC ANEMIA: ICD-10-CM

## 2022-02-17 DIAGNOSIS — D70.9 NEUTROPENIA, UNSPECIFIED TYPE (HCC): ICD-10-CM

## 2022-02-17 DIAGNOSIS — D64.9: Primary | ICD-10-CM

## 2022-02-17 LAB
A-G RATIO,AGRAT: 0.9 RATIO (ref 1.1–2.6)
ALBUMIN SERPL-MCNC: 4 G/DL (ref 3.5–5)
ALP SERPL-CCNC: 69 U/L (ref 40–120)
ALT SERPL-CCNC: 11 U/L (ref 5–40)
ANION GAP SERPL CALC-SCNC: 10 MMOL/L (ref 3–15)
AST SERPL W P-5'-P-CCNC: 16 U/L (ref 10–37)
AVG GLU, 10930: 123 MG/DL (ref 91–123)
BASOPHILS ABS-DIF,2030: 0 K/UL (ref 0–0.2)
BASOPHILS%-DIF,2033: 1 % (ref 0–2)
BILIRUB SERPL-MCNC: 0.4 MG/DL (ref 0.2–1.2)
BUN SERPL-MCNC: 11 MG/DL (ref 6–22)
CALCIUM SERPL-MCNC: 9.2 MG/DL (ref 8.4–10.5)
CHLORIDE SERPL-SCNC: 101 MMOL/L (ref 98–110)
CHOLEST SERPL-MCNC: 161 MG/DL (ref 110–200)
CO2 SERPL-SCNC: 29 MMOL/L (ref 20–32)
CREAT SERPL-MCNC: 0.8 MG/DL (ref 0.8–1.4)
ERYTHROCYTE [DISTWIDTH] IN BLOOD BY AUTOMATED COUNT: 13.2 % (ref 10–15.5)
GFRAA, 66117: >60
GFRNA, 66118: >60
GLOBULIN,GLOB: 4.6 G/DL (ref 2–4)
GLUCOSE SERPL-MCNC: 108 MG/DL (ref 70–99)
HBA1C MFR BLD HPLC: 5.9 % (ref 4.8–5.6)
HCT VFR BLD AUTO: 28.3 % (ref 35.1–48.3)
HCV AB SER IA-ACNC: NORMAL
HDLC SERPL-MCNC: 3.7 MG/DL (ref 0–5)
HDLC SERPL-MCNC: 43 MG/DL
HGB BLD-MCNC: 9.2 G/DL (ref 11.7–16.1)
LDL/HDL RATIO,LDHD: 2.1
LDLC SERPL CALC-MCNC: 90 MG/DL (ref 50–99)
LYMPHOCYTES%-DIF,2108: 55 % (ref 20–45)
LYMPHS ABS-DIF,2105: 1.3 K/UL (ref 1–4.8)
MACROCYTOSIS,MACRO: ABNORMAL
MCH RBC QN AUTO: 34 PG (ref 26–34)
MCHC RBC AUTO-ENTMCNC: 33 G/DL (ref 31–36)
MCV RBC AUTO: 103 FL (ref 80–99)
MONOCYTES ABS-DIF,2141: 0.1 K/UL (ref 0.1–1)
MONOCYTES%-DIF,2144: 5 % (ref 3–12)
NEUTROPHILS ABS,2156: 0.9 K/UL (ref 1.8–7.7)
NEUTROPHILS%-DIF,2159: 39 % (ref 40–75)
NON-HDL CHOLESTEROL, 011976: 118 MG/DL
NORMACHROMIC RBC, 868: ABNORMAL
PLATELET # BLD AUTO: 230 K/UL (ref 140–440)
PMV BLD AUTO: 10.5 FL (ref 9–13)
POTASSIUM SERPL-SCNC: 3.7 MMOL/L (ref 3.5–5.5)
PROT SERPL-MCNC: 8.6 G/DL (ref 6.2–8.1)
RBC # BLD AUTO: 2.75 M/UL (ref 3.8–5.2)
SMEAR EVAL, 1131: ABNORMAL
SODIUM SERPL-SCNC: 140 MMOL/L (ref 133–145)
TOTAL CELLS COUNTED, 12021: 100
TRIGL SERPL-MCNC: 138 MG/DL (ref 40–149)
VLDLC SERPL CALC-MCNC: 28 MG/DL (ref 8–30)
WBC # BLD AUTO: 2.3 K/UL (ref 4–11)

## 2022-02-20 NOTE — PROGRESS NOTES
HB a1c at prediabetes range     CBC show low WBC and low HB worse than last time   That will need further evaluation to get further blood work don e

## 2022-02-21 ENCOUNTER — OFFICE VISIT (OUTPATIENT)
Dept: SPORTS MEDICINE | Age: 82
End: 2022-02-21
Payer: MEDICARE

## 2022-02-21 DIAGNOSIS — M17.12 PRIMARY OSTEOARTHRITIS OF LEFT KNEE: ICD-10-CM

## 2022-02-21 DIAGNOSIS — N18.30 STAGE 3 CHRONIC KIDNEY DISEASE, UNSPECIFIED WHETHER STAGE 3A OR 3B CKD (HCC): ICD-10-CM

## 2022-02-21 DIAGNOSIS — M17.11 PRIMARY OSTEOARTHRITIS OF RIGHT KNEE: Primary | ICD-10-CM

## 2022-02-21 PROCEDURE — 99204 OFFICE O/P NEW MOD 45 MIN: CPT | Performed by: FAMILY MEDICINE

## 2022-02-21 PROCEDURE — 20610 DRAIN/INJ JOINT/BURSA W/O US: CPT | Performed by: FAMILY MEDICINE

## 2022-02-21 PROCEDURE — 1090F PRES/ABSN URINE INCON ASSESS: CPT | Performed by: FAMILY MEDICINE

## 2022-02-21 PROCEDURE — G8427 DOCREV CUR MEDS BY ELIG CLIN: HCPCS | Performed by: FAMILY MEDICINE

## 2022-02-21 PROCEDURE — G8432 DEP SCR NOT DOC, RNG: HCPCS | Performed by: FAMILY MEDICINE

## 2022-02-21 PROCEDURE — G8399 PT W/DXA RESULTS DOCUMENT: HCPCS | Performed by: FAMILY MEDICINE

## 2022-02-21 PROCEDURE — 1101F PT FALLS ASSESS-DOCD LE1/YR: CPT | Performed by: FAMILY MEDICINE

## 2022-02-21 PROCEDURE — G8536 NO DOC ELDER MAL SCRN: HCPCS | Performed by: FAMILY MEDICINE

## 2022-02-21 PROCEDURE — G8756 NO BP MEASURE DOC: HCPCS | Performed by: FAMILY MEDICINE

## 2022-02-21 PROCEDURE — G8417 CALC BMI ABV UP PARAM F/U: HCPCS | Performed by: FAMILY MEDICINE

## 2022-02-21 RX ORDER — TRIAMCINOLONE ACETONIDE 40 MG/ML
40 INJECTION, SUSPENSION INTRA-ARTICULAR; INTRAMUSCULAR ONCE
Qty: 1 ML | Refills: 0
Start: 2022-02-21 | End: 2022-02-21

## 2022-02-21 RX ORDER — DEXTROMETHORPHAN HYDROBROMIDE, GUAIFENESIN 5; 100 MG/5ML; MG/5ML
650 LIQUID ORAL
Qty: 270 TABLET | Refills: 1 | Status: SHIPPED | OUTPATIENT
Start: 2022-02-21 | End: 2022-07-15

## 2022-02-21 RX ORDER — DICLOFENAC SODIUM 10 MG/G
4 GEL TOPICAL
Qty: 300 G | Refills: 1 | Status: SHIPPED | OUTPATIENT
Start: 2022-02-21 | End: 2022-08-18

## 2022-02-21 NOTE — LETTER
2/21/2022    Patient: Jacqueline Anand   YOB: 1940   Date of Visit: 2/21/2022     Quinton Banda MD  [address] [facility]    Dear Quinton Banda MD,      Thank you for referring Ms. Malcolm Mckeon to [place] [address] for evaluation. My notes for this consultation are attached. If you have questions, please do not hesitate to call me. I look forward to following your patient along with you.       Sincerely,    Yanci Figueroa MD

## 2022-02-21 NOTE — PATIENT INSTRUCTIONS
To Do: - START the diclofenac (voltaren) gel on both knees as needed, up to 3 times a day  - START acetaminophen (Tylenol) 650mg 2-3 times a day as needed  - avoid heat to the knees  - when swollen or really painful, use ice or frozen veggies 20min/time  - START your home exercises, really emphasizing on doing those exercises slowly, for stability      Notes:  - today we did a steroid injection into the LEFT knee. I am hopeful that we will give you some relief. .. i'm actually hoping it lasts you some months!  - depending on how your LEFT knee is doing at your 1 month follow-up, we will then plan on when we need to start incorporating those \"gel shots\" (viscosupplementation) into your knee maintenance plan      Our shared goals:  - to avoid knee surgery  - to make sure you feel confident and safe when walking and getting around  - optimize the use of injections (steroid and those gel shots)      VISIT SURVEY        You may receive a survey regarding your visit today either by mail or email.  Please take the opportunity to let us know how we did.  This information helps us continue to improve and provide a great patient experience. TESTING / IMAGING RESULTS       If you have [facility] access:   Per federal regulations all of your results will be released to you and to your physician / provider simultaneously on [address]    o This means that it is likely that you will have the opportunity to review your results before your physician / provider!  Since all \"critical\" abnormal results are immediately called to the office / on-call providers on nights, weekends and holidays - please refrain from calling after hours when you receive abnormal results through [address] Instead, allow time for your physician / provider to review your results and then provide interpretation and/or guidance.    If the results are significantly abnormal and require time-sensitive action - guidance will be provided both via [facility] and via telephone.  For all other results (interpreted as \"normal\", \"abnormal but expected\", \"reassuring / stable\", \"slightly abnormal\") - non-urgent guidance will be provided via [facility] communication only. Hasmukh Hernandez #[phone]      If you do NOT have [facility] access:   If the results are significantly abnormal and require time-sensitive action - guidance will be relayed to you via telephone.  For all other results (interpreted as \"normal\", \"abnormal but expected\", \"reassuring / stable\", \"slightly abnormal\") - non-urgent guidance will be mailed to you via [employer]      Results from Outside Facilities / Laboratories:  Results of tests performed at outside facilities / laboratories likely will not appear in the [employer].  o They may appear in the patient portals of those outside facilities / laboratories. o Please keep in mind that with your access to your patient portal directly to an outside facility / laboratory, you are likely viewing results before your physician / provider! Please allow time for your physician / provider to review your results and then provide interpretation and/or guidance. If you have questions about your results beyond the guidance provided in [facility] or in your results letter, please schedule a follow up appointment to discuss with your physician / provider. MEDICATION REFILLS         Please request medication refills through your pharmacy, to ensure the correct pharmacy is used.  Please allow at least 3 business days for refill requests to be addressed.  Refills will not be provided by the after hours/on call provider. Joint Injections: Care Instructions  Your Care Instructions     Joint injections are shots into a joint, such as the knee. They may be used to put in medicines, such as pain relievers. A corticosteroid, or steroid, shot is used to reduce inflammation in tendons or joints.  It is often used to treat problems such as arthritis, tendinitis, and bursitis. Steroids can be injected directly into a painful, inflamed joint. They can also help reduce inflammation of a bursa. A bursa is a sac of fluid. It cushions and lubricates areas where tendons, ligaments, skin, muscles, or bones rub against each other. A steroid shot can sometimes help with short-term pain relief when other treatments haven't worked. If steroid shots help, pain may improve for weeks or months. Follow-up care is a key part of your treatment and safety. Be sure to make and go to all appointments, and call your doctor if you are having problems. It's also a good idea to know your test results and keep a list of the medicines you take. How can you care for yourself at home? · Put ice or a cold pack on the area for 10 to 20 minutes at a time. Put a thin cloth between the ice and your skin. · Ask your doctor if you can take an over-the-counter pain medicine, such as acetaminophen (Tylenol), ibuprofen (Advil, Motrin), or naproxen (Aleve). Be safe with medicines. Read and follow all instructions on the label. · Avoid strenuous activities for several days. In particular, avoid ones that put stress on the area where you got the shot. · If you have dressings over the area, keep them clean and dry. You may remove them when your doctor tells you to. When should you call for help? Call your doctor now or seek immediate medical care if:    · You have signs of infection, such as:  ? Increased pain, swelling, warmth, or redness. ? Red streaks leading from the site. ? Pus draining from the site. ? A fever. Watch closely for changes in your health, and be sure to contact your doctor if you have any problems. Where can you learn more? Go to http://www.gray.com/  Enter N616 in the search box to learn more about \"Joint Injections: Care Instructions. \"  Current as of: July 1, 2021               Content Version: 13.0  © 1172-7384 [employer]. Care instructions adapted under license by [employer] (which disclaims liability or warranty for this information). If you have questions about a medical condition or this instruction, always ask your healthcare professional. [place] [address] any warranty or liability for your use of this information. Knee Arthritis: Care Instructions  Your Care Instructions     Knee arthritis is a breakdown of the cartilage that cushions your knee joint. When the cartilage wears down, your bones rub against each other. This causes pain and stiffness. Knee arthritis tends to get worse with time. Treatment for knee arthritis involves reducing pain, making the leg muscles stronger, and staying at a healthy body weight. The treatment usually does not improve the health of the cartilage, but it can reduce pain and improve how well your knee works. You can take simple measures to protect your knee joints, ease your pain, and help you stay active. Follow-up care is a key part of your treatment and safety. Be sure to make and go to all appointments, and call your doctor if you are having problems. It's also a good idea to know your test results and keep a list of the medicines you take. How can you care for yourself at home? · Know that knee arthritis will cause more pain on some days than on others. · Stay at a healthy weight. Lose weight if you are overweight. When you stand up, the pressure on your knees from every pound of body weight is multiplied four times. So if you lose 10 pounds, you will reduce the pressure on your knees by 40 pounds. · Talk to your doctor or physical therapist about exercises that will help ease joint pain. ? Stretch to help prevent stiffness and to prevent injury before you exercise. You may enjoy gentle forms of yoga to help keep your knee joints and muscles flexible. ? Walk instead of jog.  ? Ride a bike.  This makes your thigh muscles stronger and takes pressure off your knee. ? Wear well-fitting and comfortable shoes. ? Exercise in chest-deep water. This can help you exercise longer with less pain. ? Avoid exercises that include squatting or kneeling. They can put a lot of strain on your knees. ? Talk to your doctor to make sure that the exercise you do is not making the arthritis worse. · Do not sit for long periods of time. Try to walk once in a while to keep your knee from getting stiff. · Ask your doctor or physical therapist whether shoe inserts may reduce your arthritis pain. · If you can afford it, get new athletic shoes at least every year. This can help reduce the strain on your knees. · Use a device to help you do everyday activities. ? A cane or walking stick can help you keep your balance when you walk. Hold the cane or walking stick in the hand opposite the painful knee. ? If you feel like you may fall when you walk, try using crutches or a front-wheeled walker. These can prevent falls that could cause more damage to your knee. ? A knee brace may help keep your knee stable and prevent pain. ? You also can use other things to make life easier, such as a higher toilet seat and handrails in the bathtub or shower. · Take pain medicines exactly as directed. ? Do not wait until you are in severe pain. You will get better results if you take it sooner. ? If you are not taking a prescription pain medicine, take an over-the-counter medicine such as acetaminophen (Tylenol), ibuprofen (Advil, Motrin), or naproxen (Aleve). Read and follow all instructions on the label. ? Do not take two or more pain medicines at the same time unless the doctor told you to. Many pain medicines have acetaminophen, which is Tylenol. Too much acetaminophen (Tylenol) can be harmful. ? Tell your doctor if you take a blood thinner, have diabetes, or have allergies to shellfish.   · Ask your doctor if you might benefit from a shot of steroid medicine into your knee. This may provide pain relief for several months. · Many people take the supplements glucosamine and chondroitin for osteoarthritis. Some people feel they help, but the medical research does not show that they work. Talk to your doctor before you take these supplements. When should you call for help? Call your doctor now or seek immediate medical care if:    · You have sudden swelling, warmth, or pain in your knee.     · You have knee pain and a fever or rash.     · You have such bad pain that you cannot use your knee. Watch closely for changes in your health, and be sure to contact your doctor if you have any problems. Where can you learn more? Go to http://www.gray.com/  Enter W187 in the search box to learn more about \"Knee Arthritis: Care Instructions. \"  Current as of: April 30, 2021               Content Version: 13.0  © 4461-9663 [employer]. Care instructions adapted under license by [employer] (which disclaims liability or warranty for this information). If you have questions about a medical condition or this instruction, always ask your healthcare professional. [place] [address] any warranty or liability for your use of this information. Knee Arthritis: Exercises  Introduction  Here are some examples of exercises for you to try. The exercises may be suggested for a condition or for rehabilitation. Start each exercise slowly. Ease off the exercises if you start to have pain. You will be told when to start these exercises and which ones will work best for you. How to do the exercises  Knee flexion with heel slide    1. Lie on your back with your knees bent. 2. Slide your heel back by bending your affected knee as far as you can. Then hook your other foot around your ankle to help pull your heel even farther back. 3. Hold for about 6 seconds, then rest for up to 10 seconds.   4. Repeat 8 to 12 times. 5. Switch legs and repeat steps 1 through 4, even if only one knee is sore. Quad sets    1. Sit with your affected leg straight and supported on the floor or a firm bed. Place a small, rolled-up towel under your knee. Your other leg should be bent, with that foot flat on the floor. 2. Tighten the thigh muscles of your affected leg by pressing the back of your knee down into the towel. 3. Hold for about 6 seconds, then rest for up to 10 seconds. 4. Repeat 8 to 12 times. 5. Switch legs and repeat steps 1 through 4, even if only one knee is sore. Straight-leg raises to the front    1. Lie on your back with your good knee bent so that your foot rests flat on the floor. Your affected leg should be straight. Make sure that your low back has a normal curve. You should be able to slip your hand in between the floor and the small of your back, with your palm touching the floor and your back touching the back of your hand. 2. Tighten the thigh muscles in your affected leg by pressing the back of your knee flat down to the floor. Hold your knee straight. 3. Keeping the thigh muscles tight and your leg straight, lift your affected leg up so that your heel is about 12 inches off the floor. Hold for about 6 seconds, then lower slowly. 4. Relax for up to 10 seconds between repetitions. 5. Repeat 8 to 12 times. 6. Switch legs and repeat steps 1 through 5, even if only one knee is sore. Active knee flexion    1. Lie on your stomach with your knees straight. If your kneecap is uncomfortable, roll up a washcloth and put it under your leg just above your kneecap. 2. Lift the foot of your affected leg by bending the knee so that you bring the foot up toward your buttock. If this motion hurts, try it without bending your knee quite as far. This may help you avoid any painful motion. 3. Slowly move your leg up and down. 4. Repeat 8 to 12 times.   5. Switch legs and repeat steps 1 through 4, even if only one knee is sore. Quadriceps stretch (facedown)    1. Lie flat on your stomach, and rest your face on the floor. 2. Wrap a towel or belt strap around the lower part of your affected leg. Then use the towel or belt strap to slowly pull your heel toward your buttock until you feel a stretch. 3. Hold for about 15 to 30 seconds, then relax your leg against the towel or belt strap. 4. Repeat 2 to 4 times. 5. Switch legs and repeat steps 1 through 4, even if only one knee is sore. Stationary exercise bike    1. If you do not have a stationary exercise bike at home, you can find one to ride at your local health club or community center. 2. Adjust the height of the bike seat so that your knee is slightly bent when your leg is extended downward. If your knee hurts when the pedal reaches the top, you can raise the seat so that your knee does not bend as much. 3. Start slowly. At first, try to do 5 to 10 minutes of cycling with little to no resistance. Then increase your time and the resistance bit by bit until you can do 20 to 30 minutes without pain. 4. If you start to have pain, rest your knee until your pain gets back to the level that is normal for you. Or cycle for less time or with less effort. Follow-up care is a key part of your treatment and safety. Be sure to make and go to all appointments, and call your doctor if you are having problems. It's also a good idea to know your test results and keep a list of the medicines you take. Where can you learn more? Go to http://www.[employer].com/  Enter C159 in the search box to learn more about \"Knee Arthritis: Exercises. \"  Current as of: July 1, 2021               Content Version: 13.0  © 4717-8664 [employer]. Care instructions adapted under license by [employer] (which disclaims liability or warranty for this information).  If you have questions about a medical condition or this instruction, always ask your healthcare professional. [address] any warranty or liability for your use of this information.

## 2022-02-21 NOTE — LETTER
NAME: Leti Sosa  : 1940  MRN: [id number]    PROCEDURAL INFORMED CONSENT FOR OPERATION / PROCEDURE     1. I (we),      Leti Sosa      authorize    Eliezer Aschoff, MD       and/or such assistants as may be selected by him/her, to perform the following operation/procedures    LEFT knee corticosteroid injection     Note: If unable to obtain consent prior to an emergent procedure, document the emergent reason in the medical record. This procedure has been explained to my (our) satisfaction and included in the explanation was:   A) the intended benefit, nature, and extent of the procedure to be performed;  B) the significant risks involved and the probability of success;  C) alternative procedures and methods of treatment;  D) the dangers and probable consequences of such alternatives (including no procedure or treatment); E) the expected consequences of the procedure on my future health;  F) whether other qualified individuals would be performing important surgical tasks and / or whether  would be present to advise or support the procedure. I (we) understand that there are other risks of infection and other serious complications in the pre-operative/procedural and postoperative/procedural stages of my (our) care. I (we) have asked all of the questions which I (we) thought were important in deciding whether or not to undergo treatment or diagnosis. These questions have been answered to my (our) satisfaction. I (we) understand that no assurance can be given that the procedure will be a success, and no guarantee or warranty of success has been given to me (us). 2.  It has been explained to me (us) that during the course of the operation/procedure, unforeseen conditions may be revealed that necessitate extension of the original procedure(s) or different procedure(s) than those set forth in Paragraph 1.  I (we) authorize and request that the above-named physician, his/her assistants or his/her designees, perform procedures as necessary and desirable if deemed to be in my (our) best interest.    3.  I acknowledge that other health care personnel may be observing this procedure for the purpose of medical education or other specified purposes as may be necessary as requested and/or approved by my (our) physician. 4.  I (we) consent to the disposal by the hospital Pathologist of the removed tissue, parts or organs in accordance with hospital policy. Page 1 of 2    NAME: Shankar Whipple  : 1940  MRN: [id number]    8. I do_____ do not______ consent to the use of a local infiltration pain blocking agent that will be used by my provider/surgical provider to help alleviate pain during my procedure. 6.  I do_____ do not_____ consent to an emergent blood transfusion in the case of a life-threatening situation that requires blood components to be administered. This consent is valid for 24 hours from the beginning of the procedure. 7.  This patient does _____ or does not ______currently have a DNR status/order. If DNR order is in place, obtain Addendum to the Surgical Consent for ALL Patients with a DNR Order to address joselin-operative status for limited intervention or DNR suspension. 8.  I have read and fully understand the above Consent for Operation/Procedure and that all blanks were completed before I signed the consent.       Shankar Whipple     Signature of Patient (or legal representative)  Printed Name / Relationship                2022 /         AM / PM   Witness to Signature  Printed Name   Date/Time        (If patient is unable to sign or is a minor, complete the following)               Patient is a minor, ____years of age, or unable to sign because: _____________________          ________________________________________________________________________  Ethlyn Tara If a phone consent is obtained, consent will be documented by using two health care professionals, each affirming that the consenting party   has no questions and gives consent for the procedure discussed with the physician/provider. 2/21/2022 /               AM / PM   2nd Witness to phone consent  Printed Name   Date/Time     Informed Consent:  I have provided the explanation described above in section 1 to the patient and/or legal representative. I have provided the patient and/or legal representative with an opportunity to ask any questions about the proposed operation/procedure. Renea Agarwal MD    2/21/2022   /            AM / PM   Provider / Proceduralist  Printed Name  Date/Time     This Provider / Clarke Aguirrer performing the surgery is ONLY for Office-based procedures in [place]   [ x ] Board certified or Board eligible by one of the [address] [place] [address] Rd., [address] of [employer], the [employer] of [facility] of the [employer], the [address] [place] [address] Rd., [address] of Podiatric Medicine, the [address] [place] [address] Rd., [address] of Foot and Ankle Surgery, or other board as approved by the hospital for medical staff appointment.           Page 2 of 2  Revised 8/2/2021

## 2022-02-21 NOTE — PROGRESS NOTES
[facility]  Sports Medicine Consultation Note    PCP: Mechelle Mercer MD  Requesting provider: Mechelle Mercer MD       Mike Shields is a 80 y.o. female (: 1940) presenting to obtain consultative services regarding:  Chief Complaint   Patient presents with    Knee Pain     bilateral       Assessment/Plan:       ICD-10-CM ICD-9-CM   1. Primary osteoarthritis of right knee  M17.11 715.16   2. Primary osteoarthritis of left knee  M17.12 715.16   3. Stage 3 chronic kidney disease, unspecified whether stage 3a or 3b CKD ([facility]  N18.30 585. 3       Discussion:  73yo overweight female with CKD3a presents for evaluation of bilateral knee pain, LEFT occasionally worse than RIGHT, assoc with decreased tolerance to weightbearing activity, occasional swelling, occasional gait instability. Pertinent exam findings: tenderness of medial & lateral joint lines of bilateral knees, RIGHT knee valgus deviation, mild effusion bilaterally, mild RIGHT great troch tenderness    Impression:  · bilateral knee osteoarthritis   With  · Mild great troch bursitis, RIGHT     Plan:  > extensive discussion re: mgmt options for knee OA; Mike Shields wants to avoid surgical intervention as much as possible, concerned about the logistics of recovery (lives in a [facility])  > records from [facility]  > LEFT knee steroid injection today -> immediate relief  > start tylenol 650 2-x/day as needed  > start topical voltaren up to 3x/day as needed   > continue to avoid PO NSAIDs  > start home exercise plan  > weightbearing radiograph(s) @ next visit    Future considerations:  RIGHT knee steroid injection, repeat visco, formal PT if she develops gait instability    Follow-up and Dispositions    · Return in about 4 weeks (around 3/21/2022) for 30min in-person follow-up. Orders Placed This Encounter    XR KNEE LT MIN 4 V     STANDING AP, lateral, notch & sunrise views please.      Standing Status:   Future     Standing Expiration Date:   2023     Order Specific Question:   Which facility to perform procedure? Answer:   [facility]    XR KNEE RT MIN 4 V     STANDING AP, lateral, notch & sunrise views please. Standing Status:   Future     Standing Expiration Date:   2023     Order Specific Question:   Which facility to perform procedure? Answer:   [facility]  - DRAIN/INJECT LARGE JOINT/BURSA    TRIAMCINOLONE ACETONIDE INJ     Order Specific Question:   Charge Quantity? Answer:   4    triamcinolone acetonide (Kenalog) 40 mg/mL injection     Si mL by Intra artICUlar route once for 1 dose. Dispense:  1 mL     Refill:  0    diclofenac (VOLTAREN) 1 % gel     Sig: Apply 4 g to affected area three (3) times daily as needed for Pain. To both knees     Dispense:  300 g     Refill:  1    acetaminophen (TYLENOL) 650 mg TbER     Sig: Take 1 Tablet by mouth every eight to twelve (8-12) hours as needed for Pain. Dispense:  270 Tablet     Refill:  1         Management plan & patient instructions discussed with Jodell Crigler, who voiced understanding. Thank you for the opportunity to participate in the care of this patient. If any questions or concerns at all, please feel free to contact me. This document may have been created with the aid of dictation software. Text may contain errors, particularly phonetic errors. Yancy Hernandez MD  Internal Medicine, Family Medicine & Sports Medicine  2022      Subjective   History:     I was asked to provide consultative services by Carson Millard MD for advice/opinion related to evaluating    Chief Complaint   Patient presents with    Knee Pain     bilateral       Jodell Crigler is a 80 y.o. female with relevant PMH of CKD3a who presents with   Bilateral knee pain, sometimes LEFT > RIGHT. Denies any precipitating incident or trauma. Neurologic symptoms: No numbness, tingling, weakness, bowel or bladder changes.   No recent falls Occasional SPC use on the RIGHT  Recent painful episode - difficulty getting out of bed, swollen and warm  Occasionally radiates down to ankles and dorsum of foot on the LEFT   No numbness/tingling   Does not radiate proximally  + instability on occasion  Worse going up the steps  + swelling with inc weightbearing activity, occasionally associated with mild warmth  No redness      Prior Treatments for this complaint:    RIGHT knee steroid injection (Dr. Justyna Whitt - June 2017)   rejuvinix at least 2 years ago      Previous Medications for this complaint:    Topical arthritis   2 old goats   Heat on occasion   Braces - tight   Tylenol PM to go to bed    - 1x/week      Current Medications for this complaint:   Gabapentin - only tried 2 pills, caused significant somnolence      Previous work-up for this complaint has included:    Dr. Richardine Severs (Oct 2019): recommended LEFT TKR    RIGHT knee [facility]ograph([facility] @ [facility] (Feb 2019)          Pain Assessment  2/21/2022   Location of Pain Knee   Location Modifiers Left;Right   Severity of Pain 7   Quality of Pain Aching   Duration of Pain Persistent   Frequency of Pain Constant   Date Pain First Started (No Data)   Date Pain First Started Comment approx 5+ years   Aggravating Factors Standing;Stairs   Limiting Behavior Some   Relieving Factors Other (Comment)   Relieving Factors Comment Rejuvinex  for injections   Result of Injury No   Type of Injury Other (Comment)           Past Medical History:   Diagnosis Date    Arthritis     Cataract     Hypercholesterolemia     Hypertension      Past Surgical History:   Procedure Laterality Date    HX APPENDECTOMY      HX CATARACT REMOVAL      HX COLONOSCOPY  12/2009    Dr. Mary Avelar, 10 yr follow up    HX TOTAL ABDOMINAL HYSTERECTOMY        reports that she has never smoked. She has never used smokeless tobacco. She reports that she does not drink alcohol and does not use drugs.   Family History   Problem Relation Age of Onset    Diabetes Mother     Hypertension Mother     Cancer Sister 48        breast    Cancer Maternal Aunt         breast    Stroke Sister      No Known Allergies    Problem List:      Patient Active Problem List    Diagnosis    Hypertension    Abnormal mammogram     L breast-> referred to Dr. Fred Matthews Prediabetes    CKD (chronic kidney disease)     Dr. Karlo Camara Complete rotator cuff tear of left shoulder     -- L shoulder injection by Dr. Lili Thapa on 3/4/2015    -- L shoulder MRI (2/13/2015):  1. Complete full thickness tear of supraspinatus & infraspinatus tendons with underlying tendinosis, about 3cm retraction & moderate muscular atrophy. Subscapularis tendinosis with no focal tear. 2. Posterior labral tear not excluded. Subchondral cystic change in the glenoid fossa. 3. Severe AC joint hypertrophy and inflammation with type 2 acromion and mild acromial anterior downsloping. -- L shoulder XR (10/31/2014): mild DJD of the superior GH joint. Minimal spurring at the superior [facility] joint without associated joint space narrowing.    - s/p subacromial injection (10/31/2014)      Osteopenia     12/2018: osteopenia, -1.2  - DEXA (8/16/2016): still osteopenia, but BMD has decreased overall    - DEXA (6/13/2014): T-scores -1.5 @ L1-L4; -1.0 @ distal 1/3 radius; 0.0 @ L prox fem; -0.2 @ R prox fem; -0.6 @ L fem neck; -0.3 @ R fem neck      Osteoarthritis of right knee     - Jan 2015: referred to ortho surgery to consider joint replacement  - Dec 2014: steroid injection, increase mobic to 15mg daily    - B knee XR (6/13/2014): mild tricompartmental arthritis in the L knee; moderate tricompartmental arthritis in the R knee with moderate knee effusion      Vitamin D deficiency     - VitD 23.6 (3/30/2014)      Breast pain     *3/11/2014: to consider steroid injection for keloid or lidoderm patches      DDD (degenerative disc disease), lumbar     -- L-spine XR (11/18/2013):   Mild L4-5 disc space narrowing. There is no fracture. There is 3 mm anterior listhesis of L4 on L5. No change with extension. This increases to approximately 4 mm during flexion.  Hyperlipidemia       Medications:     Current Outpatient Medications on File Prior to Visit   Medication Sig Dispense Refill    hydroCHLOROthiazide (HYDRODIURIL) 25 mg tablet TAKE 1 TABLET EVERY DAY 90 Tablet 1    gabapentin (NEURONTIN) 100 mg capsule Take 1 Capsule by mouth three (3) times daily for 30 days. Max Daily Amount: 300 mg. 90 Capsule 0    Arm Brace (Wrist Support Large-XLarge) misc Wrist support for carpal tunnel  syndrome 2 Each 0    furosemide (LASIX) 20 mg tablet TAKE 1 TO 2 TABLETS EVERY DAY AS NEEDED FOR SWELLING 90 Tablet 0    potassium chloride (K-DUR, KLOR-CON) 20 mEq tablet TAKE 1 TABLET TWICE DAILY 180 Tab 0    metoprolol succinate (TOPROL-XL) 100 mg tablet TAKE 1 TABLET EVERY DAY 90 Tab 1    atorvastatin (LIPITOR) 10 mg tablet TAKE 1 TABLET EVERY DAY 90 Tab 1     mg tablet TAKE 1 TABLET EVERY 8 HOURS AS NEEDED FOR PAIN 90 Tab 1    glucosam/brooke-msm1/C/luciano/bosw (OSTEO BI-FLEX TRIPLE STRENGTH PO) Take  by mouth daily. With Vit D3       calcium carbonate (CALTREX) 600 mg calcium (1,500 mg) tablet Take 600 mg by mouth two (2) times a day.  folic acid/multivit-min/lutein (CENTRUM SILVER PO) Take  by mouth.  cholecalciferol (VITAMIN D3) 1,000 unit cap Take  by mouth daily.  acetaminophen-codeine (Tylenol-Codeine #3) 300-30 mg per tablet Take 1 Tablet by mouth daily as needed for Pain for up to 30 days. 30 Tablet 0     No current facility-administered medications on file prior to visit. Objective   Physical Assessment:   VS:    Vitals:    02/21/22 0915   PainSc:   8       Physical Exam  Nursing note reviewed. Constitutional:       General: She is not in acute distress. Appearance: Normal appearance. She is well-developed and overweight. She is not diaphoretic.    HENT:      Head: Normocephalic and atraumatic. Mouth/Throat:      Comments: Mask in place  Eyes:      Conjunctiva/sclera: Conjunctivae normal.   Musculoskeletal:      Cervical back: Neck supple. Lumbar back: Spasms (inc tone R paraspinal & R glute) and bony tenderness (mild midline) present. Negative right straight leg raise test and negative left straight leg raise test.      Right hip: Bony tenderness (great troch) present. Normal range of motion. Left hip: No bony tenderness. Normal range of motion. Right knee: Deformity (valgus) and effusion present. Normal range of motion. Tenderness present over the medial joint line and lateral joint line. Abnormal alignment (valgus). Left knee: Effusion present. No deformity. Normal range of motion. Tenderness present over the medial joint line and lateral joint line. Normal alignment. Skin:     General: Skin is warm and dry. Findings: No rash. Neurological:      Mental Status: She is alert and oriented to person, place, and time. Gait: Gait abnormal (mildly antalgic). Comments: Good sit-stand time and stability   Psychiatric:         Behavior: Behavior normal. Behavior is cooperative. Thought Content: Thought content normal.        Recent Labs & Imaging:     XR Results (maximum last 3): Results from Appointment encounter on 02/13/19    XR ANKLE RT MIN 3 V    Narrative  EXAM: Right ankle 3 views    INDICATION: Right ankle pain and swelling    COMPARISON: None.    _______________    FINDINGS:    AP, oblique, lateral views performed. Osseous alignment is as expected on the  provided projections. Ankle mortise is symmetric and intact in appearance. No  fracture demonstrated. Small plantar calcaneal spur with minor Achilles  insertional enthesopathy. Relatively diffuse soft tissue swelling of the imaged  extremity is present.  No retained opaque foreign object.    _______________    Impression  IMPRESSION:    Relatively diffuse right leg/ankle soft tissue swelling without acute osseous  abnormality or malalignment present. XR KNEE RT MIN 4 V    Narrative  EXAM: Right knee 4 views    INDICATION: Right knee pain and osteoarthritis    COMPARISON: None.    _______________    FINDINGS:    AP, PA, lateral, and patellar tangential projections of the right knee  performed. Osseous alignment is as expected on the provided projections. Tricompartmental right knee joint osteoarthritis is present which is dominant  across the lateral tibiofemoral compartment. No fracture. A small knee joint  effusion is present. No retained radiopaque foreign object.    _______________    Impression  IMPRESSION:    Lateral compartment predominant moderately severe tricompartmental right knee  joint osteoarthritis and small joint effusion. Results from Appointment encounter on 10/20/16    XR SPINE LUMB 2 OR 3 V    Narrative  History: Chronic right-sided low back pain without sciatica  Comparsion: None  Limited three view evaluation of the lumbar spine demonstrates grade 1  spondylolisthesis of L4 on L5. There is also grade 1 spondylolisthesis of L3 on  L4. There are 5 nonrib-bearing lumbar vertebral bodies. There are degenerative  changes with bony hypertrophy in the facet joints L5-S1 bilaterally and L4-5 on  the right and possibly L3-4 on the right. There is no acute fracture. Osseous  density normal.  Soft tissue structures are normal.  The bowel gas pattern is  unremarkable. Impression  Impression:      1. Grade 1 spondylolisthesis L3-4 and L4-5.  2. No acute fracture. 3. Degenerative changes in the facet joints lower lumbar spine particularly on  the right. Review of Previous Medical Records:         Procedure Note:    LEFT  Knee Corticosteroid Injection    Written informed consent signed after discussing the risks & benefits as well as alternative treatments with Aleyda Fox.  The risks associated with corticosteroid injection include but are not limited to bleeding, infection, synovitis (flare reaction), tendon rupture, skin color changes, and fat atrophy.     --------------------------Timeout Performed Prior To Procedure --------------------------    Chart reviewed for the following:  Lit Pickett MD, have reviewed the History, Physical and updated the Allergic reactions for [address] [place] [address] performed immediately prior to start of procedure:  Lit Pickett MD, have performed the following reviews on [address] [place] [address] prior to the start of the procedure:            * Patient was identified by name and date of birth   * Agreement on procedure being performed was verified  * Risks and Benefits explained to the patient  * Procedure site verified and marked as necessary  * Patient was positioned for comfort  * Consent was signed and verified     Time: 10:05 AM     Date of procedure: 2/21/2022    Procedure performed by:  Usman Paula MD    How tolerated by patient: tolerated the procedure well with no complications     ----------------------------------------------------------------------------------------------------------      Location: LEFT  knee  Skin Prep:  Betadine x 3  Anesthesia: ethyl chloride  Needle used: 22g  Approach: lateral joint line  Position: seated with knee @ 90deg  Medications: 1ml (40mg) of Kenalog 40mg/ml, 1.5ml of 1% lidocaine, and 1.5ml of 0.5% Marcaine  O ml of fluid aspirated. Outcome: Patient tolerated the procedure well with no immediate complications noted. The area was cleaned & dressed. Post-Procedure Pain Level: 4 / LEFT knee    Instructions: Patient instructed to rest the joint and apply ice as needed for 15 minutes every 2-3 hours for the next 24 hours, and to limit activity involving the area treated for the next 48 hours. Also instructed to call if there is increased pain, swelling or if a fever develops.

## 2022-02-22 LAB — PATH REVIEW OF SMEAR, 12050: NORMAL

## 2022-02-22 NOTE — PROGRESS NOTES
Informed pt of lab results and tx plan; pt scheduled appt to discuss on: Future Appointments  2/28/2022  9:15 AM    Yesi Galeano MD      [facility]                [facility]  3/21/2022  9:30 AM    Baljit Chavez MD      W                 [facility]  3/21/2022  10:00 AM   [facility]             [facility]                [facility]

## 2022-02-26 ENCOUNTER — TELEPHONE (OUTPATIENT)
Dept: FAMILY MEDICINE CLINIC | Age: 82
End: 2022-02-26

## 2022-02-26 NOTE — TELEPHONE ENCOUNTER
BMD measures consistent with osteopenia. This is a stage before osteoporosis, patient need to increase physical activity weightbearing exercise, walking and cardiovascular exercises as tolerated, to be adherent to vitamin D supplements, calcium rich diet, and to take calcium supplements, t test to be repeated in 2 years   He values has worsen compared to last time ,recommend start on Flomax once weekly or boniva once a month ? ?

## 2022-02-28 ENCOUNTER — APPOINTMENT (OUTPATIENT)
Dept: FAMILY MEDICINE CLINIC | Age: 82
End: 2022-02-28

## 2022-02-28 ENCOUNTER — OFFICE VISIT (OUTPATIENT)
Dept: FAMILY MEDICINE CLINIC | Age: 82
End: 2022-02-28
Payer: MEDICARE

## 2022-02-28 VITALS
OXYGEN SATURATION: 98 % | RESPIRATION RATE: 14 BRPM | HEIGHT: 67 IN | DIASTOLIC BLOOD PRESSURE: 80 MMHG | BODY MASS INDEX: 30.39 KG/M2 | SYSTOLIC BLOOD PRESSURE: 130 MMHG | TEMPERATURE: 97 F | HEART RATE: 70 BPM | WEIGHT: 193.6 LBS

## 2022-02-28 DIAGNOSIS — M85.80 OSTEOPENIA, UNSPECIFIED LOCATION: ICD-10-CM

## 2022-02-28 DIAGNOSIS — E53.8 VITAMIN B12 DEFICIENCY: ICD-10-CM

## 2022-02-28 DIAGNOSIS — R73.03 PREDIABETES: ICD-10-CM

## 2022-02-28 DIAGNOSIS — I10 ESSENTIAL HYPERTENSION: ICD-10-CM

## 2022-02-28 DIAGNOSIS — E55.9 VITAMIN D DEFICIENCY: Primary | ICD-10-CM

## 2022-02-28 DIAGNOSIS — D64.9 LOW HEMOGLOBIN: ICD-10-CM

## 2022-02-28 DIAGNOSIS — R79.89 ABNORMAL CBC: ICD-10-CM

## 2022-02-28 PROCEDURE — G8536 NO DOC ELDER MAL SCRN: HCPCS | Performed by: LEGAL MEDICINE

## 2022-02-28 PROCEDURE — G8399 PT W/DXA RESULTS DOCUMENT: HCPCS | Performed by: LEGAL MEDICINE

## 2022-02-28 PROCEDURE — 99214 OFFICE O/P EST MOD 30 MIN: CPT | Performed by: LEGAL MEDICINE

## 2022-02-28 PROCEDURE — 1090F PRES/ABSN URINE INCON ASSESS: CPT | Performed by: LEGAL MEDICINE

## 2022-02-28 PROCEDURE — G8754 DIAS BP LESS 90: HCPCS | Performed by: LEGAL MEDICINE

## 2022-02-28 PROCEDURE — G8417 CALC BMI ABV UP PARAM F/U: HCPCS | Performed by: LEGAL MEDICINE

## 2022-02-28 PROCEDURE — G8510 SCR DEP NEG, NO PLAN REQD: HCPCS | Performed by: LEGAL MEDICINE

## 2022-02-28 PROCEDURE — G8427 DOCREV CUR MEDS BY ELIG CLIN: HCPCS | Performed by: LEGAL MEDICINE

## 2022-02-28 PROCEDURE — G8752 SYS BP LESS 140: HCPCS | Performed by: LEGAL MEDICINE

## 2022-02-28 PROCEDURE — 1101F PT FALLS ASSESS-DOCD LE1/YR: CPT | Performed by: LEGAL MEDICINE

## 2022-02-28 NOTE — PROGRESS NOTES
Torie Washington     Chief Complaint   Patient presents with    Results     discuss  lab results     Vitals:    02/28/22 0910   BP: 130/80   Pulse: 70   Resp: 14   Temp: 97 °F (36.1 °C)   TempSrc: Temporal   SpO2: 98%   Weight: 193 lb 9.6 oz (87.8 kg)   Height: 5' 7\" (1.702 m)   PainSc:   0 - No pain         HPI: Mrs.brooks Krupa Dowd has no acute complaints today. She is following up on lab results  Has decreased albumin level also decreased white blood cell count  She is asymptomatic  No bleeding no melena no hematuria no vaginal bleeding. Also has increased hemoglobin A1c in prediabetes range she is consuming candies chocolate sugary drinks on daily basis    Past Medical History:   Diagnosis Date    Arthritis     Cataract     Hypercholesterolemia     Hypertension      Past Surgical History:   Procedure Laterality Date    HX APPENDECTOMY      HX CATARACT REMOVAL      HX COLONOSCOPY  12/2009    Dr. Latrice Padilla, 10 yr follow up    HX TOTAL ABDOMINAL HYSTERECTOMY       Social History     Tobacco Use    Smoking status: Never Smoker    Smokeless tobacco: Never Used   Substance Use Topics    Alcohol use: No       Family History   Problem Relation Age of Onset    Diabetes Mother     Hypertension Mother     Cancer Sister 48        breast    Cancer Maternal Aunt         breast    Stroke Sister        Review of Systems   Constitutional: Negative for chills, fever, malaise/fatigue and weight loss. HENT: Negative for congestion, ear discharge, ear pain, hearing loss, nosebleeds, sinus pain and sore throat. Eyes: Negative for blurred vision, double vision and discharge. Respiratory: Negative for cough. Cardiovascular: Negative for chest pain, palpitations, claudication and leg swelling. Gastrointestinal: Negative for abdominal pain, constipation, diarrhea, nausea and vomiting. Genitourinary: Negative for dysuria, frequency and urgency. Musculoskeletal: Negative for myalgias.    Skin: Negative for itching and rash. Neurological: Negative for dizziness, tingling, sensory change, speech change, focal weakness, weakness and headaches. Psychiatric/Behavioral: Negative for depression, hallucinations, substance abuse and suicidal ideas. Physical Exam  Vitals and nursing note reviewed. Constitutional:       General: She is not in acute distress. Appearance: She is well-developed. She is not diaphoretic. HENT:      Head: Normocephalic and atraumatic. Eyes:      General: No scleral icterus. Neck:      Thyroid: No thyromegaly. Cardiovascular:      Rate and Rhythm: Normal rate and regular rhythm. Heart sounds: Normal heart sounds. Pulmonary:      Effort: Pulmonary effort is normal. No respiratory distress. Breath sounds: Normal breath sounds. No wheezing or rales. Chest:      Chest wall: No tenderness. Abdominal:      General: There is no distension. Palpations: Abdomen is soft. Tenderness: There is no abdominal tenderness. There is no rebound. Musculoskeletal:         General: No tenderness or deformity. Normal range of motion. Right lower leg: No edema. Left lower leg: No edema. Lymphadenopathy:      Cervical: No cervical adenopathy. Skin:     General: Skin is warm and dry. Coloration: Skin is not pale. Findings: No erythema or rash. Neurological:      Mental Status: She is alert and oriented to person, place, and time. Cranial Nerves: No cranial nerve deficit. Coordination: Coordination normal.   Psychiatric:         Behavior: Behavior normal.         Thought Content: Thought content normal.         Judgment: Judgment normal.          Assessment and plan     Plan of care has been discussed with the patient, he agrees to the plan and verbalized understanding. All his questions were answered  More than 50% of the time spent in this visit was counseling the patient about  illness and treatment options         1.  Vitamin D deficiency  She is taking vitamin D 1000 mg daily  - VITAMIN D, 25 HYDROXY; Future    2. Prediabetes  Patient need to be adherent with lifestyle modification discussed during the visit  She needs to cut back on sugary drinks and candies  3. Essential hypertension      4. Osteopenia, unspecified location  Increase calcium intake supplements and natural continue vitamin D and increase physical activity    5. Abnormal CBC    - IRON PROFILE; Future  - VITAMIN B12 & FOLATE; Future    6. Vitamin B12 deficiency    - VITAMIN B12 & FOLATE; Future    7. Low hemoglobin    - IRON PROFILE; Future    Current Outpatient Medications   Medication Sig Dispense Refill    diclofenac (VOLTAREN) 1 % gel Apply 4 g to affected area three (3) times daily as needed for Pain. To both knees 300 g 1    acetaminophen (TYLENOL) 650 mg TbER Take 1 Tablet by mouth every eight to twelve (8-12) hours as needed for Pain. 270 Tablet 1    hydroCHLOROthiazide (HYDRODIURIL) 25 mg tablet TAKE 1 TABLET EVERY DAY 90 Tablet 1    gabapentin (NEURONTIN) 100 mg capsule Take 1 Capsule by mouth three (3) times daily for 30 days. Max Daily Amount: 300 mg. 90 Capsule 0    furosemide (LASIX) 20 mg tablet TAKE 1 TO 2 TABLETS EVERY DAY AS NEEDED FOR SWELLING 90 Tablet 0    potassium chloride (K-DUR, KLOR-CON) 20 mEq tablet TAKE 1 TABLET TWICE DAILY 180 Tab 0    metoprolol succinate (TOPROL-XL) 100 mg tablet TAKE 1 TABLET EVERY DAY 90 Tab 1    atorvastatin (LIPITOR) 10 mg tablet TAKE 1 TABLET EVERY DAY 90 Tab 1     mg tablet TAKE 1 TABLET EVERY 8 HOURS AS NEEDED FOR PAIN 90 Tab 1    glucosam/brooke-msm1/C/luciano/bosw (OSTEO BI-FLEX TRIPLE STRENGTH PO) Take  by mouth daily. With Vit D3       calcium carbonate (CALTREX) 600 mg calcium (1,500 mg) tablet Take 600 mg by mouth two (2) times a day.  folic acid/multivit-min/lutein (CENTRUM SILVER PO) Take  by mouth.  cholecalciferol (VITAMIN D3) 1,000 unit cap Take  by mouth daily.       Arm Brace (Wrist Support Large-XLarge) misc Wrist support for carpal tunnel  syndrome (Patient not taking: Reported on 2/28/2022) 2 Each 0       Patient Active Problem List    Diagnosis Date Noted    Primary osteoarthritis of left knee 02/21/2022    Abnormal mammogram 12/10/2019    Prediabetes 05/23/2017    CKD (chronic kidney disease) 02/29/2016    Complete rotator cuff tear of left shoulder 10/31/2014    Osteopenia 06/24/2014    Osteoarthritis of right knee 05/31/2014    Vitamin D deficiency 03/30/2014    Breast pain 03/11/2014    DDD (degenerative disc disease), lumbar 03/11/2014    Hypertension 03/11/2014    Hyperlipidemia 03/11/2014     Results for orders placed or performed in visit on 02/17/22   HEMOGLOBIN A1C W/O EAG   Result Value Ref Range    Hemoglobin A1c 5.9 (H) 4.8 - 5.6 %    AVG  91 - 123 mg/dL   CBC WITH AUTOMATED DIFF   Result Value Ref Range    WBC 2.3 (L) 4.0 - 11.0 K/uL    RBC 2.75 (L) 3.80 - 5.20 M/uL    HGB 9.2 (L) 11.7 - 16.1 g/dL    HCT 28.3 (L) 35.1 - 48.3 %     (H) 80 - 99 fL    MCH 34 26 - 34 pg    MCHC 33 31 - 36 g/dL    RDW 13.2 10.0 - 15.5 %    PLATELET 689 822 - 293 K/uL    MPV 10.5 9.0 - 13.0 fL   LIPID PANEL   Result Value Ref Range    Triglyceride 138 40 - 149 mg/dL    HDL Cholesterol 43 >=40 mg/dL    Cholesterol, total 161 110 - 200 mg/dL    CHOLESTEROL/HDL 3.7 0.0 - 5.0    Non-HDL Cholesterol 118 <130 mg/dL    LDL, calculated 90 50 - 99 mg/dL    VLDL, calculated 28 8 - 30 mg/dL    LDL/HDL Ratio 2.1    HEPATITIS C AB   Result Value Ref Range    Hep C Virus Ab None Detected None Detec   METABOLIC PANEL, COMPREHENSIVE   Result Value Ref Range    Glucose 108 (H) 70 - 99 mg/dL    BUN 11 6 - 22 mg/dL    Creatinine 0.8 0.8 - 1.4 mg/dL    Sodium 140 133 - 145 mmol/L    Potassium 3.7 3.5 - 5.5 mmol/L    Chloride 101 98 - 110 mmol/L    CO2 29 20 - 32 mmol/L    AST (SGOT) 16 10 - 37 U/L    ALT (SGPT) 11 5 - 40 U/L    Alk.  phosphatase 69 40 - 120 U/L    Bilirubin, total 0.4 0.2 - 1.2 mg/dL    Calcium 9.2 8.4 - 10.5 mg/dL    Protein, total 8.6 (H) 6.2 - 8.1 g/dL    Albumin 4.0 3.5 - 5.0 g/dL    A-G Ratio 0.9 (L) 1.1 - 2.6 ratio    Globulin 4.6 (H) 2.0 - 4.0 g/dL    Anion gap 10.0 3.0 - 15.0 mmol/L    GFRAA >60.0 >60.0    GFRNA >60.0 >60.0   DIFFERENTIAL, MANUAL   Result Value Ref Range    Total Cells Counted 100     Macrocytosis 1+ (A) (none)    Neutrophils%-DIF 39 (L) 40 - 75 %    Lymphocytes%-DIF 55 (H) 20 - 45 %    Monocytes%-DIF 5 3 - 12 %    Basophils%-DIF 1 0 - 2 %    Neutrophils abs 0.9 (L) 1.8 - 7.7 K/uL    Lymphs abs-DIF 1.3 1.0 - 4.8 K/uL    Monocytes abs-DIF 0.1 0.1 - 1.0 K/uL    Basophils abs-DIF 0.0 0.0 - 0.2 K/uL    NORMACHROMIC RBC Normochromic     SMEAR EVAL Platelet count was verified by smear review. PATHOLOGIST REVIEW   Result Value Ref Range    PATH REVIEW OF SMEAR See Comment      Orders Only on 02/17/2022   Component Date Value Ref Range Status    Hemoglobin A1c 02/17/2022 5.9* 4.8 - 5.6 % Final    AVG GLU 02/17/2022 123  91 - 123 mg/dL Final    Comment: 5.7 - 6.4% is indicative of prediabetes. > 6.4% is indicative of diabetes.           WBC 02/17/2022 2.3* 4.0 - 11.0 K/uL Final    RBC 02/17/2022 2.75* 3.80 - 5.20 M/uL Final    HGB 02/17/2022 9.2* 11.7 - 16.1 g/dL Final    HCT 02/17/2022 28.3* 35.1 - 48.3 % Final    MCV 02/17/2022 103* 80 - 99 fL Final    MCH 02/17/2022 34  26 - 34 pg Final    MCHC 02/17/2022 33  31 - 36 g/dL Final    RDW 02/17/2022 13.2  10.0 - 15.5 % Final    PLATELET 71/89/8941 874  140 - 440 K/uL Final    MPV 02/17/2022 10.5  9.0 - 13.0 fL Final    Triglyceride 02/17/2022 138  40 - 149 mg/dL Final    HDL Cholesterol 02/17/2022 43  >=40 mg/dL Final    Cholesterol, total 02/17/2022 161  110 - 200 mg/dL Final    CHOLESTEROL/HDL 02/17/2022 3.7  0.0 - 5.0 Final    Non-HDL Cholesterol 02/17/2022 118  <130 mg/dL Final    LDL, calculated 02/17/2022 90  50 - 99 mg/dL Final    VLDL, calculated 02/17/2022 28  8 - 30 mg/dL Final    LDL/HDL Ratio 02/17/2022 2.1   Final    Comment: Test includes cholesterol, HDL cholesterol, triglycerides and LDL. Cholesterol Recommended NCEP guidelines in mg/dL:    Less than 200            Desirable  200 - 239                Borderline High  Greater than or  = 240   High      Please Note:  Total Chol/HDL Ratio                     Men     Women  1/2 Avg. Risk    3.4     3.3      Avg. Risk    5.0     4.4  2X  Avg. Risk    9.6     7.1  3X  Avg. Risk   23.4    11.0            Hep C Virus Ab 02/17/2022 None Detected  None Detec Final    Glucose 02/17/2022 108* 70 - 99 mg/dL Final    BUN 02/17/2022 11  6 - 22 mg/dL Final    Creatinine 02/17/2022 0.8  0.8 - 1.4 mg/dL Final    Sodium 02/17/2022 140  133 - 145 mmol/L Final    Potassium 02/17/2022 3.7  3.5 - 5.5 mmol/L Final    Chloride 02/17/2022 101  98 - 110 mmol/L Final    CO2 02/17/2022 29  20 - 32 mmol/L Final    AST (SGOT) 02/17/2022 16  10 - 37 U/L Final    ALT (SGPT) 02/17/2022 11  5 - 40 U/L Final    Alk. phosphatase 02/17/2022 69  40 - 120 U/L Final    Bilirubin, total 02/17/2022 0.4  0.2 - 1.2 mg/dL Final    Calcium 02/17/2022 9.2  8.4 - 10.5 mg/dL Final    Protein, total 02/17/2022 8.6* 6.2 - 8.1 g/dL Final    Albumin 02/17/2022 4.0  3.5 - 5.0 g/dL Final    A-G Ratio 02/17/2022 0.9* 1.1 - 2.6 ratio Final    Globulin 02/17/2022 4.6* 2.0 - 4.0 g/dL Final    Anion gap 02/17/2022 10.0  3.0 - 15.0 mmol/L Final    Comment: Anion Gap calculation based on electrolyte reference ranges. Test includes Albumin, Alkaline Phosphatase, ALT, AST, BUN, Calcium, CO2,  Chloride, Creatinine, Glucose, Potassium, Sodium, Total Bilirubin and Total  Protein. Estimated GFR results are reported in mL/min/1.73 sq.m. by the MDRD equation. This eGFR is validated for stable chronic renal failure patients. This   equation  is unreliable in acute illness or patients with normal renal function.               GFRAA 02/17/2022 >60.0  >60.0 Final    GFRNA 02/17/2022 >60.0 >60.0 Final    Total Cells Counted 02/17/2022 100   Final    Macrocytosis 02/17/2022 1+* (none) Final    Neutrophils%-DIF 02/17/2022 39* 40 - 75 % Final    Lymphocytes%-DIF 02/17/2022 55* 20 - 45 % Final    Monocytes%-DIF 02/17/2022 5  3 - 12 % Final    Basophils%-DIF 02/17/2022 1  0 - 2 % Final    Neutrophils abs 02/17/2022 0.9* 1.8 - 7.7 K/uL Final    Lymphs abs-DIF 02/17/2022 1.3  1.0 - 4.8 K/uL Final    Monocytes abs-DIF 02/17/2022 0.1  0.1 - 1.0 K/uL Final    Basophils abs-DIF 02/17/2022 0.0  0.0 - 0.2 K/uL Final    NORMACHROMIC RBC 02/17/2022 Normochromic   Final    SMEAR EVAL 02/17/2022 Platelet count was verified by smear review. Final    PATH REVIEW OF SMEAR 02/17/2022 See Comment   Final    Comment: Persistent moderate leukopenia with persistent absolute neutropenia, but  without myelodysplasia-related change or leukocytic immaturity, possibly due   to  drug effect or auto-immune granulocytopenia. Electronic Signature: CELIA Martinez MD  CPT: 11384O1              Follow-up and Dispositions    · Return in about 3 months (around 5/28/2022).

## 2022-02-28 NOTE — TELEPHONE ENCOUNTER
Informed pt of bone density results; pt is currently taking vitamin D and calcium supplements; pt agrees to taking the Fosamax weekly, please send Rx to mail order pharmacy per pt request.

## 2022-02-28 NOTE — PROGRESS NOTES
Rylee Boateng is a 80 y.o. female (: 1940) presenting to address:    Chief Complaint   Patient presents with    Results     discuss  lab results       Vitals:    22 0910   BP: 130/80   Pulse: 70   Resp: 14   Temp: 97 °F (36.1 °C)   TempSrc: Temporal   SpO2: 98%   Weight: 193 lb 9.6 oz (87.8 kg)   Height: 5' 7\" (1.702 m)   PainSc:   0 - No pain       Hearing/Vision:   No exam data present    Learning Assessment:     Learning Assessment 2/3/2015   PRIMARY LEARNER Patient   HIGHEST LEVEL OF EDUCATION - PRIMARY LEARNER  GRADUATED HIGH SCHOOL OR GED   BARRIERS PRIMARY LEARNER NONE   CO-LEARNER CAREGIVER No   PRIMARY LANGUAGE ENGLISH   LEARNER PREFERENCE PRIMARY READING   ANSWERED BY self   RELATIONSHIP SELF     Depression Screening:     3 most recent PHQ Screens 2022   Little interest or pleasure in doing things Not at all   Feeling down, depressed, irritable, or hopeless Not at all   Total Score PHQ 2 0     Fall Risk Assessment:     Fall Risk Assessment, last 12 mths 2022   Able to walk? Yes   Fall in past 12 months? 0   Do you feel unsteady? 0   Are you worried about falling 0   Is the gait abnormal? -   Number of falls in past 12 months -   Fall with injury? -     Abuse Screening:     Abuse Screening Questionnaire 2022   Do you ever feel afraid of your partner? N   Are you in a relationship with someone who physically or mentally threatens you? N   Is it safe for you to go home?  Y     ADL Assessment:     ADL Assessment 2021   Feeding yourself No Help Needed   Getting from bed to chair No Help Needed   Getting dressed No Help Needed   Bathing or showering No Help Needed   Walk across the room (includes cane/walker) No Help Needed   Using the telphone No Help Needed   Taking your medications No Help Needed   Preparing meals No Help Needed   Managing money (expenses/bills) No Help Needed   Moderately strenuous housework (laundry) No Help Needed   Shopping for personal items (toiletries/medicines) No Help Needed   Shopping for groceries No Help Needed   Driving No Help Needed   Climbing a flight of stairs No Help Needed   Getting to places beyond walking distances No Help Needed        Coordination of Care Questionaire:   1. \"Have you been to the ER, urgent care clinic since your last visit? Hospitalized since your last visit? \" No    2. \"Have you seen or consulted any other health care providers outside of the [address] [place] [address] Michele since your last visit? \" Dr. Odette Bacon for sports med     3. For patients aged 39-70: Has the patient had a colonoscopy / FIT/ Cologuard? NO    If the patient is female:    4. For patients aged 41-77: Has the patient had a mammogram within the past 2 years? Yes - no Care Gap present  See top three    5. For patients aged 21-65: Has the patient had a pap smear? NA - based on age or sex    Advanced Directive:   1. Do you have an Advanced Directive? YES    2. Would you like information on Advanced Directives?  NO

## 2022-03-01 LAB
25(OH)D3+25(OH)D2 SERPL-MCNC: 34.1 NG/ML (ref 30–100)
FOLATE SERPL-MCNC: 17.5 NG/ML
IRON SATN MFR SERPL: 20 % (ref 15–55)
IRON SERPL-MCNC: 51 UG/DL (ref 27–139)
TIBC SERPL-MCNC: 255 UG/DL (ref 250–450)
UIBC SERPL-MCNC: 204 UG/DL (ref 118–369)
VIT B12 SERPL-MCNC: 941 PG/ML (ref 232–1245)

## 2022-03-01 NOTE — PROGRESS NOTES
Iron profile is within normal range  B12 is within normal range    Vitamin D within normal range but is at the lower range of normal she can increase her supplement to 2000 units daily    As far as the hemoglobin will repeat CBC in 1 month

## 2022-03-09 DIAGNOSIS — Z78.0 MENOPAUSE: ICD-10-CM

## 2022-03-19 PROBLEM — M17.12 PRIMARY OSTEOARTHRITIS OF LEFT KNEE: Status: ACTIVE | Noted: 2022-02-21

## 2022-03-19 PROBLEM — R73.03 PREDIABETES: Status: ACTIVE | Noted: 2017-05-23

## 2022-03-20 PROBLEM — R92.8 ABNORMAL MAMMOGRAM: Status: ACTIVE | Noted: 2019-12-10

## 2022-03-21 ENCOUNTER — OFFICE VISIT (OUTPATIENT)
Dept: SPORTS MEDICINE | Age: 82
End: 2022-03-21
Payer: MEDICARE

## 2022-03-21 DIAGNOSIS — N18.30 STAGE 3 CHRONIC KIDNEY DISEASE, UNSPECIFIED WHETHER STAGE 3A OR 3B CKD (HCC): ICD-10-CM

## 2022-03-21 DIAGNOSIS — M17.12 PRIMARY OSTEOARTHRITIS OF LEFT KNEE: ICD-10-CM

## 2022-03-21 DIAGNOSIS — M17.11 PRIMARY OSTEOARTHRITIS OF RIGHT KNEE: Primary | ICD-10-CM

## 2022-03-21 PROCEDURE — G8427 DOCREV CUR MEDS BY ELIG CLIN: HCPCS | Performed by: FAMILY MEDICINE

## 2022-03-21 PROCEDURE — 20610 DRAIN/INJ JOINT/BURSA W/O US: CPT | Performed by: FAMILY MEDICINE

## 2022-03-21 PROCEDURE — G8536 NO DOC ELDER MAL SCRN: HCPCS | Performed by: FAMILY MEDICINE

## 2022-03-21 PROCEDURE — 1090F PRES/ABSN URINE INCON ASSESS: CPT | Performed by: FAMILY MEDICINE

## 2022-03-21 PROCEDURE — 99213 OFFICE O/P EST LOW 20 MIN: CPT | Performed by: FAMILY MEDICINE

## 2022-03-21 PROCEDURE — G8399 PT W/DXA RESULTS DOCUMENT: HCPCS | Performed by: FAMILY MEDICINE

## 2022-03-21 PROCEDURE — G8417 CALC BMI ABV UP PARAM F/U: HCPCS | Performed by: FAMILY MEDICINE

## 2022-03-21 PROCEDURE — G8432 DEP SCR NOT DOC, RNG: HCPCS | Performed by: FAMILY MEDICINE

## 2022-03-21 PROCEDURE — G8756 NO BP MEASURE DOC: HCPCS | Performed by: FAMILY MEDICINE

## 2022-03-21 PROCEDURE — 1101F PT FALLS ASSESS-DOCD LE1/YR: CPT | Performed by: FAMILY MEDICINE

## 2022-03-21 RX ORDER — TRIAMCINOLONE ACETONIDE 40 MG/ML
40 INJECTION, SUSPENSION INTRA-ARTICULAR; INTRAMUSCULAR ONCE
Status: COMPLETED | OUTPATIENT
Start: 2022-03-21 | End: 2022-03-21

## 2022-03-21 RX ADMIN — TRIAMCINOLONE ACETONIDE 40 MG: 40 INJECTION, SUSPENSION INTRA-ARTICULAR; INTRAMUSCULAR at 10:48

## 2022-03-21 NOTE — LETTER
NAME: Shorty Freitas  : 1940  MRN: [id number]    PROCEDURAL INFORMED CONSENT FOR OPERATION / PROCEDURE     1. I (we),      Shorty Freitas      authorize    Pattie Stuart MD       and/or such assistants as may be selected by him/her, to perform the following operation/procedures    RIGHT knee corticosteroid injection     Note: If unable to obtain consent prior to an emergent procedure, document the emergent reason in the medical record. This procedure has been explained to my (our) satisfaction and included in the explanation was:   A) the intended benefit, nature, and extent of the procedure to be performed;  B) the significant risks involved and the probability of success;  C) alternative procedures and methods of treatment;  D) the dangers and probable consequences of such alternatives (including no procedure or treatment); E) the expected consequences of the procedure on my future health;  F) whether other qualified individuals would be performing important surgical tasks and / or whether  would be present to advise or support the procedure. I (we) understand that there are other risks of infection and other serious complications in the pre-operative/procedural and postoperative/procedural stages of my (our) care. I (we) have asked all of the questions which I (we) thought were important in deciding whether or not to undergo treatment or diagnosis. These questions have been answered to my (our) satisfaction. I (we) understand that no assurance can be given that the procedure will be a success, and no guarantee or warranty of success has been given to me (us). 2.  It has been explained to me (us) that during the course of the operation/procedure, unforeseen conditions may be revealed that necessitate extension of the original procedure(s) or different procedure(s) than those set forth in Paragraph 1.  I (we) authorize and request that the above-named physician, his/her assistants or his/her designees, perform procedures as necessary and desirable if deemed to be in my (our) best interest.    3.  I acknowledge that other health care personnel may be observing this procedure for the purpose of medical education or other specified purposes as may be necessary as requested and/or approved by my (our) physician. 4.  I (we) consent to the disposal by the hospital Pathologist of the removed tissue, parts or organs in accordance with hospital policy. Page 1 of 2    NAME: Shankar Whipple  : 1940  MRN: [id number]    3. I do_____ do not______ consent to the use of a local infiltration pain blocking agent that will be used by my provider/surgical provider to help alleviate pain during my procedure. 6.  I do_____ do not_____ consent to an emergent blood transfusion in the case of a life-threatening situation that requires blood components to be administered. This consent is valid for 24 hours from the beginning of the procedure. 7.  This patient does _____ or does not ______currently have a DNR status/order. If DNR order is in place, obtain Addendum to the Surgical Consent for ALL Patients with a DNR Order to address joselin-operative status for limited intervention or DNR suspension. 8.  I have read and fully understand the above Consent for Operation/Procedure and that all blanks were completed before I signed the consent.       Shankar Whipple     Signature of Patient (or legal representative)  Printed Name / Relationship                3/21/2022 /         AM / PM   Witness to Signature  Printed Name   Date/Time        (If patient is unable to sign or is a minor, complete the following)               Patient is a minor, ____years of age, or unable to sign because: _____________________          ________________________________________________________________________  Ethlyn Tara If a phone consent is obtained, consent will be documented by using two health care professionals, each affirming that the consenting party   has no questions and gives consent for the procedure discussed with the physician/provider. 3/21/2022 /               AM / PM   2nd Witness to phone consent  Printed Name   Date/Time     Informed Consent:  I have provided the explanation described above in section 1 to the patient and/or legal representative. I have provided the patient and/or legal representative with an opportunity to ask any questions about the proposed operation/procedure. Ezequiel Martinez MD    3/21/2022   /            AM / PM   Provider / Proceduralist  Printed Name  Date/Time     This Provider / Phillip Thomas performing the surgery is ONLY for Office-based procedures in [place]   [ x ] Board certified or Board eligible by one of the [employer] [place], the [employer] [facility] of the [employer], the [employer] of Podiatric Medicine, the [employer] of Foot and Ankle Surgery, or other board as approved by the [facility] [place] for medical staff appointment.           Page 2 of 2  Revised 8/2/2021

## 2022-03-21 NOTE — PROGRESS NOTES
[facility]  Sports Medicine Office Visit    Chavo Toure is a 80 y.o. female (: 1940) presenting to address:  Chief Complaint   Patient presents with    Knee Pain     R knee P!    Follow-up       PCP: Norbert Cardenas MD  Referring provider: Norbert Cardenas MD         Assessment/Plan:       ICD-10-CM ICD-9-CM   1. Primary osteoarthritis of right knee  M17.11 715.16   2. Primary osteoarthritis of left knee  M17.12 715.16   3. Stage 3 chronic kidney disease, unspecified whether stage 3a or 3b CKD ([facility] [address]  N18.30 585. 3           Discussion:  71yo overweight female with CKD3a presented initially in 2022 for evaluation of bilateral knee pain, LEFT occasionally worse than RIGHT, assoc with decreased tolerance to weightbearing activity, occasional swelling, occasional gait instability. S/p viscosupplementation with Rejuvinix in 2019 and 2020    Since last visit (LEFT knee steroid injection 2022):  <20% improvement in LEFT knee pain  Worsening RIGHT knee pain  No interval trauma  Admits to occasional cane use in the morning depending on severity of sx  Topical voltaren gel & PO APAP clinically ineffective  Interested in pursuing repeat viscosupplementation     Pertinent exam findings: tenderness of medial & lateral joint lines of bilateral knees, RIGHT knee valgus deviation, mild effusion bilaterally,    Impression:  Bilateral knee osteoarthritis  CKD stage 3    Plan:  > continue to avoid PO NSAIDs  > RIGHT knee steroid injection today  > obtain auth for bilateral viscosupplementation    Needs updated weightbearing radiograph(s) @ next visit! Follow-up and Dispositions    · Return in about 6 weeks (around 2022) for for gel injections (we will call you after auth received). Orders Placed This Encounter    PROCEDURE AUTHORIZATION TO      Need authorization for bilateral knee viscosupplementation.  euflexxa ideally, however orthovisc is fine if that is formulary.  DRAIN/INJECT LARGE JOINT/BURSA    triamcinolone acetonide (KENALOG-40) 40 mg/mL injection 40 mg         Management plan & patient instructions discussed with Chet Gilliam, who voiced understanding. This document may have been created with the aid of dictation software. Text may contain errors, particularly phonetic errors. Hafsa Julian MD  Internal Medicine, Family Medicine & Sports Medicine  3/21/2022    Subjective   History:   Chet Gilliam is a 80 y.o. female who presents to address:  Chief Complaint   Patient presents with    Knee Pain     R knee P!    Follow-up        [ see \"since last visit\" in A/P section ]    Past Medical History:   Diagnosis Date    Arthritis     Cataract     Hypercholesterolemia     Hypertension      Past Surgical History:   Procedure Laterality Date    HX APPENDECTOMY      HX CATARACT REMOVAL      HX COLONOSCOPY  12/2009    Dr. Stephen Monique, 10 yr follow up    HX [address]        reports that she has never smoked. She has never used smokeless tobacco. She reports that she does not drink alcohol and does not use drugs. Family History   Problem Relation Age of Onset    Diabetes Mother     Hypertension Mother     Cancer Sister 48        breast    Cancer Maternal Aunt         breast    Stroke Sister      No Known Allergies    Problem List:      Patient Active Problem List    Diagnosis    Hypertension    Primary osteoarthritis of left knee    Abnormal mammogram     L breast-> referred to Dr. Leonila Eller Prediabetes    CKD (chronic kidney disease)     Dr. Brandon Catherine Complete rotator cuff tear of left shoulder     -- L shoulder injection by Dr. Maikel Guajardo on 3/4/2015    -- L shoulder MRI (2/13/2015):  1. Complete full thickness tear of supraspinatus & infraspinatus tendons with underlying tendinosis, about 3cm retraction & moderate muscular atrophy. Subscapularis tendinosis with no focal tear.   2. Posterior labral tear not excluded. Subchondral cystic change in the glenoid fossa. 3. Severe AC joint hypertrophy and inflammation with type 2 acromion and mild acromial anterior downsloping. -- L shoulder XR (10/31/2014): mild DJD of the superior GH joint. Minimal spurring at the superior [facility] joint without associated joint space narrowing.    - s/p subacromial injection (10/31/2014)      Osteopenia     12/2018: osteopenia, -1.2  - DEXA (8/16/2016): still osteopenia, but BMD has decreased overall    - DEXA (6/13/2014): T-scores -1.5 @ L1-L4; -1.0 @ distal 1/3 radius; 0.0 @ L prox fem; -0.2 @ R prox fem; -0.6 @ L fem neck; -0.3 @ R fem neck      Osteoarthritis of right knee     - Jan 2015: referred to ortho surgery to consider joint replacement  - Dec 2014: steroid injection, increase mobic to 15mg daily    - B knee XR (6/13/2014): mild tricompartmental arthritis in the L knee; moderate tricompartmental arthritis in the R knee with moderate knee effusion      Vitamin D deficiency     - VitD 23.6 (3/30/2014)      Breast pain     *3/11/2014: to consider steroid injection for keloid or lidoderm patches      DDD (degenerative disc disease), lumbar     -- L-spine XR (11/18/2013): Mild L4-5 disc space narrowing. There is no fracture. There is 3 mm anterior listhesis of L4 on L5. No change with extension. This increases to approximately 4 mm during flexion.  Hyperlipidemia       Medications:     Current Outpatient Medications on File Prior to Visit   Medication Sig Dispense Refill    diclofenac (VOLTAREN) 1 % gel Apply 4 g to affected area three (3) times daily as needed for Pain. To both knees 300 g 1    acetaminophen (TYLENOL) 650 mg TbER Take 1 Tablet by mouth every eight to twelve (8-12) hours as needed for Pain.  270 Tablet 1    hydroCHLOROthiazide (HYDRODIURIL) 25 mg tablet TAKE 1 TABLET EVERY DAY 90 Tablet 1    Arm Brace (Wrist Support Large-XLarge) misc Wrist support for carpal tunnel  syndrome 2 Each 0    furosemide (LASIX) 20 mg tablet TAKE 1 TO 2 TABLETS EVERY DAY AS NEEDED FOR SWELLING 90 Tablet 0    potassium chloride (K-DUR, KLOR-CON) 20 mEq tablet TAKE 1 TABLET TWICE DAILY 180 Tab 0    metoprolol succinate (TOPROL-XL) 100 mg tablet TAKE 1 TABLET EVERY DAY 90 Tab 1    atorvastatin (LIPITOR) 10 mg tablet TAKE 1 TABLET EVERY DAY 90 Tab 1     mg tablet TAKE 1 TABLET EVERY 8 HOURS AS NEEDED FOR PAIN 90 Tab 1    glucosam/brooke-msm1/C/luciano/bosw (OSTEO BI-FLEX TRIPLE STRENGTH PO) Take  by mouth daily. With Vit D3       calcium carbonate (CALTREX) 600 mg calcium (1,500 mg) tablet Take 600 mg by mouth two (2) times a day.  folic acid/multivit-min/lutein (CENTRUM SILVER PO) Take  by mouth.  cholecalciferol (VITAMIN D3) 1,000 unit cap Take  by mouth daily. No current facility-administered medications on file prior to visit. Objective   Physical Assessment:     Vitals:    03/21/22 1006   PainSc:   8       Physical Exam  Nursing note reviewed. Constitutional:       General: She is not in acute distress. Appearance: Normal appearance. She is well-developed and overweight. She is not diaphoretic. HENT:      Head: Normocephalic and atraumatic. Mouth/Throat:      Comments: Mask in place  Eyes:      Conjunctiva/sclera: Conjunctivae normal.   Musculoskeletal:      Cervical back: Neck supple. Lumbar back: Spasms (inc tone R paraspinal & R glute) and bony tenderness (mild midline) present. Negative right straight leg raise test and negative left straight leg raise test.      Right hip: No bony tenderness. Normal range of motion. Left hip: No bony tenderness. Normal range of motion. Right knee: Deformity (valgus) and effusion present. Normal range of motion. Tenderness present over the medial joint line and lateral joint line. Abnormal alignment (valgus). Left knee: Effusion present. No deformity. Normal range of motion.  Tenderness present over the medial joint line and lateral joint line. Normal alignment. Skin:     General: Skin is warm and dry. Findings: No rash. Neurological:      Mental Status: She is alert and oriented to person, place, and time. Gait: Gait abnormal (mildly antalgic). Comments: Good sit-stand time and stability   Psychiatric:         Behavior: Behavior normal. Behavior is cooperative. Thought Content: Thought content normal.         Recent Labs & Imaging:       XR Results (maximum last 3): Results from Appointment encounter on 02/13/19    XR KNEE RT MIN 4 V    Narrative  EXAM: Right knee 4 views    INDICATION: Right knee pain and osteoarthritis    COMPARISON: None.    _______________    FINDINGS:    AP, PA, lateral, and patellar tangential projections of the right knee  performed. Osseous alignment is as expected on the provided projections. Tricompartmental right knee joint osteoarthritis is present which is dominant  across the lateral tibiofemoral compartment. No fracture. A small knee joint  effusion is present. No retained radiopaque foreign object.    _______________    Impression  IMPRESSION:    Lateral compartment predominant moderately severe tricompartmental right knee  joint osteoarthritis and small joint effusion. Results from Appointment encounter on 10/20/16    XR SPINE LUMB 2 OR 3 V    Narrative  History: Chronic right-sided low back pain without sciatica  Comparsion: None  Limited three view evaluation of the lumbar spine demonstrates grade 1  spondylolisthesis of L4 on L5. There is also grade 1 spondylolisthesis of L3 on  L4. There are 5 nonrib-bearing lumbar vertebral bodies. There are degenerative  changes with bony hypertrophy in the facet joints L5-S1 bilaterally and L4-5 on  the right and possibly L3-4 on the right. There is no acute fracture. Osseous  density normal.  Soft tissue structures are normal.  The bowel gas pattern is  unremarkable. Impression  Impression:      1.  Grade 1 spondylolisthesis L3-4 and L4-5.  2. No acute fracture. 3. Degenerative changes in the facet joints lower lumbar spine particularly on  the right. Records Review:     Max:  Received viscosupplementation Apr 2020 and July 2019      Procedure Note:    RIGHT  Knee Corticosteroid Injection    Written informed consent signed after discussing the risks & benefits as well as alternative treatments with Sonam Duenas. The risks associated with corticosteroid injection include but are not limited to bleeding, infection, synovitis (flare reaction), tendon rupture, skin color changes, and fat atrophy.     --------------------------Timeout Performed Prior To Procedure --------------------------    Chart reviewed for the following:  Elena Vee MD, have reviewed the History, Physical and updated the Allergic reactions for [place] [address] [facility] performed immediately prior to start of procedure:  Elena Vee MD, have performed the following reviews on Sonam Duenas prior to the start of the procedure:            * Patient was identified by name and date of birth   * Agreement on procedure being performed was verified  * Risks and Benefits explained to the patient  * Procedure site verified and marked as necessary  * Patient was positioned for comfort  * Consent was signed and verified     Time: 10:30 AM     Date of procedure: 3/21/2022    Procedure performed by:  Tori Alcantar MD    How tolerated by patient: tolerated the procedure well with no complications     ----------------------------------------------------------------------------------------------------------      Location: RIGHT knee  Skin Prep:  Betadine x 3  Anesthesia: ethyl chloride  Needle used: 22g  Approach: lateral joint line  Position: seated with knee @ 90deg  Medications: 1ml (40mg) of Kenalog 40mg/ml, 1.5ml of 1% lidocaine, and 1.5ml of 0.5% Marcaine    O ml of fluid aspirated.     Outcome: Patient tolerated the procedure well with no immediate complications noted. The area was cleaned & dressed. Post-Procedure Pain Level: 6 / bilateral knees    Instructions: Patient instructed to rest the joint and apply ice as needed for 15 minutes every 2-3 hours for the next 24 hours, and to limit activity involving the area treated for the next 48 hours. Also instructed to call if there is increased pain, swelling or if a fever develops.

## 2022-03-21 NOTE — PATIENT INSTRUCTIONS
To Do: - avoid heat to the knees! !  - when swollen or really painful, use ice or frozen veggies 20min/time (the more you do this, the easier it is)  - CONTINUE your home exercises, really emphasizing on doing those exercises slowly, for stability    My office should be calling you within the next 2-3 weeks re: authorization for your injections. If you don't hear from them after 3 weeks, then give us a call @ [employer] main phone number: [phone]          Notes:  - today we did a steroid injection into the RIGHT knee. - since your LEFT knee steroid injection really didn't help you all that much. .. and I'm now going to start incorporating those \"gel shots\" (viscosupplementation) into your knee maintenance plan         Our shared goals:  - to avoid knee surgery  - to make sure you feel confident and safe when walking and getting around  - optimize the use of injections (steroid and those gel shots)      VISIT SURVEY        You may receive a survey regarding your visit today either by mail or email.  Please take the opportunity to let us know how we did.  This information helps us continue to improve and provide a great patient experience. TESTING / IMAGING RESULTS       If you have [employer] access:   Per federal regulations all of your results will be released to you and to your physician / provider simultaneously on [address]    o This means that it is likely that you will have the opportunity to review your results before your physician / provider!  Since all \"critical\" abnormal results are immediately called to the office / on-call providers on nights, weekends and holidays - please refrain from calling after hours when you receive abnormal results through [address] Instead, allow time for your physician / provider to review your results and then provide interpretation and/or guidance.    If the results are significantly abnormal and require time-sensitive action - guidance will be provided both via [employer] and via telephone.  For all other results (interpreted as \"normal\", \"abnormal but expected\", \"reassuring / stable\", \"slightly abnormal\") - non-urgent guidance will be provided via [employer] communication only.   i[employer] Help Desk # [phone]    If you do NOT have [employer] access:   If the results are significantly abnormal and require time-sensitive action - guidance will be relayed to you via telephone.  For all other results (interpreted as \"normal\", \"abnormal but expected\", \"reassuring / stable\", \"slightly abnormal\") - non-urgent guidance will be mailed to you via [employer]      Results from Outside Facilities / Laboratories:  Results of tests performed at outside facilities / laboratories likely will not appear in the [employer].  o They may appear in the patient portals of those outside facilities / laboratories. o Please keep in mind that with your access to your patient portal directly to an outside facility / laboratory, you are likely viewing results before your physician / provider! Please allow time for your physician / provider to review your results and then provide interpretation and/or guidance. If you have questions about your results beyond the guidance provided in [facility] or in your results letter, please schedule a follow up appointment to discuss with your physician / provider. Joint Injections: Care Instructions  Your Care Instructions     Joint injections are shots into a joint, such as the knee. They may be used to put in medicines, such as pain relievers. A corticosteroid, or steroid, shot is used to reduce inflammation in tendons or joints. It is often used to treat problems such as arthritis, tendinitis, and bursitis. Steroids can be injected directly into a painful, inflamed joint. They can also help reduce inflammation of a bursa. A bursa is a sac of fluid.  It cushions and lubricates areas where tendons, ligaments, skin, muscles, or bones rub against each other. A steroid shot can sometimes help with short-term pain relief when other treatments haven't worked. If steroid shots help, pain may improve for weeks or months. Follow-up care is a key part of your treatment and safety. Be sure to make and go to all appointments, and call your doctor if you are having problems. It's also a good idea to know your test results and keep a list of the medicines you take. How can you care for yourself at home? · Put ice or a cold pack on the area for 10 to 20 minutes at a time. Put a thin cloth between the ice and your skin. · Ask your doctor if you can take an over-the-counter pain medicine, such as acetaminophen (Tylenol), ibuprofen (Advil, Motrin), or naproxen (Aleve). Be safe with medicines. Read and follow all instructions on the label. · Avoid strenuous activities for several days. In particular, avoid ones that put stress on the area where you got the shot. · If you have dressings over the area, keep them clean and dry. You may remove them when your doctor tells you to. When should you call for help? Call your doctor now or seek immediate medical care if:    · You have signs of infection, such as:  ? Increased pain, swelling, warmth, or redness. ? Red streaks leading from the site. ? Pus draining from the site. ? A fever. Watch closely for changes in your health, and be sure to contact your doctor if you have any problems. Where can you learn more? Go to http://www.gray.com/  Enter N616 in the search box to learn more about \"Joint Injections: Care Instructions. \"  Current as of: July 1, 2021               Content Version: 13.2  © 6603-2741 [employer]. Care instructions adapted under license by [employer] (which disclaims liability or warranty for this information).  If you have questions about a medical condition or this instruction, always ask your healthcare professional. [employer], [facility] disclaims any warranty or liability for your use of this information. Viscosupplementation for Osteoarthritis of the Knee    Introduction    The squeaky hinge gets the grease. In the same way, the joint that aches from Osteoarthritis (OA) gets the attention. Since the knee works like a hinge joint, it makes sense that lubricating the joint might help people with knee osteoarthritis. Accordingly, a treatment called viscosupplementation has been used by doctors to restore lubrication in osteoarthritic knee joints. Viscosupplementation has been around for 20 years. It is becoming a  form of treatment for patients with knee OA. While the injections are thought to have a number of benefits, some are still not well understood, and the injections themselves can be costly. The treatment for a single knee costs more than $500. This guide will help you understand  - what doctors believe the supplements can do  - how the treatments are administered  - what to expect after treatment    Anatomy    What part of the knee does OA affect? The main problem in knee OA is degeneration of the articular cartilage. Articular cartilage is the smooth lining that covers the ends of the leg bones where they meet to form the knee joint. The cartilage gives the joint freedom of movement by decreasing friction. The articular cartilage is kept slippery by joint fluid made by the joint lining (the synovial membrane). The fluid, called synovial fluid, is contained in a soft-tissue enclosure around the knee joint called the joint capsule. An important substance present in articular cartilage and synovial fluid is called hyaluronic acid. Hyaluronic acid helps joints collect and hold water, improving lubrication and reducing friction. It also acts by allowing cells to move and work within the joint. Osteoarthritis results in less hyaluronic acid in the synovial fluid.  As a result, the joint surfaces of the knee don't get lubricated and are more likely to get injured from daily stresses and strain on the joint. When the articular cartilage degenerates, or wears away, the bone underneath is uncovered and rubs against bone. Small outgrowths called bone spurs, or osteophytes, may form in the joint. Rationale    What do doctors hope to achieve with this treatment? A healthy knee contains about 2 milliliters of synovial fluid and a hyaluronic acid concentration of 2.5 to 4.0 milligrams per milliliter. Patients with knee OA have one-half to one-third less hyaluronic acid than normal.    The missing hyaluronic acid changes the viscosity (the stickiness) and the elasticity of the synovial fluid. That's a problem because the viscosity of the synovial fluid is thought to help maintain normal joint lubrication and to protect the joint from shock and strain. When there are reduced levels of hyaluronic acid, the joint may be more susceptible to injury. So viscosupplementation (injecting hyaluronic acid into the joint)is used in an effort to make the osteoarthritic synovial fluid more like healthy synovial fluid. The idea behind hyaluronic acid injections is fairly simple. Since the synovial fluid in osteoarthritic patients is low in hyaluronic acid, the injections are intended to boost the level. It isn't clear how this works. The injected hyaluronic acid seems to leave the knee relatively quickly, so it's possible doctors aren't simply replacing missing hyaluronic acid. Research suggests that viscosupplementation may stimulate the body to create additional hyaluronic acid. Injections of hyaluronic acid reduce the chemicals that cause inflammation within the synovial fluid of patients with arthritis. These anti-inflammatory properties may explain why some patients report pain relief. The injections are also thought to potentially protect or repair the chondrocytes, the cartilage cells.  However, tests have only involved animals so far and are inconclusive. Doctors suggest viscosupplementation for patients who can't tolerate or shouldn't take non-steroidal anti-inflammatory drugs (NSAIDs). Patients who haven't had success with other nonsurgical treatments, such as NSAIDs or corticosteroid injections, may also be candidates. The treatments are helpful for patients with mild knee osteoarthritis who need better knee function and for patients with advanced knee arthritis who hope to prolong the time before needing a total joint replacement. Although this treatment is only prescribed for OA of the knee, someday it may be used to treat OA in other joints, such as the shoulder, hip, ankle, and wrist.    Preparation    How will I prepare for treatment? The decision to proceed with this treatment must be made jointly by you and your sports medicine specialist. Earnestyenifer Eddy should understand as much about the procedure as possible. If you have concerns or questions, you should talk to your doctor. Once you decide to have the treatment, your sports medicine specialist will have you schedule your appointments, usually one visit per week for three to five weeks. Procedure    How are these treatments administered? To begin, you'll sit at the edge of the exam table with your knee bent. The knee is then scrubbed with a germ-killing solution. You'll be asked to relax your leg muscles while the orthopedist places the needle into the knee joint just to the left or right of the kneecap. After the viscosupplement has been injected into the knee, the needle is removed. The area is cleaned and bandaged. Patients are asked to bend and straigthen the leg a few times to get the substance to all parts of the joint. Patients usually get one shot into the knee joint each week for up to three to five weeks. Complications    What might go wrong? Studies have shown that viscosupplementation is safe.  Pain, warmth, and swelling at the site of the injection are the most common complaints. These normally clear up in one to two days. Severe inflammation is an unlikely complication, but it can occur. The joint may swell with fluid. The symptoms may mimic septic arthritis, an infection in the knee joint. Any injection into the knee joint does carry a risk of infection. Because more than one injection is usually given, the risk of infection goes up. After Care    What happens after treatment? Patients are able to go about their usual activity after the procedure. Within a few days, patients may be instructed by their doctor to attend physical therapy. When the shots work, they can provide relief for several months. Unfortunately, people generally don't get equal relief when they go back for a second or third series of shots. Rehabilitation    Alhough viscosupplementation appears to have a useful place in treating knee OA, it is best used along with a variety of proven strategies. Patients do best when they also:    - Get aerobic exercise. - Do strengthening and range-of-motion exercises. These are most often taught and monitored by physical therapists. - Lose weight. - Use heat and cold packs.  - Wear wedged insoles in their shoes when indicated. - Receive massage.  - Use equipment to help take pressure off their joints, such as a cane. - Participate in education programs or support groups.

## 2022-04-01 ENCOUNTER — DOCUMENTATION ONLY (OUTPATIENT)
Dept: SPORTS MEDICINE | Age: 82
End: 2022-04-01

## 2022-04-26 ENCOUNTER — OFFICE VISIT (OUTPATIENT)
Dept: SPORTS MEDICINE | Age: 82
End: 2022-04-26
Payer: MEDICARE

## 2022-04-26 DIAGNOSIS — M17.11 PRIMARY OSTEOARTHRITIS OF RIGHT KNEE: ICD-10-CM

## 2022-04-26 DIAGNOSIS — N18.30 STAGE 3 CHRONIC KIDNEY DISEASE, UNSPECIFIED WHETHER STAGE 3A OR 3B CKD (HCC): Primary | ICD-10-CM

## 2022-04-26 DIAGNOSIS — M17.12 PRIMARY OSTEOARTHRITIS OF LEFT KNEE: ICD-10-CM

## 2022-04-26 PROCEDURE — 20610 DRAIN/INJ JOINT/BURSA W/O US: CPT | Performed by: FAMILY MEDICINE

## 2022-04-26 RX ORDER — TRIAMCINOLONE ACETONIDE 1 MG/G
CREAM TOPICAL
COMMUNITY
Start: 2022-03-25 | End: 2022-05-31

## 2022-04-26 RX ORDER — GABAPENTIN 100 MG/1
CAPSULE ORAL
COMMUNITY

## 2022-04-26 RX ORDER — TIMOLOL MALEATE 5 MG/ML
SOLUTION/ DROPS OPHTHALMIC
COMMUNITY
Start: 2022-04-20

## 2022-04-26 NOTE — PATIENT INSTRUCTIONS
- relative rest  - ice as needed      VISIT SURVEY        You may receive a survey regarding your visit today either by mail or email.  Please take the opportunity to let us know how we did.  This information helps us continue to improve and provide a great patient experience. MEDICATION REFILLS         Please request medication refills through your pharmacy, to ensure the correct pharmacy is used.  Please allow at least 3 business days for refill requests to be addressed.  Refills will not be provided by the after hours/on call provider. Joint Injections: Care Instructions  Your Care Instructions     Joint injections are shots into a joint, such as the knee. They may be used to put in medicines, such as pain relievers. A corticosteroid, or steroid, shot is used to reduce inflammation in tendons or joints. It is often used to treat problems such as arthritis, tendinitis, and bursitis. Steroids can be injected directly into a painful, inflamed joint. They can also help reduce inflammation of a bursa. A bursa is a sac of fluid. It cushions and lubricates areas where tendons, ligaments, skin, muscles, or bones rub against each other. A steroid shot can sometimes help with short-term pain relief when other treatments haven't worked. If steroid shots help, pain may improve for weeks or months. Follow-up care is a key part of your treatment and safety. Be sure to make and go to all appointments, and call your doctor if you are having problems. It's also a good idea to know your test results and keep a list of the medicines you take. How can you care for yourself at home? · Put ice or a cold pack on the area for 10 to 20 minutes at a time. Put a thin cloth between the ice and your skin. · Ask your doctor if you can take an over-the-counter pain medicine, such as acetaminophen (Tylenol), ibuprofen (Advil, Motrin), or naproxen (Aleve). Be safe with medicines.  Read and follow all instructions on the label. · Avoid strenuous activities for several days. In particular, avoid ones that put stress on the area where you got the shot. · If you have dressings over the area, keep them clean and dry. You may remove them when your doctor tells you to. When should you call for help? Call your doctor now or seek immediate medical care if:    · You have signs of infection, such as:  ? Increased pain, swelling, warmth, or redness. ? Red streaks leading from the site. ? Pus draining from the site. ? A fever. Watch closely for changes in your health, and be sure to contact your doctor if you have any problems. Where can you learn more? Go to http://www.gray.com/  Enter N616 in the search box to learn more about \"Joint Injections: Care Instructions. \"  Current as of: July 1, 2021               Content Version: 13.2  © 2006-2022 [employer]. Care instructions adapted under license by [employer] (which disclaims liability or warranty for this information). If you have questions about a medical condition or this instruction, always ask your healthcare professional. [place] [address] any warranty or liability for your use of this information. Viscosupplementation for Osteoarthritis of the Knee    Introduction    The squeaky hinge gets the grease. In the same way, the joint that aches from Osteoarthritis (OA) gets the attention. Since the knee works like a hinge joint, it makes sense that lubricating the joint might help people with knee osteoarthritis. Accordingly, a treatment called viscosupplementation has been used by doctors to restore lubrication in osteoarthritic knee joints. Viscosupplementation has been around for 20 years. It is becoming a  form of treatment for patients with knee OA.  While the injections are thought to have a number of benefits, some are still not well understood, and the injections themselves can be costly. The treatment for a single knee costs more than $500. This guide will help you understand  - what doctors believe the supplements can do  - how the treatments are administered  - what to expect after treatment    Anatomy    What part of the knee does OA affect? The main problem in knee OA is degeneration of the articular cartilage. Articular cartilage is the smooth lining that covers the ends of the leg bones where they meet to form the knee joint. The cartilage gives the joint freedom of movement by decreasing friction. The articular cartilage is kept slippery by joint fluid made by the joint lining (the synovial membrane). The fluid, called synovial fluid, is contained in a soft-tissue enclosure around the knee joint called the joint capsule. An important substance present in articular cartilage and synovial fluid is called hyaluronic acid. Hyaluronic acid helps joints collect and hold water, improving lubrication and reducing friction. It also acts by allowing cells to move and work within the joint. Osteoarthritis results in less hyaluronic acid in the synovial fluid. As a result, the joint surfaces of the knee don't get lubricated and are more likely to get injured from daily stresses and strain on the joint. When the articular cartilage degenerates, or wears away, the bone underneath is uncovered and rubs against bone. Small outgrowths called bone spurs, or osteophytes, may form in the joint. Rationale    What do doctors hope to achieve with this treatment? A healthy knee contains about 2 milliliters of synovial fluid and a hyaluronic acid concentration of 2.5 to 4.0 milligrams per milliliter. Patients with knee OA have one-half to one-third less hyaluronic acid than normal.    The missing hyaluronic acid changes the viscosity (the stickiness) and the elasticity of the synovial fluid.  That's a problem because the viscosity of the synovial fluid is thought to help maintain normal joint lubrication and to protect the joint from shock and strain. When there are reduced levels of hyaluronic acid, the joint may be more susceptible to injury. So viscosupplementation (injecting hyaluronic acid into the joint)is used in an effort to make the osteoarthritic synovial fluid more like healthy synovial fluid. The idea behind hyaluronic acid injections is fairly simple. Since the synovial fluid in osteoarthritic patients is low in hyaluronic acid, the injections are intended to boost the level. It isn't clear how this works. The injected hyaluronic acid seems to leave the knee relatively quickly, so it's possible doctors aren't simply replacing missing hyaluronic acid. Research suggests that viscosupplementation may stimulate the body to create additional hyaluronic acid. Injections of hyaluronic acid reduce the chemicals that cause inflammation within the synovial fluid of patients with arthritis. These anti-inflammatory properties may explain why some patients report pain relief. The injections are also thought to potentially protect or repair the chondrocytes, the cartilage cells. However, tests have only involved animals so far and are inconclusive. Doctors suggest viscosupplementation for patients who can't tolerate or shouldn't take non-steroidal anti-inflammatory drugs (NSAIDs). Patients who haven't had success with other nonsurgical treatments, such as NSAIDs or corticosteroid injections, may also be candidates. The treatments are helpful for patients with mild knee osteoarthritis who need better knee function and for patients with advanced knee arthritis who hope to prolong the time before needing a total joint replacement. Although this treatment is only prescribed for OA of the knee, someday it may be used to treat OA in other joints, such as the shoulder, hip, ankle, and wrist.    Preparation    How will I prepare for treatment?     The decision to proceed with this treatment must be made jointly by you and your sports medicine specialist. Claudia Nugent should understand as much about the procedure as possible. If you have concerns or questions, you should talk to your doctor. Once you decide to have the treatment, your sports medicine specialist will have you schedule your appointments, usually one visit per week for three to five weeks. Procedure    How are these treatments administered? To begin, you'll sit at the edge of the exam table with your knee bent. The knee is then scrubbed with a germ-killing solution. You'll be asked to relax your leg muscles while the orthopedist places the needle into the knee joint just to the left or right of the kneecap. After the viscosupplement has been injected into the knee, the needle is removed. The area is cleaned and bandaged. Patients are asked to bend and straigthen the leg a few times to get the substance to all parts of the joint. Patients usually get one shot into the knee joint each week for up to three to five weeks. Complications    What might go wrong? Studies have shown that viscosupplementation is safe. Pain, warmth, and swelling at the site of the injection are the most common complaints. These normally clear up in one to two days. Severe inflammation is an unlikely complication, but it can occur. The joint may swell with fluid. The symptoms may mimic septic arthritis, an infection in the knee joint. Any injection into the knee joint does carry a risk of infection. Because more than one injection is usually given, the risk of infection goes up. After Care    What happens after treatment? Patients are able to go about their usual activity after the procedure. Within a few days, patients may be instructed by their doctor to attend physical therapy. When the shots work, they can provide relief for several months.  Unfortunately, people generally don't get equal relief when they go back for a second or third series of shots. Rehabilitation    Alhough viscosupplementation appears to have a useful place in treating knee OA, it is best used along with a variety of proven strategies. Patients do best when they also:    - Get aerobic exercise. - Do strengthening and range-of-motion exercises. These are most often taught and monitored by physical therapists. - Lose weight. - Use heat and cold packs.  - Wear wedged insoles in their shoes when indicated. - Receive massage.  - Use equipment to help take pressure off their joints, such as a cane. - Participate in education programs or support groups.

## 2022-04-26 NOTE — PROGRESS NOTES
[address] [facility]  Sports Medicine Office Visit    Thereasa Boas is a 80 y.o. female (: 1940) presenting to address:  Chief Complaint   Patient presents with    Knee Pain     Bilateral Knee Orthovisc #1       PCP: Esau Klein MD  Referring provider: Esau Klein MD         Assessment/Plan:       ICD-10-CM ICD-9-CM   1. Primary osteoarthritis of right knee  M17.11 715.16   2. Primary osteoarthritis of left knee  M17.12 715.16       Discussion:  73yo overweight female with CKD3a presented initially in 2022 for evaluation of bilateral knee pain, LEFT occasionally worse than RIGHT, assoc with decreased tolerance to weightbearing activity, occasional swelling, occasional gait instability. S/p viscosupplementation with Rejuvinix in 2019 and 2020    LEFT knee steroid injection - 2022  RIGHT knee steroid injection - Mar 2022    Since last visit (3/21/2022): Ongoing main symptom is stiffness that she finds quite troublesome  0/10 P! .   Pt reports she had minor swelling yesterday. No new S/S.   No interval trauma  Admits to occasional cane use in the morning depending on severity of sx    Pertinent exam findings: tenderness of medial & lateral joint lines of bilateral knees, RIGHT knee valgus deviation, mild effusion bilaterally,    Impression:  Bilateral knee osteoarthritis  CKD stage 3    Plan:  > continue to avoid PO NSAIDs  > performed 1 of 4 series of bilateral orthovisc injections today, tolerated well  > advised post-visco injection instructions: relative rest, ice to both knees as needed      Future Appointments   Date Time Provider Sachin Ambriz   2022 10:00 AM Edvin Chavez MD [facility] BS AMB   2022 10:00 AM Edvin Chavez MD [facility] BS AMB   2022 10:00 AM Edvin Chavez MD [facility] BS AMB   2022 10:00 AM Esau Klein MD [facility] BS AMB           Orders Placed This Encounter    DRAIN/INJECT LARGE JOINT/BURSA    sodium hyaluronate (viscosup) (ORTHOVISC) 30 mg/2 mL injection syrg 30 mg    sodium hyaluronate (viscosup) (ORTHOVISC) 30 mg/2 mL injection syrg 30 mg         Management plan & patient instructions discussed with Ancelmo Ricks, who voiced understanding. This document may have been created with the aid of dictation software. Text may contain errors, particularly phonetic errors. Carmen Cevallos MD  Internal Medicine, Family Medicine & Sports Medicine  4/26/2022    Subjective   History:   Ancelmo Ricks is a 80 y.o. female who presents to address:  Chief Complaint   Patient presents with    Knee Pain     Bilateral Knee Orthovisc #1         [ see \"since last visit\" in A/P section ]    Past Medical History:   Diagnosis Date    Arthritis     Cataract     Hypercholesterolemia     Hypertension      Past Surgical History:   Procedure Laterality Date    HX APPENDECTOMY      HX CATARACT REMOVAL      HX COLONOSCOPY  12/2009    Dr. Eric Sen, 10 yr follow up    HX [address]        reports that she has never smoked. She has never used smokeless tobacco. She reports that she does not drink alcohol and does not use drugs. Family History   Problem Relation Age of Onset    Diabetes Mother     Hypertension Mother     Cancer Sister 48        breast    Cancer Maternal Aunt         breast    Stroke Sister      No Known Allergies    Problem List:      Patient Active Problem List    Diagnosis    Hypertension    Primary osteoarthritis of left knee    Abnormal mammogram     L breast-> referred to Dr. Cindy Madera Prediabetes    CKD (chronic kidney disease)     Dr. Cara Matson Complete rotator cuff tear of left shoulder     -- L shoulder injection by Dr. Adam Elam on 3/4/2015    -- L shoulder MRI (2/13/2015):  1. Complete full thickness tear of supraspinatus & infraspinatus tendons with underlying tendinosis, about 3cm retraction & moderate muscular atrophy. Subscapularis tendinosis with no focal tear.   2. Posterior labral tear not excluded. Subchondral cystic change in the glenoid fossa. 3. Severe AC joint hypertrophy and inflammation with type 2 acromion and mild acromial anterior downsloping. -- L shoulder XR (10/31/2014): mild DJD of the superior GH joint. Minimal spurring at the superior [facility] joint without associated joint space narrowing.    - s/p subacromial injection (10/31/2014)      Osteopenia     12/2018: osteopenia, -1.2  - DEXA (8/16/2016): still osteopenia, but BMD has decreased overall    - DEXA (6/13/2014): T-scores -1.5 @ L1-L4; -1.0 @ distal 1/3 radius; 0.0 @ L prox fem; -0.2 @ R prox fem; -0.6 @ L fem neck; -0.3 @ R fem neck      Osteoarthritis of right knee     - Jan 2015: referred to ortho surgery to consider joint replacement  - Dec 2014: steroid injection, increase mobic to 15mg daily    - B knee XR (6/13/2014): mild tricompartmental arthritis in the L knee; moderate tricompartmental arthritis in the R knee with moderate knee effusion      Vitamin D deficiency     - VitD 23.6 (3/30/2014)      Breast pain     *3/11/2014: to consider steroid injection for keloid or lidoderm patches      DDD (degenerative disc disease), lumbar     -- L-spine XR (11/18/2013): Mild L4-5 disc space narrowing. There is no fracture. There is 3 mm anterior listhesis of L4 on L5. No change with extension. This increases to approximately 4 mm during flexion.  Hyperlipidemia       Medications:     Current Outpatient Medications on File Prior to Visit   Medication Sig Dispense Refill    timolol (TIMOPTIC) 0.5 % ophthalmic solution INSTILL 1 DROP IN RIGHT EYE EVERY MORNING      gabapentin (NEURONTIN) 100 mg capsule gabapentin 100 mg capsule      triamcinolone acetonide (KENALOG) 0.1 % topical cream APPLY TO AFFECTED AREA TOPICALLY TWICE A DAY TO DECREASE PAIN AND INFLAMMATION      diclofenac (VOLTAREN) 1 % gel Apply 4 g to affected area three (3) times daily as needed for Pain.  To both knees 300 g 1    acetaminophen (TYLENOL) 650 mg TbER Take 1 Tablet by mouth every eight to twelve (8-12) hours as needed for Pain. 270 Tablet 1    hydroCHLOROthiazide (HYDRODIURIL) 25 mg tablet TAKE 1 TABLET EVERY DAY 90 Tablet 1    Arm Brace (Wrist Support Large-XLarge) misc Wrist support for carpal tunnel  syndrome 2 Each 0    furosemide (LASIX) 20 mg tablet TAKE 1 TO 2 TABLETS EVERY DAY AS NEEDED FOR SWELLING 90 Tablet 0    potassium chloride (K-DUR, KLOR-CON) 20 mEq tablet TAKE 1 TABLET TWICE DAILY 180 Tab 0    metoprolol succinate (TOPROL-XL) 100 mg tablet TAKE 1 TABLET EVERY DAY 90 Tab 1    atorvastatin (LIPITOR) 10 mg tablet TAKE 1 TABLET EVERY DAY 90 Tab 1     mg tablet TAKE 1 TABLET EVERY 8 HOURS AS NEEDED FOR PAIN 90 Tab 1    glucosam/brooke-msm1/C/luciano/bosw (OSTEO BI-FLEX TRIPLE STRENGTH PO) Take  by mouth daily. With Vit D3       calcium carbonate (CALTREX) 600 mg calcium (1,500 mg) tablet Take 600 mg by mouth two (2) times a day.  folic acid/multivit-min/lutein (CENTRUM SILVER PO) Take  by mouth.  cholecalciferol (VITAMIN D3) 1,000 unit cap Take  by mouth daily. No current facility-administered medications on file prior to visit. Objective   Physical Assessment:     Vitals:    04/26/22 0959   PainSc:   0 - No pain   PainLoc: Knee       Physical Exam  Nursing note reviewed. Constitutional:       General: She is not in acute distress. Appearance: Normal appearance. She is well-developed and overweight. She is not diaphoretic. HENT:      Head: Normocephalic and atraumatic. Mouth/Throat:      Comments: Mask in place  Eyes:      Conjunctiva/sclera: Conjunctivae normal.   Musculoskeletal:      Cervical back: Neck supple. Right hip: No bony tenderness. Normal range of motion. Left hip: No bony tenderness. Normal range of motion. Right knee: Deformity (valgus) and effusion present. Normal range of motion. Tenderness present over the medial joint line and lateral joint line. Abnormal alignment (valgus). Left knee: Effusion present. No deformity. Normal range of motion. Tenderness present over the medial joint line and lateral joint line. Normal alignment. Skin:     General: Skin is warm and dry. Findings: No rash. Neurological:      Mental Status: She is alert and oriented to person, place, and time. Gait: Gait abnormal (mildly antalgic). Comments: Good sit-stand time and stability   Psychiatric:         Behavior: Behavior normal. Behavior is cooperative. Thought Content: Thought content normal.         Procedure Note:    Bilateral Knee Viscosupplementation Injection(s) - Orthovisc #1    Written informed consent signed after discussing the risks & benefits as well as alternative treatments with [place].  The risks associated with viscosupplementation injection include but are not limited to bleeding, infection, synovitis (flare reaction), tendon rupture, skin color changes, and fat atrophy.     --------------------------Timeout Performed Prior To Procedure --------------------------    Chart reviewed for the following:  Carlos Mccabe MD, have reviewed the History, Physical and updated the Allergic reactions for [employer] [address] [facility] performed immediately prior to start of procedure:  Carlos Mccabe MD, have performed the following reviews on [place] prior to the start of the procedure:            * Patient was identified by name and date of birth   * Agreement on procedure being performed was verified  * Risks and Benefits explained to the patient  * Procedure site verified and marked as necessary  * Patient was positioned for comfort  * Consent was signed and verified     Time: 10:11 AM     Date of procedure: 4/26/2022    Procedure performed by:  Corrine Boone MD    How tolerated by patient: tolerated the procedure well with no complications ----------------------------------------------------------------------------------------------------------      Location: RIGHT knee  Skin Prep:  Betadine x 3  Anesthesia: ethyl chloride  Needle used: 22g  Approach:  lateral joint line  Position:  seated  with knee @ 90deg  Medications: 2ml orthovisc    O ml of fluid aspirated. ----------------------------------------------------------------------------------------------------------      Location: LEFT  knee  Skin Prep:  Betadine x 3  Anesthesia: ethyl chloride  Needle used: 22g  Approach:  lateral joint line  Position:  seated  with knee @ 90deg  Medications: 2ml orthovisc    Outcome: Patient tolerated the procedure well with no immediate complications noted. The area was cleaned & dressed. Post-Procedure Pain Level: 0 / bilateral knees    Instructions: Patient instructed to rest the joint and apply ice as needed for 15 minutes every 2-3 hours for the next 24 hours, and to limit activity involving the area treated for the next 48 hours. Also instructed to call if there is increased pain, swelling or if a fever develops.

## 2022-04-26 NOTE — LETTER
NAME: Radha Champion  : 1940  MRN: [id number]    PROCEDURAL INFORMED CONSENT FOR OPERATION / PROCEDURE     1. I (we),      Radha Akira      authorize    Melina Awad MD       and/or such assistants as may be selected by him/her, to perform the following operation/procedures    Bilateral knee viscosupplementation injections - #1 of 4 series (Orthovisc)     Note: If unable to obtain consent prior to an emergent procedure, document the emergent reason in the medical record. This procedure has been explained to my (our) satisfaction and included in the explanation was:   A) the intended benefit, nature, and extent of the procedure to be performed;  B) the significant risks involved and the probability of success;  C) alternative procedures and methods of treatment;  D) the dangers and probable consequences of such alternatives (including no procedure or treatment); E) the expected consequences of the procedure on my future health;  F) whether other qualified individuals would be performing important surgical tasks and / or whether  would be present to advise or support the procedure. I (we) understand that there are other risks of infection and other serious complications in the pre-operative/procedural and postoperative/procedural stages of my (our) care. I (we) have asked all of the questions which I (we) thought were important in deciding whether or not to undergo treatment or diagnosis. These questions have been answered to my (our) satisfaction. I (we) understand that no assurance can be given that the procedure will be a success, and no guarantee or warranty of success has been given to me (us). 2.  It has been explained to me (us) that during the course of the operation/procedure, unforeseen conditions may be revealed that necessitate extension of the original procedure(s) or different procedure(s) than those set forth in Paragraph 1.  I (we) authorize and request that the above-named physician, his/her assistants or his/her designees, perform procedures as necessary and desirable if deemed to be in my (our) best interest.    3.  I acknowledge that other health care personnel may be observing this procedure for the purpose of medical education or other specified purposes as may be necessary as requested and/or approved by my (our) physician. 4.  I (we) consent to the disposal by the hospital Pathologist of the removed tissue, parts or organs in accordance with hospital policy. Page 1 of 2    NAME: Jackson Mckenzie  : 1940  MRN: [id number]    3. I do_____ do not______ consent to the use of a local infiltration pain blocking agent that will be used by my provider/surgical provider to help alleviate pain during my procedure. 6.  I do_____ do not_____ consent to an emergent blood transfusion in the case of a life-threatening situation that requires blood components to be administered. This consent is valid for 24 hours from the beginning of the procedure. 7.  This patient does _____ or does not ______currently have a DNR status/order. If DNR order is in place, obtain Addendum to the Surgical Consent for ALL Patients with a DNR Order to address joselin-operative status for limited intervention or DNR suspension. 8.  I have read and fully understand the above Consent for Operation/Procedure and that all blanks were completed before I signed the consent.       Jackson Mckenzie     Signature of Patient (or legal representative)  Printed Name / Relationship                2022 /         AM / PM   Witness to Signature  Printed Name   Date/Time        (If patient is unable to sign or is a minor, complete the following)               Patient is a minor, ____years of age, or unable to sign because: _____________________          ________________________________________________________________________  [employer] If a phone consent is obtained, consent will be documented by using two health care professionals, each affirming that the consenting party   has no questions and gives consent for the procedure discussed with the physician/provider. 4/26/2022 /               AM / PM   2nd Witness to phone consent  Printed Name   Date/Time     Informed Consent:  I have provided the explanation described above in section 1 to the patient and/or legal representative. I have provided the patient and/or legal representative with an opportunity to ask any questions about the proposed operation/procedure. Lorri Snow MD    4/26/2022   /            AM / PM   Provider / Proceduralist  Printed Name  Date/Time     This Provider / Anna Martínez performing the surgery is ONLY for Office-based procedures in [place]   [ x ] Board certified or Board eligible by one of the [address] [place] [address] Rd., [address] of [employer], the [employer] of [facility] of the [employer], the [address] [place] [address] Rd., [address] of Podiatric Medicine, the [address] [place] [address] Rd., [address] of Foot and Ankle Surgery, or other board as approved by the hospital for medical staff appointment.           Page 2 of 2  Revised 8/2/2021

## 2022-05-02 ENCOUNTER — OFFICE VISIT (OUTPATIENT)
Dept: SPORTS MEDICINE | Age: 82
End: 2022-05-02
Payer: MEDICARE

## 2022-05-02 DIAGNOSIS — M17.12 PRIMARY OSTEOARTHRITIS OF LEFT KNEE: ICD-10-CM

## 2022-05-02 DIAGNOSIS — N18.30 STAGE 3 CHRONIC KIDNEY DISEASE, UNSPECIFIED WHETHER STAGE 3A OR 3B CKD (HCC): ICD-10-CM

## 2022-05-02 DIAGNOSIS — M17.11 PRIMARY OSTEOARTHRITIS OF RIGHT KNEE: Primary | ICD-10-CM

## 2022-05-02 PROCEDURE — 20610 DRAIN/INJ JOINT/BURSA W/O US: CPT | Performed by: FAMILY MEDICINE

## 2022-05-02 NOTE — PATIENT INSTRUCTIONS
Joint Injections: Care Instructions  Your Care Instructions     Joint injections are shots into a joint, such as the knee. They may be used to put in medicines, such as pain relievers. A corticosteroid, or steroid, shot is used to reduce inflammation in tendons or joints. It is often used to treat problems such as arthritis, tendinitis, and bursitis. Steroids can be injected directly into a painful, inflamed joint. They can also help reduce inflammation of a bursa. A bursa is a sac of fluid. It cushions and lubricates areas where tendons, ligaments, skin, muscles, or bones rub against each other. A steroid shot can sometimes help with short-term pain relief when other treatments haven't worked. If steroid shots help, pain may improve for weeks or months. Follow-up care is a key part of your treatment and safety. Be sure to make and go to all appointments, and call your doctor if you are having problems. It's also a good idea to know your test results and keep a list of the medicines you take. How can you care for yourself at home? · Put ice or a cold pack on the area for 10 to 20 minutes at a time. Put a thin cloth between the ice and your skin. · Ask your doctor if you can take an over-the-counter pain medicine, such as acetaminophen (Tylenol), ibuprofen (Advil, Motrin), or naproxen (Aleve). Be safe with medicines. Read and follow all instructions on the label. · Avoid strenuous activities for several days. In particular, avoid ones that put stress on the area where you got the shot. · If you have dressings over the area, keep them clean and dry. You may remove them when your doctor tells you to. When should you call for help? Call your doctor now or seek immediate medical care if:    · You have signs of infection, such as:  ? Increased pain, swelling, warmth, or redness. ? Red streaks leading from the site. ? Pus draining from the site. ? A fever.    Watch closely for changes in your health, and be sure to contact your doctor if you have any problems. Where can you learn more? Go to http://www.gray.com/  Enter N616 in the search box to learn more about \"Joint Injections: Care Instructions. \"  Current as of: July 1, 2021               Content Version: 13.2  © 1774-6374 [employer]. Care instructions adapted under license by [employer] (which disclaims liability or warranty for this information). If you have questions about a medical condition or this instruction, always ask your healthcare professional. [address] any warranty or liability for your use of this information.

## 2022-05-02 NOTE — PROGRESS NOTES
[address] [facility]  Sports Medicine Office Visit    Jenny Ignacio is a 80 y.o. female (: 1940) presenting to address:  Chief Complaint   Patient presents with    Knee Pain     Bilateral       PCP: Rafaela Gonzalez MD  Referring provider: Rafaela Gonzalez MD         Assessment/Plan:       ICD-10-CM ICD-9-CM   1. Primary osteoarthritis of right knee  M17.11 715.16   2. Primary osteoarthritis of left knee  M17.12 715.16   3. Stage 3 chronic kidney disease, unspecified whether stage 3a or 3b CKD ([address]  N18.30 585. 3       Discussion:  71yo overweight female with CKD3a presented initially in 2022 for evaluation of bilateral knee pain, LEFT occasionally worse than RIGHT, assoc with decreased tolerance to weightbearing activity, occasional swelling, occasional gait instability.  S/p viscosupplementation with Rejuvinix in 2019 and 2020     LEFT knee steroid injection - 2022  RIGHT knee steroid injection - Mar 2022     Since last visit (1 of 4 bilateral orthovisc, 2022): No interval trauma  No new symptoms  No redness / swelling / warmth since injections last week       Pertinent exam findings: tenderness of medial & lateral joint lines of bilateral knees, RIGHT knee valgus deviation, mild effusion bilaterally       Impression:  Bilateral knee osteoarthritis  CKD stage 3     Plan:  > continue to avoid PO NSAIDs  > performed 2 of 4 series of bilateral orthovisc injections today, tolerated well  > advised post-visco injection instructions: relative rest, ice to both knees as needed     Orders Placed This Encounter    DRAIN/INJECT LARGE JOINT/BURSA    sodium hyaluronate (viscosup) (ORTHOVISC) 30 mg/2 mL injection syrg 30 mg    sodium hyaluronate (viscosup) (ORTHOVISC) 30 mg/2 mL injection syrg 30 mg         Management plan & patient instructions discussed with Jenny Ignacio, who voiced understanding. This document may have been created with the aid of dictation software.   Text may contain errors, particularly phonetic errors. Karin Omalley MD  Internal Medicine, Family Medicine & Sports Medicine  5/2/2022    Subjective   History:   Chip Olivo is a 80 y.o. female who presents to address:  Chief Complaint   Patient presents with    Knee Pain     Bilateral     Pain Assessment  5/2/2022   Location of Pain Knee   Location Modifiers Right;Left   Severity of Pain 0   Quality of Pain Aching; Other (Comment)   Quality of Pain Comment Stiffness   Duration of Pain Persistent   Frequency of Pain Constant   Date Pain First Started -   Date Pain First Started Comment -   Aggravating Factors Exercise   Limiting Behavior No   Relieving Factors Other (Comment)   Relieving Factors Comment -   Result of Injury No   Type of Injury -         [ see \"since last visit\" in A/P section ]    Past Medical History:   Diagnosis Date    Arthritis     Cataract     Hypercholesterolemia     Hypertension      Past Surgical History:   Procedure Laterality Date    HX APPENDECTOMY      HX CATARACT REMOVAL      HX COLONOSCOPY  12/2009    Dr. Yoon Chacon, 10 yr follow up    HX TOTAL ABDOMINAL HYSTERECTOMY        reports that she has never smoked. She has never used smokeless tobacco. She reports that she does not drink alcohol and does not use drugs. Family History   Problem Relation Age of Onset    Diabetes Mother     Hypertension Mother     Cancer Sister 48        breast    Cancer Maternal Aunt         breast    Stroke Sister      No Known Allergies    Problem List:      Patient Active Problem List    Diagnosis    Hypertension    Primary osteoarthritis of left knee    Abnormal mammogram     L breast-> referred to Dr. Dannie Easley Prediabetes    CKD (chronic kidney disease)     Dr. Rocio Silva Complete rotator cuff tear of left shoulder     -- L shoulder injection by Dr. Boykin Riva on 3/4/2015    -- L shoulder MRI (2/13/2015):  1.  Complete full thickness tear of supraspinatus & infraspinatus tendons with underlying tendinosis, about 3cm retraction & moderate muscular atrophy. Subscapularis tendinosis with no focal tear. 2. Posterior labral tear not excluded. Subchondral cystic change in the glenoid fossa. 3. Severe AC joint hypertrophy and inflammation with type 2 acromion and mild acromial anterior downsloping. -- L shoulder XR (10/31/2014): mild DJD of the superior GH joint. Minimal spurring at the superior [facility]ISR [facility] joint without associated joint space narrowing.    - s/p subacromial injection (10/31/2014)      Osteopenia     12/2018: osteopenia, -1.2  - DEXA (8/16/2016): still osteopenia, but BMD has decreased overall    - DEXA (6/13/2014): T-scores -1.5 @ L1-L4; -1.0 @ distal 1/3 radius; 0.0 @ L prox fem; -0.2 @ R prox fem; -0.6 @ L fem neck; -0.3 @ R fem neck      Osteoarthritis of right knee     - Jan 2015: referred to ortho surgery to consider joint replacement  - Dec 2014: steroid injection, increase mobic to 15mg daily    - B knee XR (6/13/2014): mild tricompartmental arthritis in the L knee; moderate tricompartmental arthritis in the R knee with moderate knee effusion      Vitamin D deficiency     - VitD 23.6 (3/30/2014)      Breast pain     *3/11/2014: to consider steroid injection for keloid or lidoderm patches      DDD (degenerative disc disease), lumbar     -- L-spine XR (11/18/2013): Mild L4-5 disc space narrowing. There is no fracture. There is 3 mm anterior listhesis of L4 on L5. No change with extension. This increases to approximately 4 mm during flexion.        Hyperlipidemia       Medications:     Current Outpatient Medications on File Prior to Visit   Medication Sig Dispense Refill    timolol (TIMOPTIC) 0.5 % ophthalmic solution INSTILL 1 DROP IN RIGHT EYE EVERY MORNING      gabapentin (NEURONTIN) 100 mg capsule gabapentin 100 mg capsule      triamcinolone acetonide (KENALOG) 0.1 % topical cream APPLY TO AFFECTED AREA TOPICALLY TWICE A DAY TO DECREASE PAIN AND INFLAMMATION      diclofenac (VOLTAREN) 1 % gel Apply 4 g to affected area three (3) times daily as needed for Pain. To both knees 300 g 1    acetaminophen (TYLENOL) 650 mg TbER Take 1 Tablet by mouth every eight to twelve (8-12) hours as needed for Pain. 270 Tablet 1    hydroCHLOROthiazide (HYDRODIURIL) 25 mg tablet TAKE 1 TABLET EVERY DAY 90 Tablet 1    Arm Brace (Wrist Support Large-XLarge) misc Wrist support for carpal tunnel  syndrome 2 Each 0    furosemide (LASIX) 20 mg tablet TAKE 1 TO 2 TABLETS EVERY DAY AS NEEDED FOR SWELLING 90 Tablet 0    potassium chloride (K-DUR, KLOR-CON) 20 mEq tablet TAKE 1 TABLET TWICE DAILY 180 Tab 0    metoprolol succinate (TOPROL-XL) 100 mg tablet TAKE 1 TABLET EVERY DAY 90 Tab 1    atorvastatin (LIPITOR) 10 mg tablet TAKE 1 TABLET EVERY DAY 90 Tab 1     mg tablet TAKE 1 TABLET EVERY 8 HOURS AS NEEDED FOR PAIN 90 Tab 1    glucosam/brooke-msm1/C/luciano/bosw (OSTEO BI-FLEX TRIPLE STRENGTH PO) Take  by mouth daily. With Vit D3       calcium carbonate (CALTREX) 600 mg calcium (1,500 mg) tablet Take 600 mg by mouth two (2) times a day.  folic acid/multivit-min/lutein (CENTRUM SILVER PO) Take  by mouth.  cholecalciferol (VITAMIN D3) 1,000 unit cap Take  by mouth daily. No current facility-administered medications on file prior to visit. Objective   Physical Assessment:     Vitals:    05/02/22 0945   PainSc:   0 - No pain   PainLoc: Knee       Physical Exam  Nursing note reviewed. Constitutional:       General: She is not in acute distress. Appearance: Normal appearance. She is well-developed and overweight. She is not diaphoretic. HENT:      Head: Normocephalic and atraumatic. Mouth/Throat:      Comments: Mask in place  Eyes:      Conjunctiva/sclera: Conjunctivae normal.   Musculoskeletal:      Cervical back: Neck supple. Right hip: No bony tenderness. Normal range of motion. Left hip: No bony tenderness. Normal range of motion.       Right knee: Deformity (valgus) and effusion present. Normal range of motion. Tenderness present over the medial joint line and lateral joint line. Abnormal alignment (valgus). Left knee: Effusion present. No deformity. Normal range of motion. Tenderness present over the medial joint line and lateral joint line. Normal alignment. Skin:     General: Skin is warm and dry. Findings: No rash. Neurological:      Mental Status: She is alert and oriented to person, place, and time. Gait: Gait abnormal (mildly antalgic). Comments: Good sit-stand time and stability   Psychiatric:         Behavior: Behavior normal. Behavior is cooperative. Thought Content: Thought content normal.           Procedure Note:    Bilateral Knee Viscosupplementation Injections (Orthovisc 2 of 4)    Written informed consent signed after discussing the risks & benefits as well as alternative treatments with Claudia Sandra.  The risks associated with viscosupplementation injection include but are not limited to bleeding, infection, synovitis (flare reaction), tendon rupture, skin color changes, and fat atrophy.     --------------------------Timeout Performed Prior To Procedure --------------------------    Chart reviewed for the following:  Nolene Goodell, MD, have reviewed the History, Physical and updated the Allergic reactions for [address] [place] [address] Herbert performed immediately prior to start of procedure:  Nolene Goodell, MD, have performed the following reviews on Claudia Sandra prior to the start of the procedure:            * Patient was identified by name and date of birth   * Agreement on procedure being performed was verified  * Risks and Benefits explained to the patient  * Procedure site verified and marked as necessary  * Patient was positioned for comfort  * Consent was signed and verified     Time: 9:42 AM     Date of procedure: 5/2/2022    Procedure performed by:  Lolis Roldan MD    How tolerated by patient: tolerated the procedure well with no complications     ----------------------------------------------------------------------------------------------------------      Location: RIGHT  knee  Skin Prep:  Betadine x 3  Anesthesia: ethyl chloride  Needle used: 22g  Approach:  lateral joint line  Position:  seated  with knee @ 90deg  Medications: 2ml Orthovisc    O ml of fluid aspirated. Outcome: Patient tolerated the procedure well with no immediate complications noted. The area was cleaned & dressed. ----------------------------------------------------------------------------------------------------------      Location: LEFT  knee  Skin Prep:  Betadine x 3  Anesthesia: ethyl chloride  Needle used: 22g  Approach:  lateral joint line  Position:  seated  with knee @ 90deg  Medications: 2ml Orthovisc    O ml of fluid aspirated. Outcome: Patient tolerated the procedure well with no immediate complications noted. The area was cleaned & dressed. Post-Procedure Pain Level: 0  / bilateral knees    Instructions: Patient instructed to rest the joint and apply ice as needed for 15 minutes every 2-3 hours for the next 24 hours, and to limit activity involving the area treated for the next 48 hours. Also instructed to call if there is increased pain, swelling or if a fever develops.

## 2022-05-27 ENCOUNTER — OFFICE VISIT (OUTPATIENT)
Dept: SPORTS MEDICINE | Age: 82
End: 2022-05-27
Payer: MEDICARE

## 2022-05-27 DIAGNOSIS — M17.12 PRIMARY OSTEOARTHRITIS OF LEFT KNEE: ICD-10-CM

## 2022-05-27 DIAGNOSIS — N18.30 STAGE 3 CHRONIC KIDNEY DISEASE, UNSPECIFIED WHETHER STAGE 3A OR 3B CKD (HCC): ICD-10-CM

## 2022-05-27 DIAGNOSIS — M17.11 PRIMARY OSTEOARTHRITIS OF RIGHT KNEE: ICD-10-CM

## 2022-05-27 PROCEDURE — 20610 DRAIN/INJ JOINT/BURSA W/O US: CPT | Performed by: FAMILY MEDICINE

## 2022-05-27 NOTE — PROGRESS NOTES
[address] [facility]  Sports Medicine Office Visit    Valencia Rangel is a 80 y.o. female (: 1940) presenting to address:  Chief Complaint   Patient presents with    Knee Pain     Bilateral       PCP: Romy Ferrer MD  Referring provider: Romy Ferrer MD         Assessment/Plan:       ICD-10-CM ICD-9-CM   1. Primary osteoarthritis of right knee  M17.11 715.16   2. Primary osteoarthritis of left knee  M17.12 715.16   3. Stage 3 chronic kidney disease, unspecified whether stage 3a or 3b CKD ([facility] [address]  N18.30 585. 3         Discussion:  71yo overweight female with CKD3a presented initially in 2022 for evaluation of bilateral knee pain, LEFT occasionally worse than RIGHT, assoc with decreased tolerance to weightbearing activity, occasional swelling, occasional gait instability.  S/p viscosupplementation with Rejuvinix in 2019 and 2020     LEFT knee steroid injection - 2022  RIGHT knee steroid injection - Mar 2022     Since last visit (2 of 4 bilateral orthovisc, 2022): No interval trauma  No new symptoms  No redness / swelling / warmth since last injections  Ongoing stiffness, which is what is currently bothering her the most        Pertinent exam findings: tenderness of medial & lateral joint lines of bilateral knees, RIGHT knee valgus deviation, mild effusion bilaterally         Impression:  Bilateral knee osteoarthritis  CKD stage 3     Plan:  > continue to avoid PO NSAIDs  > performed 3 of 4 series of bilateral orthovisc injections today, tolerated well  > advised post-visco injection instructions: relative rest, ice to both knees as needed        Orders Placed This Encounter    DRAIN/INJECT LARGE JOINT/BURSA    sodium hyaluronate (viscosup) (ORTHOVISC) 30 mg/2 mL injection syrg 30 mg         Management plan & patient instructions discussed with Valencia Rangel, who voiced understanding. This document may have been created with the aid of dictation software.   Text may contain errors, particularly phonetic errors. Clearance MD Carlos Manuel  Internal Medicine, Family Medicine & Sports Medicine  5/27/2022    Subjective   History:   Shirley Mitchell is a 80 y.o. female who presents to address:  Chief Complaint   Patient presents with    Knee Pain     Bilateral         [ see \"since last visit\" in A/P section ]    Past Medical History:   Diagnosis Date    Arthritis     Cataract     Hypercholesterolemia     Hypertension      Past Surgical History:   Procedure Laterality Date    HX APPENDECTOMY      HX CATARACT REMOVAL      HX COLONOSCOPY  12/2009    Dr. Nelsy Ribeiro, 10 yr follow up    [address] [place] [address]        reports that she has never smoked. She has never used smokeless tobacco. She reports that she does not drink alcohol and does not use drugs. Family History   Problem Relation Age of Onset    Diabetes Mother     Hypertension Mother     Cancer Sister 48        breast    Cancer Maternal Aunt         breast    Stroke Sister      No Known Allergies    Problem List:      Patient Active Problem List    Diagnosis    Hypertension    Primary osteoarthritis of left knee    Abnormal mammogram     L breast-> referred to Dr. Lanetta Hammans Prediabetes    CKD (chronic kidney disease)     Dr. Joi Robbins Complete rotator cuff tear of left shoulder     -- L shoulder injection by Dr. Claudia Bello on 3/4/2015    -- L shoulder MRI (2/13/2015):  1. Complete full thickness tear of supraspinatus & infraspinatus tendons with underlying tendinosis, about 3cm retraction & moderate muscular atrophy. Subscapularis tendinosis with no focal tear. 2. Posterior labral tear not excluded. Subchondral cystic change in the glenoid fossa. 3. Severe AC joint hypertrophy and inflammation with type 2 acromion and mild acromial anterior downsloping. -- L shoulder XR (10/31/2014): mild DJD of the superior GH joint.   [facility] without associated joint space narrowing.    - s/p subacromial injection (10/31/2014)      Osteopenia     12/2018: osteopenia, -1.2  - DEXA (8/16/2016): still osteopenia, but BMD has decreased overall    - DEXA (6/13/2014): T-scores -1.5 @ L1-L4; -1.0 @ distal 1/3 radius; 0.0 @ L prox fem; -0.2 @ R prox fem; -0.6 @ L fem neck; -0.3 @ R fem neck      Osteoarthritis of right knee     - Jan 2015: referred to ortho surgery to consider joint replacement  - Dec 2014: steroid injection, increase mobic to 15mg daily    - B knee XR (6/13/2014): mild tricompartmental arthritis in the L knee; moderate tricompartmental arthritis in the R knee with moderate knee effusion      Vitamin D deficiency     - VitD 23.6 (3/30/2014)      Breast pain     *3/11/2014: to consider steroid injection for keloid or lidoderm patches      DDD (degenerative disc disease), lumbar     -- L-spine XR (11/18/2013): Mild L4-5 disc space narrowing. There is no fracture. There is 3 mm anterior listhesis of L4 on L5. No change with extension. This increases to approximately 4 mm during flexion.  Hyperlipidemia       Medications:     Current Outpatient Medications on File Prior to Visit   Medication Sig Dispense Refill    timolol (TIMOPTIC) 0.5 % ophthalmic solution INSTILL 1 DROP IN RIGHT EYE EVERY MORNING      gabapentin (NEURONTIN) 100 mg capsule gabapentin 100 mg capsule      triamcinolone acetonide (KENALOG) 0.1 % topical cream APPLY TO AFFECTED AREA TOPICALLY TWICE A DAY TO DECREASE PAIN AND INFLAMMATION      diclofenac (VOLTAREN) 1 % gel Apply 4 g to affected area three (3) times daily as needed for Pain. To both knees 300 g 1    acetaminophen (TYLENOL) 650 mg TbER Take 1 Tablet by mouth every eight to twelve (8-12) hours as needed for Pain.  270 Tablet 1    hydroCHLOROthiazide (HYDRODIURIL) 25 mg tablet TAKE 1 TABLET EVERY DAY 90 Tablet 1    Arm Brace (Wrist Support Large-XLarge) misc Wrist support for carpal tunnel  syndrome 2 Each 0    furosemide (LASIX) 20 mg tablet TAKE 1 TO 2 TABLETS EVERY DAY AS NEEDED FOR SWELLING 90 Tablet 0    potassium chloride (K-DUR, KLOR-CON) 20 mEq tablet TAKE 1 TABLET TWICE DAILY 180 Tab 0    metoprolol succinate (TOPROL-XL) 100 mg tablet TAKE 1 TABLET EVERY DAY 90 Tab 1    atorvastatin (LIPITOR) 10 mg tablet TAKE 1 TABLET EVERY DAY 90 Tab 1     mg tablet TAKE 1 TABLET EVERY 8 HOURS AS NEEDED FOR PAIN 90 Tab 1    glucosam/brooke-msm1/C/luciano/bosw (OSTEO BI-FLEX TRIPLE STRENGTH PO) Take  by mouth daily. With Vit D3       calcium carbonate (CALTREX) 600 mg calcium (1,500 mg) tablet Take 600 mg by mouth two (2) times a day.  folic acid/multivit-min/lutein (CENTRUM SILVER PO) Take  by mouth.  cholecalciferol (VITAMIN D3) 1,000 unit cap Take  by mouth daily. No current facility-administered medications on file prior to visit. Objective   Physical Assessment:     Vitals:    05/27/22 0958   PainSc:   0 - No pain   PainLoc: Knee       Physical Exam  Nursing note reviewed. Constitutional:       General: She is not in acute distress. Appearance: Normal appearance. She is well-developed and overweight. She is not diaphoretic. HENT:      Head: Normocephalic and atraumatic. Mouth/Throat:      Comments: Mask in place  Eyes:      Conjunctiva/sclera: Conjunctivae normal.   Musculoskeletal:      Cervical back: Neck supple. Right hip: No bony tenderness. Normal range of motion. Left hip: No bony tenderness. Normal range of motion. Right knee: Deformity (valgus) and effusion present. Normal range of motion. Tenderness present over the medial joint line and lateral joint line. Abnormal alignment (valgus). Left knee: Effusion present. No deformity. Normal range of motion. Tenderness present over the medial joint line and lateral joint line. Normal alignment. Skin:     General: Skin is warm and dry. Findings: No rash.    Neurological:      Mental Status: She is alert and oriented to person, place, and time.      Gait: Gait abnormal (mildly antalgic). Comments: Good sit-stand time and stability   Psychiatric:         Behavior: Behavior normal. Behavior is cooperative. Thought Content: Thought content normal.         Procedure Note:   Bilateral Knee Viscosupplementation Injection    Written informed consent signed after discussing the risks & benefits as well as alternative treatments with Claudia Sandra. The risks associated with viscosupplementation injection include but are not limited to bleeding, infection, synovitis (flare reaction), tendon rupture, skin color changes, and fat atrophy.     --------------------------Timeout Performed Prior To Procedure --------------------------    Chart reviewed for the following:  Nolene Goodell, MD, have reviewed the History, Physical and updated the Allergic reactions for [address] [facility] performed immediately prior to start of procedure:  Nolene Goodell, MD, have performed the following reviews on Claudia Sandra prior to the start of the procedure:            * Patient was identified by name and date of birth   * Agreement on procedure being performed was verified  * Risks and Benefits explained to the patient  * Procedure site verified and marked as necessary  * Patient was positioned for comfort  * Consent was signed and verified     Time: 9:53 AM     Date of procedure: 5/27/2022    Procedure performed by:  Lolis Roldan MD    How tolerated by patient: tolerated the procedure well with no complications     ----------------------------------------------------------------------------------------------------------      Location: RIGHT  knee  Skin Prep:  Betadine x 3  Anesthesia: ethyl chloride  Needle used: 22g  Approach: lateral joint line  Position: seated  with knee @ 90deg  Medications: 2ml orthovisc    O ml of fluid aspirated. Outcome: Patient tolerated the procedure well with no immediate complications noted.   The area was cleaned & dressed. ----------------------------------------------------------------------------------------------------------      Location: LEFT knee  Skin Prep:  Betadine x 3  Anesthesia: ethyl chloride  Needle used: 22g  Approach: lateral joint line  Position: seated  with knee @ 90deg  Medications: 2ml orthovisc    O ml of fluid aspirated. Outcome: Patient tolerated the procedure well with no immediate complications noted. The area was cleaned & dressed. Post-Procedure Pain Level: 0    Instructions: Patient instructed to rest the joint and apply ice as needed for 15 minutes every 2-3 hours for the next 24 hours, and to limit activity involving the area treated for the next 48 hours. Also instructed to call if there is increased pain, swelling or if a fever develops.

## 2022-05-27 NOTE — PATIENT INSTRUCTIONS
Joint Injections: Care Instructions  Your Care Instructions     Joint injections are shots into a joint, such as the knee. They may be used to put in medicines, such as pain relievers. A corticosteroid, or steroid, shot is used to reduce inflammation in tendons or joints. It is often used to treat problems such as arthritis, tendinitis, and bursitis. Steroids can be injected directly into a painful, inflamed joint. They can also help reduce inflammation of a bursa. A bursa is a sac of fluid. It cushions and lubricates areas where tendons, ligaments, skin, muscles, or bones rub against each other. A steroid shot can sometimes help with short-term pain relief when other treatments haven't worked. If steroid shots help, pain may improve for weeks or months. Follow-up care is a key part of your treatment and safety. Be sure to make and go to all appointments, and call your doctor if you are having problems. It's also a good idea to know your test results and keep a list of the medicines you take. How can you care for yourself at home? · Put ice or a cold pack on the area for 10 to 20 minutes at a time. Put a thin cloth between the ice and your skin. · Ask your doctor if you can take an over-the-counter pain medicine, such as acetaminophen (Tylenol), ibuprofen (Advil, Motrin), or naproxen (Aleve). Be safe with medicines. Read and follow all instructions on the label. · Avoid strenuous activities for several days. In particular, avoid ones that put stress on the area where you got the shot. · If you have dressings over the area, keep them clean and dry. You may remove them when your doctor tells you to. When should you call for help? Call your doctor now or seek immediate medical care if:    · You have signs of infection, such as:  ? Increased pain, swelling, warmth, or redness. ? Red streaks leading from the site. ? Pus draining from the site. ? A fever.    Watch closely for changes in your health, and be sure to contact your doctor if you have any problems. Where can you learn more? Go to http://www.gray.com/  Enter N616 in the search box to learn more about \"Joint Injections: Care Instructions. \"  Current as of: July 1, 2021               Content Version: 13.2  © 7925-0987 [employer]. Care instructions adapted under license by [employer] (which disclaims liability or warranty for this information). If you have questions about a medical condition or this instruction, always ask your healthcare professional. [address] any warranty or liability for your use of this information.

## 2022-05-31 ENCOUNTER — OFFICE VISIT (OUTPATIENT)
Dept: FAMILY MEDICINE CLINIC | Age: 82
End: 2022-05-31
Payer: MEDICARE

## 2022-05-31 ENCOUNTER — APPOINTMENT (OUTPATIENT)
Dept: FAMILY MEDICINE CLINIC | Age: 82
End: 2022-05-31

## 2022-05-31 VITALS
HEIGHT: 67 IN | BODY MASS INDEX: 30.57 KG/M2 | TEMPERATURE: 97.8 F | RESPIRATION RATE: 14 BRPM | SYSTOLIC BLOOD PRESSURE: 126 MMHG | DIASTOLIC BLOOD PRESSURE: 74 MMHG | WEIGHT: 194.8 LBS | OXYGEN SATURATION: 99 % | HEART RATE: 60 BPM

## 2022-05-31 DIAGNOSIS — I10 ESSENTIAL HYPERTENSION: Primary | ICD-10-CM

## 2022-05-31 DIAGNOSIS — E87.6 LOW BLOOD POTASSIUM: ICD-10-CM

## 2022-05-31 DIAGNOSIS — R73.03 PREDIABETES: ICD-10-CM

## 2022-05-31 DIAGNOSIS — L91.0 KELOID SCAR OF SKIN: ICD-10-CM

## 2022-05-31 DIAGNOSIS — N64.4 PAIN OF RIGHT BREAST: ICD-10-CM

## 2022-05-31 DIAGNOSIS — N18.31 STAGE 3A CHRONIC KIDNEY DISEASE (HCC): ICD-10-CM

## 2022-05-31 DIAGNOSIS — E53.8 VITAMIN B12 DEFICIENCY: ICD-10-CM

## 2022-05-31 DIAGNOSIS — I10 ESSENTIAL HYPERTENSION: ICD-10-CM

## 2022-05-31 PROCEDURE — G8399 PT W/DXA RESULTS DOCUMENT: HCPCS | Performed by: LEGAL MEDICINE

## 2022-05-31 PROCEDURE — 1090F PRES/ABSN URINE INCON ASSESS: CPT | Performed by: LEGAL MEDICINE

## 2022-05-31 PROCEDURE — G8754 DIAS BP LESS 90: HCPCS | Performed by: LEGAL MEDICINE

## 2022-05-31 PROCEDURE — 1123F ACP DISCUSS/DSCN MKR DOCD: CPT | Performed by: LEGAL MEDICINE

## 2022-05-31 PROCEDURE — G8427 DOCREV CUR MEDS BY ELIG CLIN: HCPCS | Performed by: LEGAL MEDICINE

## 2022-05-31 PROCEDURE — G8752 SYS BP LESS 140: HCPCS | Performed by: LEGAL MEDICINE

## 2022-05-31 PROCEDURE — G8510 SCR DEP NEG, NO PLAN REQD: HCPCS | Performed by: LEGAL MEDICINE

## 2022-05-31 PROCEDURE — 1101F PT FALLS ASSESS-DOCD LE1/YR: CPT | Performed by: LEGAL MEDICINE

## 2022-05-31 PROCEDURE — G8417 CALC BMI ABV UP PARAM F/U: HCPCS | Performed by: LEGAL MEDICINE

## 2022-05-31 PROCEDURE — 99214 OFFICE O/P EST MOD 30 MIN: CPT | Performed by: LEGAL MEDICINE

## 2022-05-31 PROCEDURE — G8536 NO DOC ELDER MAL SCRN: HCPCS | Performed by: LEGAL MEDICINE

## 2022-05-31 RX ORDER — LIDOCAINE 40 MG/G
CREAM TOPICAL
Qty: 45 G | Refills: 3 | Status: SHIPPED | OUTPATIENT
Start: 2022-05-31 | End: 2022-06-03

## 2022-05-31 RX ORDER — HYDROCHLOROTHIAZIDE 25 MG/1
25 TABLET ORAL DAILY
Qty: 90 TABLET | Refills: 1 | Status: SHIPPED | OUTPATIENT
Start: 2022-05-31 | End: 2022-08-29

## 2022-05-31 NOTE — PROGRESS NOTES
[employer] [employer]     Chief Complaint   Patient presents with    Follow-up     3 month      Vitals:    05/31/22 0954   BP: 126/74   Pulse: 60   Resp: 14   Temp: 97.8 °F (36.6 °C)   TempSrc: Temporal   SpO2: 99%   Weight: 194 lb 12.8 oz (88.4 kg)   Height: 5' 7\" (1.702 m)   PainSc:   0 - No pain         HPI:Mrs.Brooks Mayfield is here for follow up     Work in her backyard  no other exercise   She still having keloid pain on her right breast she was seen by breast surgery who cleared her from any surgical related diagnosis and she was seen by dermatologist, she has no improvement in the pain, previously she was given gabapentin for pain but she has stopped taking it she did her Glucola she does not recall why she stopped it    Past Medical History:   Diagnosis Date    Arthritis     Cataract     Hypercholesterolemia     Hypertension      Past Surgical History:   Procedure Laterality Date    HX APPENDECTOMY      HX CATARACT REMOVAL      HX COLONOSCOPY  12/2009    Dr. Na Schreiber, 10 yr follow up    HX TOTAL ABDOMINAL HYSTERECTOMY       Social History     Tobacco Use    Smoking status: Never Smoker    Smokeless tobacco: Never Used   Substance Use Topics    Alcohol use: No       Family History   Problem Relation Age of Onset    Diabetes Mother     Hypertension Mother     Cancer Sister 48        breast    Cancer Maternal Aunt         breast    Stroke Sister        Review of Systems   Constitutional: Negative for chills, fever, malaise/fatigue and weight loss. HENT: Negative for congestion, ear discharge, ear pain, hearing loss, nosebleeds, sinus pain and sore throat. Eyes: Negative for blurred vision, double vision and discharge. Respiratory: Negative for cough, hemoptysis, sputum production, shortness of breath, wheezing and stridor. Cardiovascular: Negative for chest pain, palpitations, claudication and leg swelling.    Gastrointestinal: Negative for abdominal pain, blood in stool, constipation, diarrhea, melena, nausea and vomiting. Genitourinary: Negative for dysuria, flank pain, frequency, hematuria and urgency. Musculoskeletal: Positive for joint pain. Negative for back pain, myalgias and neck pain. Skin: Negative for itching and rash. Right breast painful keloids   Neurological: Negative for dizziness, tingling, tremors, sensory change, speech change, focal weakness, seizures, loss of consciousness, weakness and headaches. Psychiatric/Behavioral: Negative for depression, hallucinations, substance abuse and suicidal ideas. The patient is not nervous/anxious. Physical Exam  Vitals and nursing note reviewed. Constitutional:       General: She is not in acute distress. Appearance: She is well-developed. She is not ill-appearing, toxic-appearing or diaphoretic. HENT:      Head: Normocephalic and atraumatic. Eyes:      General: No scleral icterus. Right eye: No discharge. Neck:      Thyroid: No thyromegaly. Cardiovascular:      Rate and Rhythm: Normal rate and regular rhythm. Heart sounds: Normal heart sounds. Pulmonary:      Effort: Pulmonary effort is normal. No respiratory distress. Breath sounds: Normal breath sounds. No wheezing or rales. Chest:      Chest wall: No tenderness. Abdominal:      General: There is no distension. Palpations: Abdomen is soft. Tenderness: There is no abdominal tenderness. There is no rebound. Musculoskeletal:         General: No tenderness or deformity. Normal range of motion. Right lower leg: No edema. Left lower leg: No edema. Lymphadenopathy:      Cervical: No cervical adenopathy. Skin:     General: Skin is warm and dry. Coloration: Skin is not pale. Findings: Lesion present. No erythema or rash. Comments: Patient has keloid in the right breast laterally tender to touch   Neurological:      Mental Status: She is alert and oriented to person, place, and time.       Cranial Nerves: No cranial nerve deficit. Coordination: Coordination normal.   Psychiatric:         Mood and Affect: Mood normal.         Behavior: Behavior normal.         Thought Content: Thought content normal.         Judgment: Judgment normal.          Assessment and plan     Plan of care has been discussed with the patient, he agrees to the plan and verbalized understanding. All his questions were answered  More than 50% of the time spent in this visit was counseling the patient about  illness and treatment options         1. Keloid scar of skin  Advised patient to continue taking gabapentin which she still have and to also apply topical lidocaine creams as needed blood pressures well controlled on current medications  - lidocaine (XYLOCAINE) 4 % topical cream; Apply  to affected area two (2) times daily as needed for Pain for up to 3 days. Dispense: 45 g; Refill: 3    2. Pain of right breast    - lidocaine (XYLOCAINE) 4 % topical cream; Apply  to affected area two (2) times daily as needed for Pain for up to 3 days. Dispense: 45 g; Refill: 3    3. Essential hypertension  Blood pressures well controlled on current medications  - hydroCHLOROthiazide (HYDRODIURIL) 25 mg tablet; Take 1 Tablet by mouth daily for 90 days. Dispense: 90 Tablet; Refill: 1  - METABOLIC PANEL, BASIC; Future    4. Prediabetes    Is stable with lifestyle modifications last hemoglobin A1c was 5.93 months ago  5. Vitamin B12 deficiency  She is on multivitamins    6. Stage 3a chronic kidney disease (HCC)  Stable CKD avoid all NSAIDs    7. Low blood potassium  She is taking potassium supplement 10 mEq 2 tablets daily  - METABOLIC PANEL, BASIC; Future    Current Outpatient Medications   Medication Sig Dispense Refill    lidocaine (XYLOCAINE) 4 % topical cream Apply  to affected area two (2) times daily as needed for Pain for up to 3 days. 45 g 3    hydroCHLOROthiazide (HYDRODIURIL) 25 mg tablet Take 1 Tablet by mouth daily for 90 days.  80 Tablet 1    timolol (TIMOPTIC) 0.5 % ophthalmic solution INSTILL 1 DROP IN RIGHT EYE EVERY MORNING      gabapentin (NEURONTIN) 100 mg capsule gabapentin 100 mg capsule      diclofenac (VOLTAREN) 1 % gel Apply 4 g to affected area three (3) times daily as needed for Pain. To both knees 300 g 1    acetaminophen (TYLENOL) 650 mg TbER Take 1 Tablet by mouth every eight to twelve (8-12) hours as needed for Pain. 270 Tablet 1    Arm Brace (Wrist Support Large-XLarge) misc Wrist support for carpal tunnel  syndrome 2 Each 0    furosemide (LASIX) 20 mg tablet TAKE 1 TO 2 TABLETS EVERY DAY AS NEEDED FOR SWELLING 90 Tablet 0    potassium chloride (K-DUR, KLOR-CON) 20 mEq tablet TAKE 1 TABLET TWICE DAILY 180 Tab 0    metoprolol succinate (TOPROL-XL) 100 mg tablet TAKE 1 TABLET EVERY DAY 90 Tab 1    atorvastatin (LIPITOR) 10 mg tablet TAKE 1 TABLET EVERY DAY 90 Tab 1     mg tablet TAKE 1 TABLET EVERY 8 HOURS AS NEEDED FOR PAIN 90 Tab 1    calcium carbonate (CALTREX) 600 mg calcium (1,500 mg) tablet Take 600 mg by mouth two (2) times a day.  folic acid/multivit-min/lutein (CENTRUM SILVER PO) Take  by mouth.  cholecalciferol (VITAMIN D3) 1,000 unit cap Take  by mouth daily.          Patient Active Problem List    Diagnosis Date Noted    Primary osteoarthritis of left knee 02/21/2022    Abnormal mammogram 12/10/2019    Prediabetes 05/23/2017    CKD (chronic kidney disease) 02/29/2016    Complete rotator cuff tear of left shoulder 10/31/2014    Osteopenia 06/24/2014    Osteoarthritis of right knee 05/31/2014    Vitamin D deficiency 03/30/2014    Breast pain 03/11/2014    DDD (degenerative disc disease), lumbar 03/11/2014    Hypertension 03/11/2014    Hyperlipidemia 03/11/2014     Results for orders placed or performed in visit on 02/28/22   IRON PROFILE   Result Value Ref Range    TIBC 255 250 - 450 ug/dL    UIBC 204 118 - 369 ug/dL    Iron 51 27 - 139 ug/dL    Iron % saturation 20 15 - 55 % VITAMIN B12 & FOLATE   Result Value Ref Range    Vitamin B12 941 232 - 1,245 pg/mL    Folate 17.5 >3.0 ng/mL   VITAMIN D, 25 HYDROXY   Result Value Ref Range    VITAMIN D, 25-HYDROXY 34.1 30.0 - 100.0 ng/mL     No visits with results within 3 Month(s) from this visit. Latest known visit with results is:   Office Visit on 02/28/2022   Component Date Value Ref Range Status    TIBC 02/28/2022 255  250 - 450 ug/dL Final    UIBC 02/28/2022 204  118 - 369 ug/dL Final    Iron 02/28/2022 51  27 - 139 ug/dL Final    Iron % saturation 02/28/2022 20  15 - 55 % Final    Vitamin B12 02/28/2022 941  232 - 1,245 pg/mL Final    Folate 02/28/2022 17.5  >3.0 ng/mL Final    Comment: A serum folate concentration of less than 3.1 ng/mL is  considered to represent clinical deficiency.  VITAMIN D, 25-HYDROXY 02/28/2022 34.1  30.0 - 100.0 ng/mL Final    Comment: Vitamin D deficiency has been defined by the [facility] [address] practice guideline as a  level of serum 25-OH vitamin D less than 20 ng/mL (1,2). The Endocrine Society went on to further define vitamin D  insufficiency as a level between 21 and 29 ng/mL (2). 1. IOM ([place] of Medicine). 2010. Dietary reference     intakes for calcium and D. [facility]: The     [employer]. 2. Raul MF, Vicky NC, Brandon WADSWORTH, et al.     Evaluation, treatment, and prevention of vitamin D     deficiency: an Endocrine Society clinical practice     guideline. JCEM. 2011 Jul; 96(7):1911-30. Follow-up and Dispositions    · Return if symptoms worsen or fail to improve.

## 2022-05-31 NOTE — PROGRESS NOTES
Shirley Mitchell is a 80 y.o. female (: 1940) presenting to address:    Chief Complaint   Patient presents with    Follow-up     3 month        Vitals:    22 0954   BP: 126/74   Pulse: 60   Resp: 14   Temp: 97.8 °F (36.6 °C)   TempSrc: Temporal   SpO2: 99%   Weight: 194 lb 12.8 oz (88.4 kg)   Height: 5' 7\" (1.702 m)   PainSc:   0 - No pain       Hearing/Vision:   No exam data present    Learning Assessment:     Learning Assessment 2/3/2015   PRIMARY LEARNER Patient   HIGHEST LEVEL OF EDUCATION - PRIMARY LEARNER  GRADUATED HIGH SCHOOL OR GED   BARRIERS PRIMARY LEARNER NONE   CO-LEARNER CAREGIVER No   PRIMARY LANGUAGE ENGLISH   LEARNER PREFERENCE PRIMARY READING   ANSWERED BY self   RELATIONSHIP SELF     Depression Screening:     3 most recent PHQ Screens 2022   Little interest or pleasure in doing things Not at all   Feeling down, depressed, irritable, or hopeless Not at all   Total Score PHQ 2 0     Fall Risk Assessment:     Fall Risk Assessment, last 12 mths 2022   Able to walk? Yes   Fall in past 12 months? 0   Do you feel unsteady? 0   Are you worried about falling 0   Is the gait abnormal? -   Number of falls in past 12 months -   Fall with injury? -     Abuse Screening:     Abuse Screening Questionnaire 2022   Do you ever feel afraid of your partner? N   Are you in a relationship with someone who physically or mentally threatens you? N   Is it safe for you to go home?  Y     ADL Assessment:     ADL Assessment 2021   Feeding yourself No Help Needed   Getting from bed to chair No Help Needed   Getting dressed No Help Needed   Bathing or showering No Help Needed   Walk across the room (includes cane/walker) No Help Needed   Using the telphone No Help Needed   Taking your medications No Help Needed   Preparing meals No Help Needed   Managing money (expenses/bills) No Help Needed   Moderately strenuous housework (laundry) No Help Needed   Shopping for personal items (toiletries/medicines) No Help Needed   Shopping for groceries No Help Needed   Driving No Help Needed   Climbing a flight of stairs No Help Needed   Getting to places beyond walking distances No Help Needed        Coordination of Care Questionaire:   1. \"Have you been to the ER, urgent care clinic since your last visit? Hospitalized since your last visit? \" No    2. \"Have you seen or consulted any other health care providers outside of the [address] [place] [address] Michele since your last visit? \" Yes When: 5/27/2022 Where: Sports med and breast exam Reason for visit: follow up/ pain     3. For patients aged 39-70: Has the patient had a colonoscopy? Yes - Care Gap present. Most recent result on file     If the patient is female:    4. For patients aged 41-77: Has the patient had a mammogram within the past 2 years? Yes - Care Gap present. Most recent result on file    5. For patients aged 21-65: Has the patient had a pap smear? Yes - Care Gap present. Most recent result on file    Advanced Directive:   1. Do you have an Advanced Directive? YES    2. Would you like information on Advanced Directives?  NO

## 2022-06-01 LAB
BUN SERPL-MCNC: 16 MG/DL (ref 8–27)
BUN/CREAT SERPL: 16 (ref 12–28)
CALCIUM SERPL-MCNC: 9.2 MG/DL (ref 8.7–10.3)
CHLORIDE SERPL-SCNC: 98 MMOL/L (ref 96–106)
CO2 SERPL-SCNC: 28 MMOL/L (ref 20–29)
CREAT SERPL-MCNC: 1.03 MG/DL (ref 0.57–1)
EGFR: 55 ML/MIN/1.73
GLUCOSE SERPL-MCNC: 111 MG/DL (ref 65–99)
INTERPRETATION: NORMAL
POTASSIUM SERPL-SCNC: 4.3 MMOL/L (ref 3.5–5.2)
SODIUM SERPL-SCNC: 137 MMOL/L (ref 134–144)

## 2022-06-02 RX ORDER — FUROSEMIDE 20 MG/1
TABLET ORAL
Qty: 90 TABLET | Refills: 0 | Status: SHIPPED | OUTPATIENT
Start: 2022-06-02 | End: 2022-07-14

## 2022-06-02 NOTE — TELEPHONE ENCOUNTER
Last visit: 5/31/22  Next visit:   Future Appointments   Date Time Provider Sachin Ambriz   6/3/2022 10:00 AM Rosa Hudson MD W [facility]B   8/30/2022 11:00 AM Romy Ferrer MD [facility]     Last filled: 6/8/21; lasix 20 mg tab; qty 90 w/no refills

## 2022-06-03 ENCOUNTER — OFFICE VISIT (OUTPATIENT)
Dept: SPORTS MEDICINE | Age: 82
End: 2022-06-03
Payer: MEDICARE

## 2022-06-03 DIAGNOSIS — N18.30 STAGE 3 CHRONIC KIDNEY DISEASE, UNSPECIFIED WHETHER STAGE 3A OR 3B CKD (HCC): ICD-10-CM

## 2022-06-03 DIAGNOSIS — M17.12 PRIMARY OSTEOARTHRITIS OF LEFT KNEE: ICD-10-CM

## 2022-06-03 DIAGNOSIS — M17.11 PRIMARY OSTEOARTHRITIS OF RIGHT KNEE: Primary | ICD-10-CM

## 2022-06-03 PROCEDURE — 20610 DRAIN/INJ JOINT/BURSA W/O US: CPT | Performed by: FAMILY MEDICINE

## 2022-06-03 RX ORDER — HYALURONATE SODIUM 30 MG/2 ML
30 SYRINGE (ML) INTRAARTICULAR ONCE
Qty: 1 EACH | Refills: 0
Start: 2022-06-03 | End: 2022-06-03 | Stop reason: CLARIF

## 2022-06-03 NOTE — PROGRESS NOTES
[address] [facility]  Sports Medicine Office Visit    Chaparro Lehman is a 80 y.o. female (: 1940) presenting to address:  Chief Complaint   Patient presents with    Knee Pain     bilateral - Orthovisc #4       PCP: Blanca Marroquin MD  Referring provider: Blanca Marroquin MD         Assessment/Plan:       ICD-10-CM ICD-9-CM   1. Primary osteoarthritis of right knee  M17.11 715.16   2. Primary osteoarthritis of left knee  M17.12 715.16   3. Stage 3 chronic kidney disease, unspecified whether stage 3a or 3b CKD ([facility] [address]  N18.30 585. 3       Discussion:  71yo overweight female with CKD3a presented initially in 2022 for evaluation of bilateral knee pain, LEFT occasionally worse than RIGHT, assoc with decreased tolerance to weightbearing activity, occasional swelling, occasional gait instability.  S/p viscosupplementation with Rejuvinix in 2019 and 2020     LEFT knee steroid injection - 2022  RIGHT knee steroid injection - Mar 2022       Since last visit (3 of 4 bilateral orthovisc):  No interval trauma  No new symptoms  No redness / swelling / warmth since last injections  Ongoing stiffness, which is what is currently bothering her the most        Pertinent exam findings: tenderness of medial & lateral joint lines of bilateral knees, RIGHT knee valgus deviation, mild effusion bilaterally        Impression:  No interval trauma  No new symptoms  No redness / swelling / warmth since last injections  Ongoing stiffness, which is what is currently bothering her the most        Pertinent exam findings: tenderness of medial & lateral joint lines of bilateral knees, RIGHT knee valgus deviation, mild effusion bilaterally           Plan:  > continue to avoid PO NSAIDs  > performed 4 of 4 series of bilateral orthovisc injections today, tolerated well  > advised post-visco injection instructions: relative rest, ice to both knees as needed  Discussed that hopefully symptoms of knee OA will be well controlled for at least 6+mo or so. She is comfortable with f/u as needed at this time. Follow-up and Dispositions    · Return if symptoms worsen or fail to improve. Orders Placed This Encounter    DRAIN/INJECT LARGE JOINT/BURSA    ORTHOVISC INJECTION PER DOSE    sodium hyaluronate (viscosup) (ORTHOVISC) 30 mg/2 mL injection syrg 30 mg    sodium hyaluronate (viscosup) (ORTHOVISC) 30 mg/2 mL injection syrg 30 mg         Management plan & patient instructions discussed with Rg Shea, who voiced understanding. This document may have been created with the aid of dictation software. Text may contain errors, particularly phonetic errors. Anny Ford MD  Internal Medicine, Family Medicine & Sports Medicine  6/3/2022    Subjective   History:   Rg Shea is a 80 y.o. female who presents to address:  Chief Complaint   Patient presents with    Knee Pain     bilateral - Orthovisc #4         [ see \"since last visit\" in A/P section ]    Past Medical History:   Diagnosis Date    Arthritis     Cataract     Hypercholesterolemia     Hypertension      Past Surgical History:   Procedure Laterality Date    HX APPENDECTOMY      HX CATARACT REMOVAL      HX COLONOSCOPY  12/2009    Dr. Marina Hodgkin, 10 yr follow up    HX [address]        reports that she has never smoked. She has never used smokeless tobacco. She reports that she does not drink alcohol and does not use drugs.   Family History   Problem Relation Age of Onset    Diabetes Mother     Hypertension Mother     Cancer Sister 48        breast    Cancer Maternal Aunt         breast    Stroke Sister      No Known Allergies    Problem List:      Patient Active Problem List    Diagnosis    Hypertension    Primary osteoarthritis of left knee    Abnormal mammogram     L breast-> referred to Dr. Jennifer Graves Prediabetes    CKD (chronic kidney disease)     Dr. Edwige August Complete rotator cuff tear of left shoulder     -- L shoulder injection by Dr. Carson Brandt on 3/4/2015    -- L shoulder MRI (2/13/2015):  1. Complete full thickness tear of supraspinatus & infraspinatus tendons with underlying tendinosis, about 3cm retraction & moderate muscular atrophy. Subscapularis tendinosis with no focal tear. 2. Posterior labral tear not excluded. Subchondral cystic change in the glenoid fossa. 3. Severe AC joint hypertrophy and inflammation with type 2 acromion and mild acromial anterior downsloping. -- L shoulder XR (10/31/2014): mild DJD of the superior GH joint. Minimal spurring at the superior [facility] joint without associated joint space narrowing.    - s/p subacromial injection (10/31/2014)      Osteopenia     12/2018: osteopenia, -1.2  - DEXA (8/16/2016): still osteopenia, but BMD has decreased overall    - DEXA (6/13/2014): T-scores -1.5 @ L1-L4; -1.0 @ distal 1/3 radius; 0.0 @ L prox fem; -0.2 @ R prox fem; -0.6 @ L fem neck; -0.3 @ R fem neck      Osteoarthritis of right knee     - Jan 2015: referred to ortho surgery to consider joint replacement  - Dec 2014: steroid injection, increase mobic to 15mg daily    - B knee XR (6/13/2014): mild tricompartmental arthritis in the L knee; moderate tricompartmental arthritis in the R knee with moderate knee effusion      Vitamin D deficiency     - VitD 23.6 (3/30/2014)      Breast pain     *3/11/2014: to consider steroid injection for keloid or lidoderm patches      DDD (degenerative disc disease), lumbar     -- L-spine XR (11/18/2013): Mild L4-5 disc space narrowing. There is no fracture. There is 3 mm anterior listhesis of L4 on L5. No change with extension. This increases to approximately 4 mm during flexion.        Hyperlipidemia       Medications:     Current Outpatient Medications on File Prior to Visit   Medication Sig Dispense Refill    furosemide (LASIX) 20 mg tablet TAKE 1 TO 2 TABLETS EVERY DAY AS NEEDED FOR SWELLING 90 Tablet 0    hydroCHLOROthiazide (HYDRODIURIL) 25 mg tablet Take 1 Tablet by mouth daily for 90 days. 90 Tablet 1    timolol (TIMOPTIC) 0.5 % ophthalmic solution INSTILL 1 DROP IN RIGHT EYE EVERY MORNING      Arm Brace (Wrist Support Large-XLarge) misc Wrist support for carpal tunnel  syndrome 2 Each 0    potassium chloride (K-DUR, KLOR-CON) 20 mEq tablet TAKE 1 TABLET TWICE DAILY 180 Tab 0    metoprolol succinate (TOPROL-XL) 100 mg tablet TAKE 1 TABLET EVERY DAY 90 Tab 1    atorvastatin (LIPITOR) 10 mg tablet TAKE 1 TABLET EVERY DAY 90 Tab 1    calcium carbonate (CALTREX) 600 mg calcium (1,500 mg) tablet Take 600 mg by mouth two (2) times a day.  folic acid/multivit-min/lutein (CENTRUM SILVER PO) Take  by mouth.  cholecalciferol (VITAMIN D3) 1,000 unit cap Take  by mouth daily.  lidocaine (XYLOCAINE) 4 % topical cream Apply  to affected area two (2) times daily as needed for Pain for up to 3 days. (Patient not taking: Reported on 6/3/2022) 45 g 3    gabapentin (NEURONTIN) 100 mg capsule gabapentin 100 mg capsule (Patient not taking: Reported on 6/3/2022)      diclofenac (VOLTAREN) 1 % gel Apply 4 g to affected area three (3) times daily as needed for Pain. To both knees (Patient not taking: Reported on 6/3/2022) 300 g 1    acetaminophen (TYLENOL) 650 mg TbER Take 1 Tablet by mouth every eight to twelve (8-12) hours as needed for Pain. (Patient not taking: Reported on 6/3/2022) 270 Tablet 1     No current facility-administered medications on file prior to visit. Objective   Physical Assessment:     Vitals:    06/03/22 1006   PainSc:   0 - No pain       Physical Exam  Nursing note reviewed. Constitutional:       General: She is not in acute distress. Appearance: Normal appearance. She is well-developed and overweight. She is not diaphoretic. HENT:      Head: Normocephalic and atraumatic.       Mouth/Throat:      Comments: Mask in place  Eyes:      Conjunctiva/sclera: Conjunctivae normal.   Musculoskeletal:      Cervical back: Neck supple. Right hip: No bony tenderness. Normal range of motion. Left hip: No bony tenderness. Normal range of motion. Right knee: Deformity (valgus) and effusion present. Normal range of motion. Tenderness present over the medial joint line and lateral joint line. Abnormal alignment (valgus). Left knee: Effusion present. No deformity. Normal range of motion. Tenderness present over the medial joint line and lateral joint line. Normal alignment. Skin:     General: Skin is warm and dry. Findings: No rash. Neurological:      Mental Status: She is alert and oriented to person, place, and time. Gait: Gait abnormal (mildly antalgic). Comments: Good sit-stand time and stability   Psychiatric:         Behavior: Behavior normal. Behavior is cooperative. Thought Content: Thought content normal.         Procedure Note:   Bilateral Knee Viscosupplementation Injections    Written informed consent signed after discussing the risks & benefits as well as alternative treatments with Fabiola Salazars.  The risks associated with viscosupplementation injection include but are not limited to bleeding, infection, synovitis (flare reaction), tendon rupture, skin color changes, and fat atrophy.     --------------------------Timeout Performed Prior To Procedure --------------------------    Chart reviewed for the following:  Erick Gandara MD, have reviewed the History, Physical and updated the Allergic reactions for [address] performed immediately prior to start of procedure:  I, Sowmya Keene MD, have performed the following reviews on Vassie Gruver prior to the start of the procedure:            * Patient was identified by name and date of birth   * Agreement on procedure being performed was verified  * Risks and Benefits explained to the patient  * Procedure site verified and marked as necessary  * Patient was positioned for comfort  * Consent was signed and verified     Time: 10:11 AM     Date of procedure: 6/3/2022    Procedure performed by:  Todd Figueroa MD    How tolerated by patient: tolerated the procedure well with no complications       ----------------------------------------------------------------------------------------------------------        Location: RIGHT  knee  Skin Prep:  Betadine x 3  Anesthesia: ethyl chloride  Needle used: 22g  Approach: lateral joint line  Position: seated  with knee @ 90deg  Medications: 2ml orthovisc     O ml of fluid aspirated.     Outcome: Patient tolerated the procedure well with no immediate complications noted. The area was cleaned & dressed.      ----------------------------------------------------------------------------------------------------------        Location: LEFT knee  Skin Prep:  Betadine x 3  Anesthesia: ethyl chloride  Needle used: 22g  Approach: lateral joint line  Position: seated  with knee @ 90deg  Medications: 2ml orthovisc    Outcome: Patient tolerated the procedure well with no immediate complications noted. The area was cleaned & dressed. Post-Procedure Pain Level: 0     Instructions: Patient instructed to rest the joint and apply ice as needed for 15 minutes every 2-3 hours for the next 24 hours, and to limit activity involving the area treated for the next 48 hours. Also instructed to call if there is increased pain, swelling or if a fever develops.

## 2022-06-03 NOTE — PATIENT INSTRUCTIONS
VISIT SURVEY        You may receive a survey regarding your visit today either by mail or email.  Please take the opportunity to let us know how we did.  This information helps us continue to improve and provide a great patient experience. TESTING / IMAGING RESULTS       If you have WH[employer] access:   Per federal regulations all of your results will be released to you and to your physician / provider simultaneously on [address]    o This means that it is likely that you will have the opportunity to review your results before your physician / provider!  Since all \"critical\" abnormal results are immediately called to the office / on-call providers on nights, weekends and holidays - please refrain from calling after hours when you receive abnormal results through [address] Instead, allow time for your physician / provider to review your results and then provide interpretation and/or guidance.  If the results are significantly abnormal and require time-sensitive action - guidance will be provided both via Foto[employer] and via telephone.  For all other results (interpreted as \"normal\", \"abnormal but expected\", \"reassuring / stable\", \"slightly abnormal\") - non-urgent guidance will be provided via Fotofeedback communication only.   Fotofeedback Help Desk # [phone]    If you do NOT have [facility]art access:   If the results are significantly abnormal and require time-sensitive action - guidance will be relayed to you via telephone.  For all other results (interpreted as \"normal\", \"abnormal but expected\", \"reassuring / stable\", \"slightly abnormal\") - non-urgent guidance will be mailed to you via [employer] Postal [employer]      Results from Outside Facilities / Laboratories:  Results of tests performed at outside facilities / laboratories likely will not appear in the [employer].  o They may appear in the patient portals of those outside facilities / laboratories.   o Please keep in mind that with your access to your patient portal directly to an outside facility / laboratory, you are likely viewing results before your physician / provider! Please allow time for your physician / provider to review your results and then provide interpretation and/or guidance. If you have questions about your results beyond the guidance provided in [employer] or in your results letter, please schedule a follow up appointment to discuss with your physician / provider. Joint Injections: Care Instructions  Your Care Instructions     Joint injections are shots into a joint, such as the knee. They may be used to put in medicines, such as pain relievers. A corticosteroid, or steroid, shot is used to reduce inflammation in tendons or joints. It is often used to treat problems such as arthritis, tendinitis, and bursitis. Steroids can be injected directly into a painful, inflamed joint. They can also help reduce inflammation of a bursa. A bursa is a sac of fluid. It cushions and lubricates areas where tendons, ligaments, skin, muscles, or bones rub against each other. A steroid shot can sometimes help with short-term pain relief when other treatments haven't worked. If steroid shots help, pain may improve for weeks or months. Follow-up care is a key part of your treatment and safety. Be sure to make and go to all appointments, and call your doctor if you are having problems. It's also a good idea to know your test results and keep a list of the medicines you take. How can you care for yourself at home? · Put ice or a cold pack on the area for 10 to 20 minutes at a time. Put a thin cloth between the ice and your skin. · Ask your doctor if you can take an over-the-counter pain medicine, such as acetaminophen (Tylenol), ibuprofen (Advil, Motrin), or naproxen (Aleve). Be safe with medicines. Read and follow all instructions on the label. · Avoid strenuous activities for several days.  In particular, avoid ones that put stress on the area where you got the shot. · If you have dressings over the area, keep them clean and dry. You may remove them when your doctor tells you to. When should you call for help? Call your doctor now or seek immediate medical care if:    · You have signs of infection, such as:  ? Increased pain, swelling, warmth, or redness. ? Red streaks leading from the site. ? Pus draining from the site. ? A fever. Watch closely for changes in your health, and be sure to contact your doctor if you have any problems. Where can you learn more? Go to http://www.gray.com/  Enter N616 in the search box to learn more about \"Joint Injections: Care Instructions. \"  Current as of: July 1, 2021               Content Version: 13.2  © 2006-2022 [employer]. Care instructions adapted under license by [employer] (which disclaims liability or warranty for this information). If you have questions about a medical condition or this instruction, always ask your healthcare professional. [place] [address] any warranty or liability for your use of this information.

## 2022-07-11 ENCOUNTER — PATIENT OUTREACH (OUTPATIENT)
Dept: CASE MANAGEMENT | Age: 82
End: 2022-07-11

## 2022-07-11 NOTE — PROGRESS NOTES
.Date/Time:  7/11/2022 11:54 AM    Method of communication with patient:phone    [address] ( [facility]) made out reach to  patient by telephone to offer Complex Case Management  ( CCM). Provided introduction to self, and explanation of the Ambulatory Care . Contact at [phone] anytime Monday thru Friday 08:00-4:30.

## 2022-07-13 DIAGNOSIS — M17.11 PRIMARY OSTEOARTHRITIS OF RIGHT KNEE: ICD-10-CM

## 2022-07-13 DIAGNOSIS — M17.12 PRIMARY OSTEOARTHRITIS OF LEFT KNEE: ICD-10-CM

## 2022-07-14 RX ORDER — FUROSEMIDE 20 MG/1
TABLET ORAL
Qty: 90 TABLET | Refills: 0 | Status: SHIPPED | OUTPATIENT
Start: 2022-07-14 | End: 2022-10-06

## 2022-07-15 RX ORDER — DEXTROMETHORPHAN HYDROBROMIDE, GUAIFENESIN 5; 100 MG/5ML; MG/5ML
LIQUID ORAL
Qty: 270 TABLET | Refills: 1 | Status: SHIPPED | OUTPATIENT
Start: 2022-07-15

## 2022-07-20 ENCOUNTER — PATIENT OUTREACH (OUTPATIENT)
Dept: CASE MANAGEMENT | Age: 82
End: 2022-07-20

## 2022-07-20 NOTE — PROGRESS NOTES
.Complex Case Management Denial       Date/Time:  7/20/2022 3:49 PM    Method of communication with patient:phone    [facility] ( [facility]) contacted the patient by telephone to perform Ambulatory Care Coordination. Provided introduction to self, and explanation of the Ambulatory Care Manager's role. Left on Voicemail for return call .

## 2022-07-27 ENCOUNTER — PATIENT OUTREACH (OUTPATIENT)
Dept: CASE MANAGEMENT | Age: 82
End: 2022-07-27

## 2022-07-27 NOTE — PROGRESS NOTES
.Complex Case Management Deferred 90 days      Date/Time:  7/27/2022 2:38 PM    Method of communication with patient:phone    [address] [facility] ( [facility]) attempted contact the patient by telephone to perform Ambulatory Care Coordination. For three unsuccessful calls.     Care Coordination Services calls will be suspended for 90 days unable to contact

## 2022-08-18 ENCOUNTER — OFFICE VISIT (OUTPATIENT)
Dept: FAMILY MEDICINE CLINIC | Age: 82
End: 2022-08-18
Payer: MEDICARE

## 2022-08-18 ENCOUNTER — APPOINTMENT (OUTPATIENT)
Dept: FAMILY MEDICINE CLINIC | Age: 82
End: 2022-08-18

## 2022-08-18 VITALS
RESPIRATION RATE: 16 BRPM | DIASTOLIC BLOOD PRESSURE: 72 MMHG | HEART RATE: 75 BPM | TEMPERATURE: 97.4 F | HEIGHT: 67 IN | WEIGHT: 194 LBS | OXYGEN SATURATION: 100 % | SYSTOLIC BLOOD PRESSURE: 129 MMHG | BODY MASS INDEX: 30.45 KG/M2

## 2022-08-18 DIAGNOSIS — R73.03 PREDIABETES: ICD-10-CM

## 2022-08-18 DIAGNOSIS — R06.09 DOE (DYSPNEA ON EXERTION): ICD-10-CM

## 2022-08-18 DIAGNOSIS — D64.9 ANEMIA, UNSPECIFIED TYPE: ICD-10-CM

## 2022-08-18 DIAGNOSIS — R06.09 DOE (DYSPNEA ON EXERTION): Primary | ICD-10-CM

## 2022-08-18 DIAGNOSIS — I10 ESSENTIAL HYPERTENSION: ICD-10-CM

## 2022-08-18 PROCEDURE — G8427 DOCREV CUR MEDS BY ELIG CLIN: HCPCS | Performed by: INTERNAL MEDICINE

## 2022-08-18 PROCEDURE — G8417 CALC BMI ABV UP PARAM F/U: HCPCS | Performed by: INTERNAL MEDICINE

## 2022-08-18 PROCEDURE — 99214 OFFICE O/P EST MOD 30 MIN: CPT | Performed by: INTERNAL MEDICINE

## 2022-08-18 PROCEDURE — 1123F ACP DISCUSS/DSCN MKR DOCD: CPT | Performed by: INTERNAL MEDICINE

## 2022-08-18 PROCEDURE — G8536 NO DOC ELDER MAL SCRN: HCPCS | Performed by: INTERNAL MEDICINE

## 2022-08-18 PROCEDURE — 93000 ELECTROCARDIOGRAM COMPLETE: CPT | Performed by: INTERNAL MEDICINE

## 2022-08-18 PROCEDURE — G8432 DEP SCR NOT DOC, RNG: HCPCS | Performed by: INTERNAL MEDICINE

## 2022-08-18 PROCEDURE — G8754 DIAS BP LESS 90: HCPCS | Performed by: INTERNAL MEDICINE

## 2022-08-18 PROCEDURE — 1090F PRES/ABSN URINE INCON ASSESS: CPT | Performed by: INTERNAL MEDICINE

## 2022-08-18 PROCEDURE — G8399 PT W/DXA RESULTS DOCUMENT: HCPCS | Performed by: INTERNAL MEDICINE

## 2022-08-18 NOTE — PROGRESS NOTES
HISTORY OF PRESENT ILLNESS  Ravi Sargent is a 80 y.o. female. HPI  C/o CASTRO, started 2-3 weeks ago, not associated with c/p, no palpitations, pt stated that she feel SOB when she walk up a flight of steps, she had similar c/o in 2019 and her pcp then ordered Echo that was ok with LVEF 06% and mild diastolic dysfuntion, she use lasix as needed only for swelling, no dyspnea at rest, no PND  HTN, stable, bp is controlled on toprol xl. HLD, controlled on lipitor 10 mg daily  Prediabetes, stable, last A1c was 5.9  Chronic anemia, ? Etiology, last Iron/Vit b12/folate levels were normal, last Hgb was 9.2. Review of Systems   Constitutional:  Negative for chills and fever. Respiratory:  Negative for cough and wheezing. Cardiovascular:  Negative for chest pain, palpitations, orthopnea, claudication, leg swelling and PND. Gastrointestinal:  Negative for abdominal pain. Physical Exam  Vitals reviewed. Constitutional:       General: She is not in acute distress. Appearance: She is not diaphoretic. Cardiovascular:      Rate and Rhythm: Normal rate and regular rhythm. Heart sounds: Normal heart sounds. No murmur heard. Pulmonary:      Effort: Pulmonary effort is normal. No respiratory distress. Breath sounds: Normal breath sounds. No wheezing or rales. Abdominal:      Palpations: Abdomen is soft. Tenderness: There is no abdominal tenderness. Musculoskeletal:      Right lower leg: No edema. Left lower leg: No edema. Neurological:      Mental Status: She is oriented to person, place, and time. ASSESSMENT and PLAN  Diagnoses and all orders for this visit:    1. CASTRO (dyspnea on exertion), ? Etiology, will re check Echo  -     XR CHEST PA LAT; Future  -     AMB POC EKG ROUTINE W/ 12 LEADS, INTER & REP, NSR, non specific T wave changes  -     CBC WITH AUTOMATED DIFF; Future  -     METABOLIC PANEL, BASIC; Future  -     NT-PRO BNP;  Future  -     ECHO ADULT COMPLETE; Future  - advised pt to take lasix 20 mg q am for now    2. Essential hypertension, stable, continue Toprol xl  -     CBC WITH AUTOMATED DIFF; Future  -     METABOLIC PANEL, BASIC; Future    3. Anemia, unspecified type, will repeat labs  -     CBC WITH AUTOMATED DIFF; Future  -     HEMOGLOBIN FRACTIONATION; Future  - consider Hematology evaluation    4. Prediabetes, stable  -     METABOLIC PANEL, BASIC; Future  -     HEMOGLOBIN A1C WITH EAG;  Future    Pt has follow up with Dr Carleen Glass in 2 weeks    Lab Results   Component Value Date/Time    Cholesterol, total 161 02/17/2022 11:55 AM    HDL Cholesterol 43 02/17/2022 11:55 AM    LDL-C, External 146 08/30/2016 12:00 AM    LDL, calculated 90 02/17/2022 11:55 AM    VLDL, calculated 28 02/17/2022 11:55 AM    Triglyceride 138 02/17/2022 11:55 AM     Lab Results   Component Value Date/Time    Hemoglobin A1c 5.9 (H) 02/17/2022 11:55 AM    Hemoglobin A1c (POC) 5.4 02/13/2018 11:13 AM       Lab Results   Component Value Date/Time    WBC 2.3 (L) 02/17/2022 11:55 AM    HGB 9.2 (L) 02/17/2022 11:55 AM    HCT 28.3 (L) 02/17/2022 11:55 AM    PLATELET 960 60/70/0928 11:55 AM     (H) 02/17/2022 11:55 AM

## 2022-08-18 NOTE — PROGRESS NOTES
Elena Tavarez is a 80 y.o. female (: 1940) presenting to address:    Chief Complaint   Patient presents with    Shortness of Breath       Vitals:    22 1103   BP: 129/72   Pulse: 75   Resp: 16   Temp: 97.4 °F (36.3 °C)   SpO2: 100%   Weight: 194 lb (88 kg)   Height: 5' 7\" (1.702 m)   PainSc:   7   PainLoc: Back       Hearing/Vision:   No results found. Learning Assessment:     Learning Assessment 2/3/2015   PRIMARY LEARNER Patient   HIGHEST LEVEL OF EDUCATION - PRIMARY LEARNER  GRADUATED HIGH SCHOOL OR GED   BARRIERS PRIMARY LEARNER NONE   CO-LEARNER CAREGIVER No   PRIMARY LANGUAGE ENGLISH   LEARNER PREFERENCE PRIMARY READING   ANSWERED BY self   RELATIONSHIP SELF     Depression Screening:     3 most recent PHQ Screens 2022   Little interest or pleasure in doing things Not at all   Feeling down, depressed, irritable, or hopeless Not at all   Total Score PHQ 2 0     Fall Risk Assessment:     Fall Risk Assessment, last 12 mths 2022   Able to walk? Yes   Fall in past 12 months? 0   Do you feel unsteady? -   Are you worried about falling -   Is the gait abnormal? -   Number of falls in past 12 months -   Fall with injury? -     Abuse Screening:     Abuse Screening Questionnaire 2022   Do you ever feel afraid of your partner? N   Are you in a relationship with someone who physically or mentally threatens you? N   Is it safe for you to go home?  Y     ADL Assessment:     ADL Assessment 2021   Feeding yourself No Help Needed   Getting from bed to chair No Help Needed   Getting dressed No Help Needed   Bathing or showering No Help Needed   Walk across the room (includes cane/walker) No Help Needed   Using the telphone No Help Needed   Taking your medications No Help Needed   Preparing meals No Help Needed   Managing money (expenses/bills) No Help Needed   Moderately strenuous housework (laundry) No Help Needed   Shopping for personal items (toiletries/medicines) No Help Needed Shopping for groceries No Help Needed   Driving No Help Needed   Climbing a flight of stairs No Help Needed   Getting to places beyond walking distances No Help Needed        Coordination of Care Questionaire:   1. \"Have you been to the ER, urgent care clinic since your last visit? Hospitalized since your last visit? \" No    2. \"Have you seen or consulted any other health care providers outside of the [address] [place] [address] Michele since your last visit? \" No     3. For patients aged 39-70: Has the patient had a colonoscopy? Yes - no Care Gap present     If the patient is female:    4. For patients aged 41-77: Has the patient had a mammogram within the past 2 years? N/a  5. For patients aged 21-65: Has the patient had a pap smear? NA - based on age    Advanced Directive:   1. Do you have an Advanced Directive? NO    2. Would you like information on Advanced Directives?  NO

## 2022-08-22 LAB
BASOPHILS # BLD AUTO: 0 X10E3/UL (ref 0–0.2)
BASOPHILS NFR BLD AUTO: 0 %
BUN SERPL-MCNC: 11 MG/DL (ref 8–27)
BUN/CREAT SERPL: 12 (ref 12–28)
CALCIUM SERPL-MCNC: 9.1 MG/DL (ref 8.7–10.3)
CHLORIDE SERPL-SCNC: 97 MMOL/L (ref 96–106)
CO2 SERPL-SCNC: 29 MMOL/L (ref 20–29)
CREAT SERPL-MCNC: 0.93 MG/DL (ref 0.57–1)
EGFR: 63 ML/MIN/1.73
EOSINOPHIL # BLD AUTO: 0 X10E3/UL (ref 0–0.4)
EOSINOPHIL NFR BLD AUTO: 0 %
ERYTHROCYTE [DISTWIDTH] IN BLOOD BY AUTOMATED COUNT: 12.9 % (ref 11.7–15.4)
EST. AVERAGE GLUCOSE BLD GHB EST-MCNC: 126 MG/DL
GLUCOSE SERPL-MCNC: 137 MG/DL (ref 65–99)
HBA1C MFR BLD: 6 % (ref 4.8–5.6)
HCT VFR BLD AUTO: 27.1 % (ref 34–46.6)
HGB A MFR BLD ELPH: 61.7 % (ref 96.4–98.8)
HGB A2 MFR BLD ELPH: 2.9 % (ref 1.8–3.2)
HGB BLD-MCNC: 9.1 G/DL (ref 11.1–15.9)
HGB F MFR BLD ELPH: 2.7 % (ref 0–2)
HGB FRACT BLD-IMP: ABNORMAL
HGB FRACT BLD-IMP: ABNORMAL
HGB S BLD QL SOLY: POSITIVE
HGB S MFR BLD ELPH: 32.7 %
IMM GRANULOCYTES # BLD AUTO: 0 X10E3/UL (ref 0–0.1)
IMM GRANULOCYTES NFR BLD AUTO: 0 %
LYMPHOCYTES # BLD AUTO: 1.2 X10E3/UL (ref 0.7–3.1)
LYMPHOCYTES NFR BLD AUTO: 31 %
MCH RBC QN AUTO: 34.5 PG (ref 26.6–33)
MCHC RBC AUTO-ENTMCNC: 33.6 G/DL (ref 31.5–35.7)
MCV RBC AUTO: 103 FL (ref 79–97)
MONOCYTES # BLD AUTO: 0.2 X10E3/UL (ref 0.1–0.9)
MONOCYTES NFR BLD AUTO: 4 %
NEUTROPHILS # BLD AUTO: 2.5 X10E3/UL (ref 1.4–7)
NEUTROPHILS NFR BLD AUTO: 65 %
NT-PROBNP SERPL-MCNC: 118 PG/ML (ref 0–738)
PLATELET # BLD AUTO: 275 X10E3/UL (ref 150–450)
POTASSIUM SERPL-SCNC: 3.4 MMOL/L (ref 3.5–5.2)
RBC # BLD AUTO: 2.64 X10E6/UL (ref 3.77–5.28)
SODIUM SERPL-SCNC: 139 MMOL/L (ref 134–144)
WBC # BLD AUTO: 3.9 X10E3/UL (ref 3.4–10.8)

## 2022-08-24 NOTE — PROGRESS NOTES
Informed pt of lab results and tx plan; pt voiced understanding; pt scheduled on: Future Appointments  8/30/2022  11:00 AM   Ian Pollack MD      BSMA                BS AMB

## 2022-08-24 NOTE — PROGRESS NOTES
Labs showed pt has sickle cell trait, Hgb is similar to last labs @ 9.1  A1c 6.0, prediabetes stable  Pt has follow up with Dr Elizabeth Yanez next week and she will discuss further with her.   Continue current meds

## 2022-08-30 ENCOUNTER — APPOINTMENT (OUTPATIENT)
Dept: FAMILY MEDICINE CLINIC | Age: 82
End: 2022-08-30

## 2022-08-30 ENCOUNTER — OFFICE VISIT (OUTPATIENT)
Dept: FAMILY MEDICINE CLINIC | Age: 82
End: 2022-08-30
Payer: MEDICARE

## 2022-08-30 VITALS
RESPIRATION RATE: 16 BRPM | DIASTOLIC BLOOD PRESSURE: 82 MMHG | TEMPERATURE: 98.7 F | SYSTOLIC BLOOD PRESSURE: 150 MMHG | HEIGHT: 67 IN | BODY MASS INDEX: 31.08 KG/M2 | HEART RATE: 74 BPM | WEIGHT: 198 LBS | OXYGEN SATURATION: 97 %

## 2022-08-30 DIAGNOSIS — M54.50 CHRONIC BILATERAL LOW BACK PAIN WITHOUT SCIATICA: ICD-10-CM

## 2022-08-30 DIAGNOSIS — I10 ESSENTIAL HYPERTENSION: Primary | ICD-10-CM

## 2022-08-30 DIAGNOSIS — D57.3 SICKLE-CELL TRAIT (HCC): ICD-10-CM

## 2022-08-30 DIAGNOSIS — G89.29 CHRONIC BILATERAL LOW BACK PAIN WITHOUT SCIATICA: ICD-10-CM

## 2022-08-30 DIAGNOSIS — D64.9 ANEMIA, UNSPECIFIED TYPE: ICD-10-CM

## 2022-08-30 DIAGNOSIS — R79.89 ABNORMAL CBC: ICD-10-CM

## 2022-08-30 PROCEDURE — G8754 DIAS BP LESS 90: HCPCS | Performed by: LEGAL MEDICINE

## 2022-08-30 PROCEDURE — G8536 NO DOC ELDER MAL SCRN: HCPCS | Performed by: LEGAL MEDICINE

## 2022-08-30 PROCEDURE — 99214 OFFICE O/P EST MOD 30 MIN: CPT | Performed by: LEGAL MEDICINE

## 2022-08-30 PROCEDURE — G8753 SYS BP > OR = 140: HCPCS | Performed by: LEGAL MEDICINE

## 2022-08-30 PROCEDURE — 1123F ACP DISCUSS/DSCN MKR DOCD: CPT | Performed by: LEGAL MEDICINE

## 2022-08-30 PROCEDURE — G8399 PT W/DXA RESULTS DOCUMENT: HCPCS | Performed by: LEGAL MEDICINE

## 2022-08-30 PROCEDURE — G8417 CALC BMI ABV UP PARAM F/U: HCPCS | Performed by: LEGAL MEDICINE

## 2022-08-30 PROCEDURE — G8427 DOCREV CUR MEDS BY ELIG CLIN: HCPCS | Performed by: LEGAL MEDICINE

## 2022-08-30 PROCEDURE — 1090F PRES/ABSN URINE INCON ASSESS: CPT | Performed by: LEGAL MEDICINE

## 2022-08-30 RX ORDER — CYCLOBENZAPRINE HCL 5 MG
5 TABLET ORAL
Qty: 30 TABLET | Refills: 0 | Status: SHIPPED | OUTPATIENT
Start: 2022-08-30 | End: 2022-09-29

## 2022-08-30 NOTE — PROGRESS NOTES
1. \"Have you been to the ER, urgent care clinic since your last visit? Hospitalized since your last visit? \" No    2. \"Have you seen or consulted any other health care providers outside of the [address] [place] [address] since your last visit? \" No     3. For patients aged 39-70: Has the patient had a colonoscopy / FIT/ Cologuard? NA - based on age      If the patient is female:    4. For patients aged 41-77: Has the patient had a mammogram within the past 2 years? NA - based on age or sex      11. For patients aged 21-65: Has the patient had a pap smear?  NA - based on age or sex

## 2022-08-30 NOTE — PROGRESS NOTES
Sonal Olivares     Chief Complaint   Patient presents with    Follow-up     Vitals:    08/30/22 1113 08/30/22 1130   BP: (!) 152/84 (!) 150/82   Pulse: 74    Resp: 16    Temp: 98.7 °F (37.1 °C)    TempSrc: Temporal    SpO2: 97%    Weight: 198 lb (89.8 kg)    Height: 5' 7\" (1.702 m)    PainSc:   0 - No pain          HPI:    Patient is here for follow-up after she had seen Dr. Michael Cooper 2 weeks ago, at that time she was having shortness of breath, now her shortness of breath has improved  Echocardiogram was requested but patient need to schedule it  CBC showing anemia and solubility test was positive    Blood pressure is elevated today because she has not taken her medication yet she usually takes them after she eats later in the morning    She has low back pain gabapentin has not helped her in the past    Past Medical History:   Diagnosis Date    Arthritis     Cataract     Hypercholesterolemia     Hypertension      Past Surgical History:   Procedure Laterality Date    HX APPENDECTOMY      HX CATARACT REMOVAL      HX COLONOSCOPY  12/2009    Dr. Yasmani Pandey, 10 yr follow up    HX TOTAL ABDOMINAL HYSTERECTOMY       Social History     Tobacco Use    Smoking status: Never    Smokeless tobacco: Never   Substance Use Topics    Alcohol use: No       Family History   Problem Relation Age of Onset    Diabetes Mother     Hypertension Mother     Cancer Sister 48        breast    Cancer Maternal Aunt         breast    Stroke Sister        Review of Systems   Constitutional:  Negative for chills, fever, malaise/fatigue and weight loss. HENT:  Negative for congestion, ear discharge, ear pain, hearing loss, nosebleeds, sinus pain and sore throat. Eyes:  Negative for blurred vision, double vision and discharge. Respiratory:  Negative for cough, hemoptysis, sputum production, shortness of breath and wheezing. Cardiovascular:  Negative for chest pain, palpitations, claudication and leg swelling.    Gastrointestinal:  Negative for abdominal pain, constipation, diarrhea, nausea and vomiting. Genitourinary:  Negative for dysuria, frequency and urgency. Musculoskeletal:  Positive for back pain. Negative for joint pain, myalgias and neck pain. Skin:  Negative for itching and rash. Neurological:  Negative for dizziness, tingling, sensory change, speech change, focal weakness, weakness and headaches. Psychiatric/Behavioral:  Negative for depression, hallucinations, memory loss, substance abuse and suicidal ideas. The patient is not nervous/anxious and does not have insomnia. Physical Exam  Vitals and nursing note reviewed. Constitutional:       General: She is not in acute distress. Appearance: She is well-developed. She is not diaphoretic. HENT:      Head: Normocephalic and atraumatic. Eyes:      General: No scleral icterus. Right eye: No discharge. Left eye: No discharge. Conjunctiva/sclera: Conjunctivae normal.   Neck:      Thyroid: No thyromegaly. Cardiovascular:      Rate and Rhythm: Normal rate and regular rhythm. Heart sounds: Normal heart sounds. Pulmonary:      Effort: Pulmonary effort is normal. No respiratory distress. Breath sounds: Normal breath sounds. No wheezing or rales. Chest:      Chest wall: No tenderness. Abdominal:      General: There is no distension. Palpations: Abdomen is soft. Tenderness: There is no abdominal tenderness. There is no rebound. Musculoskeletal:         General: No tenderness or deformity. Normal range of motion. Lymphadenopathy:      Cervical: No cervical adenopathy. Skin:     General: Skin is warm and dry. Coloration: Skin is not jaundiced or pale. Findings: No bruising, erythema, lesion or rash. Neurological:      Mental Status: She is alert and oriented to person, place, and time. Cranial Nerves: No cranial nerve deficit.       Coordination: Coordination normal.   Psychiatric:         Behavior: Behavior normal. Thought Content: Thought content normal.         Judgment: Judgment normal.        Assessment and plan     Plan of care has been discussed with the patient, he agrees to the plan and verbalized understanding. All his questions were answered  More than 50% of the time spent in this visit was counseling the patient about  illness and treatment options         1. Essential hypertension  Continue same medicationsrecheck blood pressure next visit-    2. Abnormal CBC   CBC WITH AUTOMATED DIFF; Future  - HEMOGLOBIN FRACTIONATION; Future    3. Anemia, unspecified type    - IRON PROFILE; Future  - CBC WITH AUTOMATED DIFF; Future  - HEMOGLOBIN FRACTIONATION; Future    4. Chronic bilateral low back pain without sciatica  To try muscle relaxant and to consider physical therapy    - cyclobenzaprine (FLEXERIL) 5 mg tablet; Take 1 Tablet by mouth daily as needed for Muscle Spasm(s) for up to 30 days. Dispense: 30 Tablet; Refill: 0  Current Outpatient Medications   Medication Sig Dispense Refill    cyclobenzaprine (FLEXERIL) 5 mg tablet Take 1 Tablet by mouth daily as needed for Muscle Spasm(s) for up to 30 days.  30 Tablet 0    acetaminophen (TYLENOL) 650 mg TbER TAKE 1 TABLET EVERY 8 TO 12 HOURS AS NEEDED FOR PAIN 270 Tablet 1    furosemide (LASIX) 20 mg tablet TAKE 1 TO 2 TABLETS EVERY DAY AS NEEDED FOR SWELLING 90 Tablet 0    timolol (TIMOPTIC) 0.5 % ophthalmic solution INSTILL 1 DROP IN RIGHT EYE EVERY MORNING      gabapentin (NEURONTIN) 100 mg capsule gabapentin 100 mg capsule      Arm Brace (Wrist Support Large-XLarge) misc Wrist support for carpal tunnel  syndrome 2 Each 0    potassium chloride (K-DUR, KLOR-CON) 20 mEq tablet TAKE 1 TABLET TWICE DAILY 180 Tab 0    metoprolol succinate (TOPROL-XL) 100 mg tablet TAKE 1 TABLET EVERY DAY 90 Tab 1    atorvastatin (LIPITOR) 10 mg tablet TAKE 1 TABLET EVERY DAY 90 Tab 1    calcium carbonate (CALTREX) 600 mg calcium (1,500 mg) tablet Take 600 mg by mouth two (2) times a day.      folic acid/multivit-min/lutein (CENTRUM SILVER PO) Take  by mouth. cholecalciferol (VITAMIN D3) 25 mcg (1,000 unit) cap Take  by mouth daily. hydroCHLOROthiazide (HYDRODIURIL) 25 mg tablet Take 1 Tablet by mouth daily for 90 days.  90 Tablet 1       Patient Active Problem List    Diagnosis Date Noted    Primary osteoarthritis of left knee 02/21/2022    Abnormal mammogram 12/10/2019    Prediabetes 05/23/2017    CKD (chronic kidney disease) 02/29/2016    Complete rotator cuff tear of left shoulder 10/31/2014    Osteopenia 06/24/2014    Osteoarthritis of right knee 05/31/2014    Vitamin D deficiency 03/30/2014    Breast pain 03/11/2014    DDD (degenerative disc disease), lumbar 03/11/2014    Hypertension 03/11/2014    Hyperlipidemia 03/11/2014     Results for orders placed or performed in visit on 08/18/22   HEMOGLOBIN FRACTIONATION   Result Value Ref Range    Hemoglobin F 2.7 (H) 0.0 - 2.0 %    Hemoglobin A 61.7 (L) 96.4 - 98.8 %    Hemoglobin A2 2.9 1.8 - 3.2 %    Hemoglobin S 32.7 (H) 0.0 %    Interpretation Comment    HEMOGLOBIN A1C WITH EAG   Result Value Ref Range    Hemoglobin A1c 6.0 (H) 4.8 - 5.6 %    Estimated average glucose 126 mg/dL   NT-PRO BNP   Result Value Ref Range    PROBNP 118 0 - 832 pg/mL   METABOLIC PANEL, BASIC   Result Value Ref Range    Glucose 137 (H) 65 - 99 mg/dL    BUN 11 8 - 27 mg/dL    Creatinine 0.93 0.57 - 1.00 mg/dL    eGFR 63 >59 mL/min/1.73    BUN/Creatinine ratio 12 12 - 28    Sodium 139 134 - 144 mmol/L    Potassium 3.4 (L) 3.5 - 5.2 mmol/L    Chloride 97 96 - 106 mmol/L    CO2 29 20 - 29 mmol/L    Calcium 9.1 8.7 - 10.3 mg/dL   CBC WITH AUTOMATED DIFF   Result Value Ref Range    WBC 3.9 3.4 - 10.8 x10E3/uL    RBC 2.64 (LL) 3.77 - 5.28 x10E6/uL    HGB 9.1 (L) 11.1 - 15.9 g/dL    HCT 27.1 (L) 34.0 - 46.6 %     (H) 79 - 97 fL    MCH 34.5 (H) 26.6 - 33.0 pg    MCHC 33.6 31.5 - 35.7 g/dL    RDW 12.9 11.7 - 15.4 %    PLATELET 037 307 - 605 x10E3/uL NEUTROPHILS 65 Not Estab. %    Lymphocytes 31 Not Estab. %    MONOCYTES 4 Not Estab. %    EOSINOPHILS 0 Not Estab. %    BASOPHILS 0 Not Estab. %    ABS. NEUTROPHILS 2.5 1.4 - 7.0 x10E3/uL    Abs Lymphocytes 1.2 0.7 - 3.1 x10E3/uL    ABS. MONOCYTES 0.2 0.1 - 0.9 x10E3/uL    ABS. EOSINOPHILS 0.0 0.0 - 0.4 x10E3/uL    ABS. BASOPHILS 0.0 0.0 - 0.2 x10E3/uL    IMMATURE GRANULOCYTES 0 Not Estab. %    ABS. IMM. GRANS. 0.0 0.0 - 0.1 x10E3/uL   HGB SOLUBILITY   Result Value Ref Range    Hgb solubility Positive (A) Negative    Interpretation Comment      Appointment on 08/18/2022   Component Date Value Ref Range Status    Hemoglobin F 08/18/2022 2.7 (A) 0.0 - 2.0 % Final    Hemoglobin A 08/18/2022 61.7 (A) 96.4 - 98.8 % Final    Hemoglobin A2 08/18/2022 2.9  1.8 - 3.2 % Final    Hemoglobin S 08/18/2022 32.7 (A) 0.0 % Final    Interpretation 08/18/2022 Comment   Final    Reflex to Hgb Solubility indicated for confirmation.     Hemoglobin A1c 08/18/2022 6.0 (A) 4.8 - 5.6 % Final    Comment:          Prediabetes: 5.7 - 6.4           Diabetes: >6.4           Glycemic control for adults with diabetes: <7.0      Estimated average glucose 08/18/2022 126  mg/dL Final    PROBNP 08/18/2022 118  0 - 738 pg/mL Final    Comment: The following cut-points have been suggested for the  use of proBNP for the diagnostic evaluation of heart  failure (HF) in patients with acute dyspnea:  Modality                     Age           Optimal Cut                             (years)            Point  ------------------------------------------------------  Diagnosis (rule in HF)        <50            450 pg/mL                            50 - 75            900 pg/mL                                >75           1800 pg/mL  Exclusion (rule out HF)  Age independent     300 pg/mL      Glucose 08/18/2022 137 (A) 65 - 99 mg/dL Final    BUN 08/18/2022 11  8 - 27 mg/dL Final    Creatinine 08/18/2022 0.93  0.57 - 1.00 mg/dL Final    eGFR 08/18/2022 63  >59 mL/min/1.73 Final    BUN/Creatinine ratio 08/18/2022 12  12 - 28 Final    Sodium 08/18/2022 139  134 - 144 mmol/L Final    Potassium 08/18/2022 3.4 (A) 3.5 - 5.2 mmol/L Final    Chloride 08/18/2022 97  96 - 106 mmol/L Final    CO2 08/18/2022 29  20 - 29 mmol/L Final    Calcium 08/18/2022 9.1  8.7 - 10.3 mg/dL Final    WBC 08/18/2022 3.9  3.4 - 10.8 x10E3/uL Final    RBC 08/18/2022 2.64 (A) 3.77 - 5.28 x10E6/uL Final    HGB 08/18/2022 9.1 (A) 11.1 - 15.9 g/dL Final    HCT 08/18/2022 27.1 (A) 34.0 - 46.6 % Final    MCV 08/18/2022 103 (A) 79 - 97 fL Final    MCH 08/18/2022 34.5 (A) 26.6 - 33.0 pg Final    MCHC 08/18/2022 33.6  31.5 - 35.7 g/dL Final    RDW 08/18/2022 12.9  11.7 - 15.4 % Final    PLATELET 44/61/5198 736  150 - 450 x10E3/uL Final    NEUTROPHILS 08/18/2022 65  Not Estab. % Final    Lymphocytes 08/18/2022 31  Not Estab. % Final    MONOCYTES 08/18/2022 4  Not Estab. % Final    EOSINOPHILS 08/18/2022 0  Not Estab. % Final    BASOPHILS 08/18/2022 0  Not Estab. % Final    ABS. NEUTROPHILS 08/18/2022 2.5  1.4 - 7.0 x10E3/uL Final    Abs Lymphocytes 08/18/2022 1.2  0.7 - 3.1 x10E3/uL Final    ABS. MONOCYTES 08/18/2022 0.2  0.1 - 0.9 x10E3/uL Final    ABS. EOSINOPHILS 08/18/2022 0.0  0.0 - 0.4 x10E3/uL Final    ABS. BASOPHILS 08/18/2022 0.0  0.0 - 0.2 x10E3/uL Final    IMMATURE GRANULOCYTES 08/18/2022 0  Not Estab. % Final    ABS. IMM. GRANS. 08/18/2022 0.0  0.0 - 0.1 x10E3/uL Final    Hgb solubility 08/18/2022 Positive (A) Negative Final    Interpretation 08/18/2022 Comment   Final    Comment: Hemoglobin pattern and concentrations are consistent with  sickle cell trait (heterozygous). Suggest clinical and  hematologic correlation.            Sickle Trait Interpretation Ranges           Hgb A       50.0 - 70.0%           Hgb S       30.0 - 45.0%           Hgb A2       1.8 -  4.0%  Hemoglobin pattern and concentrations reveal an elevation of Hgb F  which may indicate the presence of a hereditary persistence gene.  However, elevations of Hgb F have also been reported to occur with  certain anemias, pregnancy, porphyrias, and some malignancies. Appointment on 05/31/2022   Component Date Value Ref Range Status    Glucose 05/31/2022 111 (A) 65 - 99 mg/dL Final    BUN 05/31/2022 16  8 - 27 mg/dL Final    Creatinine 05/31/2022 1.03 (A) 0.57 - 1.00 mg/dL Final    eGFR 05/31/2022 55 (A) >59 mL/min/1.73 Final    BUN/Creatinine ratio 05/31/2022 16  12 - 28 Final    Sodium 05/31/2022 137  134 - 144 mmol/L Final    Potassium 05/31/2022 4.3  3.5 - 5.2 mmol/L Final    Chloride 05/31/2022 98  96 - 106 mmol/L Final    CO2 05/31/2022 28  20 - 29 mmol/L Final    Calcium 05/31/2022 9.2  8.7 - 10.3 mg/dL Final    Interpretation 05/31/2022 Note   Final    Comment: Medical Director's Note: Effective 2/28/2022, [facility] uses  the 2021 CKD-EPI creatinine equation without a race factor  to calculate and report eGFR results. eGFR results reported  prior to this date using the 2009 CKD-EPI equation are no  longer included in this report interpretation. Supplemental report is available.

## 2022-08-31 DIAGNOSIS — I10 ESSENTIAL HYPERTENSION: ICD-10-CM

## 2022-08-31 NOTE — TELEPHONE ENCOUNTER
Last visit: 8/30/22  Next visit: No future appointments.   Last filled: 12/3/2020; metoprolol 100 mg tab; qty 90 w/1 refill

## 2022-09-01 LAB
BASOPHILS # BLD AUTO: 0 X10E3/UL (ref 0–0.2)
BASOPHILS NFR BLD AUTO: 0 %
EOSINOPHIL # BLD AUTO: 0 X10E3/UL (ref 0–0.4)
EOSINOPHIL NFR BLD AUTO: 0 %
ERYTHROCYTE [DISTWIDTH] IN BLOOD BY AUTOMATED COUNT: 13 % (ref 11.7–15.4)
HCT VFR BLD AUTO: 27.4 % (ref 34–46.6)
HGB A MFR BLD ELPH: 61.7 % (ref 96.4–98.8)
HGB A2 MFR BLD ELPH: 2.8 % (ref 1.8–3.2)
HGB BLD-MCNC: 9.1 G/DL (ref 11.1–15.9)
HGB F MFR BLD ELPH: 2.7 % (ref 0–2)
HGB FRACT BLD-IMP: ABNORMAL
HGB FRACT BLD-IMP: ABNORMAL
HGB S BLD QL SOLY: POSITIVE
HGB S MFR BLD ELPH: 32.8 %
IMM GRANULOCYTES # BLD AUTO: 0 X10E3/UL (ref 0–0.1)
IMM GRANULOCYTES NFR BLD AUTO: 0 %
IRON SATN MFR SERPL: 41 % (ref 15–55)
IRON SERPL-MCNC: 102 UG/DL (ref 27–139)
LYMPHOCYTES # BLD AUTO: 1.6 X10E3/UL (ref 0.7–3.1)
LYMPHOCYTES NFR BLD AUTO: 51 %
MCH RBC QN AUTO: 34.2 PG (ref 26.6–33)
MCHC RBC AUTO-ENTMCNC: 33.2 G/DL (ref 31.5–35.7)
MCV RBC AUTO: 103 FL (ref 79–97)
MONOCYTES # BLD AUTO: 0.1 X10E3/UL (ref 0.1–0.9)
MONOCYTES NFR BLD AUTO: 4 %
MORPHOLOGY BLD-IMP: ABNORMAL
NEUTROPHILS # BLD AUTO: 1.4 X10E3/UL (ref 1.4–7)
NEUTROPHILS NFR BLD AUTO: 45 %
PLATELET # BLD AUTO: 278 X10E3/UL (ref 150–450)
RBC # BLD AUTO: 2.66 X10E6/UL (ref 3.77–5.28)
TIBC SERPL-MCNC: 249 UG/DL (ref 250–450)
UIBC SERPL-MCNC: 147 UG/DL (ref 118–369)
WBC # BLD AUTO: 3.2 X10E3/UL (ref 3.4–10.8)

## 2022-09-02 RX ORDER — METOPROLOL SUCCINATE 100 MG/1
100 TABLET, EXTENDED RELEASE ORAL DAILY
Qty: 90 TABLET | Refills: 1 | Status: SHIPPED | OUTPATIENT
Start: 2022-09-02 | End: 2022-12-01

## 2022-09-12 ENCOUNTER — TELEPHONE (OUTPATIENT)
Dept: FAMILY MEDICINE CLINIC | Age: 82
End: 2022-09-12

## 2022-09-12 NOTE — TELEPHONE ENCOUNTER
Informed pt lab results and tx plan; pt has been referred to hem/onc and if not contacted by end of week, will call our office to contact number to schedule.

## 2022-09-12 NOTE — TELEPHONE ENCOUNTER
Pt was calling in regards to lab results. Pt stated that if she is not available to please leave a vm.

## 2022-09-30 ENCOUNTER — TELEPHONE (OUTPATIENT)
Dept: FAMILY MEDICINE CLINIC | Age: 82
End: 2022-09-30

## 2022-09-30 DIAGNOSIS — U07.1 RESPIRATORY TRACT INFECTION DUE TO COVID-19 VIRUS: Primary | ICD-10-CM

## 2022-09-30 DIAGNOSIS — J98.8 RESPIRATORY TRACT INFECTION DUE TO COVID-19 VIRUS: Primary | ICD-10-CM

## 2022-09-30 RX ORDER — CODEINE PHOSPHATE AND GUAIFENESIN 10; 100 MG/5ML; MG/5ML
5 SOLUTION ORAL
Qty: 60 ML | Refills: 0 | Status: SHIPPED | OUTPATIENT
Start: 2022-09-30 | End: 2022-10-03

## 2022-09-30 NOTE — TELEPHONE ENCOUNTER
On-call telephone contact with the patient who reports that she developed symptoms she considered like a cold 3 days ago, she had a COVID test 2 days ago and the results came back positive today. She reports that she is fully immunized against COVID-19. Reports that she is most bothered by cough but has not noted any respiratory difficulty. She reports subjective fever but has not measured her temperature. Chart review reveals a history of hypertension and prediabetes. Options for treatment were discussed with the patient. Paxlovid pros and cons versus symptomatic treatment were discussed. Patient was advised that based on the information I have at this point I would recommend supportive treatment. Patient indicated agreement and understanding. She was advised to maintain hydration, take Tylenol if needed for pain or fever. A prescription was sent to her pharmacy for guaifenesin codeine syrup with instructions to take 1 teaspoon up to 4 times daily if needed for cough, not to be taken with other sedating medications and only to be taken if necessary. She was carefully advised to report back if her symptoms seem to be worsening or with any other problems.

## 2022-10-06 RX ORDER — FUROSEMIDE 20 MG/1
TABLET ORAL
Qty: 90 TABLET | Refills: 0 | Status: SHIPPED | OUTPATIENT
Start: 2022-10-06 | End: 2022-10-25

## 2022-10-07 ENCOUNTER — TELEPHONE (OUTPATIENT)
Dept: FAMILY MEDICINE CLINIC | Age: 82
End: 2022-10-07

## 2022-10-07 NOTE — TELEPHONE ENCOUNTER
Patient was evaluated via telephone on 9/30/22 w/Dr. Ramiro Cevallos (on call); pt was given Rx for Robitussin [facility]; pt still experiencing deep cough, chest pain, chest tightness; pt advised to go to ER for further evaluation; pt agreed.

## 2022-10-20 ENCOUNTER — TELEPHONE (OUTPATIENT)
Dept: FAMILY MEDICINE CLINIC | Age: 82
End: 2022-10-20

## 2022-10-20 NOTE — TELEPHONE ENCOUNTER
Patient had covid at the end of september. since then she has had coughing spells and sneezing and drainage preventing her from sleeping at night. She feels like she has a cold and if she talks for a long period of time she starts sneezing and coughing. Patient looking for medication options. No future appointments.   Last seen 08/30/22

## 2022-10-21 NOTE — TELEPHONE ENCOUNTER
Patient need to be evaluated and examined for possible secondary infection like sinus infection bronchitis or pneumonia

## 2022-10-25 ENCOUNTER — OFFICE VISIT (OUTPATIENT)
Dept: FAMILY MEDICINE CLINIC | Age: 82
End: 2022-10-25
Payer: MEDICARE

## 2022-10-25 ENCOUNTER — APPOINTMENT (OUTPATIENT)
Dept: FAMILY MEDICINE CLINIC | Age: 82
End: 2022-10-25

## 2022-10-25 VITALS
WEIGHT: 195 LBS | SYSTOLIC BLOOD PRESSURE: 138 MMHG | HEIGHT: 67 IN | DIASTOLIC BLOOD PRESSURE: 74 MMHG | OXYGEN SATURATION: 98 % | HEART RATE: 93 BPM | RESPIRATION RATE: 16 BRPM | BODY MASS INDEX: 30.61 KG/M2 | TEMPERATURE: 97.2 F

## 2022-10-25 DIAGNOSIS — I10 ESSENTIAL HYPERTENSION: ICD-10-CM

## 2022-10-25 DIAGNOSIS — R73.03 PREDIABETES: ICD-10-CM

## 2022-10-25 DIAGNOSIS — E87.6 HYPOKALEMIA: ICD-10-CM

## 2022-10-25 DIAGNOSIS — J40 BRONCHITIS: Primary | ICD-10-CM

## 2022-10-25 DIAGNOSIS — D57.3 SICKLE-CELL TRAIT (HCC): ICD-10-CM

## 2022-10-25 PROCEDURE — G8427 DOCREV CUR MEDS BY ELIG CLIN: HCPCS | Performed by: LEGAL MEDICINE

## 2022-10-25 PROCEDURE — 99214 OFFICE O/P EST MOD 30 MIN: CPT | Performed by: LEGAL MEDICINE

## 2022-10-25 PROCEDURE — G8752 SYS BP LESS 140: HCPCS | Performed by: LEGAL MEDICINE

## 2022-10-25 PROCEDURE — G8399 PT W/DXA RESULTS DOCUMENT: HCPCS | Performed by: LEGAL MEDICINE

## 2022-10-25 PROCEDURE — G8754 DIAS BP LESS 90: HCPCS | Performed by: LEGAL MEDICINE

## 2022-10-25 PROCEDURE — G8417 CALC BMI ABV UP PARAM F/U: HCPCS | Performed by: LEGAL MEDICINE

## 2022-10-25 PROCEDURE — 3078F DIAST BP <80 MM HG: CPT | Performed by: LEGAL MEDICINE

## 2022-10-25 PROCEDURE — 3074F SYST BP LT 130 MM HG: CPT | Performed by: LEGAL MEDICINE

## 2022-10-25 PROCEDURE — 1101F PT FALLS ASSESS-DOCD LE1/YR: CPT | Performed by: LEGAL MEDICINE

## 2022-10-25 PROCEDURE — G8536 NO DOC ELDER MAL SCRN: HCPCS | Performed by: LEGAL MEDICINE

## 2022-10-25 PROCEDURE — 1123F ACP DISCUSS/DSCN MKR DOCD: CPT | Performed by: LEGAL MEDICINE

## 2022-10-25 PROCEDURE — G8510 SCR DEP NEG, NO PLAN REQD: HCPCS | Performed by: LEGAL MEDICINE

## 2022-10-25 PROCEDURE — 1090F PRES/ABSN URINE INCON ASSESS: CPT | Performed by: LEGAL MEDICINE

## 2022-10-25 RX ORDER — HYDROCODONE POLISTIREX AND CHLORPHENIRAMINE POLISTIREX 10; 8 MG/5ML; MG/5ML
SUSPENSION, EXTENDED RELEASE ORAL
COMMUNITY
Start: 2022-10-07 | End: 2022-10-25

## 2022-10-25 RX ORDER — ALBUTEROL SULFATE 90 UG/1
AEROSOL, METERED RESPIRATORY (INHALATION)
COMMUNITY
Start: 2022-10-07

## 2022-10-25 RX ORDER — DEXAMETHASONE SODIUM PHOSPHATE 1 MG/ML
SOLUTION/ DROPS OPHTHALMIC
COMMUNITY
Start: 2022-09-07

## 2022-10-25 RX ORDER — METHYLPREDNISOLONE 4 MG/1
TABLET ORAL
Qty: 1 DOSE PACK | Refills: 0 | Status: SHIPPED | OUTPATIENT
Start: 2022-10-25

## 2022-10-25 RX ORDER — DOXYCYCLINE 100 MG/1
100 CAPSULE ORAL 2 TIMES DAILY
Qty: 20 CAPSULE | Refills: 0 | Status: SHIPPED | OUTPATIENT
Start: 2022-10-25 | End: 2022-11-04

## 2022-10-25 NOTE — PROGRESS NOTES
Michel Alexandre presents today for   Chief Complaint   Patient presents with    Cough       Vitals:    10/25/22 1307   BP: 138/74   Pulse: 93   Resp: 16   Temp: 97.2 °F (36.2 °C)   TempSrc: Temporal   SpO2: 98%   Weight: 195 lb (88.5 kg)   Height: 5' 7\" (1.702 m)        Is someone accompanying this pt? no    Is the patient using any DME equipment during OV? no    Depression Screening:  3 most recent PHQ Screens 10/25/2022   Little interest or pleasure in doing things Not at all   Feeling down, depressed, irritable, or hopeless Not at all   Total Score PHQ 2 0       Learning Assessment:  Learning Assessment 2/3/2015   PRIMARY LEARNER Patient   HIGHEST LEVEL OF EDUCATION - PRIMARY LEARNER  GRADUATED HIGH SCHOOL OR GED   BARRIERS PRIMARY LEARNER NONE   CO-LEARNER CAREGIVER No   PRIMARY LANGUAGE ENGLISH   LEARNER PREFERENCE PRIMARY READING   ANSWERED BY self   RELATIONSHIP SELF       Abuse Screening:  Abuse Screening Questionnaire 2/28/2022   Do you ever feel afraid of your partner? N   Are you in a relationship with someone who physically or mentally threatens you? N   Is it safe for you to go home? Y       Fall Screening  Fall Risk Assessment, last 12 mths 10/25/2022   Able to walk? Yes   Fall in past 12 months? 0   Do you feel unsteady? -   Are you worried about falling -   Is the gait abnormal? -   Number of falls in past 12 months -   Fall with injury? -       Generalized Anxiety  No flowsheet data found. Health Maintenance Due   Topic Date Due    Flu Vaccine (1) 08/01/2022   . Health Maintenance reviewed and discussed and ordered per Provider. Vaccines Due   Screenings Due       Michel Alexandre is updated on all HM    1. \"Have you been to the ER, urgent care clinic since your last visit? Hospitalized since your last visit? \" No    2. \"Have you seen or consulted any other health care providers outside of the [address] [place] [address] since your last visit? \" No     3.  For patients aged 39-70: Has the patient had a colonoscopy / FIT/ Cologuard? NA - based on age     If the patient is female:    4. For patients aged 41-77: Has the patient had a mammogram within the past 2 years? NA - based on age    11. For patients aged 21-65: Has the patient had a pap smear?  NA - based on age

## 2022-10-25 NOTE — TELEPHONE ENCOUNTER
Pt is scheduled for visit today for ongoing cough.    Future Appointments   Date Time Provider Sachin Ambriz   10/25/2022  1:15 PM Stacy Freeman MD [facility]

## 2022-10-25 NOTE — PROGRESS NOTES
[facility]     Chief Complaint   Patient presents with    Cough     Vitals:    10/25/22 1307   BP: 138/74   Pulse: 93   Resp: 16   Temp: 97.2 °F (36.2 °C)   TempSrc: Temporal   SpO2: 98%   Weight: 195 lb (88.5 kg)   Height: 5' 7\" (1.702 m)   PainSc:   0 - No pain         HPI: Had  COVID 19 infection had positive test  at [facility] on 9/29   Went to ER 10/7  had CXR was negative she was found to have low potassium at 2.9 she was giving supplementation to reevaluate potassium level considering changing medication if needed  Patient is still having cough congestion, productive of sputum, has body aches, sinus pain and congestion  No shortness of breath no chest pain no fever or chills    Past Medical History:   Diagnosis Date    Arthritis     Cataract     Hypercholesterolemia     Hypertension      Past Surgical History:   Procedure Laterality Date    HX APPENDECTOMY      HX CATARACT REMOVAL      HX COLONOSCOPY  12/2009    Dr. Rosa Easley, 10 yr follow up    HX TOTAL ABDOMINAL HYSTERECTOMY       Social History     Tobacco Use    Smoking status: Never    Smokeless tobacco: Never   Substance Use Topics    Alcohol use: No       Family History   Problem Relation Age of Onset    Diabetes Mother     Hypertension Mother     Cancer Sister 48        breast    Cancer Maternal Aunt         breast    Stroke Sister        Review of Systems   Constitutional:  Negative for chills, fever, malaise/fatigue and weight loss. HENT:  Positive for congestion and sinus pain. Negative for ear discharge, ear pain, hearing loss, nosebleeds and sore throat. Eyes:  Negative for blurred vision, double vision and discharge. Respiratory:  Positive for cough, sputum production and shortness of breath. Negative for hemoptysis, wheezing and stridor. Cardiovascular:  Negative for chest pain, palpitations, claudication and leg swelling.    Gastrointestinal:  Negative for abdominal pain, blood in stool, constipation, diarrhea, melena, nausea and vomiting. Genitourinary:  Negative for dysuria, frequency and urgency. Musculoskeletal:  Positive for back pain and myalgias. Skin:  Negative for itching and rash. Neurological:  Negative for dizziness, tingling, sensory change, speech change, focal weakness, weakness and headaches. Physical Exam  Vitals and nursing note reviewed. Constitutional:       General: She is not in acute distress. Appearance: Normal appearance. She is well-developed. She is obese. She is not toxic-appearing or diaphoretic. HENT:      Head: Normocephalic and atraumatic. Right Ear: Tympanic membrane, ear canal and external ear normal. There is no impacted cerumen. Left Ear: Tympanic membrane, ear canal and external ear normal. There is no impacted cerumen. Nose: No congestion or rhinorrhea. Mouth/Throat:      Pharynx: No oropharyngeal exudate. Eyes:      General: No scleral icterus. Right eye: No discharge. Left eye: No discharge. Neck:      Thyroid: No thyromegaly. Cardiovascular:      Rate and Rhythm: Normal rate and regular rhythm. Heart sounds: Normal heart sounds. No murmur heard. Pulmonary:      Effort: Pulmonary effort is normal. No respiratory distress. Breath sounds: Normal breath sounds. No wheezing or rales. Chest:      Chest wall: No tenderness. Abdominal:      General: There is no distension. Palpations: Abdomen is soft. Tenderness: There is no abdominal tenderness. There is no right CVA tenderness, left CVA tenderness, guarding or rebound. Musculoskeletal:         General: No swelling, tenderness, deformity or signs of injury. Normal range of motion. Right lower leg: No edema. Left lower leg: No edema. Lymphadenopathy:      Cervical: No cervical adenopathy. Skin:     General: Skin is warm and dry. Coloration: Skin is not pale. Findings: No erythema or rash. Neurological:      General: No focal deficit present. Mental Status: She is alert and oriented to person, place, and time. Cranial Nerves: No cranial nerve deficit. Motor: No weakness. Coordination: Coordination normal.      Gait: Gait normal.   Psychiatric:         Behavior: Behavior normal.         Thought Content: Thought content normal.         Judgment: Judgment normal.        Assessment and plan     Plan of care has been discussed with the patient, he agrees to the plan and verbalized understanding. All his questions were answered  More than 50% of the time spent in this visit was counseling the patient about  illness and treatment options         1. Sickle-cell trait ([facility] [address]  Patient last hemoglobin was low at 8.9 with decreased white blood cell count she was referred to hematology oncology and her appointment is next month    2. Prediabetes  To be adherent to lifestyle modifications last hemoglobin A1c was 6 to reevaluate A1c  - HEMOGLOBIN A1C W/O EAG; Future    3. Essential hypertension  Blood pressures well controlled on current medications metoprolol  mg daily hydrochlorothiazide 25 mg daily  - METABOLIC PANEL, COMPREHENSIVE; Future    4. Bronchitis  To treat with Medrol Dosepak and antibiotics follow-up if there is no improvement    - methylPREDNISolone (MEDROL DOSEPACK) 4 mg tablet; Take all the tabs for the day with breakfast  Dispense: 1 Dose Pack; Refill: 0  - doxycycline (VIBRAMYCIN) 100 mg capsule; Take 1 Capsule by mouth two (2) times a day for 10 days. Dispense: 20 Capsule;  Refill: 0    Hypokalemia  Currently she is taking potassium supplement given by the emergency room  I advised the patient to take furosemide 20 mg every other day  Will consider stopping hydrochlorothiazide if potassium is still low, and add daily calcium channel blocker if needed for blood pressure control    Current Outpatient Medications   Medication Sig Dispense Refill    albuterol (PROVENTIL HFA, VENTOLIN HFA, PROAIR HFA) 90 mcg/actuation inhaler INHALE 1 TO 2 PUFFS BY MOUTH EVERY 6 HOURS      dexamethasone (DECADRON) 0.1 % ophthalmic solution       HYDROcodone-chlorpheniramine (TUSSIONEX) 10-8 mg/5 mL suspension TAKE 2.5 ML BY MOUTH EVERY 12 HOURS AS NEEDED FOR COUGH FOR UP TO 7 DAYS. NO DRIVING WHILE TAKING THIS MEDICATION      methylPREDNISolone (MEDROL DOSEPACK) 4 mg tablet Take all the tabs for the day with breakfast 1 Dose Pack 0    doxycycline (VIBRAMYCIN) 100 mg capsule Take 1 Capsule by mouth two (2) times a day for 10 days. 20 Capsule 0    metoprolol succinate (TOPROL-XL) 100 mg tablet Take 1 Tablet by mouth daily for 90 days. 90 Tablet 1    acetaminophen (TYLENOL) 650 mg TbER TAKE 1 TABLET EVERY 8 TO 12 HOURS AS NEEDED FOR PAIN 270 Tablet 1    timolol (TIMOPTIC) 0.5 % ophthalmic solution INSTILL 1 DROP IN RIGHT EYE EVERY MORNING      gabapentin (NEURONTIN) 100 mg capsule gabapentin 100 mg capsule      Arm Brace (Wrist Support Large-XLarge) misc Wrist support for carpal tunnel  syndrome 2 Each 0    potassium chloride (K-DUR, KLOR-CON) 20 mEq tablet TAKE 1 TABLET TWICE DAILY 180 Tab 0    atorvastatin (LIPITOR) 10 mg tablet TAKE 1 TABLET EVERY DAY 90 Tab 1    calcium carbonate (CALTREX) 600 mg calcium (1,500 mg) tablet Take 600 mg by mouth two (2) times a day. folic acid/multivit-min/lutein (CENTRUM SILVER PO) Take  by mouth. cholecalciferol (VITAMIN D3) 25 mcg (1,000 unit) cap Take  by mouth daily. hydroCHLOROthiazide (HYDRODIURIL) 25 mg tablet Take 1 Tablet by mouth daily for 90 days.  90 Tablet 1       Patient Active Problem List    Diagnosis Date Noted    Sickle-cell trait ([facility] [address] 10/25/2022    Primary osteoarthritis of left knee 02/21/2022    Abnormal mammogram 12/10/2019    Prediabetes 05/23/2017    CKD (chronic kidney disease) 02/29/2016    Complete rotator cuff tear of left shoulder 10/31/2014    Osteopenia 06/24/2014    Osteoarthritis of right knee 05/31/2014    Vitamin D deficiency 03/30/2014    Breast pain 03/11/2014    DDD (degenerative disc disease), lumbar 03/11/2014    Hypertension 03/11/2014    Hyperlipidemia 03/11/2014     Results for orders placed or performed in visit on 08/30/22   IRON PROFILE   Result Value Ref Range    TIBC 249 (L) 250 - 450 ug/dL    UIBC 147 118 - 369 ug/dL    Iron 102 27 - 139 ug/dL    Iron % saturation 41 15 - 55 %   CBC WITH AUTOMATED DIFF   Result Value Ref Range    WBC 3.2 (L) 3.4 - 10.8 x10E3/uL    RBC 2.66 (LL) 3.77 - 5.28 x10E6/uL    HGB 9.1 (L) 11.1 - 15.9 g/dL    HCT 27.4 (L) 34.0 - 46.6 %     (H) 79 - 97 fL    MCH 34.2 (H) 26.6 - 33.0 pg    MCHC 33.2 31.5 - 35.7 g/dL    RDW 13.0 11.7 - 15.4 %    PLATELET 172 692 - 590 x10E3/uL    NEUTROPHILS 45 Not Estab. %    Lymphocytes 51 Not Estab. %    MONOCYTES 4 Not Estab. %    EOSINOPHILS 0 Not Estab. %    BASOPHILS 0 Not Estab. %    ABS. NEUTROPHILS 1.4 1.4 - 7.0 x10E3/uL    Abs Lymphocytes 1.6 0.7 - 3.1 x10E3/uL    ABS. MONOCYTES 0.1 0.1 - 0.9 x10E3/uL    ABS. EOSINOPHILS 0.0 0.0 - 0.4 x10E3/uL    ABS. BASOPHILS 0.0 0.0 - 0.2 x10E3/uL    IMMATURE GRANULOCYTES 0 Not Estab. %    ABS. IMM. GRANS. 0.0 0.0 - 0.1 x10E3/uL    Hematology comments: Note:    HEMOGLOBIN FRACTIONATION   Result Value Ref Range    Hemoglobin F 2.7 (H) 0.0 - 2.0 %    Hemoglobin A 61.7 (L) 96.4 - 98.8 %    Hemoglobin A2 2.8 1.8 - 3.2 %    Hemoglobin S 32.8 (H) 0.0 %    Interpretation Comment    HGB SOLUBILITY   Result Value Ref Range    Hgb solubility Positive (A) Negative    Interpretation Comment      Appointment on 08/30/2022   Component Date Value Ref Range Status    TIBC 08/30/2022 249 (A)  250 - 450 ug/dL Final    UIBC 08/30/2022 147  118 - 369 ug/dL Final    Iron 08/30/2022 102  27 - 139 ug/dL Final    Iron % saturation 08/30/2022 41  15 - 55 % Final    WBC 08/30/2022 3.2 (A)  3.4 - 10.8 x10E3/uL Final    RBC 08/30/2022 2.66 (A)  3.77 - 5.28 x10E6/uL Final    Comment: Red blood cells appear slightly agglutinated  Ovalocytes present.       HGB 08/30/2022 9.1 (A)  11.1 - 15.9 g/dL Final    HCT 08/30/2022 27.4 (A)  34.0 - 46.6 % Final    MCV 08/30/2022 103 (A)  79 - 97 fL Final    MCH 08/30/2022 34.2 (A)  26.6 - 33.0 pg Final    MCHC 08/30/2022 33.2  31.5 - 35.7 g/dL Final    RDW 08/30/2022 13.0  11.7 - 15.4 % Final    PLATELET 07/31/1797 050  150 - 450 x10E3/uL Final    NEUTROPHILS 08/30/2022 45  Not Estab. % Final    Lymphocytes 08/30/2022 51  Not Estab. % Final    MONOCYTES 08/30/2022 4  Not Estab. % Final    EOSINOPHILS 08/30/2022 0  Not Estab. % Final    BASOPHILS 08/30/2022 0  Not Estab. % Final    ABS. NEUTROPHILS 08/30/2022 1.4  1.4 - 7.0 x10E3/uL Final    Abs Lymphocytes 08/30/2022 1.6  0.7 - 3.1 x10E3/uL Final    ABS. MONOCYTES 08/30/2022 0.1  0.1 - 0.9 x10E3/uL Final    ABS. EOSINOPHILS 08/30/2022 0.0  0.0 - 0.4 x10E3/uL Final    ABS. BASOPHILS 08/30/2022 0.0  0.0 - 0.2 x10E3/uL Final    IMMATURE GRANULOCYTES 08/30/2022 0  Not Estab. % Final    ABS. IMM. GRANS. 08/30/2022 0.0  0.0 - 0.1 x10E3/uL Final    Hematology comments: 08/30/2022 Note:   Final    Verified by microscopic examination. Hemoglobin F 08/30/2022 2.7 (A)  0.0 - 2.0 % Final    Hemoglobin A 08/30/2022 61.7 (A)  96.4 - 98.8 % Final    Hemoglobin A2 08/30/2022 2.8  1.8 - 3.2 % Final    Hemoglobin S 08/30/2022 32.8 (A)  0.0 % Final    Interpretation 08/30/2022 Comment   Final    Reflex to Hgb Solubility indicated for confirmation. Hgb solubility 08/30/2022 Positive (A)  Negative Final    Interpretation 08/30/2022 Comment   Final    Comment: Hemoglobin pattern and concentrations are consistent with  sickle cell trait (heterozygous). Suggest clinical and  hematologic correlation. Sickle Trait Interpretation Ranges           Hgb A       50.0 - 70.0%           Hgb S       30.0 - 45.0%           Hgb A2       1.8 -  4.0%  Hemoglobin pattern and concentrations reveal an elevation of Hgb F  which may indicate the presence of a hereditary persistence gene.   However, elevations of Hgb F have also been reported to occur with  certain anemias, pregnancy, porphyrias, and some malignancies. Appointment on 08/18/2022   Component Date Value Ref Range Status    Hemoglobin F 08/18/2022 2.7 (A)  0.0 - 2.0 % Final    Hemoglobin A 08/18/2022 61.7 (A)  96.4 - 98.8 % Final    Hemoglobin A2 08/18/2022 2.9  1.8 - 3.2 % Final    Hemoglobin S 08/18/2022 32.7 (A)  0.0 % Final    Interpretation 08/18/2022 Comment   Final    Reflex to Hgb Solubility indicated for confirmation.     Hemoglobin A1c 08/18/2022 6.0 (A)  4.8 - 5.6 % Final    Comment:          Prediabetes: 5.7 - 6.4           Diabetes: >6.4           Glycemic control for adults with diabetes: <7.0      Estimated average glucose 08/18/2022 126  mg/dL Final    PROBNP 08/18/2022 118  0 - 738 pg/mL Final    Comment: The following cut-points have been suggested for the  use of proBNP for the diagnostic evaluation of heart  failure (HF) in patients with acute dyspnea:  Modality                     Age           Optimal Cut                             (years)            Point  ------------------------------------------------------  Diagnosis (rule in HF)        <50            450 pg/mL                            50 - 75            900 pg/mL                                >75           1800 pg/mL  Exclusion (rule out HF)  Age independent     300 pg/mL      Glucose 08/18/2022 137 (A)  65 - 99 mg/dL Final    BUN 08/18/2022 11  8 - 27 mg/dL Final    Creatinine 08/18/2022 0.93  0.57 - 1.00 mg/dL Final    eGFR 08/18/2022 63  >59 mL/min/1.73 Final    BUN/Creatinine ratio 08/18/2022 12  12 - 28 Final    Sodium 08/18/2022 139  134 - 144 mmol/L Final    Potassium 08/18/2022 3.4 (A)  3.5 - 5.2 mmol/L Final    Chloride 08/18/2022 97  96 - 106 mmol/L Final    CO2 08/18/2022 29  20 - 29 mmol/L Final    Calcium 08/18/2022 9.1  8.7 - 10.3 mg/dL Final    WBC 08/18/2022 3.9  3.4 - 10.8 x10E3/uL Final    RBC 08/18/2022 2.64 (A)  3.77 - 5.28 x10E6/uL Final    HGB 08/18/2022 9.1 (A)  11.1 - 15.9 g/dL Final    HCT 08/18/2022 27.1 (A)  34.0 - 46.6 % Final    MCV 08/18/2022 103 (A)  79 - 97 fL Final    MCH 08/18/2022 34.5 (A)  26.6 - 33.0 pg Final    MCHC 08/18/2022 33.6  31.5 - 35.7 g/dL Final    RDW 08/18/2022 12.9  11.7 - 15.4 % Final    PLATELET 48/36/7220 249  150 - 450 x10E3/uL Final    NEUTROPHILS 08/18/2022 65  Not Estab. % Final    Lymphocytes 08/18/2022 31  Not Estab. % Final    MONOCYTES 08/18/2022 4  Not Estab. % Final    EOSINOPHILS 08/18/2022 0  Not Estab. % Final    BASOPHILS 08/18/2022 0  Not Estab. % Final    ABS. NEUTROPHILS 08/18/2022 2.5  1.4 - 7.0 x10E3/uL Final    Abs Lymphocytes 08/18/2022 1.2  0.7 - 3.1 x10E3/uL Final    ABS. MONOCYTES 08/18/2022 0.2  0.1 - 0.9 x10E3/uL Final    ABS. EOSINOPHILS 08/18/2022 0.0  0.0 - 0.4 x10E3/uL Final    ABS. BASOPHILS 08/18/2022 0.0  0.0 - 0.2 x10E3/uL Final    IMMATURE GRANULOCYTES 08/18/2022 0  Not Estab. % Final    ABS. IMM. GRANS. 08/18/2022 0.0  0.0 - 0.1 x10E3/uL Final    Hgb solubility 08/18/2022 Positive (A)  Negative Final    Interpretation 08/18/2022 Comment   Final    Comment: Hemoglobin pattern and concentrations are consistent with  sickle cell trait (heterozygous). Suggest clinical and  hematologic correlation. Sickle Trait Interpretation Ranges           Hgb A       50.0 - 70.0%           Hgb S       30.0 - 45.0%           Hgb A2       1.8 -  4.0%  Hemoglobin pattern and concentrations reveal an elevation of Hgb F  which may indicate the presence of a hereditary persistence gene. However, elevations of Hgb F have also been reported to occur with  certain anemias, pregnancy, porphyrias, and some malignancies. Follow-up and Dispositions    Return in about 2 months (around 12/25/2022) for for medicare wellness.

## 2022-10-26 LAB
ALBUMIN SERPL-MCNC: 4.3 G/DL (ref 3.7–4.7)
ALBUMIN/GLOB SERPL: 1 {RATIO} (ref 1.2–2.2)
ALP SERPL-CCNC: 64 IU/L (ref 44–121)
ALT SERPL-CCNC: 12 IU/L (ref 0–32)
AST SERPL-CCNC: 14 IU/L (ref 0–40)
BILIRUB SERPL-MCNC: 0.4 MG/DL (ref 0–1.2)
BUN SERPL-MCNC: 9 MG/DL (ref 8–27)
BUN/CREAT SERPL: 10 (ref 12–28)
CALCIUM SERPL-MCNC: 9.3 MG/DL (ref 8.7–10.3)
CHLORIDE SERPL-SCNC: 99 MMOL/L (ref 96–106)
CO2 SERPL-SCNC: 29 MMOL/L (ref 20–29)
CREAT SERPL-MCNC: 0.89 MG/DL (ref 0.57–1)
EGFR: 66 ML/MIN/1.73
GLOBULIN SER CALC-MCNC: 4.4 G/DL (ref 1.5–4.5)
GLUCOSE SERPL-MCNC: 120 MG/DL (ref 70–99)
HBA1C MFR BLD: 5.9 % (ref 4.8–5.6)
POTASSIUM SERPL-SCNC: 3.6 MMOL/L (ref 3.5–5.2)
PROT SERPL-MCNC: 8.7 G/DL (ref 6–8.5)
SODIUM SERPL-SCNC: 140 MMOL/L (ref 134–144)

## 2022-10-28 ENCOUNTER — TELEPHONE (OUTPATIENT)
Dept: FAMILY MEDICINE CLINIC | Age: 82
End: 2022-10-28

## 2022-10-28 NOTE — PROGRESS NOTES
LM for pt, A1C is stable; kidney and liver functions are normal; pt can call back w/questions, if any.

## 2022-11-28 ENCOUNTER — OFFICE VISIT (OUTPATIENT)
Dept: SPORTS MEDICINE | Age: 82
End: 2022-11-28
Payer: MEDICARE

## 2022-11-28 DIAGNOSIS — M25.571 CHRONIC PAIN OF RIGHT ANKLE: ICD-10-CM

## 2022-11-28 DIAGNOSIS — G89.29 CHRONIC PAIN OF RIGHT ANKLE: ICD-10-CM

## 2022-11-28 DIAGNOSIS — N18.30 STAGE 3 CHRONIC KIDNEY DISEASE, UNSPECIFIED WHETHER STAGE 3A OR 3B CKD (HCC): ICD-10-CM

## 2022-11-28 DIAGNOSIS — M17.12 PRIMARY OSTEOARTHRITIS OF LEFT KNEE: ICD-10-CM

## 2022-11-28 DIAGNOSIS — M17.11 PRIMARY OSTEOARTHRITIS OF RIGHT KNEE: Primary | ICD-10-CM

## 2022-11-28 PROCEDURE — G8536 NO DOC ELDER MAL SCRN: HCPCS | Performed by: FAMILY MEDICINE

## 2022-11-28 PROCEDURE — G8756 NO BP MEASURE DOC: HCPCS | Performed by: FAMILY MEDICINE

## 2022-11-28 PROCEDURE — G8417 CALC BMI ABV UP PARAM F/U: HCPCS | Performed by: FAMILY MEDICINE

## 2022-11-28 PROCEDURE — 1123F ACP DISCUSS/DSCN MKR DOCD: CPT | Performed by: FAMILY MEDICINE

## 2022-11-28 PROCEDURE — G8399 PT W/DXA RESULTS DOCUMENT: HCPCS | Performed by: FAMILY MEDICINE

## 2022-11-28 PROCEDURE — 1090F PRES/ABSN URINE INCON ASSESS: CPT | Performed by: FAMILY MEDICINE

## 2022-11-28 PROCEDURE — 99214 OFFICE O/P EST MOD 30 MIN: CPT | Performed by: FAMILY MEDICINE

## 2022-11-28 PROCEDURE — G8427 DOCREV CUR MEDS BY ELIG CLIN: HCPCS | Performed by: FAMILY MEDICINE

## 2022-11-28 PROCEDURE — 1101F PT FALLS ASSESS-DOCD LE1/YR: CPT | Performed by: FAMILY MEDICINE

## 2022-11-28 PROCEDURE — G8432 DEP SCR NOT DOC, RNG: HCPCS | Performed by: FAMILY MEDICINE

## 2022-11-28 RX ORDER — DICLOFENAC SODIUM 10 MG/G
4 GEL TOPICAL 4 TIMES DAILY
Qty: 200 G | Refills: 3 | Status: SHIPPED | OUTPATIENT
Start: 2022-11-28

## 2022-11-28 RX ORDER — DEXTROMETHORPHAN HYDROBROMIDE, GUAIFENESIN 5; 100 MG/5ML; MG/5ML
650 LIQUID ORAL
Qty: 270 TABLET | Refills: 1 | Status: SHIPPED | OUTPATIENT
Start: 2022-11-28

## 2022-11-28 RX ORDER — IBUPROFEN 600 MG/1
600 TABLET ORAL
Qty: 90 TABLET | Refills: 1 | Status: SHIPPED | OUTPATIENT
Start: 2022-11-28

## 2022-11-28 NOTE — PROGRESS NOTES
[address] [facility]  Sports Medicine Office Visit    Mike Shields is a [de-identified] y.o. female (: 1940) presenting to address:  Chief Complaint   Patient presents with    Knee Pain     Bilateral      Ankle Pain     Right       PCP: Mechelle Mercer MD  Referring provider: Mechelle Mercer MD         Assessment/Plan:       ICD-10-CM ICD-9-CM   1. Primary osteoarthritis of right knee  M17.11 715.16   2. Primary osteoarthritis of left knee  M17.12 715.16   3. Stage 3 chronic kidney disease, unspecified whether stage 3a or 3b CKD ([facility])  N18.30 585.3   4. Chronic pain of right ankle  M25.571 719.47    G89.29 338.29         Discussion:  [de-identified] overweight female with CKD3a presented initially in 2022 for evaluation of bilateral knee pain, LEFT occasionally worse than RIGHT, assoc with decreased tolerance to weightbearing activity, occasional swelling, occasional gait instability. S/p viscosupplementation with Rejuvinix in 2019 and 2020     LEFT knee steroid injection - 2022  RIGHT knee steroid injection - Mar 2022  Bilateral orthovisc - 2022    Since last visit (2022): # Bilateral knee pain - started being uncomfortable only 2mo after orthovisc to both knees  + swelling RIGHT > LEFT  No interval trauma    # RIGHT ankle pain - chronic, worsening  \"Feels like it is going to give out towards the inside\"  + swelling    Using IBU 800mg ~1x/wk  Hasn't been using APAP nor diclofenac (Voltaren) gel      Impression:  Bilateral knee OA - varus LEFT medial, valgus RIGHT lateral - both steroid injections and visco series are clinically ineffective   RIGHT ankle OA  CKD3      Plan:  Is aware she needs to limit use of NSAIDs. Recent gfr was stable.   > updated bilateral knee radiograph(s) and RIGHT ankle radiograph(s)   > continue ice as needed  > continue home exercise plan for both knees & ankles  > analgesia as follows:  - topical diclofenac (Voltaren) QID   - then APAP 650mg q8h as needed  - then IBU 600mg for severe pain, limited to max of 3-4 doses per week! Would recommend referral to ortho for joint replacement if/when she is aready    Orders Placed This Encounter    XR ANKLE RT MIN 3 V     Weightbearing AP, mortise and lateral views. Standing Status:   Future     Number of Occurrences:   1     Standing Expiration Date:   11/28/2023     Order Specific Question:   Which facility to perform procedure? Answer:   [facility]    diclofenac (VOLTAREN) 1 % gel     Sig: Apply 4 g to affected area four (4) times daily. Dispense:  200 g     Refill:  3    acetaminophen (TYLENOL) 650 mg TbER     Sig: Take 1 Tablet by mouth every eight to twelve (8-12) hours as needed for Pain (mild). Dispense:  270 Tablet     Refill:  1    ibuprofen (MOTRIN) 600 mg tablet     Sig: Take 1 Tablet by mouth daily as needed for Pain (severe pain). Dispense:  90 Tablet     Refill:  1           Management plan & patient instructions discussed with Susan Graham, who voiced understanding. This document may have been created with the aid of dictation software. Text may contain errors, particularly phonetic errors. Shade Best MD  Internal Medicine, Family Medicine & Sports Medicine  11/28/2022    Subjective   History:   Susan Graham is a [de-identified] y.o. female who presents to address:  Chief Complaint   Patient presents with    Knee Pain     Bilateral      Ankle Pain     Right     Pain Assessment  11/28/2022   Location of Pain Knee; Ankle   Location Modifiers Left;Right   Severity of Pain 8   Quality of Pain Sharp;Dull;Aching; Other (Comment)   Quality of Pain Comment Weakness   Duration of Pain Persistent   Frequency of Pain Constant   Date Pain First Started 10/28/2022   Date Pain First Started Comment -   Aggravating Factors Standing;Walking;Exercise   Limiting Behavior Yes   Relieving Factors Nothing;NSAID   Relieving Factors Comment -   Result of Injury No   Type of Injury -           [ see \"since last visit\" in A/P section ]    Past Medical History:   Diagnosis Date    Arthritis     Cataract     Hypercholesterolemia     Hypertension      Past Surgical History:   Procedure Laterality Date    HX APPENDECTOMY      HX CATARACT REMOVAL      HX COLONOSCOPY  12/2009    Dr. Luis Thomas, 10 yr follow up    HX TOTAL ABDOMINAL HYSTERECTOMY        reports that she has never smoked. She has never used smokeless tobacco. She reports that she does not drink alcohol and does not use drugs. Family History   Problem Relation Age of Onset    Diabetes Mother     Hypertension Mother     Cancer Sister 48        breast    Cancer Maternal Aunt         breast    Stroke Sister      No Known Allergies    Problem List:      Patient Active Problem List    Diagnosis    Hypertension    Sickle-cell trait (HCC)    Primary osteoarthritis of left knee    Abnormal mammogram     L breast-> referred to Dr. Leopold Baas      Prediabetes    CKD (chronic kidney disease)     Dr. Bethany Collier      Complete rotator cuff tear of left shoulder     -- L shoulder injection by Dr. Jasmyn Feldman on 3/4/2015    -- L shoulder MRI (2/13/2015):  1. Complete full thickness tear of supraspinatus & infraspinatus tendons with underlying tendinosis, about 3cm retraction & moderate muscular atrophy. Subscapularis tendinosis with no focal tear. 2. Posterior labral tear not excluded. Subchondral cystic change in the glenoid fossa. 3. Severe AC joint hypertrophy and inflammation with type 2 acromion and mild acromial anterior downsloping. -- L shoulder XR (10/31/2014): mild DJD of the superior GH joint.   Minimal spurring at the [facility] without associated joint space narrowing.    - s/p subacromial injection (10/31/2014)      Osteopenia     12/2018: osteopenia, -1.2  - DEXA (8/16/2016): still osteopenia, but BMD has decreased overall    - DEXA (6/13/2014): T-scores -1.5 @ L1-L4; -1.0 @ distal 1/3 radius; 0.0 @ L prox fem; -0.2 @ R prox fem; -0.6 @ L fem neck; -0.3 @ R fem neck      Osteoarthritis of right knee     - Jan 2015: referred to ortho surgery to consider joint replacement  - Dec 2014: steroid injection, increase mobic to 15mg daily    - B knee XR (6/13/2014): mild tricompartmental arthritis in the L knee; moderate tricompartmental arthritis in the R knee with moderate knee effusion      Vitamin D deficiency     - VitD 23.6 (3/30/2014)      Breast pain     *3/11/2014: to consider steroid injection for keloid or lidoderm patches      DDD (degenerative disc disease), lumbar     -- L-spine XR (11/18/2013): Mild L4-5 disc space narrowing. There is no fracture. There is 3 mm anterior listhesis of L4 on L5. No change with extension. This increases to approximately 4 mm during flexion. Hyperlipidemia       Medications:     Current Outpatient Medications on File Prior to Visit   Medication Sig Dispense Refill    albuterol (PROVENTIL HFA, VENTOLIN HFA, PROAIR HFA) 90 mcg/actuation inhaler INHALE 1 TO 2 PUFFS BY MOUTH EVERY 6 HOURS      dexamethasone (DECADRON) 0.1 % ophthalmic solution       methylPREDNISolone (MEDROL DOSEPACK) 4 mg tablet Take all the tabs for the day with breakfast 1 Dose Pack 0    metoprolol succinate (TOPROL-XL) 100 mg tablet Take 1 Tablet by mouth daily for 90 days. 90 Tablet 1    hydroCHLOROthiazide (HYDRODIURIL) 25 mg tablet Take 1 Tablet by mouth daily for 90 days. 90 Tablet 1    timolol (TIMOPTIC) 0.5 % ophthalmic solution INSTILL 1 DROP IN RIGHT EYE EVERY MORNING      gabapentin (NEURONTIN) 100 mg capsule gabapentin 100 mg capsule      Arm Brace (Wrist Support Large-XLarge) misc Wrist support for carpal tunnel  syndrome 2 Each 0    potassium chloride (K-DUR, KLOR-CON) 20 mEq tablet TAKE 1 TABLET TWICE DAILY 180 Tab 0    atorvastatin (LIPITOR) 10 mg tablet TAKE 1 TABLET EVERY DAY 90 Tab 1    calcium carbonate (CALTREX) 600 mg calcium (1,500 mg) tablet Take 600 mg by mouth two (2) times a day. folic acid/multivit-min/lutein (CENTRUM SILVER PO) Take  by mouth. cholecalciferol (VITAMIN D3) 25 mcg (1,000 unit) cap Take  by mouth daily. No current facility-administered medications on file prior to visit. Objective   Physical Assessment:     Vitals:    11/28/22 0954   PainSc:   8   PainLoc: Knee       Physical Exam  Nursing note reviewed. Constitutional:       General: She is not in acute distress. Appearance: Normal appearance. She is well-developed and overweight. She is not diaphoretic. HENT:      Head: Normocephalic and atraumatic. Mouth/Throat:      Comments: Mask in place  Eyes:      Conjunctiva/sclera: Conjunctivae normal.   Musculoskeletal:      Cervical back: Neck supple. Right hip: No bony tenderness. Normal range of motion. Left hip: No bony tenderness. Normal range of motion. Right knee: Deformity (valgus) and effusion present. Normal range of motion. Tenderness present over the medial joint line and lateral joint line. Abnormal alignment (valgus). Left knee: Effusion present. No deformity. Normal range of motion. Tenderness present over the medial joint line and lateral joint line. Normal alignment. Right ankle: Swelling present. Tenderness present over the lateral malleolus. Decreased range of motion. Left ankle: No swelling. Tenderness present over the lateral malleolus (trace). Normal range of motion. Skin:     General: Skin is warm and dry. Findings: No rash. Neurological:      Mental Status: She is alert and oriented to person, place, and time. Gait: Gait abnormal (mildly antalgic). Comments: Good sit-stand time and stability   Psychiatric:         Behavior: Behavior normal. Behavior is cooperative. Thought Content:  Thought content normal.

## 2022-11-28 NOTE — PATIENT INSTRUCTIONS
Maintenance:  - ice for swelling as needed  - home exercises    If hurting:  First, reach for the diclofenac (Voltaren) gel. ... apply to knees up to 4 times a day! Then. .. reach for the tylenol 650mg as needed  If STILL hurting. .. ibuprofen 600mg. .. limit to max of 3 times per WEEK    When you are ready, let me know if/when you want to see a surgeon for knee replacements. I will send you to someone good. VISIT SURVEY       You may receive a survey regarding your visit today either by mail or email. Please take the opportunity to let us know how we did. This information helps us continue to improve and provide a great patient experience. TESTING / IMAGING RESULTS       If you have [employer] access:  Per federal regulations all of your results will be released to you and to your physician / provider simultaneously on [address] This means that it is likely that you will have the opportunity to review your results before your physician / provider! Since all \"critical\" abnormal results are immediately called to the office / on-call providers on nights, weekends and holidays - please refrain from calling after hours when you receive abnormal results through [address] Instead, allow time for your physician / provider to review your results and then provide interpretation and/or guidance. If the results are significantly abnormal and require time-sensitive action - guidance will be provided both via [employer] and via telephone. For all other results (interpreted as \"normal\", \"abnormal but expected\", \"reassuring / stable\", \"slightly abnormal\") - non-urgent guidance will be provided via [employer] communication only. [employer] Help Desk # [phone]    If you do NOT have [employer] access: If the results are significantly abnormal and require time-sensitive action - guidance will be relayed to you via telephone.   For all other results (interpreted as \"normal\", \"abnormal but expected\", \"reassuring / stable\", \"slightly abnormal\") - non-urgent guidance will be mailed to you via [employer]      Results from Outside Facilities / Laboratories:  Results of tests performed at outside facilities / laboratories likely will not appear in the [employer]. They may appear in the patient portals of those outside facilities / laboratories. Please keep in mind that with your access to your patient portal directly to an outside facility / laboratory, you are likely viewing results before your physician / provider! Please allow time for your physician / provider to review your results and then provide interpretation and/or guidance. If you have questions about your results beyond the guidance provided in MyChart or in your results letter, please schedule a follow up appointment to discuss with your physician / provider. MEDICATION REFILLS        Please request medication refills through your pharmacy, to ensure the correct pharmacy is used. Please allow at least 3 business days for refill requests to be addressed. Refills will not be provided by the after hours/on call provider.

## 2022-12-05 DIAGNOSIS — I10 ESSENTIAL HYPERTENSION: ICD-10-CM

## 2022-12-06 RX ORDER — HYDROCHLOROTHIAZIDE 25 MG/1
TABLET ORAL
Qty: 90 TABLET | Refills: 1 | Status: SHIPPED | OUTPATIENT
Start: 2022-12-06

## 2022-12-12 ENCOUNTER — TELEPHONE (OUTPATIENT)
Dept: FAMILY MEDICINE CLINIC | Age: 82
End: 2022-12-12

## 2022-12-12 NOTE — TELEPHONE ENCOUNTER
Informed pt, DMV form can be completed at visit on:   Future Appointments   Date Time Provider Sachin Ambriz   12/27/2022 11:00 AM Kimberlyn Blank MD [facility]

## 2022-12-12 NOTE — TELEPHONE ENCOUNTER
Pt is calling stating that she needs her handicap sticker renewed asap and she wanted to know how she should go about getting that taken care of. Please advise.      Future Appointments   Date Time Provider Sachin Ambriz   12/27/2022 11:00 AM Antwan Ricardo MD [facility]

## 2022-12-27 ENCOUNTER — OFFICE VISIT (OUTPATIENT)
Dept: FAMILY MEDICINE CLINIC | Age: 82
End: 2022-12-27
Payer: MEDICARE

## 2022-12-27 ENCOUNTER — APPOINTMENT (OUTPATIENT)
Dept: FAMILY MEDICINE CLINIC | Age: 82
End: 2022-12-27

## 2022-12-27 VITALS
RESPIRATION RATE: 16 BRPM | BODY MASS INDEX: 31.45 KG/M2 | HEIGHT: 67 IN | SYSTOLIC BLOOD PRESSURE: 136 MMHG | HEART RATE: 62 BPM | OXYGEN SATURATION: 98 % | WEIGHT: 200.4 LBS | DIASTOLIC BLOOD PRESSURE: 72 MMHG | TEMPERATURE: 97.7 F

## 2022-12-27 DIAGNOSIS — Z23 NEEDS FLU SHOT: ICD-10-CM

## 2022-12-27 DIAGNOSIS — E78.2 MIXED HYPERLIPIDEMIA: ICD-10-CM

## 2022-12-27 DIAGNOSIS — R79.89 ABNORMAL CBC: ICD-10-CM

## 2022-12-27 DIAGNOSIS — R73.03 PREDIABETES: ICD-10-CM

## 2022-12-27 DIAGNOSIS — E55.9 VITAMIN D DEFICIENCY: ICD-10-CM

## 2022-12-27 DIAGNOSIS — D57.3 SICKLE-CELL TRAIT (HCC): ICD-10-CM

## 2022-12-27 DIAGNOSIS — I10 ESSENTIAL HYPERTENSION: ICD-10-CM

## 2022-12-27 DIAGNOSIS — Z00.00 MEDICARE ANNUAL WELLNESS VISIT, SUBSEQUENT: Primary | ICD-10-CM

## 2022-12-27 PROCEDURE — 1123F ACP DISCUSS/DSCN MKR DOCD: CPT | Performed by: LEGAL MEDICINE

## 2022-12-27 PROCEDURE — 1101F PT FALLS ASSESS-DOCD LE1/YR: CPT | Performed by: LEGAL MEDICINE

## 2022-12-27 PROCEDURE — 3078F DIAST BP <80 MM HG: CPT | Performed by: LEGAL MEDICINE

## 2022-12-27 PROCEDURE — 90694 VACC AIIV4 NO PRSRV 0.5ML IM: CPT | Performed by: LEGAL MEDICINE

## 2022-12-27 PROCEDURE — G8432 DEP SCR NOT DOC, RNG: HCPCS | Performed by: LEGAL MEDICINE

## 2022-12-27 PROCEDURE — G8427 DOCREV CUR MEDS BY ELIG CLIN: HCPCS | Performed by: LEGAL MEDICINE

## 2022-12-27 PROCEDURE — G8417 CALC BMI ABV UP PARAM F/U: HCPCS | Performed by: LEGAL MEDICINE

## 2022-12-27 PROCEDURE — G8536 NO DOC ELDER MAL SCRN: HCPCS | Performed by: LEGAL MEDICINE

## 2022-12-27 PROCEDURE — G0439 PPPS, SUBSEQ VISIT: HCPCS | Performed by: LEGAL MEDICINE

## 2022-12-27 PROCEDURE — G8399 PT W/DXA RESULTS DOCUMENT: HCPCS | Performed by: LEGAL MEDICINE

## 2022-12-27 PROCEDURE — G0008 ADMIN INFLUENZA VIRUS VAC: HCPCS | Performed by: LEGAL MEDICINE

## 2022-12-27 PROCEDURE — 3074F SYST BP LT 130 MM HG: CPT | Performed by: LEGAL MEDICINE

## 2022-12-27 NOTE — PROGRESS NOTES
After obtaining consent, and per orders of Dr. Rich Beaulieu, injection of flu shot given by Claudine Matson. Patient instructed to remain in clinic for 20 minutes afterwards, and to report any adverse reaction to me immediately. Sharon Larose presents today for   Chief Complaint   Patient presents with    Annual Wellness Visit       Vitals:    12/27/22 1050   BP: 136/72   Pulse: 62   Resp: 16   Temp: 97.7 °F (36.5 °C)   TempSrc: Temporal   SpO2: 98%   Weight: 200 lb 6.4 oz (90.9 kg)   Height: 5' 7\" (1.702 m)        Is someone accompanying this pt? no    Is the patient using any DME equipment during OV? no    Depression Screening:  3 most recent PHQ Screens 12/27/2022   Little interest or pleasure in doing things Not at all   Feeling down, depressed, irritable, or hopeless Not at all   Total Score PHQ 2 0       Learning Assessment:  Learning Assessment 2/3/2015   PRIMARY LEARNER Patient   HIGHEST LEVEL OF EDUCATION - PRIMARY LEARNER  GRADUATED HIGH SCHOOL OR GED   BARRIERS PRIMARY LEARNER NONE   CO-LEARNER CAREGIVER No   PRIMARY LANGUAGE ENGLISH   LEARNER PREFERENCE PRIMARY READING   ANSWERED BY self   RELATIONSHIP SELF       Abuse Screening:  Abuse Screening Questionnaire 2/28/2022   Do you ever feel afraid of your partner? N   Are you in a relationship with someone who physically or mentally threatens you? N   Is it safe for you to go home? Y       Fall Screening  Fall Risk Assessment, last 12 mths 12/27/2022   Able to walk? Yes   Fall in past 12 months? 0   Do you feel unsteady? -   Are you worried about falling -   Is the gait abnormal? -   Number of falls in past 12 months -   Fall with injury? -       Generalized Anxiety  No flowsheet data found. Health Maintenance Due   Topic Date Due    COVID-19 Vaccine (5 - Booster for Moderna series) 06/15/2022    Flu Vaccine (1) 08/01/2022    Medicare Yearly Exam  12/15/2022   . Health Maintenance reviewed and discussed and ordered per Provider.   Vaccines Due   Screenings Due       Rylee Boateng is updated on all HM    1. \"Have you been to the ER, urgent care clinic since your last visit? Hospitalized since your last visit? \" No    2. \"Have you seen or consulted any other health care providers outside of the [address] [place] [address] since your last visit? \" No     3. For patients aged 39-70: Has the patient had a colonoscopy / FIT/ Cologuard? NA - based on age     If the patient is female:    4. For patients aged 41-77: Has the patient had a mammogram within the past 2 years? NA - based on age    11. For patients aged 21-65: Has the patient had a pap smear?  NA - based on age

## 2022-12-27 NOTE — PATIENT INSTRUCTIONS
Vaccine Information Statement    Influenza (Flu) Vaccine (Inactivated or Recombinant): What You Need to Know    Many vaccine information statements are available in [place] and other languages. See www.immunize.org/vis. Hojas de información sobre vacunas están disponibles en español y en muchos otros idiomas. Visite www.immunize.org/vis. 1. Why get vaccinated? Influenza vaccine can prevent influenza (flu). Flu is a contagious disease that spreads around the [facility] every year, usually between October and May. Anyone can get the flu, but it is more dangerous for some people. Infants and young children, people 72 years and older, pregnant people, and people with certain health conditions or a weakened immune system are at greatest risk of flu complications. Pneumonia, bronchitis, sinus infections, and ear infections are examples of flu-related complications. If you have a medical condition, such as heart disease, cancer, or diabetes, flu can make it worse. Flu can cause fever and chills, sore throat, muscle aches, fatigue, cough, headache, and runny or stuffy nose. Some people may have vomiting and diarrhea, though this is more common in children than adults. In an average year, thousands of people in the [facility] die from flu, and many more are hospitalized. Flu vaccine prevents millions of illnesses and flu-related visits to the doctor each year. 2. Influenza vaccines     CDC recommends everyone 6 months and older get vaccinated every flu season. Children 6 months through 6years of age may need 2 doses during a single flu season. Everyone else needs only 1 dose each flu season. It takes about 2 weeks for protection to develop after vaccination. There are many flu viruses, and they are always changing. Each year a new flu vaccine is made to protect against the influenza viruses believed to be likely to cause disease in the upcoming flu season.  Even when the vaccine doesnt exactly match these viruses, it may still provide some protection. Influenza vaccine does not cause flu. Influenza vaccine may be given at the same time as other vaccines. 3. Talk with your health care provider    Tell your vaccination provider if the person getting the vaccine:  Has had an allergic reaction after a previous dose of influenza vaccine, or has any severe, life-threatening allergies   Has ever had Guillain-Barré Syndrome (also called GBS)    In some cases, your health care provider may decide to postpone influenza vaccination until a future visit. Influenza vaccine can be administered at any time during pregnancy. People who are or will be pregnant during influenza season should receive inactivated influenza vaccine. People with minor illnesses, such as a cold, may be vaccinated. People who are moderately or severely ill should usually wait until they recover before getting influenza vaccine. Your health care provider can give you more information. 4. Risks of a vaccine reaction    Soreness, redness, and swelling where the shot is given, fever, muscle aches, and headache can happen after influenza vaccination. There may be a very small increased risk of Guillain-Barré Syndrome (GBS) after inactivated influenza vaccine (the flu shot). [employer] [employer] children who get the flu shot along with pneumococcal vaccine (PCV13) and/or DTaP vaccine at the same time might be slightly more likely to have a seizure caused by fever. Tell your health care provider if a child who is getting flu vaccine has ever had a seizure. People sometimes faint after medical procedures, including vaccination. Tell your provider if you feel dizzy or have vision changes or ringing in the ears. As with any medicine, there is a very remote chance of a vaccine causing a severe allergic reaction, other serious injury, or death. 5. What if there is a serious problem?     An allergic reaction could occur after the vaccinated person leaves the clinic. If you see signs of a severe allergic reaction (hives, swelling of the face and throat, difficulty breathing, a fast heartbeat, dizziness, or weakness), call 9-1-1 and get the person to the nearest hospital.    For other signs that concern you, call your health care provider. Adverse reactions should be reported to the Vaccine Adverse Event Reporting System (VAERS). Your health care provider will usually file this report, or you can do it yourself. Visit the VAERS website at www.vaers. [facility].gov or call 4-805.134.5208. VAERS is only for reporting reactions, and VAERS staff members do not give medical advice. 6. The National Vaccine Injury Compensation Program    The [facility] Vaccine Injury Compensation Program (VICP) is a federal program that was created to compensate people who may have been injured by certain vaccines. Claims regarding alleged injury or death due to vaccination have a time limit for filing, which may be as short as two years. Visit the VICP website at www.[facility].gov/vaccinecompensation or call 6-962.910.5810 to learn about the program and about filing a claim. 7. How can I learn more? Ask your health care provider. Call your local or state health department. Visit the website of the Food and Drug Administration (FDA) for vaccine package inserts and additional information at www.fda.gov/vaccines-blood-biologics/vaccines. Contact the Centers for Disease Control and Prevention (CDC): Call 0-803.548.5593 (2-725-XRO-INFO) or  Visit CDCs influenza website at www.cdc.gov/flu. Vaccine Information Statement   Inactivated Influenza Vaccine   8/6/2021  42 NORM Hoisac Carolyn [id number]   Department of Health and Human Services  Centers for Disease Control and Prevention    Office Use Only

## 2022-12-27 NOTE — PROGRESS NOTES
(AWV) The Initial Medicare Annual Wellness Exam PROGRESS NOTE    This is an Initial Medicare Annual Wellness Exam (AWV) (Performed 12 months after IPPE or effective date of Medicare Part B enrollment, Once in a lifetime)    I have reviewed the patient's medical history in detail and updated the computerized patient record. Concepción Colindres is a [de-identified] y.o.  female and presents for an annual wellness exam       Patient Active Problem List   Diagnosis Code    Breast pain N64.4    DDD (degenerative disc disease), lumbar M51.36    Hypertension I10    Hyperlipidemia E78.5    Vitamin D deficiency E55.9    Osteoarthritis of right knee M17.11    Osteopenia M85.80    Complete rotator cuff tear of left shoulder M75.122    CKD (chronic kidney disease) N18.9    Prediabetes R73.03    Abnormal mammogram R92.8    Primary osteoarthritis of left knee M17.12    Sickle-cell trait ([facility]) D57.3     Patient Active Problem List    Diagnosis Date Noted    Sickle-cell trait ([facility] [address] 10/25/2022    Primary osteoarthritis of left knee 02/21/2022    Abnormal mammogram 12/10/2019    Prediabetes 05/23/2017    CKD (chronic kidney disease) 02/29/2016    Complete rotator cuff tear of left shoulder 10/31/2014    Osteopenia 06/24/2014    Osteoarthritis of right knee 05/31/2014    Vitamin D deficiency 03/30/2014    Breast pain 03/11/2014    DDD (degenerative disc disease), lumbar 03/11/2014    Hypertension 03/11/2014    Hyperlipidemia 03/11/2014     Current Outpatient Medications   Medication Sig Dispense Refill    hydroCHLOROthiazide (HYDRODIURIL) 25 mg tablet TAKE 1 TABLET EVERY DAY 90 Tablet 1    diclofenac (VOLTAREN) 1 % gel Apply 4 g to affected area four (4) times daily. 200 g 3    acetaminophen (TYLENOL) 650 mg TbER Take 1 Tablet by mouth every eight to twelve (8-12) hours as needed for Pain (mild). 270 Tablet 1    ibuprofen (MOTRIN) 600 mg tablet Take 1 Tablet by mouth daily as needed for Pain (severe pain).  90 Tablet 1 dexamethasone (DECADRON) 0.1 % ophthalmic solution       metoprolol succinate (TOPROL-XL) 100 mg tablet Take 1 Tablet by mouth daily for 90 days. 90 Tablet 1    timolol (TIMOPTIC) 0.5 % ophthalmic solution INSTILL 1 DROP IN RIGHT EYE EVERY MORNING      gabapentin (NEURONTIN) 100 mg capsule gabapentin 100 mg capsule      Arm Brace (Wrist Support Large-XLarge) misc Wrist support for carpal tunnel  syndrome 2 Each 0    potassium chloride (K-DUR, KLOR-CON) 20 mEq tablet TAKE 1 TABLET TWICE DAILY 180 Tab 0    atorvastatin (LIPITOR) 10 mg tablet TAKE 1 TABLET EVERY DAY 90 Tab 1    calcium carbonate (CALTREX) 600 mg calcium (1,500 mg) tablet Take 600 mg by mouth two (2) times a day. folic acid/multivit-min/lutein (CENTRUM SILVER PO) Take  by mouth. cholecalciferol (VITAMIN D3) 25 mcg (1,000 unit) cap Take  by mouth daily.        No Known Allergies  Past Medical History:   Diagnosis Date    Arthritis     Cataract     Hypercholesterolemia     Hypertension      Past Surgical History:   Procedure Laterality Date    HX APPENDECTOMY      HX CATARACT REMOVAL      HX COLONOSCOPY  12/2009    Dr. Rosa Easley, 8 yr follow up    HX TOTAL ABDOMINAL HYSTERECTOMY       Family History   Problem Relation Age of Onset    Diabetes Mother     Hypertension Mother     Cancer Sister 48        breast    Cancer Maternal Aunt         breast    Stroke Sister      Social History     Tobacco Use    Smoking status: Never    Smokeless tobacco: Never   Substance Use Topics    Alcohol use: No       ROS   History obtained from the patient  General ROS: negative for - chills or fever  Psychological ROS: negative  for - depression and anxiety   negative for - hallucinations, hostility or irritability  Ophthalmic ROS: negative for - blurry vision, decreased vision or double vision  ENT ROS: negative for - epistaxis or headaches  Allergy and Immunology ROS: negative  negative for - hives or itchy/watery eyes  Hematological and Lymphatic ROS: negative for - bleeding problems, blood clots, blood transfusions or bruising  Endocrine ROS: positive for - skin changes and breast pain with discomfort      Breast ROS: right breast keloid   Respiratory ROS: no cough, shortness of breath, or wheezing  Cardiovascular ROS: no chest pain or dyspnea on exertion  Gastrointestinal ROS: no abdominal pain, change in bowel habits, or black or bloody stools  Genito-Urinary ROS: no dysuria, trouble voiding, or hematuria  Musculoskeletal ROS: positive for - joint pain, joint stiffness, muscle pain and pain in hand - bilateral ,right knee more that left knee , right shoulder pain   Neurological ROS: no TIA or stroke symptoms  Dermatological ROS: right breast keloid          History     Past Medical History:   Diagnosis Date    Arthritis     Cataract     Hypercholesterolemia     Hypertension       Past Surgical History:   Procedure Laterality Date    HX APPENDECTOMY      HX CATARACT REMOVAL      HX COLONOSCOPY  12/2009    Dr. Sandee Zhou, 10 yr follow up    HX TOTAL ABDOMINAL HYSTERECTOMY       Current Outpatient Medications   Medication Sig Dispense Refill    hydroCHLOROthiazide (HYDRODIURIL) 25 mg tablet TAKE 1 TABLET EVERY DAY 90 Tablet 1    diclofenac (VOLTAREN) 1 % gel Apply 4 g to affected area four (4) times daily. 200 g 3    acetaminophen (TYLENOL) 650 mg TbER Take 1 Tablet by mouth every eight to twelve (8-12) hours as needed for Pain (mild). 270 Tablet 1    ibuprofen (MOTRIN) 600 mg tablet Take 1 Tablet by mouth daily as needed for Pain (severe pain). 90 Tablet 1    dexamethasone (DECADRON) 0.1 % ophthalmic solution       metoprolol succinate (TOPROL-XL) 100 mg tablet Take 1 Tablet by mouth daily for 90 days.  90 Tablet 1    timolol (TIMOPTIC) 0.5 % ophthalmic solution INSTILL 1 DROP IN RIGHT EYE EVERY MORNING      gabapentin (NEURONTIN) 100 mg capsule gabapentin 100 mg capsule      Arm Brace (Wrist Support Large-XLarge) misc Wrist support for carpal tunnel  syndrome 2 Each 0 potassium chloride (K-DUR, KLOR-CON) 20 mEq tablet TAKE 1 TABLET TWICE DAILY 180 Tab 0    atorvastatin (LIPITOR) 10 mg tablet TAKE 1 TABLET EVERY DAY 90 Tab 1    calcium carbonate (CALTREX) 600 mg calcium (1,500 mg) tablet Take 600 mg by mouth two (2) times a day. folic acid/multivit-min/lutein (CENTRUM SILVER PO) Take  by mouth. cholecalciferol (VITAMIN D3) 25 mcg (1,000 unit) cap Take  by mouth daily. No Known Allergies  Family History   Problem Relation Age of Onset    Diabetes Mother     Hypertension Mother     Cancer Sister 48        breast    Cancer Maternal Aunt         breast    Stroke Sister      Social History     Tobacco Use    Smoking status: Never    Smokeless tobacco: Never   Substance Use Topics    Alcohol use: No     Patient Active Problem List   Diagnosis Code    Breast pain N64.4    DDD (degenerative disc disease), lumbar M51.36    Hypertension I10    Hyperlipidemia E78.5    Vitamin D deficiency E55.9    Osteoarthritis of right knee M17.11    Osteopenia M85.80    Complete rotator cuff tear of left shoulder M75.122    CKD (chronic kidney disease) N18.9    Prediabetes R73.03    Abnormal mammogram R92.8    Primary osteoarthritis of left knee M17.12    Sickle-cell trait (H[facility]) D57.3       Health Maintenance History    Immunizations reviewed, up-to-date except for shingles  colonoscopy: Patient has a stopped colon cancer screening  Chest CT: never smoked   Eye exam:  Up to date   Mammo up todate   Dexascan done 2/2022 repeat in 2 years        Depression Risk Factor Screening:      Patient Health Questionnaire (PHQ-2)   Over the last 2 weeks, how often have you been bothered by any of the following problems? Little interest or pleasure in doing things? Not at all. [0]  Feeling down, depressed, or hopeless? Not at all. [0]    Total Score: 0/6  PHQ-2 Assessment Scoring:   A score of 2 or more requires further screening with the PHQ-9    Alcohol Risk Factor Screening:     Women:  On any occasion during the past 3 months, have you had more than 3 drinks containing alcohol? No    Do you average more than 7 drinks per week? No   Men: On any occasion during the past 3 months, have you had more than 4 drinks containing alcohol? Do you average more than 14 drinks per week? Functional Ability and Level of Safety:     Hearing Loss    Hearing is good. Activities of Daily Living   Self-care. Requires assistance with: no ADLs    Fall Risk   Ambulatory aid device (15 pts)    Abuse Screen   Patient is not abused  None    Examination   Physical Examination  Vitals:    12/27/22 1050   BP: 136/72   Pulse: 62   Resp: 16   Temp: 97.7 °F (36.5 °C)   TempSrc: Temporal   SpO2: 98%   Weight: 200 lb 6.4 oz (90.9 kg)   Height: 5' 7\" (1.702 m)   PainSc:   8   PainLoc: Generalized      Body mass index is 31.39 kg/m².      Evaluation of Cognitive Function:  Mood/affect: Good   Appearance:well dressed   Family member/caregiver input:not accompanied at this visit     alert, well appearing, and in no distress, oriented to person, place, and time, and overweight    Patient Care Team:  Valentine Aguilar MD as PCP - General (Internal Medicine Physician)  Valentine Aguilar MD as PCP - [facility] Empaneled Provider  Robby Sanchez MD (Nephrology)  Travon Sol MD as Consulting Provider (Obstetrics & Gynecology)  Cody Alcantara MD (Surgery Physician)  Cody Alcantara MD (Surgery Physician)  Clemente Garcia, [address] [place] [address] as Physician (Optometry)    End-of-life planning  Advanced Directive in the case than an injury or illness causes the patient to be unable to make health care decisions    Health Care Directive or Living Will: yes    Advice/Referrals/Counselling/Plan:   Education and counseling provided:  Are appropriate based on today's review and evaluation  Pneumococcal Vaccine  Influenza Vaccine  Screening Mammography  Bone mass measurement (DEXA)  Screening for glaucoma  Include in education list (weight loss, physical activity, smoking cessation, fall prevention, and nutrition)    ICD-10-CM ICD-9-CM    1. Medicare annual wellness visit, subsequent  Z00.00 V70.0       2. Needs flu shot  Z23 V04.81 INFLUENZA, FLUAD, (AGE 65 Y+), IM, PF, 0.5 ML      3. Essential hypertension  I10 401.9       4. Sickle-cell trait ([facility]  D57.3 282.5       5. Prediabetes  R73.03 790.29       6. Mixed hyperlipidemia  E78.2 272.2 LIPID PANEL      7. Abnormal CBC  R79.89 790.6       8. Vitamin D deficiency  E55.9 268.9         Encounter Diagnoses   Name Primary? Medicare annual wellness visit, subsequent Yes    Needs flu shot     Essential hypertension     Sickle-cell trait ([facility]     Prediabetes     Mixed hyperlipidemia     Abnormal CBC     Vitamin D deficiency      Orders Placed This Encounter    Influenza, FLUAD, (age 72 y+), IM, PF, 0.5 mL    LIPID PANEL     Orders Placed This Encounter    Influenza, FLUAD, (age 72 y+), IM, PF, 0.5 mL    LIPID PANEL     Standing Status:   Future     Standing Expiration Date:   12/28/2023     Orders Placed This Encounter    Influenza, FLUAD, (age 72 y+), IM, PF, 0.5 mL    LIPID PANEL     Diagnoses and all orders for this visit:    1. Medicare annual wellness visit, subsequent    2. Needs flu shot  -     INFLUENZA, FLUAD, (AGE 65 Y+), IM, PF, 0.5 ML    3. Essential hypertension    4. Sickle-cell trait ([facility]    5. Prediabetes    6. Mixed hyperlipidemia  -     LIPID PANEL; Future    7. Abnormal CBC    8. Vitamin D deficiency    . Brief written plan, checklist    I have discussed the diagnosis with the patient and the intended plan as seen in the above orders. The patient has received an after-visit summary and questions were answered concerning future plans. I have discussed medication side effects and warnings with the patient as well. I have reviewed the plan of care with the patient, accepted their input and they are in agreement with the treatment goals. ____________________________________________________________    Problem Assessment    for treatment of   Chief Complaint   Patient presents with    Annual Wellness Visit

## 2022-12-28 LAB
CHOLEST SERPL-MCNC: 156 MG/DL (ref 100–199)
HDLC SERPL-MCNC: 36 MG/DL
IMP & REVIEW OF LAB RESULTS: NORMAL
LDLC SERPL CALC-MCNC: 94 MG/DL (ref 0–99)
TRIGL SERPL-MCNC: 147 MG/DL (ref 0–149)
VLDLC SERPL CALC-MCNC: 26 MG/DL (ref 5–40)

## 2022-12-30 NOTE — PROGRESS NOTES
Informed pt of lab results and treatment plan, pt stated that PCP was supposed to refill her pain medication Gabapentin, however it was never sent to the pharmacy, also pt would like to know what PCP mean by \"Healthy eating\" informed pt to avoid greasy foods, carbs and consume more healthy intakes pt stated that she do not eat greasy foods and she eat a lot of salads.

## 2023-01-03 ENCOUNTER — TELEPHONE (OUTPATIENT)
Dept: FAMILY MEDICINE CLINIC | Age: 83
End: 2023-01-03

## 2023-01-03 DIAGNOSIS — G89.29 CHRONIC BILATERAL LOW BACK PAIN WITHOUT SCIATICA: Primary | ICD-10-CM

## 2023-01-03 DIAGNOSIS — M54.50 CHRONIC BILATERAL LOW BACK PAIN WITHOUT SCIATICA: Primary | ICD-10-CM

## 2023-01-03 RX ORDER — GABAPENTIN 100 MG/1
CAPSULE ORAL
Qty: 90 CAPSULE | Refills: 1 | Status: SHIPPED | OUTPATIENT
Start: 2023-01-03

## 2023-01-03 NOTE — TELEPHONE ENCOUNTER
Please schedule follow up tomorrow overbooking if she still having symptoms      Also I have checked with Dr. Donnald Heimlich and should be able to schedule appointment for manipulation

## 2023-01-03 NOTE — TELEPHONE ENCOUNTER
Pt called to follow up on message from 12/30/22 in regards to getting gabapentin filled. Pt was informed that we are waiting on Dr Chalino Coronado to respond once she does she will receive a call back.

## 2023-01-04 ENCOUNTER — TELEPHONE (OUTPATIENT)
Dept: FAMILY MEDICINE CLINIC | Age: 83
End: 2023-01-04

## 2023-01-04 NOTE — TELEPHONE ENCOUNTER
Pt was returning phone call from yesterday. Informed pt that the the MA reached out to her to inform her that her medication was sent to the pharmacy. Pt verbally understood.  GARCIA

## 2023-01-21 DIAGNOSIS — M54.50 CHRONIC BILATERAL LOW BACK PAIN WITHOUT SCIATICA: ICD-10-CM

## 2023-01-21 DIAGNOSIS — G89.29 CHRONIC BILATERAL LOW BACK PAIN WITHOUT SCIATICA: ICD-10-CM

## 2023-01-21 DIAGNOSIS — I10 ESSENTIAL HYPERTENSION: ICD-10-CM

## 2023-01-21 RX ORDER — METOPROLOL SUCCINATE 100 MG/1
100 TABLET, EXTENDED RELEASE ORAL DAILY
Qty: 90 TABLET | Refills: 1 | Status: SHIPPED | OUTPATIENT
Start: 2023-01-21 | End: 2023-04-21

## 2023-01-21 RX ORDER — CYCLOBENZAPRINE HCL 5 MG
TABLET ORAL
Qty: 30 TABLET | Refills: 0 | Status: SHIPPED | OUTPATIENT
Start: 2023-01-21

## 2023-03-14 ENCOUNTER — OFFICE VISIT (OUTPATIENT)
Dept: FAMILY MEDICINE CLINIC | Facility: CLINIC | Age: 83
End: 2023-03-14
Payer: MEDICARE

## 2023-03-14 VITALS
RESPIRATION RATE: 19 BRPM | HEART RATE: 57 BPM | HEIGHT: 67 IN | WEIGHT: 194 LBS | OXYGEN SATURATION: 100 % | DIASTOLIC BLOOD PRESSURE: 69 MMHG | SYSTOLIC BLOOD PRESSURE: 117 MMHG | TEMPERATURE: 98 F | BODY MASS INDEX: 30.45 KG/M2

## 2023-03-14 DIAGNOSIS — G56.03 BILATERAL CARPAL TUNNEL SYNDROME: Primary | ICD-10-CM

## 2023-03-14 PROCEDURE — G8484 FLU IMMUNIZE NO ADMIN: HCPCS | Performed by: INTERNAL MEDICINE

## 2023-03-14 PROCEDURE — G8417 CALC BMI ABV UP PARAM F/U: HCPCS | Performed by: INTERNAL MEDICINE

## 2023-03-14 PROCEDURE — 3078F DIAST BP <80 MM HG: CPT | Performed by: INTERNAL MEDICINE

## 2023-03-14 PROCEDURE — 1123F ACP DISCUSS/DSCN MKR DOCD: CPT | Performed by: INTERNAL MEDICINE

## 2023-03-14 PROCEDURE — 3074F SYST BP LT 130 MM HG: CPT | Performed by: INTERNAL MEDICINE

## 2023-03-14 PROCEDURE — 99213 OFFICE O/P EST LOW 20 MIN: CPT | Performed by: INTERNAL MEDICINE

## 2023-03-14 PROCEDURE — 1090F PRES/ABSN URINE INCON ASSESS: CPT | Performed by: INTERNAL MEDICINE

## 2023-03-14 PROCEDURE — 1036F TOBACCO NON-USER: CPT | Performed by: INTERNAL MEDICINE

## 2023-03-14 PROCEDURE — G8427 DOCREV CUR MEDS BY ELIG CLIN: HCPCS | Performed by: INTERNAL MEDICINE

## 2023-03-14 PROCEDURE — G8399 PT W/DXA RESULTS DOCUMENT: HCPCS | Performed by: INTERNAL MEDICINE

## 2023-03-14 RX ORDER — PREDNISONE 10 MG/1
TABLET ORAL
Qty: 1 EACH | Refills: 0 | Status: SHIPPED | OUTPATIENT
Start: 2023-03-14

## 2023-03-14 SDOH — ECONOMIC STABILITY: FOOD INSECURITY: WITHIN THE PAST 12 MONTHS, YOU WORRIED THAT YOUR FOOD WOULD RUN OUT BEFORE YOU GOT MONEY TO BUY MORE.: NEVER TRUE

## 2023-03-14 SDOH — ECONOMIC STABILITY: FOOD INSECURITY: WITHIN THE PAST 12 MONTHS, THE FOOD YOU BOUGHT JUST DIDN'T LAST AND YOU DIDN'T HAVE MONEY TO GET MORE.: NEVER TRUE

## 2023-03-14 SDOH — ECONOMIC STABILITY: INCOME INSECURITY: HOW HARD IS IT FOR YOU TO PAY FOR THE VERY BASICS LIKE FOOD, HOUSING, MEDICAL CARE, AND HEATING?: NOT HARD AT ALL

## 2023-03-14 SDOH — ECONOMIC STABILITY: HOUSING INSECURITY
IN THE LAST 12 MONTHS, WAS THERE A TIME WHEN YOU DID NOT HAVE A STEADY PLACE TO SLEEP OR SLEPT IN A SHELTER (INCLUDING NOW)?: NO

## 2023-03-14 NOTE — TELEPHONE ENCOUNTER
Ted called to potassium chloride (KLOR-CON M) 20 MEQ extended release tablet   02/06/21    Seen 03/14/23

## 2023-03-14 NOTE — PROGRESS NOTES
HISTORY OF PRESENT ILLNESS  Rosa Christina is a [de-identified] y.o. female    HPI  C/o bilateral wrists/hands pain and tingling, mainly during the night, it wakes her up at night, started months ago but getting worse for the last 3 weeks, no recent fall or trauma  No pain or tingling now. Review of Systems   Constitutional:  Negative for fever. Neurological:  Negative for weakness and numbness. Physical Exam  Vitals reviewed. Cardiovascular:      Rate and Rhythm: Normal rate and regular rhythm. Musculoskeletal:      Right wrist: No swelling or tenderness. Normal range of motion. Left wrist: No swelling or tenderness. Normal range of motion. Right hand: No swelling or tenderness. Normal range of motion. Normal sensation. Left hand: No swelling or tenderness. Normal range of motion. Normal sensation. ASSESSMENT and PLAN    1. Bilateral carpal tunnel syndrome  -     predniSONE 10 MG (21) TBPK; Take as per dose pack instructions. , Disp-1 each, R-0Normal  -     Wrist splint, wear during the night  -     External Referral To Orthopedic Surgery         Return if symptoms worsen or fail to improve.

## 2023-03-14 NOTE — PROGRESS NOTES
Ge Baca is a [de-identified] y.o. female (: 1940) presenting to address:    Chief Complaint   Patient presents with    Joint Pain       Vitals:    23 1254   BP: 117/69   Pulse: 57   Resp: 19   Temp: 98 °F (36.7 °C)   SpO2: 100%       Coordination of Care Questionaire:   1. \"Have you been to the ER, urgent care clinic since your last visit? Hospitalized since your last visit? \" No    2. \"Have you seen or consulted any other health care providers outside of the [address] [place] [address] since your last visit? \" No     3. For patients aged 39-70: Has the patient had a colonoscopy / FIT/ Cologuard? NA - based on age      If the patient is female:    4. For patients aged 41-77: Has the patient had a mammogram within the past 2 years? NA - based on age or sex      11. For patients aged 21-65: Has the patient had a pap smear? NA - based on age or sex    Advanced Directive:   1. Do you have an Advanced Directive? No    2. Would you like information on Advanced Directives?  No

## 2023-03-15 RX ORDER — POTASSIUM CHLORIDE 20 MEQ/1
TABLET, EXTENDED RELEASE ORAL
Qty: 60 TABLET | Refills: 1 | Status: SHIPPED | OUTPATIENT
Start: 2023-03-15

## 2023-05-25 ENCOUNTER — OFFICE VISIT (OUTPATIENT)
Dept: FAMILY MEDICINE CLINIC | Facility: CLINIC | Age: 83
End: 2023-05-25
Payer: MEDICARE

## 2023-05-25 VITALS
RESPIRATION RATE: 18 BRPM | TEMPERATURE: 97.7 F | WEIGHT: 196.4 LBS | SYSTOLIC BLOOD PRESSURE: 128 MMHG | OXYGEN SATURATION: 98 % | DIASTOLIC BLOOD PRESSURE: 72 MMHG | HEIGHT: 67 IN | HEART RATE: 65 BPM | BODY MASS INDEX: 30.83 KG/M2

## 2023-05-25 DIAGNOSIS — N61.0 BREAST INFECTION IN FEMALE: ICD-10-CM

## 2023-05-25 DIAGNOSIS — N64.4 PAINFUL LUMPY RIGHT BREAST: ICD-10-CM

## 2023-05-25 DIAGNOSIS — M17.11 PRIMARY OSTEOARTHRITIS OF RIGHT KNEE: ICD-10-CM

## 2023-05-25 DIAGNOSIS — E78.2 MIXED HYPERLIPIDEMIA: ICD-10-CM

## 2023-05-25 DIAGNOSIS — I10 PRIMARY HYPERTENSION: ICD-10-CM

## 2023-05-25 DIAGNOSIS — N63.11 BREAST LUMP ON RIGHT SIDE AT 11 O'CLOCK POSITION: ICD-10-CM

## 2023-05-25 DIAGNOSIS — N63.10 PAINFUL LUMPY RIGHT BREAST: ICD-10-CM

## 2023-05-25 DIAGNOSIS — M51.36 DDD (DEGENERATIVE DISC DISEASE), LUMBAR: ICD-10-CM

## 2023-05-25 DIAGNOSIS — N18.31 STAGE 3A CHRONIC KIDNEY DISEASE (HCC): Primary | ICD-10-CM

## 2023-05-25 DIAGNOSIS — D57.3 SICKLE-CELL TRAIT (HCC): ICD-10-CM

## 2023-05-25 PROCEDURE — 1090F PRES/ABSN URINE INCON ASSESS: CPT | Performed by: LEGAL MEDICINE

## 2023-05-25 PROCEDURE — 3074F SYST BP LT 130 MM HG: CPT | Performed by: LEGAL MEDICINE

## 2023-05-25 PROCEDURE — G8417 CALC BMI ABV UP PARAM F/U: HCPCS | Performed by: LEGAL MEDICINE

## 2023-05-25 PROCEDURE — 1036F TOBACCO NON-USER: CPT | Performed by: LEGAL MEDICINE

## 2023-05-25 PROCEDURE — G8428 CUR MEDS NOT DOCUMENT: HCPCS | Performed by: LEGAL MEDICINE

## 2023-05-25 PROCEDURE — G8399 PT W/DXA RESULTS DOCUMENT: HCPCS | Performed by: LEGAL MEDICINE

## 2023-05-25 PROCEDURE — 1123F ACP DISCUSS/DSCN MKR DOCD: CPT | Performed by: LEGAL MEDICINE

## 2023-05-25 PROCEDURE — 99214 OFFICE O/P EST MOD 30 MIN: CPT | Performed by: LEGAL MEDICINE

## 2023-05-25 PROCEDURE — 3078F DIAST BP <80 MM HG: CPT | Performed by: LEGAL MEDICINE

## 2023-05-25 RX ORDER — AMOXICILLIN AND CLAVULANATE POTASSIUM 875; 125 MG/1; MG/1
1 TABLET, FILM COATED ORAL 2 TIMES DAILY
Qty: 20 TABLET | Refills: 0 | Status: SHIPPED | OUTPATIENT
Start: 2023-05-25 | End: 2023-06-04

## 2023-05-25 RX ORDER — PYRAZINAMIDE 500 MG/1
1 TABLET ORAL EVERY 8 HOURS PRN
Qty: 18 TABLET | Refills: 0 | Status: SHIPPED | OUTPATIENT
Start: 2023-05-25 | End: 2023-05-28

## 2023-05-25 RX ORDER — GABAPENTIN 100 MG/1
200 CAPSULE ORAL 3 TIMES DAILY
Qty: 540 CAPSULE | Refills: 1 | Status: SHIPPED | OUTPATIENT
Start: 2023-05-25 | End: 2023-08-23

## 2023-05-25 RX ORDER — METOPROLOL SUCCINATE 100 MG/1
100 TABLET, EXTENDED RELEASE ORAL DAILY
Qty: 90 TABLET | Refills: 3 | Status: SHIPPED | OUTPATIENT
Start: 2023-05-25 | End: 2023-05-25 | Stop reason: DRUGHIGH

## 2023-05-25 RX ORDER — HYDROCHLOROTHIAZIDE 25 MG/1
25 TABLET ORAL DAILY
Qty: 90 TABLET | Refills: 3 | Status: SHIPPED | OUTPATIENT
Start: 2023-05-25 | End: 2023-08-23

## 2023-05-25 RX ORDER — METOPROLOL SUCCINATE 100 MG/1
100 TABLET, EXTENDED RELEASE ORAL DAILY
Qty: 90 TABLET | Refills: 3 | Status: SHIPPED | OUTPATIENT
Start: 2023-05-25 | End: 2023-08-23

## 2023-05-25 RX ORDER — ATORVASTATIN CALCIUM 10 MG/1
10 TABLET, FILM COATED ORAL DAILY
Qty: 90 TABLET | Refills: 3 | Status: SHIPPED | OUTPATIENT
Start: 2023-05-25 | End: 2023-08-23

## 2023-05-25 ASSESSMENT — ENCOUNTER SYMPTOMS
COUGH: 0
DIARRHEA: 0
ABDOMINAL PAIN: 0
EYE ITCHING: 0
BLOOD IN STOOL: 0
EYE DISCHARGE: 0
NAUSEA: 0
APNEA: 0
FACIAL SWELLING: 0
CONSTIPATION: 0
EYE REDNESS: 0
WHEEZING: 0
CHEST TIGHTNESS: 0
SHORTNESS OF BREATH: 0
EYE PAIN: 0
ANAL BLEEDING: 0
CHOKING: 0
SORE THROAT: 0
VOMITING: 0
BACK PAIN: 1

## 2023-05-25 ASSESSMENT — PATIENT HEALTH QUESTIONNAIRE - PHQ9
2. FEELING DOWN, DEPRESSED OR HOPELESS: 0
SUM OF ALL RESPONSES TO PHQ QUESTIONS 1-9: 0
SUM OF ALL RESPONSES TO PHQ QUESTIONS 1-9: 0
SUM OF ALL RESPONSES TO PHQ9 QUESTIONS 1 & 2: 0
SUM OF ALL RESPONSES TO PHQ QUESTIONS 1-9: 0
SUM OF ALL RESPONSES TO PHQ QUESTIONS 1-9: 0
1. LITTLE INTEREST OR PLEASURE IN DOING THINGS: 0

## 2023-05-25 NOTE — PROGRESS NOTES
[facility]     Chief Complaint   Patient presents with    Fatigue    Breast Pain     Right breast, tender to touch with a lump    Numbness     In fingers at night     /72 (Site: Right Upper Arm, Position: Sitting, Cuff Size: Medium Adult)   Pulse 65   Temp 97.7 °F (36.5 °C) (Temporal)   Resp 18   Ht 5' 7\" (1.702 m)   Wt 196 lb 6.4 oz (89.1 kg)   SpO2 98%   BMI 30.76 kg/m²         HPI:  [facility] is here for right breast pain and tenderness for  2 weeks also pain in the right axilla     She fell 2 month ago she tripped on her yard hit the ground and landed on her face ,possible hit her breast ?? Bilateral hands numbness for few weeks possible carpal tunnel syndrome      She has increase  pain in her knee and back pain     Past Medical History:   Diagnosis Date    Arthritis     Cataract     Hypercholesterolemia     Hypertension      Past Surgical History:   Procedure Laterality Date    APPENDECTOMY      CATARACT REMOVAL      COLONOSCOPY  12/2009    Dr. Jeniffer Smart, 10 yr follow up    HYSTERECTOMY, TOTAL ABDOMINAL (CERVIX REMOVED)       Social History     Tobacco Use    Smoking status: Never    Smokeless tobacco: Never   Substance Use Topics    Alcohol use: No       Family History   Problem Relation Age of Onset    Diabetes Mother     Hypertension Mother     Cancer Sister 48        breast    Cancer Maternal Aunt         breast    Stroke Sister        Review of Systems   Constitutional:  Negative for activity change, appetite change, chills, diaphoresis, fatigue and fever. HENT:  Negative for congestion, ear discharge, ear pain, facial swelling, hearing loss and sore throat. Eyes:  Negative for pain, discharge, redness and itching. Respiratory:  Negative for apnea, cough, choking, chest tightness, shortness of breath and wheezing. Gastrointestinal:  Negative for abdominal pain, anal bleeding, blood in stool, constipation, diarrhea, nausea and vomiting.    Endocrine: Negative for cold

## 2023-05-25 NOTE — PROGRESS NOTES
Jovon Silva is a [de-identified] y.o. female (: 1940) presenting to address:    Chief Complaint   Patient presents with    Fatigue    Breast Pain     Right breast, tender to touch with a lump    Numbness     In fingers at night       Vitals:    23 0849   BP: 128/72   Pulse: 65   Resp: 18   Temp: 97.7 °F (36.5 °C)   SpO2: 98%       Coordination of Care Questionaire:   1. \"Have you been to the ER, urgent care clinic since your last visit? Hospitalized since your last visit? \" No    2. \"Have you seen or consulted any other health care providers outside of the [address] [place] [address] since your last visit? \" No     3. For patients aged 39-70: Has the patient had a colonoscopy? NA - based on age    If the patient is female:    4. For patients aged 41-77: Has the patient had a mammogram within the past 2 years? NA - based on age or sex    11. For patients aged 21-65: Has the patient had a pap smear? NA - based on age or sex    Advanced Directive:   1. Do you have an Advanced Directive? No    2. Would you like information on Advanced Directives?  No

## 2023-05-30 RX ORDER — POTASSIUM CHLORIDE 20 MEQ/1
TABLET, EXTENDED RELEASE ORAL
Qty: 60 TABLET | Refills: 1 | Status: SHIPPED | OUTPATIENT
Start: 2023-05-30

## 2023-05-30 NOTE — TELEPHONE ENCOUNTER
Pt called Rx Refill Request.  Requested Prescriptions     Pending Prescriptions Disp Refills    potassium chloride (KLOR-CON M) 20 MEQ extended release tablet 60 tablet 1     Sig: TAKE 1 TABLET TWICE DAILY

## 2023-06-01 ENCOUNTER — TELEPHONE (OUTPATIENT)
Dept: FAMILY MEDICINE CLINIC | Facility: CLINIC | Age: 83
End: 2023-06-01

## 2023-06-01 DIAGNOSIS — N63.11 BREAST LUMP ON RIGHT SIDE AT 11 O'CLOCK POSITION: Primary | ICD-10-CM

## 2023-07-18 PROBLEM — D64.9 ANEMIA: Status: ACTIVE | Noted: 2023-07-18

## 2023-07-18 PROBLEM — E66.811 CLASS 1 OBESITY DUE TO EXCESS CALORIES WITH SERIOUS COMORBIDITY AND BODY MASS INDEX (BMI) OF 30.0 TO 30.9 IN ADULT: Status: ACTIVE | Noted: 2023-07-18

## 2023-07-18 PROBLEM — E66.09 CLASS 1 OBESITY DUE TO EXCESS CALORIES WITH SERIOUS COMORBIDITY AND BODY MASS INDEX (BMI) OF 30.0 TO 30.9 IN ADULT: Status: ACTIVE | Noted: 2023-07-18

## 2023-07-18 PROBLEM — D70.9 NEUTROPENIA (HCC): Status: ACTIVE | Noted: 2023-07-18

## 2023-07-18 NOTE — PATIENT INSTRUCTIONS
appointment, we will prescribe enough medications to last until your next appointment. Prescriptions cannot be filled after office hours, on weekends/holidays, or any other time the office is closed. F. If you have any forms to be completed, please mention that when making your appointment. Please send the forms to the office prior to your appointment. If you cannot do that, you can bring the forms with you and give them to the  when you check-in. Forms requiring more time or additional information may not be completed the day of the appointment. Please plan accordingly. G. For Pre-op appointments, please complete labs or other studies ordered by your surgeon at the hospital prior to your appointment. H. In order to make your scheduled appointments as productive as possible, please have your blood work done 1 week prior to all appointments.

## 2023-07-19 ENCOUNTER — OFFICE VISIT (OUTPATIENT)
Facility: CLINIC | Age: 83
End: 2023-07-19
Payer: MEDICARE

## 2023-07-19 VITALS
TEMPERATURE: 97.8 F | OXYGEN SATURATION: 98 % | BODY MASS INDEX: 29.98 KG/M2 | WEIGHT: 191 LBS | DIASTOLIC BLOOD PRESSURE: 73 MMHG | RESPIRATION RATE: 16 BRPM | HEIGHT: 67 IN | SYSTOLIC BLOOD PRESSURE: 134 MMHG | HEART RATE: 74 BPM

## 2023-07-19 DIAGNOSIS — E11.65 CONTROLLED TYPE 2 DIABETES MELLITUS WITH HYPERGLYCEMIA, WITHOUT LONG-TERM CURRENT USE OF INSULIN (HCC): ICD-10-CM

## 2023-07-19 DIAGNOSIS — I10 ESSENTIAL HYPERTENSION: ICD-10-CM

## 2023-07-19 DIAGNOSIS — E78.2 MIXED HYPERLIPIDEMIA: ICD-10-CM

## 2023-07-19 DIAGNOSIS — N63.11 MASS OF UPPER OUTER QUADRANT OF RIGHT BREAST: ICD-10-CM

## 2023-07-19 DIAGNOSIS — D64.9 ANEMIA, UNSPECIFIED TYPE: ICD-10-CM

## 2023-07-19 DIAGNOSIS — N18.31 STAGE 3A CHRONIC KIDNEY DISEASE (HCC): ICD-10-CM

## 2023-07-19 DIAGNOSIS — Z12.31 ENCOUNTER FOR SCREENING MAMMOGRAM FOR MALIGNANT NEOPLASM OF BREAST: ICD-10-CM

## 2023-07-19 DIAGNOSIS — Z76.89 ENCOUNTER TO ESTABLISH CARE: Primary | ICD-10-CM

## 2023-07-19 DIAGNOSIS — D70.9 NEUTROPENIA, UNSPECIFIED TYPE (HCC): ICD-10-CM

## 2023-07-19 DIAGNOSIS — L91.0 KELOID: ICD-10-CM

## 2023-07-19 PROCEDURE — 3075F SYST BP GE 130 - 139MM HG: CPT | Performed by: FAMILY MEDICINE

## 2023-07-19 PROCEDURE — 99204 OFFICE O/P NEW MOD 45 MIN: CPT | Performed by: FAMILY MEDICINE

## 2023-07-19 PROCEDURE — 3078F DIAST BP <80 MM HG: CPT | Performed by: FAMILY MEDICINE

## 2023-07-19 PROCEDURE — 1123F ACP DISCUSS/DSCN MKR DOCD: CPT | Performed by: FAMILY MEDICINE

## 2023-07-19 RX ORDER — DOXYCYCLINE HYCLATE 100 MG
100 TABLET ORAL 2 TIMES DAILY
Qty: 14 TABLET | Refills: 0 | Status: SHIPPED | OUTPATIENT
Start: 2023-07-19 | End: 2023-07-26

## 2023-07-19 RX ORDER — DOXYCYCLINE HYCLATE 100 MG
100 TABLET ORAL 2 TIMES DAILY
Qty: 14 TABLET | Refills: 0 | Status: SHIPPED | OUTPATIENT
Start: 2023-07-19 | End: 2023-07-19

## 2023-07-19 RX ORDER — METOPROLOL SUCCINATE 100 MG/1
100 TABLET, EXTENDED RELEASE ORAL DAILY
Qty: 90 TABLET | Refills: 2 | Status: SHIPPED | OUTPATIENT
Start: 2023-07-19

## 2023-07-19 RX ORDER — VALSARTAN 40 MG/1
40 TABLET ORAL DAILY
Qty: 90 TABLET | Refills: 2 | Status: SHIPPED | OUTPATIENT
Start: 2023-07-19

## 2023-07-19 RX ORDER — CYCLOBENZAPRINE HCL 5 MG
TABLET ORAL
COMMUNITY
Start: 2023-06-14

## 2023-07-19 SDOH — ECONOMIC STABILITY: FOOD INSECURITY: WITHIN THE PAST 12 MONTHS, THE FOOD YOU BOUGHT JUST DIDN'T LAST AND YOU DIDN'T HAVE MONEY TO GET MORE.: NEVER TRUE

## 2023-07-19 SDOH — ECONOMIC STABILITY: FOOD INSECURITY: WITHIN THE PAST 12 MONTHS, YOU WORRIED THAT YOUR FOOD WOULD RUN OUT BEFORE YOU GOT MONEY TO BUY MORE.: NEVER TRUE

## 2023-07-19 SDOH — ECONOMIC STABILITY: INCOME INSECURITY: HOW HARD IS IT FOR YOU TO PAY FOR THE VERY BASICS LIKE FOOD, HOUSING, MEDICAL CARE, AND HEATING?: NOT HARD AT ALL

## 2023-07-19 ASSESSMENT — ENCOUNTER SYMPTOMS
VOMITING: 0
RHINORRHEA: 0
SHORTNESS OF BREATH: 0
NAUSEA: 0
DIARRHEA: 0
COUGH: 0

## 2023-07-19 ASSESSMENT — PATIENT HEALTH QUESTIONNAIRE - PHQ9
2. FEELING DOWN, DEPRESSED OR HOPELESS: 0
SUM OF ALL RESPONSES TO PHQ QUESTIONS 1-9: 0
SUM OF ALL RESPONSES TO PHQ QUESTIONS 1-9: 0
1. LITTLE INTEREST OR PLEASURE IN DOING THINGS: 0
SUM OF ALL RESPONSES TO PHQ9 QUESTIONS 1 & 2: 0
SUM OF ALL RESPONSES TO PHQ QUESTIONS 1-9: 0
SUM OF ALL RESPONSES TO PHQ QUESTIONS 1-9: 0

## 2023-07-19 NOTE — PROGRESS NOTES
Patient presents in office today as new patient. Patient concerns: right breast pain, and hand pain.
Mass and tenderness present. No inverted nipple, nipple discharge or skin change. Left: No mass, nipple discharge, skin change or tenderness. Comments: Large breast mass 3x2 cm in Rupper outer quadrant; +tenderness; no erythema of skin; +axillary tenderness    Ttp of R keloid as well    L breast and keloid nontender  Musculoskeletal:      Cervical back: Normal range of motion. Neurological:      Mental Status: She is alert and oriented to person, place, and time. Gait: Gait normal.   Psychiatric:         Mood and Affect: Mood normal.         Behavior: Behavior normal.         Thought Content: Thought content normal.         Judgment: Judgment normal.               I have discussed the diagnosis with the patient and the intended plan as seen in the above orders. The patient has received an After-Visit Summary and questions were answered concerning future plans. An After Visit Summary was printed and given to the patient. All diagnoses have been discussed with the patient and all of the patient's questions have been answered.            Maya Jensen MD  [address] [place] [address] Group   [address] [place], [place] [address]al Abarca

## 2023-07-20 LAB
ALBUMIN SERPL-MCNC: 4.1 G/DL (ref 3.8–4.8)
ALBUMIN/CREAT UR: 5 MG/G CREAT (ref 0–29)
ALBUMIN/GLOB SERPL: 0.9 {RATIO} (ref 1.2–2.2)
ALP SERPL-CCNC: 52 IU/L (ref 44–121)
ALT SERPL-CCNC: 18 IU/L (ref 0–32)
APPEARANCE UR: CLEAR
AST SERPL-CCNC: 22 IU/L (ref 0–40)
BACTERIA #/AREA URNS HPF: NORMAL /[HPF]
BASOPHILS # BLD AUTO: 0 X10E3/UL (ref 0–0.2)
BASOPHILS NFR BLD AUTO: 0 %
BILIRUB SERPL-MCNC: 0.4 MG/DL (ref 0–1.2)
BILIRUB UR QL STRIP: NEGATIVE
BUN SERPL-MCNC: 9 MG/DL (ref 8–27)
BUN/CREAT SERPL: 10 (ref 12–28)
CALCIUM SERPL-MCNC: 9.3 MG/DL (ref 8.7–10.3)
CASTS URNS QL MICRO: NORMAL /LPF
CHLORIDE SERPL-SCNC: 97 MMOL/L (ref 96–106)
CHOLEST SERPL-MCNC: 127 MG/DL (ref 100–199)
CO2 SERPL-SCNC: 29 MMOL/L (ref 20–29)
COLOR UR: YELLOW
CREAT SERPL-MCNC: 0.88 MG/DL (ref 0.57–1)
CREAT UR-MCNC: 151.1 MG/DL
EGFRCR SERPLBLD CKD-EPI 2021: 66 ML/MIN/1.73
EOSINOPHIL # BLD AUTO: 0 X10E3/UL (ref 0–0.4)
EOSINOPHIL NFR BLD AUTO: 0 %
EPI CELLS #/AREA URNS HPF: NORMAL /HPF (ref 0–10)
ERYTHROCYTE [DISTWIDTH] IN BLOOD BY AUTOMATED COUNT: 13.1 % (ref 11.7–15.4)
FERRITIN SERPL-MCNC: 447 NG/ML (ref 15–150)
FOLATE SERPL-MCNC: >20 NG/ML
GLOBULIN SER CALC-MCNC: 4.4 G/DL (ref 1.5–4.5)
GLUCOSE SERPL-MCNC: 114 MG/DL (ref 70–99)
GLUCOSE UR QL STRIP: NEGATIVE
HBA1C MFR BLD: 6 % (ref 4.8–5.6)
HCT VFR BLD AUTO: 25.1 % (ref 34–46.6)
HDLC SERPL-MCNC: 41 MG/DL
HGB BLD-MCNC: 8.5 G/DL (ref 11.1–15.9)
HGB UR QL STRIP: NEGATIVE
IMM GRANULOCYTES # BLD AUTO: 0 X10E3/UL (ref 0–0.1)
IMM GRANULOCYTES NFR BLD AUTO: 0 %
IRON SATN MFR SERPL: 48 % (ref 15–55)
IRON SERPL-MCNC: 124 UG/DL (ref 27–139)
KETONES UR QL STRIP: NEGATIVE
LDLC SERPL CALC-MCNC: 57 MG/DL (ref 0–99)
LEUKOCYTE ESTERASE UR QL STRIP: NEGATIVE
LYMPHOCYTES # BLD AUTO: 1.7 X10E3/UL (ref 0.7–3.1)
LYMPHOCYTES NFR BLD AUTO: 67 %
MCH RBC QN AUTO: 35 PG (ref 26.6–33)
MCHC RBC AUTO-ENTMCNC: 33.9 G/DL (ref 31.5–35.7)
MCV RBC AUTO: 103 FL (ref 79–97)
MICRO URNS: NORMAL
MICRO URNS: NORMAL
MICROALBUMIN UR-MCNC: 7.6 UG/ML
MONOCYTES # BLD AUTO: 0.1 X10E3/UL (ref 0.1–0.9)
MONOCYTES NFR BLD AUTO: 4 %
MORPHOLOGY BLD-IMP: ABNORMAL
NEUTROPHILS # BLD AUTO: 0.7 X10E3/UL (ref 1.4–7)
NEUTROPHILS NFR BLD AUTO: 29 %
NITRITE UR QL STRIP: NEGATIVE
PH UR STRIP: 7.5 [PH] (ref 5–7.5)
PLATELET # BLD AUTO: 225 X10E3/UL (ref 150–450)
POTASSIUM SERPL-SCNC: 3.2 MMOL/L (ref 3.5–5.2)
PROT SERPL-MCNC: 8.5 G/DL (ref 6–8.5)
PROT UR QL STRIP: NEGATIVE
RBC # BLD AUTO: 2.43 X10E6/UL (ref 3.77–5.28)
RBC #/AREA URNS HPF: NORMAL /HPF (ref 0–2)
SODIUM SERPL-SCNC: 140 MMOL/L (ref 134–144)
SP GR UR STRIP: 1.01 (ref 1–1.03)
SPECIMEN STATUS REPORT: NORMAL
TIBC SERPL-MCNC: 261 UG/DL (ref 250–450)
TRIGL SERPL-MCNC: 173 MG/DL (ref 0–149)
TSH SERPL DL<=0.005 MIU/L-ACNC: 2.36 UIU/ML (ref 0.45–4.5)
UIBC SERPL-MCNC: 137 UG/DL (ref 118–369)
UROBILINOGEN UR STRIP-MCNC: 0.2 MG/DL (ref 0.2–1)
VIT B12 SERPL-MCNC: 1016 PG/ML (ref 232–1245)
VLDLC SERPL CALC-MCNC: 29 MG/DL (ref 5–40)
WBC # BLD AUTO: 2.5 X10E3/UL (ref 3.4–10.8)
WBC #/AREA URNS HPF: NORMAL /HPF (ref 0–5)

## 2023-07-21 ENCOUNTER — TELEPHONE (OUTPATIENT)
Facility: CLINIC | Age: 83
End: 2023-07-21

## 2023-07-21 DIAGNOSIS — E87.6 HYPOKALEMIA: ICD-10-CM

## 2023-07-21 DIAGNOSIS — E78.1 HYPERTRIGLYCERIDEMIA: ICD-10-CM

## 2023-07-21 DIAGNOSIS — R74.8 LOW SERUM HDL: Primary | ICD-10-CM

## 2023-07-21 PROBLEM — R73.03 PREDIABETES: Status: RESOLVED | Noted: 2017-05-23 | Resolved: 2023-07-21

## 2023-07-21 PROBLEM — N18.31 STAGE 3A CHRONIC KIDNEY DISEASE (HCC): Status: RESOLVED | Noted: 2023-05-25 | Resolved: 2023-07-21

## 2023-07-21 RX ORDER — POTASSIUM CHLORIDE 20 MEQ/1
20 TABLET, EXTENDED RELEASE ORAL DAILY
Qty: 90 TABLET | Refills: 2 | Status: SHIPPED | OUTPATIENT
Start: 2023-07-21

## 2023-07-22 NOTE — TELEPHONE ENCOUNTER
Hi, Ms José Miguel Phillips,  Your labs still show signs of the NEUTROPENIA--LOW white blood cell count and ANEMIA. Your ferritin level is HIGH--this is a reactive process. Marshal Mora is following this with recommendations for your bone marrow biopsy. They reached you 7/19/2023. Your total/bad cholesterol LDL are AT GOAL  Your good cholesterol HDL was LOW--it should be >40 for men and >50 for women. Physical exercise can raise this. Your triglycerides were HIGH--you can lower this with fish oil over the counter, increasing salmon in your diet and lowering the sugars in your diet. Your Diabetes is still CONTROLLED. Your potassium was a little LOW--I recommend taking a daily supplement and repeating your lab next week.     Your kidney function is NORMAL    Normal liver testing    Your urine protein was NORMAL and your urine test was NORMAL    Your iron and thyroid (TSH) levels were NORMAL    Your folate levels were HIGH    Your B12 level was NORMAL    Please reply back with a time/date you can return back for the potassium retest.    V/r,  Dr. Robert Cash

## 2023-07-26 ENCOUNTER — OFFICE VISIT (OUTPATIENT)
Facility: CLINIC | Age: 83
End: 2023-07-26
Payer: MEDICARE

## 2023-07-26 VITALS
TEMPERATURE: 97.9 F | HEART RATE: 68 BPM | SYSTOLIC BLOOD PRESSURE: 146 MMHG | DIASTOLIC BLOOD PRESSURE: 92 MMHG | RESPIRATION RATE: 16 BRPM | BODY MASS INDEX: 30.76 KG/M2 | WEIGHT: 196 LBS | OXYGEN SATURATION: 98 % | HEIGHT: 67 IN

## 2023-07-26 DIAGNOSIS — E87.6 HYPOKALEMIA: ICD-10-CM

## 2023-07-26 DIAGNOSIS — D70.9 NEUTROPENIA, UNSPECIFIED TYPE (HCC): ICD-10-CM

## 2023-07-26 DIAGNOSIS — M79.641 PAIN IN BOTH HANDS: ICD-10-CM

## 2023-07-26 DIAGNOSIS — R60.9 PERIPHERAL EDEMA: ICD-10-CM

## 2023-07-26 DIAGNOSIS — N63.11 MASS OF UPPER OUTER QUADRANT OF RIGHT BREAST: ICD-10-CM

## 2023-07-26 DIAGNOSIS — D64.9 ANEMIA, UNSPECIFIED TYPE: ICD-10-CM

## 2023-07-26 DIAGNOSIS — M79.642 PAIN IN BOTH HANDS: ICD-10-CM

## 2023-07-26 DIAGNOSIS — I50.32 CHRONIC DIASTOLIC HEART FAILURE (HCC): Primary | ICD-10-CM

## 2023-07-26 PROBLEM — E66.811 CLASS 1 OBESITY DUE TO EXCESS CALORIES WITH SERIOUS COMORBIDITY AND BODY MASS INDEX (BMI) OF 30.0 TO 30.9 IN ADULT: Status: RESOLVED | Noted: 2023-07-18 | Resolved: 2023-07-26

## 2023-07-26 PROBLEM — R60.0 PERIPHERAL EDEMA: Status: ACTIVE | Noted: 2023-07-26

## 2023-07-26 PROBLEM — E66.09 CLASS 1 OBESITY DUE TO EXCESS CALORIES WITH SERIOUS COMORBIDITY AND BODY MASS INDEX (BMI) OF 30.0 TO 30.9 IN ADULT: Status: RESOLVED | Noted: 2023-07-18 | Resolved: 2023-07-26

## 2023-07-26 PROCEDURE — 99214 OFFICE O/P EST MOD 30 MIN: CPT | Performed by: FAMILY MEDICINE

## 2023-07-26 PROCEDURE — 1123F ACP DISCUSS/DSCN MKR DOCD: CPT | Performed by: FAMILY MEDICINE

## 2023-07-26 PROCEDURE — 3080F DIAST BP >= 90 MM HG: CPT | Performed by: FAMILY MEDICINE

## 2023-07-26 PROCEDURE — 3077F SYST BP >= 140 MM HG: CPT | Performed by: FAMILY MEDICINE

## 2023-07-26 RX ORDER — FUROSEMIDE 40 MG/1
40 TABLET ORAL DAILY
Qty: 1 TABLET | Refills: 0 | Status: SHIPPED | OUTPATIENT
Start: 2023-07-26 | End: 2023-07-27

## 2023-07-26 SDOH — ECONOMIC STABILITY: FOOD INSECURITY: WITHIN THE PAST 12 MONTHS, THE FOOD YOU BOUGHT JUST DIDN'T LAST AND YOU DIDN'T HAVE MONEY TO GET MORE.: NEVER TRUE

## 2023-07-26 SDOH — ECONOMIC STABILITY: FOOD INSECURITY: WITHIN THE PAST 12 MONTHS, YOU WORRIED THAT YOUR FOOD WOULD RUN OUT BEFORE YOU GOT MONEY TO BUY MORE.: NEVER TRUE

## 2023-07-26 SDOH — ECONOMIC STABILITY: INCOME INSECURITY: HOW HARD IS IT FOR YOU TO PAY FOR THE VERY BASICS LIKE FOOD, HOUSING, MEDICAL CARE, AND HEATING?: NOT HARD AT ALL

## 2023-07-26 ASSESSMENT — ENCOUNTER SYMPTOMS
VOMITING: 0
COUGH: 0
DIARRHEA: 0
SHORTNESS OF BREATH: 0
NAUSEA: 0
RHINORRHEA: 0

## 2023-07-26 ASSESSMENT — PATIENT HEALTH QUESTIONNAIRE - PHQ9
SUM OF ALL RESPONSES TO PHQ QUESTIONS 1-9: 0
1. LITTLE INTEREST OR PLEASURE IN DOING THINGS: 0
SUM OF ALL RESPONSES TO PHQ9 QUESTIONS 1 & 2: 0
SUM OF ALL RESPONSES TO PHQ QUESTIONS 1-9: 0
2. FEELING DOWN, DEPRESSED OR HOPELESS: 0

## 2023-07-26 NOTE — PROGRESS NOTES
John Reeder presents today for   Chief Complaint   Patient presents with    Edema     legs       Is someone accompanying this pt? No    Is the patient using any DME equipment during OV? No    Depression Screening:  PHQ-9  7/26/2023   Little interest or pleasure in doing things 0   Little interest or pleasure in doing things -   Feeling down, depressed, or hopeless 0   PHQ-2 Score 0   Total Score PHQ 2 -   PHQ-9 Total Score 0               Health Maintenance reviewed and discussed and ordered per Provider. Health Maintenance Due   Topic Date Due    COVID-19 Vaccine (5 - Booster for Moderna series) 06/15/2022   .        1. \"Have you been to the ER, urgent care clinic since your last visit? Hospitalized since your last visit? \" No    2. \"Have you seen or consulted any other health care providers outside of the [address] [place] [address] since your last visit? \" No    3. For patients over 45: Has the patient had a colonoscopy? Yes - no Care Gap present     If the patient is female:    4. For patients over 40: Has the patient had a mammogram? Yes - no Care Gap present    5. For patients over 21: Has the patient had a pap smear?  Yes - no Care Gap present

## 2023-07-26 NOTE — PROGRESS NOTES
9 [facility], [address] [place] [address]               [phone]        Porsha Gill is a 82 y.o. female and presents with Edema (legs)           Assessment/Plan:  Bonnie Wilkerson was seen today for edema. Diagnoses and all orders for this visit:    Chronic diastolic heart failure (HCC)  -     furosemide (LASIX) 40 MG tablet; Take 1 tablet by mouth daily for 1 dose    Peripheral edema    Neutropenia, unspecified type (HCC)    Anemia, unspecified type    Mass of upper outer quadrant of right breast    Pain in both hands    Hypokalemia  -     Potassium  -     furosemide (LASIX) 40 MG tablet; Take 1 tablet by mouth daily for 1 dose      Heart failure noticed with diastolic dysfunction in echocardiogram done in 2019; she has gained 5 lbs from last week as well in water retention; lasix x 1 dose now; educated on low salt diet; f/u next week as scheduled    Neutropenia/anemia: pending appt with [facility] for bone marrow biopsy    Our nurse faxed the referral for mammo/US to [facility] per pt request; I advised her to call to schedule    Recheck potassium today      Follow up and disposition:   Return in about 1 week (around 8/2/2023) for follow up -15 min.       Health Maintenance:   Health Maintenance   Topic Date Due    COVID-19 Vaccine (5 - Booster for Moderna series) 06/15/2022    Flu vaccine (1) 08/01/2023    Annual Wellness Visit (AWV)  12/28/2023    Lipids  07/19/2024    Depression Screen  07/19/2024    DTaP/Tdap/Td vaccine (2 - Td or Tdap) 01/11/2031    DEXA (modify frequency per FRAX score)  Completed    Shingles vaccine  Completed    Pneumococcal 65+ years Vaccine  Completed    Hepatitis A vaccine  Aged Out    Hib vaccine  Aged Out    Meningococcal (ACWY) vaccine  Aged Out    Hepatitis C screen  Discontinued        Subjective     83 y/o female here for swelling to her legs since Sunday; states Sunday she ate crab dip; denies increased sob; doesn't remember dietary change    Has appointment with AL[facility]

## 2023-07-26 NOTE — PATIENT INSTRUCTIONS
NO more deli meats :)  No canned foods, matt/ham/corn beef  No frozen meals that are pre-packaged (unless they say salt free)  Use Salt free seasoning

## 2023-07-27 LAB — POTASSIUM SERPL-SCNC: 3.6 MMOL/L (ref 3.5–5.2)

## 2023-08-02 ENCOUNTER — OFFICE VISIT (OUTPATIENT)
Facility: CLINIC | Age: 83
End: 2023-08-02
Payer: MEDICARE

## 2023-08-02 VITALS
HEIGHT: 68 IN | TEMPERATURE: 97.5 F | SYSTOLIC BLOOD PRESSURE: 139 MMHG | WEIGHT: 199 LBS | RESPIRATION RATE: 16 BRPM | DIASTOLIC BLOOD PRESSURE: 68 MMHG | HEART RATE: 65 BPM | BODY MASS INDEX: 30.16 KG/M2 | OXYGEN SATURATION: 98 %

## 2023-08-02 DIAGNOSIS — N63.11 MASS OF UPPER OUTER QUADRANT OF RIGHT BREAST: ICD-10-CM

## 2023-08-02 DIAGNOSIS — M79.641 PAIN IN BOTH HANDS: Primary | ICD-10-CM

## 2023-08-02 DIAGNOSIS — E87.6 HYPOKALEMIA: ICD-10-CM

## 2023-08-02 DIAGNOSIS — I50.32 CHRONIC DIASTOLIC HEART FAILURE (HCC): ICD-10-CM

## 2023-08-02 DIAGNOSIS — M79.642 PAIN IN BOTH HANDS: Primary | ICD-10-CM

## 2023-08-02 DIAGNOSIS — R51.9 ACUTE NONINTRACTABLE HEADACHE, UNSPECIFIED HEADACHE TYPE: ICD-10-CM

## 2023-08-02 DIAGNOSIS — R60.9 PERIPHERAL EDEMA: ICD-10-CM

## 2023-08-02 PROCEDURE — 3074F SYST BP LT 130 MM HG: CPT | Performed by: FAMILY MEDICINE

## 2023-08-02 PROCEDURE — 1123F ACP DISCUSS/DSCN MKR DOCD: CPT | Performed by: FAMILY MEDICINE

## 2023-08-02 PROCEDURE — 3078F DIAST BP <80 MM HG: CPT | Performed by: FAMILY MEDICINE

## 2023-08-02 PROCEDURE — 99214 OFFICE O/P EST MOD 30 MIN: CPT | Performed by: FAMILY MEDICINE

## 2023-08-02 RX ORDER — SENNOSIDES 8.6 MG
650 CAPSULE ORAL EVERY 8 HOURS PRN
COMMUNITY

## 2023-08-02 RX ORDER — GABAPENTIN 100 MG/1
CAPSULE ORAL
Qty: 540 CAPSULE | Refills: 1
Start: 2023-08-02 | End: 2023-11-02

## 2023-08-02 RX ORDER — FUROSEMIDE 20 MG/1
20 TABLET ORAL DAILY
COMMUNITY

## 2023-08-02 SDOH — ECONOMIC STABILITY: FOOD INSECURITY: WITHIN THE PAST 12 MONTHS, THE FOOD YOU BOUGHT JUST DIDN'T LAST AND YOU DIDN'T HAVE MONEY TO GET MORE.: NEVER TRUE

## 2023-08-02 SDOH — ECONOMIC STABILITY: FOOD INSECURITY: WITHIN THE PAST 12 MONTHS, YOU WORRIED THAT YOUR FOOD WOULD RUN OUT BEFORE YOU GOT MONEY TO BUY MORE.: NEVER TRUE

## 2023-08-02 SDOH — ECONOMIC STABILITY: INCOME INSECURITY: HOW HARD IS IT FOR YOU TO PAY FOR THE VERY BASICS LIKE FOOD, HOUSING, MEDICAL CARE, AND HEATING?: NOT HARD AT ALL

## 2023-08-02 ASSESSMENT — ENCOUNTER SYMPTOMS
RHINORRHEA: 0
DIARRHEA: 0
NAUSEA: 0
SHORTNESS OF BREATH: 0
COUGH: 0
VOMITING: 0

## 2023-08-02 ASSESSMENT — PATIENT HEALTH QUESTIONNAIRE - PHQ9
SUM OF ALL RESPONSES TO PHQ QUESTIONS 1-9: 0
SUM OF ALL RESPONSES TO PHQ9 QUESTIONS 1 & 2: 0
SUM OF ALL RESPONSES TO PHQ QUESTIONS 1-9: 0
2. FEELING DOWN, DEPRESSED OR HOPELESS: 0
1. LITTLE INTEREST OR PLEASURE IN DOING THINGS: 0

## 2023-08-02 NOTE — PATIENT INSTRUCTIONS
You can take furosemide 20 mg daily    Increase the gabapentin to 300 mg (3 of your 100 mg capsules) at night

## 2023-08-03 DIAGNOSIS — E78.2 MIXED HYPERLIPIDEMIA: ICD-10-CM

## 2023-08-03 LAB
BUN SERPL-MCNC: 12 MG/DL (ref 8–27)
BUN/CREAT SERPL: 13 (ref 12–28)
CHLORIDE SERPL-SCNC: 100 MMOL/L (ref 96–106)
CO2 SERPL-SCNC: 23 MMOL/L (ref 20–29)
CREAT SERPL-MCNC: 0.95 MG/DL (ref 0.57–1)
EGFRCR SERPLBLD CKD-EPI 2021: 61 ML/MIN/1.73
GLUCOSE SERPL-MCNC: NORMAL MG/DL
POTASSIUM SERPL-SCNC: NORMAL MMOL/L
SODIUM SERPL-SCNC: 138 MMOL/L (ref 134–144)

## 2023-08-03 RX ORDER — ATORVASTATIN CALCIUM 10 MG/1
10 TABLET, FILM COATED ORAL
Qty: 90 TABLET | Refills: 2 | Status: SHIPPED | OUTPATIENT
Start: 2023-08-03 | End: 2024-04-29

## 2023-08-04 ENCOUNTER — TELEPHONE (OUTPATIENT)
Facility: CLINIC | Age: 83
End: 2023-08-04

## 2023-08-04 NOTE — TELEPHONE ENCOUNTER
Mrs. Skinner Buszenong,  Your potassium was canceled again due to the lab machine error. We'll have to have you come back to repeat this test. Please call the clinic to schedule it or send a my chart message with a date/time that works for you and we'll add you to the schedule.  You can eat for this test.  V/r,  Dr. Sirena Meyer      Please call pt to schedule her for repeat potassium testing

## 2023-08-09 NOTE — TELEPHONE ENCOUNTER
Patient does not want to come to our Lab at [facility] b/c she has been here twice for the same potassium test and it did not go through. She wants to go to [facility]. I faxed the lab order to [facility].

## 2023-08-16 ENCOUNTER — OFFICE VISIT (OUTPATIENT)
Facility: CLINIC | Age: 83
End: 2023-08-16
Payer: MEDICARE

## 2023-08-16 VITALS
SYSTOLIC BLOOD PRESSURE: 134 MMHG | DIASTOLIC BLOOD PRESSURE: 83 MMHG | BODY MASS INDEX: 28.79 KG/M2 | TEMPERATURE: 97.5 F | HEIGHT: 68 IN | HEART RATE: 70 BPM | RESPIRATION RATE: 16 BRPM | WEIGHT: 190 LBS | OXYGEN SATURATION: 99 %

## 2023-08-16 DIAGNOSIS — I50.32 CHRONIC DIASTOLIC HEART FAILURE (HCC): Primary | ICD-10-CM

## 2023-08-16 DIAGNOSIS — M79.642 PAIN IN BOTH HANDS: ICD-10-CM

## 2023-08-16 DIAGNOSIS — Z76.0 PRESCRIPTION REFILL: ICD-10-CM

## 2023-08-16 DIAGNOSIS — M79.641 PAIN IN BOTH HANDS: ICD-10-CM

## 2023-08-16 PROCEDURE — 1123F ACP DISCUSS/DSCN MKR DOCD: CPT | Performed by: FAMILY MEDICINE

## 2023-08-16 PROCEDURE — 99214 OFFICE O/P EST MOD 30 MIN: CPT | Performed by: FAMILY MEDICINE

## 2023-08-16 PROCEDURE — 3079F DIAST BP 80-89 MM HG: CPT | Performed by: FAMILY MEDICINE

## 2023-08-16 PROCEDURE — 3075F SYST BP GE 130 - 139MM HG: CPT | Performed by: FAMILY MEDICINE

## 2023-08-16 RX ORDER — GABAPENTIN 100 MG/1
CAPSULE ORAL
Qty: 540 CAPSULE | Refills: 1
Start: 2023-08-16 | End: 2023-11-16

## 2023-08-16 SDOH — ECONOMIC STABILITY: FOOD INSECURITY: WITHIN THE PAST 12 MONTHS, THE FOOD YOU BOUGHT JUST DIDN'T LAST AND YOU DIDN'T HAVE MONEY TO GET MORE.: NEVER TRUE

## 2023-08-16 SDOH — ECONOMIC STABILITY: FOOD INSECURITY: WITHIN THE PAST 12 MONTHS, YOU WORRIED THAT YOUR FOOD WOULD RUN OUT BEFORE YOU GOT MONEY TO BUY MORE.: NEVER TRUE

## 2023-08-16 SDOH — ECONOMIC STABILITY: INCOME INSECURITY: HOW HARD IS IT FOR YOU TO PAY FOR THE VERY BASICS LIKE FOOD, HOUSING, MEDICAL CARE, AND HEATING?: NOT HARD AT ALL

## 2023-08-16 ASSESSMENT — PATIENT HEALTH QUESTIONNAIRE - PHQ9
SUM OF ALL RESPONSES TO PHQ9 QUESTIONS 1 & 2: 0
SUM OF ALL RESPONSES TO PHQ QUESTIONS 1-9: 0
SUM OF ALL RESPONSES TO PHQ QUESTIONS 1-9: 0
1. LITTLE INTEREST OR PLEASURE IN DOING THINGS: 0
SUM OF ALL RESPONSES TO PHQ QUESTIONS 1-9: 0
SUM OF ALL RESPONSES TO PHQ QUESTIONS 1-9: 0
2. FEELING DOWN, DEPRESSED OR HOPELESS: 0

## 2023-08-16 ASSESSMENT — ENCOUNTER SYMPTOMS
DIARRHEA: 0
RHINORRHEA: 0
COUGH: 0
VOMITING: 0
NAUSEA: 0
SHORTNESS OF BREATH: 0

## 2023-08-29 ENCOUNTER — TELEPHONE (OUTPATIENT)
Dept: FAMILY MEDICINE CLINIC | Facility: CLINIC | Age: 83
End: 2023-08-29

## 2023-08-29 DIAGNOSIS — N63.11 BREAST LUMP ON RIGHT SIDE AT 11 O'CLOCK POSITION: Primary | ICD-10-CM

## 2023-08-29 NOTE — TELEPHONE ENCOUNTER
Please fax referral to [facility] breast center    Call [address] [facility] [address], [address]

## 2023-08-29 NOTE — TELEPHONE ENCOUNTER
Terri Montiel from [facility] has been trying to get a hold of the office. Pt needs an order for R breast US biopsy.  Scheduled tomorrow at [facility].

## 2023-09-06 ENCOUNTER — TELEPHONE (OUTPATIENT)
Facility: CLINIC | Age: 83
End: 2023-09-06

## 2023-09-06 DIAGNOSIS — C50.919 INVASIVE CARCINOMA OF BREAST (HCC): Primary | ICD-10-CM

## 2023-09-06 NOTE — TELEPHONE ENCOUNTER
New diagnosis of breast cancer noted from biopsy results    A) RIGHT BREAST, MASS AT 11:30 AND 11 CM FROM THE NIPPLE, ULTRASOUND GUIDED CORE BIOPSY:   - INVASIVE CARCINOMA OF NO SPECIAL TYPE (DUCTAL)   - TOBIAS HISTOLOGIC GRADE 2   - ANCILLARY STUDIES:               - ESTROGEN RECEPTOR: POSITIVE (MODERATE, 90%)               - PROGESTERONE RECEPTOR: POSITIVE (MODERATE, 20%)               - HER2 IHC: EQUIVOCAL (SCORE 2+);  FISH TO FOLLOW IN SEPARATE REPORT   - SEE COMMENT        Pt states she just received the news today; planning on having surgeon contact her for removal; states everything will be taken care of at the [facility]; will follow along with her

## 2023-09-07 RX ORDER — CYCLOBENZAPRINE HCL 5 MG
TABLET ORAL
Qty: 30 TABLET | OUTPATIENT
Start: 2023-09-07

## 2023-09-07 NOTE — TELEPHONE ENCOUNTER
Rx request for flexeril sent to old PCP    Please advise pt that flexeril is not recommended in her age group since it takes a while to come out of her system; recommend tizanidine instead but please find out what she is treating

## 2023-09-19 NOTE — PROGRESS NOTES
9 [facility], [address] [place] [address] [place]               [phone]        Debby More is a 82 y.o. female and presents with Hand Pain (Bilateral hand pain)           Assessment/Plan:  Deborah Gasca was seen today for hand pain. Diagnoses and all orders for this visit:    Invasive carcinoma of breast ([address])    Pain in both hands  -     gabapentin (NEURONTIN) 600 MG tablet; Take 1 tablet by mouth nightly for 30 days. Max Daily Amount: 600 mg    Need for influenza vaccination  -     Influenza, FLUZONE HIGH-DOSE, (age 72 y+), IM, Preservative Free, 0.7 mL    Hypokalemia  -     Cancel: Potassium; Future  -     Potassium; Future    CARMEN (acute kidney injury) ([address])      Gave her our support and encouraged to lean on family/friends for support;     Hand pain: given one capsule of 600 mg to see if this helps with symptoms; f/u at next appt    Recheck potassium and encouraged hydration to prevent chronic kidney disease; advised to minimize nsaids      Follow up and disposition:   Return in about 2 weeks (around 10/4/2023), or if symptoms worsen or fail to improve, for follow up -15 min.       Health Maintenance:   Health Maintenance   Topic Date Due    Hepatitis B vaccine (1 of 3 - Risk 3-dose series) Never done    COVID-19 Vaccine (5 - Moderna series) 06/15/2022    Flu vaccine (1) 08/01/2023    Annual Wellness Visit (AWV)  12/28/2023    Lipids  07/19/2024    Depression Screen  08/16/2024    DTaP/Tdap/Td vaccine (2 - Td or Tdap) 01/11/2031    DEXA (modify frequency per FRAX score)  Completed    Shingles vaccine  Completed    Pneumococcal 65+ years Vaccine  Completed    Hepatitis A vaccine  Aged Out    Hib vaccine  Aged Out    Meningococcal (ACWY) vaccine  Aged Out    Hepatitis C screen  Discontinued        Subjective     81 y/o female here for f/u on newly diagnosed breast cancer; states she had one appointment scheduled with specialist but states some appts had to be rescheduled; has told some family

## 2023-09-20 ENCOUNTER — OFFICE VISIT (OUTPATIENT)
Facility: CLINIC | Age: 83
End: 2023-09-20
Payer: MEDICARE

## 2023-09-20 VITALS
DIASTOLIC BLOOD PRESSURE: 62 MMHG | WEIGHT: 188 LBS | RESPIRATION RATE: 16 BRPM | SYSTOLIC BLOOD PRESSURE: 138 MMHG | OXYGEN SATURATION: 99 % | TEMPERATURE: 97.8 F | HEART RATE: 74 BPM | HEIGHT: 68 IN | BODY MASS INDEX: 28.49 KG/M2

## 2023-09-20 DIAGNOSIS — N17.9 AKI (ACUTE KIDNEY INJURY) (HCC): ICD-10-CM

## 2023-09-20 DIAGNOSIS — C50.919 INVASIVE CARCINOMA OF BREAST (HCC): Primary | ICD-10-CM

## 2023-09-20 DIAGNOSIS — M79.642 PAIN IN BOTH HANDS: ICD-10-CM

## 2023-09-20 DIAGNOSIS — M79.641 PAIN IN BOTH HANDS: ICD-10-CM

## 2023-09-20 DIAGNOSIS — E87.6 HYPOKALEMIA: ICD-10-CM

## 2023-09-20 DIAGNOSIS — Z23 NEED FOR INFLUENZA VACCINATION: ICD-10-CM

## 2023-09-20 PROCEDURE — 1123F ACP DISCUSS/DSCN MKR DOCD: CPT | Performed by: FAMILY MEDICINE

## 2023-09-20 PROCEDURE — 3074F SYST BP LT 130 MM HG: CPT | Performed by: FAMILY MEDICINE

## 2023-09-20 PROCEDURE — 3078F DIAST BP <80 MM HG: CPT | Performed by: FAMILY MEDICINE

## 2023-09-20 PROCEDURE — G0008 ADMIN INFLUENZA VIRUS VAC: HCPCS | Performed by: FAMILY MEDICINE

## 2023-09-20 PROCEDURE — 99214 OFFICE O/P EST MOD 30 MIN: CPT | Performed by: FAMILY MEDICINE

## 2023-09-20 PROCEDURE — 90662 IIV NO PRSV INCREASED AG IM: CPT | Performed by: FAMILY MEDICINE

## 2023-09-20 RX ORDER — GABAPENTIN 600 MG/1
600 TABLET ORAL
Qty: 30 TABLET | Refills: 0 | Status: SHIPPED | OUTPATIENT
Start: 2023-09-20 | End: 2023-10-20

## 2023-09-20 SDOH — ECONOMIC STABILITY: FOOD INSECURITY: WITHIN THE PAST 12 MONTHS, THE FOOD YOU BOUGHT JUST DIDN'T LAST AND YOU DIDN'T HAVE MONEY TO GET MORE.: NEVER TRUE

## 2023-09-20 SDOH — ECONOMIC STABILITY: INCOME INSECURITY: HOW HARD IS IT FOR YOU TO PAY FOR THE VERY BASICS LIKE FOOD, HOUSING, MEDICAL CARE, AND HEATING?: NOT HARD AT ALL

## 2023-09-20 SDOH — ECONOMIC STABILITY: FOOD INSECURITY: WITHIN THE PAST 12 MONTHS, YOU WORRIED THAT YOUR FOOD WOULD RUN OUT BEFORE YOU GOT MONEY TO BUY MORE.: NEVER TRUE

## 2023-09-20 ASSESSMENT — PATIENT HEALTH QUESTIONNAIRE - PHQ9
SUM OF ALL RESPONSES TO PHQ QUESTIONS 1-9: 2
SUM OF ALL RESPONSES TO PHQ QUESTIONS 1-9: 2
1. LITTLE INTEREST OR PLEASURE IN DOING THINGS: 1
SUM OF ALL RESPONSES TO PHQ QUESTIONS 1-9: 2
2. FEELING DOWN, DEPRESSED OR HOPELESS: 1
SUM OF ALL RESPONSES TO PHQ9 QUESTIONS 1 & 2: 2
SUM OF ALL RESPONSES TO PHQ QUESTIONS 1-9: 2

## 2023-09-20 ASSESSMENT — ENCOUNTER SYMPTOMS
SHORTNESS OF BREATH: 0
RHINORRHEA: 0
NAUSEA: 0
VOMITING: 0
DIARRHEA: 0
COUGH: 0

## 2023-09-20 NOTE — PROGRESS NOTES
Brandon Rangel presents today for   Chief Complaint   Patient presents with    Hand Pain     Bilateral hand pain       Is someone accompanying this pt? Yes    Is the patient using any DME equipment during OV? No    Depression Screenin/20/2023    10:11 AM   PHQ-9    Little interest or pleasure in doing things 1   Feeling down, depressed, or hopeless 1   PHQ-2 Score 2   PHQ-9 Total Score 2               Health Maintenance reviewed and discussed and ordered per Provider. Health Maintenance Due   Topic Date Due    Hepatitis B vaccine (1 of 3 - Risk 3-dose series) Never done    COVID-19 Vaccine (5 - Moderna series) 06/15/2022    Flu vaccine (1) 2023   . 1. \"Have you been to the ER, urgent care clinic since your last visit? Hospitalized since your last visit? \" No    2. \"Have you seen or consulted any other health care providers outside of the [address] [place] [address] since your last visit? \" No    3. For patients over 45: Has the patient had a colonoscopy? Yes - no Care Gap present     If the patient is female:    4. For patients over 40: Has the patient had a mammogram? Yes - no Care Gap present    5. For patients over 21: Has the patient had a pap smear?  Yes - no Care Gap present Home

## 2023-09-21 PROBLEM — N17.9 AKI (ACUTE KIDNEY INJURY) (HCC): Status: ACTIVE | Noted: 2023-09-21

## 2023-10-02 LAB — POTASSIUM SERPL-SCNC: 3.8 MMOL/L (ref 3.5–5.5)

## 2023-10-04 ENCOUNTER — OFFICE VISIT (OUTPATIENT)
Facility: CLINIC | Age: 83
End: 2023-10-04
Payer: MEDICARE

## 2023-10-04 VITALS
OXYGEN SATURATION: 99 % | TEMPERATURE: 97.3 F | SYSTOLIC BLOOD PRESSURE: 137 MMHG | HEIGHT: 68 IN | DIASTOLIC BLOOD PRESSURE: 62 MMHG | RESPIRATION RATE: 16 BRPM | WEIGHT: 193 LBS | BODY MASS INDEX: 29.25 KG/M2 | HEART RATE: 66 BPM

## 2023-10-04 DIAGNOSIS — M79.641 PAIN IN BOTH HANDS: Primary | ICD-10-CM

## 2023-10-04 DIAGNOSIS — E11.65 CONTROLLED TYPE 2 DIABETES MELLITUS WITH HYPERGLYCEMIA, WITHOUT LONG-TERM CURRENT USE OF INSULIN (HCC): ICD-10-CM

## 2023-10-04 DIAGNOSIS — N17.9 AKI (ACUTE KIDNEY INJURY) (HCC): ICD-10-CM

## 2023-10-04 DIAGNOSIS — E87.6 HYPOKALEMIA: ICD-10-CM

## 2023-10-04 DIAGNOSIS — C50.919 INVASIVE CARCINOMA OF BREAST (HCC): ICD-10-CM

## 2023-10-04 DIAGNOSIS — M79.642 PAIN IN BOTH HANDS: Primary | ICD-10-CM

## 2023-10-04 PROCEDURE — 3044F HG A1C LEVEL LT 7.0%: CPT | Performed by: FAMILY MEDICINE

## 2023-10-04 PROCEDURE — 3075F SYST BP GE 130 - 139MM HG: CPT | Performed by: FAMILY MEDICINE

## 2023-10-04 PROCEDURE — 1123F ACP DISCUSS/DSCN MKR DOCD: CPT | Performed by: FAMILY MEDICINE

## 2023-10-04 PROCEDURE — 99214 OFFICE O/P EST MOD 30 MIN: CPT | Performed by: FAMILY MEDICINE

## 2023-10-04 PROCEDURE — 3078F DIAST BP <80 MM HG: CPT | Performed by: FAMILY MEDICINE

## 2023-10-04 RX ORDER — PREGABALIN 50 MG/1
50 CAPSULE ORAL EVERY EVENING
Qty: 30 CAPSULE | Refills: 0 | Status: SHIPPED | OUTPATIENT
Start: 2023-10-04 | End: 2023-11-03

## 2023-10-04 RX ORDER — POTASSIUM CHLORIDE 20 MEQ/1
40 TABLET, EXTENDED RELEASE ORAL DAILY
Qty: 180 TABLET | Refills: 2 | Status: SHIPPED | OUTPATIENT
Start: 2023-10-04

## 2023-10-04 SDOH — ECONOMIC STABILITY: INCOME INSECURITY: HOW HARD IS IT FOR YOU TO PAY FOR THE VERY BASICS LIKE FOOD, HOUSING, MEDICAL CARE, AND HEATING?: NOT HARD AT ALL

## 2023-10-04 SDOH — ECONOMIC STABILITY: FOOD INSECURITY: WITHIN THE PAST 12 MONTHS, THE FOOD YOU BOUGHT JUST DIDN'T LAST AND YOU DIDN'T HAVE MONEY TO GET MORE.: NEVER TRUE

## 2023-10-04 SDOH — ECONOMIC STABILITY: FOOD INSECURITY: WITHIN THE PAST 12 MONTHS, YOU WORRIED THAT YOUR FOOD WOULD RUN OUT BEFORE YOU GOT MONEY TO BUY MORE.: NEVER TRUE

## 2023-10-04 ASSESSMENT — PATIENT HEALTH QUESTIONNAIRE - PHQ9
SUM OF ALL RESPONSES TO PHQ QUESTIONS 1-9: 6
6. FEELING BAD ABOUT YOURSELF - OR THAT YOU ARE A FAILURE OR HAVE LET YOURSELF OR YOUR FAMILY DOWN: 0
10. IF YOU CHECKED OFF ANY PROBLEMS, HOW DIFFICULT HAVE THESE PROBLEMS MADE IT FOR YOU TO DO YOUR WORK, TAKE CARE OF THINGS AT HOME, OR GET ALONG WITH OTHER PEOPLE: 0
1. LITTLE INTEREST OR PLEASURE IN DOING THINGS: 2
3. TROUBLE FALLING OR STAYING ASLEEP: 1
SUM OF ALL RESPONSES TO PHQ9 QUESTIONS 1 & 2: 4
SUM OF ALL RESPONSES TO PHQ QUESTIONS 1-9: 6
2. FEELING DOWN, DEPRESSED OR HOPELESS: 2
4. FEELING TIRED OR HAVING LITTLE ENERGY: 1
SUM OF ALL RESPONSES TO PHQ QUESTIONS 1-9: 6
5. POOR APPETITE OR OVEREATING: 0
8. MOVING OR SPEAKING SO SLOWLY THAT OTHER PEOPLE COULD HAVE NOTICED. OR THE OPPOSITE, BEING SO FIGETY OR RESTLESS THAT YOU HAVE BEEN MOVING AROUND A LOT MORE THAN USUAL: 0
SUM OF ALL RESPONSES TO PHQ QUESTIONS 1-9: 6
9. THOUGHTS THAT YOU WOULD BE BETTER OFF DEAD, OR OF HURTING YOURSELF: 0
7. TROUBLE CONCENTRATING ON THINGS, SUCH AS READING THE NEWSPAPER OR WATCHING TELEVISION: 0

## 2023-10-04 ASSESSMENT — ENCOUNTER SYMPTOMS
COUGH: 0
NAUSEA: 0
VOMITING: 0
DIARRHEA: 0
RHINORRHEA: 0
SHORTNESS OF BREATH: 0

## 2023-10-04 NOTE — PROGRESS NOTES
Calvinlaurie Longo presents today for   Chief Complaint   Patient presents with    Follow-up       Is someone accompanying this pt? No    Is the patient using any DME equipment during OV? No    Depression Screening:      10/4/2023    10:23 AM   PHQ-9    Little interest or pleasure in doing things 2   Feeling down, depressed, or hopeless 2   Trouble falling or staying asleep, or sleeping too much 1   Feeling tired or having little energy 1   Poor appetite or overeating 0   Feeling bad about yourself - or that you are a failure or have let yourself or your family down 0   Trouble concentrating on things, such as reading the newspaper or watching television 0   Moving or speaking so slowly that other people could have noticed. Or the opposite - being so fidgety or restless that you have been moving around a lot more than usual 0   Thoughts that you would be better off dead, or of hurting yourself in some way 0   PHQ-2 Score 4   PHQ-9 Total Score 6   If you checked off any problems, how difficult have these problems made it for you to do your work, take care of things at home, or get along with other people? 0               Health Maintenance reviewed and discussed and ordered per Provider. Health Maintenance Due   Topic Date Due    Hepatitis B vaccine (1 of 3 - Risk 3-dose series) Never done    COVID-19 Vaccine (5 - Moderna series) 06/15/2022   .        1. \"Have you been to the ER, urgent care clinic since your last visit? Hospitalized since your last visit? \" No    2. \"Have you seen or consulted any other health care providers outside of the [address] [place] [address] since your last visit? \" No    3. For patients over 45: Has the patient had a colonoscopy? Yes - no Care Gap present     If the patient is female:    4. For patients over 40: Has the patient had a mammogram? Yes - no Care Gap present    5. For patients over 21: Has the patient had a pap smear?  Yes - no Care Gap present
Pneumococcal 65+ years Vaccine  Completed    Hepatitis A vaccine  Aged Out    Hib vaccine  Aged Out    Meningococcal (ACWY) vaccine  Aged Out    Hepatitis C screen  Discontinued        Subjective     83 y/o female here for f/u    Chronic b/l hand pain, started on gabapentin 600 mg dose given prior doses were hard to swallow; states gabapentin was too sedating--only took 3 doses then discontinued;     States she is supposed to return for surgery 10/26 for breast surgery; states she thinks preop labs are scheduled 10/9    Repeat potassium wnl 3.2 (10/2/2023); has been taking 2 tabs of potassium (40 mEQ) daily    Recent CARMEN; encouraged hydration at last visit; has been trying to increase water intake    Last a1c 6% in July; due for repeat testing in December    Labs obtained prior to visit? Yes  Reviewed with patient? yes      ROS:     Review of Systems   Constitutional:  Negative for chills, diaphoresis, fatigue and fever. HENT:  Negative for congestion and rhinorrhea. Respiratory:  Negative for cough and shortness of breath. Cardiovascular:  Negative for chest pain. Gastrointestinal:  Negative for diarrhea, nausea and vomiting. Genitourinary:  Negative for dysuria. Musculoskeletal:  Positive for arthralgias. Skin:  Negative for rash. Neurological:  Negative for light-headedness. Psychiatric/Behavioral:  Negative for suicidal ideas. The problem list was updated as a part of today's visit.   Patient Active Problem List   Diagnosis    Mixed hyperlipidemia    Complete rotator cuff tear of left shoulder    Osteoarthritis of right knee    DDD (degenerative disc disease), lumbar    Essential hypertension    Primary osteoarthritis of left knee    Vitamin D deficiency    Osteopenia    Abnormal mammogram    Sickle-cell trait (HCC)    Neutropenia (HCC)    Anemia    Mass of upper outer quadrant of right breast    Keloid    Controlled type 2 diabetes mellitus with hyperglycemia, without long-term

## 2023-10-17 ENCOUNTER — OFFICE VISIT (OUTPATIENT)
Facility: CLINIC | Age: 83
End: 2023-10-17
Payer: MEDICARE

## 2023-10-17 VITALS
HEART RATE: 70 BPM | SYSTOLIC BLOOD PRESSURE: 140 MMHG | WEIGHT: 193 LBS | BODY MASS INDEX: 29.25 KG/M2 | HEIGHT: 68 IN | DIASTOLIC BLOOD PRESSURE: 60 MMHG

## 2023-10-17 DIAGNOSIS — D70.9 NEUTROPENIA, UNSPECIFIED TYPE (HCC): ICD-10-CM

## 2023-10-17 DIAGNOSIS — E87.6 HYPOKALEMIA: ICD-10-CM

## 2023-10-17 DIAGNOSIS — R20.0 NUMBNESS AND TINGLING IN BOTH HANDS: ICD-10-CM

## 2023-10-17 DIAGNOSIS — E78.2 MIXED HYPERLIPIDEMIA: ICD-10-CM

## 2023-10-17 DIAGNOSIS — I10 ESSENTIAL HYPERTENSION: ICD-10-CM

## 2023-10-17 DIAGNOSIS — D64.9 CHRONIC ANEMIA: ICD-10-CM

## 2023-10-17 DIAGNOSIS — E11.65 CONTROLLED TYPE 2 DIABETES MELLITUS WITH HYPERGLYCEMIA, WITHOUT LONG-TERM CURRENT USE OF INSULIN (HCC): ICD-10-CM

## 2023-10-17 DIAGNOSIS — R20.2 NUMBNESS AND TINGLING IN BOTH HANDS: ICD-10-CM

## 2023-10-17 DIAGNOSIS — Z01.818 PREOPERATIVE CLEARANCE: Primary | ICD-10-CM

## 2023-10-17 DIAGNOSIS — G56.03 BILATERAL CARPAL TUNNEL SYNDROME: ICD-10-CM

## 2023-10-17 PROCEDURE — 1123F ACP DISCUSS/DSCN MKR DOCD: CPT | Performed by: FAMILY MEDICINE

## 2023-10-17 PROCEDURE — 99214 OFFICE O/P EST MOD 30 MIN: CPT | Performed by: FAMILY MEDICINE

## 2023-10-17 PROCEDURE — 3044F HG A1C LEVEL LT 7.0%: CPT | Performed by: FAMILY MEDICINE

## 2023-10-17 PROCEDURE — 3077F SYST BP >= 140 MM HG: CPT | Performed by: FAMILY MEDICINE

## 2023-10-17 PROCEDURE — 3078F DIAST BP <80 MM HG: CPT | Performed by: FAMILY MEDICINE

## 2023-10-17 ASSESSMENT — ENCOUNTER SYMPTOMS
DIARRHEA: 0
RHINORRHEA: 0
SHORTNESS OF BREATH: 0
VOMITING: 0
COUGH: 0
NAUSEA: 0

## 2023-10-18 LAB
BUN SERPL-MCNC: 9 MG/DL (ref 8–27)
BUN/CREAT SERPL: 11 (ref 12–28)
CALCIUM SERPL-MCNC: 9.4 MG/DL (ref 8.7–10.3)
CHLORIDE SERPL-SCNC: 98 MMOL/L (ref 96–106)
CO2 SERPL-SCNC: 28 MMOL/L (ref 20–29)
CREAT SERPL-MCNC: 0.82 MG/DL (ref 0.57–1)
EGFRCR SERPLBLD CKD-EPI 2021: 72 ML/MIN/1.73
GLUCOSE SERPL-MCNC: 97 MG/DL (ref 70–99)
POTASSIUM SERPL-SCNC: 3.5 MMOL/L (ref 3.5–5.2)
SODIUM SERPL-SCNC: 138 MMOL/L (ref 134–144)
SPECIMEN STATUS REPORT: NORMAL

## 2023-10-19 ENCOUNTER — OFFICE VISIT (OUTPATIENT)
Facility: CLINIC | Age: 83
End: 2023-10-19

## 2023-10-19 VITALS
OXYGEN SATURATION: 99 % | SYSTOLIC BLOOD PRESSURE: 133 MMHG | TEMPERATURE: 97.9 F | DIASTOLIC BLOOD PRESSURE: 64 MMHG | HEART RATE: 70 BPM

## 2023-10-19 DIAGNOSIS — I10 ESSENTIAL HYPERTENSION: Primary | ICD-10-CM

## 2023-11-14 ENCOUNTER — OFFICE VISIT (OUTPATIENT)
Facility: CLINIC | Age: 83
End: 2023-11-14
Payer: MEDICARE

## 2023-11-14 VITALS
SYSTOLIC BLOOD PRESSURE: 130 MMHG | DIASTOLIC BLOOD PRESSURE: 67 MMHG | TEMPERATURE: 97.1 F | HEART RATE: 74 BPM | WEIGHT: 186 LBS | RESPIRATION RATE: 16 BRPM | BODY MASS INDEX: 28.19 KG/M2 | HEIGHT: 68 IN

## 2023-11-14 DIAGNOSIS — M79.641 PAIN IN BOTH HANDS: ICD-10-CM

## 2023-11-14 DIAGNOSIS — M79.642 PAIN IN BOTH HANDS: ICD-10-CM

## 2023-11-14 DIAGNOSIS — K52.9 ACUTE GASTROENTERITIS: ICD-10-CM

## 2023-11-14 DIAGNOSIS — Z90.10 STATUS POST PARTIAL MASTECTOMY, UNSPECIFIED LATERALITY: Primary | ICD-10-CM

## 2023-11-14 PROCEDURE — 1090F PRES/ABSN URINE INCON ASSESS: CPT | Performed by: FAMILY MEDICINE

## 2023-11-14 PROCEDURE — G8427 DOCREV CUR MEDS BY ELIG CLIN: HCPCS | Performed by: FAMILY MEDICINE

## 2023-11-14 PROCEDURE — 1036F TOBACCO NON-USER: CPT | Performed by: FAMILY MEDICINE

## 2023-11-14 PROCEDURE — 1123F ACP DISCUSS/DSCN MKR DOCD: CPT | Performed by: FAMILY MEDICINE

## 2023-11-14 PROCEDURE — 99214 OFFICE O/P EST MOD 30 MIN: CPT | Performed by: FAMILY MEDICINE

## 2023-11-14 PROCEDURE — 3075F SYST BP GE 130 - 139MM HG: CPT | Performed by: FAMILY MEDICINE

## 2023-11-14 PROCEDURE — 3078F DIAST BP <80 MM HG: CPT | Performed by: FAMILY MEDICINE

## 2023-11-14 PROCEDURE — G8417 CALC BMI ABV UP PARAM F/U: HCPCS | Performed by: FAMILY MEDICINE

## 2023-11-14 PROCEDURE — G8399 PT W/DXA RESULTS DOCUMENT: HCPCS | Performed by: FAMILY MEDICINE

## 2023-11-14 PROCEDURE — G8484 FLU IMMUNIZE NO ADMIN: HCPCS | Performed by: FAMILY MEDICINE

## 2023-11-14 RX ORDER — OXYCODONE HYDROCHLORIDE 5 MG/1
5 TABLET ORAL EVERY 6 HOURS PRN
COMMUNITY
Start: 2023-10-26

## 2023-11-14 RX ORDER — ONDANSETRON 4 MG/1
4 TABLET, ORALLY DISINTEGRATING ORAL 3 TIMES DAILY PRN
Qty: 21 TABLET | Refills: 0 | Status: SHIPPED | OUTPATIENT
Start: 2023-11-14

## 2023-11-14 RX ORDER — PREGABALIN 50 MG/1
50 CAPSULE ORAL EVERY EVENING
Qty: 30 CAPSULE | Refills: 1 | Status: SHIPPED | OUTPATIENT
Start: 2023-11-14 | End: 2024-01-13

## 2023-11-14 RX ORDER — GABAPENTIN 100 MG/1
CAPSULE ORAL
COMMUNITY
Start: 2023-10-27 | End: 2023-11-14 | Stop reason: ALTCHOICE

## 2023-11-14 RX ORDER — LETROZOLE 2.5 MG/1
2.5 TABLET, FILM COATED ORAL DAILY
COMMUNITY
Start: 2023-11-10

## 2023-11-14 SDOH — ECONOMIC STABILITY: FOOD INSECURITY: WITHIN THE PAST 12 MONTHS, THE FOOD YOU BOUGHT JUST DIDN'T LAST AND YOU DIDN'T HAVE MONEY TO GET MORE.: NEVER TRUE

## 2023-11-14 SDOH — ECONOMIC STABILITY: INCOME INSECURITY: HOW HARD IS IT FOR YOU TO PAY FOR THE VERY BASICS LIKE FOOD, HOUSING, MEDICAL CARE, AND HEATING?: NOT HARD AT ALL

## 2023-11-14 SDOH — ECONOMIC STABILITY: FOOD INSECURITY: WITHIN THE PAST 12 MONTHS, YOU WORRIED THAT YOUR FOOD WOULD RUN OUT BEFORE YOU GOT MONEY TO BUY MORE.: NEVER TRUE

## 2023-11-14 ASSESSMENT — PATIENT HEALTH QUESTIONNAIRE - PHQ9
SUM OF ALL RESPONSES TO PHQ9 QUESTIONS 1 & 2: 1
SUM OF ALL RESPONSES TO PHQ QUESTIONS 1-9: 1
SUM OF ALL RESPONSES TO PHQ QUESTIONS 1-9: 1
1. LITTLE INTEREST OR PLEASURE IN DOING THINGS: 0
SUM OF ALL RESPONSES TO PHQ QUESTIONS 1-9: 1
2. FEELING DOWN, DEPRESSED OR HOPELESS: 1
SUM OF ALL RESPONSES TO PHQ QUESTIONS 1-9: 1

## 2023-11-14 ASSESSMENT — ENCOUNTER SYMPTOMS
RHINORRHEA: 0
SHORTNESS OF BREATH: 0
NAUSEA: 0
VOMITING: 1
COUGH: 0
DIARRHEA: 1

## 2023-11-14 NOTE — PROGRESS NOTES
9 [address]               [phone]        Meche Ruelas is a 82 y.o. female and presents with Follow-up (B/P check)           Assessment/Plan:  Yarelis Frye was seen today for follow-up. Diagnoses and all orders for this visit:    Status post partial mastectomy, unspecified laterality    Acute gastroenteritis  -     ondansetron (ZOFRAN-ODT) 4 MG disintegrating tablet; Take 1 tablet by mouth 3 times daily as needed for Nausea or Vomiting    Pain in both hands  -     pregabalin (LYRICA) 50 MG capsule; Take 1 capsule by mouth every evening for 60 days. Max Daily Amount: 50 mg      Doing very well post-op; continue supportive care; has not had to use oxycodone; advised to discard after 2 weeks if she still has not used it in acute setting    Acute gastroenteritis: likely from food borne cause; likely dehydration from GI losses and use of lasix; encouraged to supplement with pedialyte/hydration; drink enough water so urine is clear; f/u in 2 weeks for re-evaluation; given zofran should symptoms recur    Hand pain: restart Lyrica to assist with pain; CTS w/o improvement; given home exercises previously; can refer to hand specialist at next visit if no improvement on medications/home PT      Follow up and disposition:   Return in about 2 weeks (around 11/28/2023), or if symptoms worsen or fail to improve, for follow up -15 min.       Health Maintenance:   Health Maintenance   Topic Date Due    Hepatitis B vaccine (1 of 3 - Risk 3-dose series) Never done    COVID-19 Vaccine (5 - 2023-24 season) 09/01/2023    Annual Wellness Visit (AWV)  12/28/2023    Lipids  07/19/2024    Depression Screen  10/04/2024    Breast cancer screen  08/30/2025    DTaP/Tdap/Td vaccine (2 - Td or Tdap) 01/11/2031    DEXA (modify frequency per FRAX score)  Completed    Flu vaccine  Completed    Shingles vaccine  Completed    Pneumococcal 65+ years Vaccine  Completed    Hepatitis A vaccine  Aged

## 2023-11-28 ENCOUNTER — OFFICE VISIT (OUTPATIENT)
Facility: CLINIC | Age: 83
End: 2023-11-28
Payer: MEDICARE

## 2023-11-28 VITALS
RESPIRATION RATE: 16 BRPM | HEART RATE: 63 BPM | SYSTOLIC BLOOD PRESSURE: 136 MMHG | WEIGHT: 191 LBS | TEMPERATURE: 97 F | HEIGHT: 68 IN | BODY MASS INDEX: 28.95 KG/M2 | DIASTOLIC BLOOD PRESSURE: 64 MMHG

## 2023-11-28 DIAGNOSIS — G56.03 BILATERAL CARPAL TUNNEL SYNDROME: ICD-10-CM

## 2023-11-28 DIAGNOSIS — E11.69 TYPE 2 DIABETES MELLITUS WITH OTHER SPECIFIED COMPLICATION, WITHOUT LONG-TERM CURRENT USE OF INSULIN (HCC): ICD-10-CM

## 2023-11-28 DIAGNOSIS — R20.0 NUMBNESS AND TINGLING IN LEFT HAND: ICD-10-CM

## 2023-11-28 DIAGNOSIS — Z90.11 STATUS POST PARTIAL MASTECTOMY OF RIGHT BREAST: ICD-10-CM

## 2023-11-28 DIAGNOSIS — R20.2 NUMBNESS AND TINGLING IN BOTH HANDS: ICD-10-CM

## 2023-11-28 DIAGNOSIS — Z00.00 MEDICARE ANNUAL WELLNESS VISIT, SUBSEQUENT: Primary | ICD-10-CM

## 2023-11-28 DIAGNOSIS — M79.642 BILATERAL HAND PAIN: ICD-10-CM

## 2023-11-28 DIAGNOSIS — M79.641 BILATERAL HAND PAIN: ICD-10-CM

## 2023-11-28 DIAGNOSIS — R20.2 NUMBNESS AND TINGLING IN LEFT HAND: ICD-10-CM

## 2023-11-28 DIAGNOSIS — R20.0 NUMBNESS AND TINGLING IN BOTH HANDS: ICD-10-CM

## 2023-11-28 PROCEDURE — G8427 DOCREV CUR MEDS BY ELIG CLIN: HCPCS | Performed by: FAMILY MEDICINE

## 2023-11-28 PROCEDURE — G8417 CALC BMI ABV UP PARAM F/U: HCPCS | Performed by: FAMILY MEDICINE

## 2023-11-28 PROCEDURE — 1123F ACP DISCUSS/DSCN MKR DOCD: CPT | Performed by: FAMILY MEDICINE

## 2023-11-28 PROCEDURE — 1036F TOBACCO NON-USER: CPT | Performed by: FAMILY MEDICINE

## 2023-11-28 PROCEDURE — G8484 FLU IMMUNIZE NO ADMIN: HCPCS | Performed by: FAMILY MEDICINE

## 2023-11-28 PROCEDURE — 1090F PRES/ABSN URINE INCON ASSESS: CPT | Performed by: FAMILY MEDICINE

## 2023-11-28 PROCEDURE — 99214 OFFICE O/P EST MOD 30 MIN: CPT | Performed by: FAMILY MEDICINE

## 2023-11-28 PROCEDURE — 3078F DIAST BP <80 MM HG: CPT | Performed by: FAMILY MEDICINE

## 2023-11-28 PROCEDURE — 3075F SYST BP GE 130 - 139MM HG: CPT | Performed by: FAMILY MEDICINE

## 2023-11-28 PROCEDURE — G8399 PT W/DXA RESULTS DOCUMENT: HCPCS | Performed by: FAMILY MEDICINE

## 2023-11-28 PROCEDURE — G0439 PPPS, SUBSEQ VISIT: HCPCS | Performed by: FAMILY MEDICINE

## 2023-11-28 PROCEDURE — 3044F HG A1C LEVEL LT 7.0%: CPT | Performed by: FAMILY MEDICINE

## 2023-11-28 RX ORDER — PREGABALIN 50 MG/1
50 CAPSULE ORAL EVERY EVENING
Qty: 30 CAPSULE | Refills: 5 | Status: SHIPPED | OUTPATIENT
Start: 2023-11-28 | End: 2024-05-26

## 2023-11-28 ASSESSMENT — PATIENT HEALTH QUESTIONNAIRE - PHQ9
SUM OF ALL RESPONSES TO PHQ QUESTIONS 1-9: 0
SUM OF ALL RESPONSES TO PHQ QUESTIONS 1-9: 0
2. FEELING DOWN, DEPRESSED OR HOPELESS: 0
SUM OF ALL RESPONSES TO PHQ QUESTIONS 1-9: 0
SUM OF ALL RESPONSES TO PHQ QUESTIONS 1-9: 0
1. LITTLE INTEREST OR PLEASURE IN DOING THINGS: 0
SUM OF ALL RESPONSES TO PHQ9 QUESTIONS 1 & 2: 0

## 2023-11-28 ASSESSMENT — ENCOUNTER SYMPTOMS
COUGH: 0
NAUSEA: 0
DIARRHEA: 0
VOMITING: 0
SHORTNESS OF BREATH: 0
RHINORRHEA: 0

## 2023-11-28 ASSESSMENT — LIFESTYLE VARIABLES
HOW MANY STANDARD DRINKS CONTAINING ALCOHOL DO YOU HAVE ON A TYPICAL DAY: PATIENT DOES NOT DRINK
HOW OFTEN DO YOU HAVE A DRINK CONTAINING ALCOHOL: NEVER

## 2023-11-28 NOTE — PATIENT INSTRUCTIONS
is low in saturated fat and sodium. Encourage the person you're caring for not to use tobacco products. They can affect dental and general health. Many older adults have a fixed income and feel that they can't afford dental care. But most towns and [facility] have programs in which dentists help older adults by lowering fees. Contact your area's public health offices or  for information about dental care in your area. Using a toothbrush  Older adults with arthritis sometimes have trouble brushing their teeth because they can't easily hold the toothbrush. Their hands and fingers may be stiff, painful, or weak. If this is the case, you can: Offer an electric toothbrush. Enlarge the handle of a non-electric toothbrush by wrapping a sponge, an elastic bandage, or adhesive tape around it. Push the toothbrush handle through a ball made of rubber or soft foam.  Make the handle longer and thicker by taping Popsicle sticks or tongue depressors to it. You may also be able to buy special toothbrushes, toothpaste dispensers, and floss holders. Your doctor may recommend a soft-bristle toothbrush if the person you care for bleeds easily. Bleeding can happen because of a health problem or from certain medicines. A toothpaste for sensitive teeth may help if the person you care for has sensitive teeth. How do you brush and floss someone's teeth? If the person you are caring for has a hard time cleaning their teeth on their own, you may need to brush and floss their teeth for them. It may be easiest to have the person sit and face away from you, and to sit or stand behind them. That way you can steady their head against your arm as you reach around to floss and brush their teeth. Choose a place that has good lighting and is comfortable for both of you. Before you begin, gather your supplies. You will need gloves, floss, a toothbrush, and a container to hold water if you are not near a sink.  Wash and dry your

## 2023-11-28 NOTE — PROGRESS NOTES
Fay Genao presents today for   Chief Complaint   Patient presents with    Medicare AWV       Is someone accompanying this pt? No    Is the patient using any DME equipment during OV? No    Depression Screenin/28/2023    11:42 AM   PHQ-9    Little interest or pleasure in doing things 0   Feeling down, depressed, or hopeless 0   PHQ-2 Score 0   PHQ-9 Total Score 0               Health Maintenance reviewed and discussed and ordered per Provider. Health Maintenance Due   Topic Date Due    Hepatitis B vaccine (1 of 3 - Risk 3-dose series) Never done    COVID-19 Vaccine ( season) 2023    Annual Wellness Visit (AWV)  2023   . 1. \"Have you been to the ER, urgent care clinic since your last visit? Hospitalized since your last visit? \" No    2. \"Have you seen or consulted any other health care providers outside of the [address] [place] [address] since your last visit? \" No    3. For patients over 45: Has the patient had a colonoscopy? Yes - no Care Gap present     If the patient is female:    4. For patients over 40: Has the patient had a mammogram? Yes - no Care Gap present    5. For patients over 21: Has the patient had a pap smear?  NA - based on age or sex
Anastasia Office Visit from 11/28/2023 in [facility]   How many times in the past year have you used a recreational drug or used a prescription medication for nonmedical reasons? None     Health Risk Assessment Results    [facility] Office Visit from 11/28/2023 in [address] [place] [address] Drive    In general, how would you say your health is? Good   In the past 7 days, have you experienced any of the following: New or Increased Pain, New or Increased Fatigue, Loneliness, Social Isolation, Stress or Anger? No   Select all that apply --   Do you get the social and emotional support that you need? Yes   Health Habits/Nutrition    On average, how many days per week do you engage in moderate to strenuous exercise (like a brisk walk)? 0 days   On average, how many minutes do you engage in exercise at this level? 0 min   Have you lost any weight without trying in the past 3 months? No   Have you seen the dentist within the past year? No Abnormal    Hearing/Vision    Do you or your family notice any trouble with your hearing that hasn't been managed with hearing aids? No   Do you have difficulty driving, watching TV, or doing any of your daily activities because of your eyesight? No   Have you had an eye exam within the past year? Yes   Safety    Do you have working smoke detectors? Yes   Do you have any tripping hazards - loose or unsecured carpets or rugs? No   Do you have any tripping hazards - clutter in doorways, halls, or stairs? No   Do you have either shower bars, grab bars, non-slip mats or non-slip surfaces in your shower or bathtub? Yes   Do all of your stairways have a railing or banister? Yes   Do you always fasten your seatbelt when you are in a car? Yes   ADLs    In the past 7 days, did you need help from others to perform any of the following everyday activities: Eating, dressing, grooming, bathing, toileting, or walking/balance?  No   Select all that apply --   In the past 7 days, did you
of upper outer quadrant of right breast    Keloid    Controlled type 2 diabetes mellitus with hyperglycemia, without long-term current use of insulin (HCC)    Hypertriglyceridemia    Low serum HDL    Pain in both hands    Chronic diastolic heart failure (HCC)    Hypokalemia    Peripheral edema    Invasive carcinoma of breast (HCC)    CARMEN (acute kidney injury) (HCC)    Numbness and tingling in both hands    Bilateral carpal tunnel syndrome    Status post partial mastectomy       The PSH, FH were reviewed. Past Surgical History:   Procedure Laterality Date    APPENDECTOMY      CATARACT REMOVAL      COLONOSCOPY  12/2009    Dr. Elisa Power, 10 yr follow up    HYSTERECTOMY, TOTAL ABDOMINAL (CERVIX REMOVED)         Family History   Problem Relation Age of Onset    Diabetes Mother     Hypertension Mother     Cancer Sister 48        breast    Cancer Maternal Aunt         breast    Stroke Sister           SH:  Social History     Tobacco Use    Smoking status: Never     Passive exposure: Never    Smokeless tobacco: Never   Substance Use Topics    Alcohol use: No    Drug use: No       Medications/Allergies:  Current Outpatient Medications   Medication Sig Dispense Refill    pregabalin (LYRICA) 50 MG capsule Take 1 capsule by mouth every evening for 180 days.  Max Daily Amount: 50 mg 30 capsule 5    letrozole (FEMARA) 2.5 MG tablet Take 1 tablet by mouth daily      ondansetron (ZOFRAN-ODT) 4 MG disintegrating tablet Take 1 tablet by mouth 3 times daily as needed for Nausea or Vomiting 21 tablet 0    potassium chloride (KLOR-CON M) 20 MEQ extended release tablet Take 2 tablets by mouth daily 180 tablet 2    atorvastatin (LIPITOR) 10 MG tablet Take 1 tablet by mouth nightly 90 tablet 2    furosemide (LASIX) 20 MG tablet Take 1 tablet by mouth daily      acetaminophen (TYLENOL) 650 MG extended release tablet Take 1 tablet by mouth every 8 hours as needed      metoprolol succinate (TOPROL XL) 100 MG extended release tablet Take 1

## 2023-11-29 ENCOUNTER — TELEPHONE (OUTPATIENT)
Facility: CLINIC | Age: 83
End: 2023-11-29

## 2023-11-29 LAB
ALBUMIN SERPL-MCNC: 3.7 G/DL (ref 3.7–4.7)
ALBUMIN/CREAT UR: 5 MG/G CREAT (ref 0–29)
ALBUMIN/GLOB SERPL: 0.8 {RATIO} (ref 1.2–2.2)
ALP SERPL-CCNC: 45 IU/L (ref 44–121)
ALT SERPL-CCNC: 15 IU/L (ref 0–32)
AST SERPL-CCNC: 15 IU/L (ref 0–40)
BILIRUB SERPL-MCNC: 0.4 MG/DL (ref 0–1.2)
BUN SERPL-MCNC: 11 MG/DL (ref 8–27)
BUN/CREAT SERPL: 12 (ref 12–28)
CALCIUM SERPL-MCNC: 8.9 MG/DL (ref 8.7–10.3)
CHLORIDE SERPL-SCNC: 101 MMOL/L (ref 96–106)
CHOLEST SERPL-MCNC: 163 MG/DL (ref 100–199)
CO2 SERPL-SCNC: 27 MMOL/L (ref 20–29)
CREAT SERPL-MCNC: 0.92 MG/DL (ref 0.57–1)
CREAT UR-MCNC: 168.5 MG/DL
EGFRCR SERPLBLD CKD-EPI 2021: 63 ML/MIN/1.73
GLOBULIN SER CALC-MCNC: 4.6 G/DL (ref 1.5–4.5)
GLUCOSE SERPL-MCNC: 100 MG/DL (ref 70–99)
HBA1C MFR BLD: 6.2 % (ref 4.8–5.6)
HDLC SERPL-MCNC: 53 MG/DL
LDLC SERPL CALC-MCNC: 94 MG/DL (ref 0–99)
MICROALBUMIN UR-MCNC: 8.8 UG/ML
POTASSIUM SERPL-SCNC: 4.2 MMOL/L (ref 3.5–5.2)
PROT SERPL-MCNC: 8.3 G/DL (ref 6–8.5)
SODIUM SERPL-SCNC: 140 MMOL/L (ref 134–144)
SPECIMEN STATUS REPORT: NORMAL
TRIGL SERPL-MCNC: 88 MG/DL (ref 0–149)
TSH SERPL DL<=0.005 MIU/L-ACNC: 2.23 UIU/ML (ref 0.45–4.5)
VLDLC SERPL CALC-MCNC: 16 MG/DL (ref 5–40)

## 2023-11-29 NOTE — TELEPHONE ENCOUNTER
Please contact [facility] to make sure they are running the A1c and urine micro that were ordered yesterday.  These labs say \"not released\"

## 2024-01-17 ENCOUNTER — TELEPHONE (OUTPATIENT)
Facility: CLINIC | Age: 84
End: 2024-01-17

## 2024-01-17 NOTE — TELEPHONE ENCOUNTER
Patient states she had food poisoning yesterday and wanted to know what to do. She stands she feels weak. Please advise

## 2024-01-18 ENCOUNTER — TELEMEDICINE (OUTPATIENT)
Facility: CLINIC | Age: 84
End: 2024-01-18
Payer: MEDICARE

## 2024-01-18 DIAGNOSIS — K52.9 ACUTE GASTROENTERITIS: Primary | ICD-10-CM

## 2024-01-18 PROCEDURE — 1090F PRES/ABSN URINE INCON ASSESS: CPT | Performed by: FAMILY MEDICINE

## 2024-01-18 PROCEDURE — 1123F ACP DISCUSS/DSCN MKR DOCD: CPT | Performed by: FAMILY MEDICINE

## 2024-01-18 PROCEDURE — G8427 DOCREV CUR MEDS BY ELIG CLIN: HCPCS | Performed by: FAMILY MEDICINE

## 2024-01-18 PROCEDURE — 99212 OFFICE O/P EST SF 10 MIN: CPT | Performed by: FAMILY MEDICINE

## 2024-01-18 PROCEDURE — G8399 PT W/DXA RESULTS DOCUMENT: HCPCS | Performed by: FAMILY MEDICINE

## 2024-01-18 ASSESSMENT — ENCOUNTER SYMPTOMS
DIARRHEA: 0
VOMITING: 0
ABDOMINAL PAIN: 0

## 2024-01-18 NOTE — PROGRESS NOTES
[address] [place] [address]               [phone]        Ana Luisa Samuels is a 83 y.o. female and presents with Nausea & Vomiting (Possible food poisoning ) and Diarrhea           Assessment/Plan:  Ana Luisa was seen today for nausea & vomiting and diarrhea.    Diagnoses and all orders for this visit:    Acute gastroenteritis    Symptoms have resolved; encouraged to use zofran prn in the future for vomiting; can stop pedialyte given no further episodes of vomiting/diarrhea for last 24 hours; f/u prn        Follow up and disposition:   No follow-ups on file.      Health Maintenance:   Health Maintenance   Topic Date Due    Respiratory Syncytial Virus (RSV) Pregnant or age 60 yrs+ (1 - 1-dose 60+ series) Never done    COVID-19 Vaccine (5 - 2023-24 season) 09/01/2023    Annual Wellness Visit (Medicare Advantage)  01/01/2024    Lipids  11/28/2024    Depression Screen  11/28/2024    Breast cancer screen  08/30/2025    DTaP/Tdap/Td vaccine (2 - Td or Tdap) 01/11/2031    DEXA (modify frequency per FRAX score)  Completed    Flu vaccine  Completed    Shingles vaccine  Completed    Pneumococcal 65+ years Vaccine  Completed    Hepatitis A vaccine  Aged Out    Hepatitis B vaccine  Aged Out    Hib vaccine  Aged Out    Polio vaccine  Aged Out    Meningococcal (ACWY) vaccine  Aged Out    Hepatitis C screen  Discontinued        Subjective     82 y/o female here for gastroenteritis  States she ate some soup/ate chicken from [employer] 2 days ago; ate drumstick, noted abdominal pain quickly afterwards; had multiple episodes of vomiting/diarrhea all day; states her throat was sore from it; states she had zofran at home but didn't use it since she wanted it to come out; has been drinking pedialyte/gingerale/saltine crackers; diarrhea/vomiting has resolved for 2 days now; feels much better/more hydrated and herself again    Denies any new issues/concerns    Labs obtained prior to visit? No  Reviewed

## 2024-01-18 NOTE — PROGRESS NOTES
Ana Luisa JANEL Samuels presents today for   Chief Complaint   Patient presents with    Nausea & Vomiting     Possible food poisoning     Diarrhea       Is someone accompanying this pt? na    Is the patient using any DME equipment during OV? na    Depression Screening:       No data to display                Learning Assessment:      Abuse Screening:       No data to display                Fall Risk      Health Maintenance reviewed and discussed and ordered per Provider.    Health Maintenance Due   Topic Date Due    Respiratory Syncytial Virus (RSV) Pregnant or age 60 yrs+ (1 - 1-dose 60+ series) Never done    COVID-19 Vaccine (5 - 2023-24 season) 09/01/2023    Annual Wellness Visit (Medicare Advantage)  01/01/2024   .      Coordination of Care:  1. Have you been to the ER, urgent care clinic since your last visit? Hospitalized since your last visit? no    2. Have you seen or consulted any other health care providers outside of the [facility] since your last visit? Include any pap smears or colon screening. no      Last  Checked na  Last UDS Checked na  Last Pain contract signed: rose

## 2024-02-15 ENCOUNTER — OFFICE VISIT (OUTPATIENT)
Facility: CLINIC | Age: 84
End: 2024-02-15

## 2024-02-15 VITALS
HEIGHT: 68 IN | SYSTOLIC BLOOD PRESSURE: 149 MMHG | WEIGHT: 194.4 LBS | TEMPERATURE: 98.8 F | BODY MASS INDEX: 29.46 KG/M2 | RESPIRATION RATE: 16 BRPM | HEART RATE: 65 BPM | DIASTOLIC BLOOD PRESSURE: 79 MMHG | OXYGEN SATURATION: 98 %

## 2024-02-15 DIAGNOSIS — M79.642 BILATERAL HAND PAIN: ICD-10-CM

## 2024-02-15 DIAGNOSIS — G56.03 BILATERAL CARPAL TUNNEL SYNDROME: Primary | ICD-10-CM

## 2024-02-15 DIAGNOSIS — M79.641 BILATERAL HAND PAIN: ICD-10-CM

## 2024-02-15 RX ORDER — PREGABALIN 50 MG/1
50 CAPSULE ORAL 2 TIMES DAILY
Qty: 180 CAPSULE | Refills: 1 | Status: SHIPPED | OUTPATIENT
Start: 2024-02-15 | End: 2024-08-13

## 2024-02-15 NOTE — PROGRESS NOTES
Ana Luisa Samuels presents today for   Chief Complaint   Patient presents with    Tingling     In the day time and night, both hands for a long time        Is someone accompanying this pt? no    Is the patient using any DME equipment during OV? no    Depression Screening:       No data to display                Learning Assessment:      Fall Risk      ADL       No data to display                Health Maintenance reviewed and discussed and ordered per Provider.    Health Maintenance Due   Topic Date Due    Annual Wellness Visit (Medicare Advantage)  01/01/2024   .    \"Have you been to the ER, urgent care clinic since your last visit?  Hospitalized since your last visit?\"    no    “Have you seen or consulted any other health care providers outside of [facility] since your last visit?”    no

## 2024-02-15 NOTE — PROGRESS NOTES
[address] [place] [address]               [phone]      Ana Luisa Samuels is a 83 y.o. female and presents with Tingling (In the day time and night, both hands for a long time )       Assessment/Plan:    1. Bilateral carpal tunnel syndrome  -     pregabalin (LYRICA) 50 MG capsule; Take 1 capsule by mouth 2 times daily for 180 days. Max Daily Amount: 100 mg, Disp-180 capsule, R-1Normal  -     External Referral To Hand Surgery  2. Bilateral hand pain  -     pregabalin (LYRICA) 50 MG capsule; Take 1 capsule by mouth 2 times daily for 180 days. Max Daily Amount: 100 mg, Disp-180 capsule, R-1Normal     Pain is most likely related to her carpal tunnel syndrome  States she has been performing the exercises at home with no help  Will increase lyrica to two times a day and refer to hand surgery  Patient verbalized understanding and is in agreement with this plan of care      Follow up and disposition:   Return for keep previously scheduled follow up.      Subjective:    Labs obtained prior to visit? No  Reviewed with patient? N/A    Numbness to hands  To both hands  Mostly at the finger tips  Has been seen for this problem by Dr. Duque  Has been on gabapentin and lyrica: neither one works      ROS:     Review of Systems      The problem list was updated as a part of today's visit.  Patient Active Problem List   Diagnosis    Mixed hyperlipidemia    Complete rotator cuff tear of left shoulder    Osteoarthritis of right knee    DDD (degenerative disc disease), lumbar    Essential hypertension    Primary osteoarthritis of left knee    Vitamin D deficiency    Osteopenia    Abnormal mammogram    Sickle-cell trait (HCC)    Neutropenia (HCC)    Chronic anemia    Mass of upper outer quadrant of right breast    Keloid    Controlled type 2 diabetes mellitus with hyperglycemia, without long-term current use of insulin (HCC)    Hypertriglyceridemia    Low serum HDL    Pain in both hands    Chronic

## 2024-02-15 NOTE — PROGRESS NOTES
[address] [place] [address]               [phone]        Ana Luisa Samuels is a 83 y.o. female and presents with No chief complaint on file.           Assessment/Plan:  There are no diagnoses linked to this encounter.        Follow up and disposition:   No follow-ups on file.      Health Maintenance:   Health Maintenance   Topic Date Due    Respiratory Syncytial Virus (RSV) Pregnant or age 60 yrs+ (1 - 1-dose 60+ series) Never done    COVID-19 Vaccine (5 - 2023-24 season) 09/01/2023    Annual Wellness Visit (Medicare Advantage)  01/01/2024    Lipids  11/28/2024    Depression Screen  11/28/2024    Breast cancer screen  08/30/2025    DTaP/Tdap/Td vaccine (2 - Td or Tdap) 01/11/2031    DEXA (modify frequency per FRAX score)  Completed    Flu vaccine  Completed    Shingles vaccine  Completed    Pneumococcal 65+ years Vaccine  Completed    Hepatitis A vaccine  Aged Out    Hepatitis B vaccine  Aged Out    Hib vaccine  Aged Out    Polio vaccine  Aged Out    Meningococcal (ACWY) vaccine  Aged Out    Hepatitis C screen  Discontinued        Subjective     84 y/o female here for f/u on CTS/hand neuropathy; started on lyrica at prior visits; advised at prior visits to eprfrom home exercises daily; offered referral to formal PT but declined at November appointment      Labs obtained prior to visit? No  Reviewed with patient? no      ROS:     Review of Systems      The problem list was updated as a part of today's visit.  Patient Active Problem List   Diagnosis    Mixed hyperlipidemia    Complete rotator cuff tear of left shoulder    Osteoarthritis of right knee    DDD (degenerative disc disease), lumbar    Essential hypertension    Primary osteoarthritis of left knee    Vitamin D deficiency    Osteopenia    Abnormal mammogram    Sickle-cell trait (HCC)    Neutropenia (HCC)    Chronic anemia    Mass of upper outer quadrant of right breast    Keloid    Controlled type 2 diabetes mellitus

## 2024-03-18 DIAGNOSIS — I10 ESSENTIAL HYPERTENSION: ICD-10-CM

## 2024-03-18 DIAGNOSIS — N18.31 STAGE 3A CHRONIC KIDNEY DISEASE (HCC): ICD-10-CM

## 2024-03-18 RX ORDER — VALSARTAN 40 MG/1
40 TABLET ORAL DAILY
Qty: 90 TABLET | Refills: 2 | Status: SHIPPED | OUTPATIENT
Start: 2024-03-18

## 2024-03-30 DIAGNOSIS — I10 ESSENTIAL HYPERTENSION: ICD-10-CM

## 2024-03-30 DIAGNOSIS — N18.31 STAGE 3A CHRONIC KIDNEY DISEASE (HCC): ICD-10-CM

## 2024-04-02 RX ORDER — METOPROLOL SUCCINATE 100 MG/1
100 TABLET, EXTENDED RELEASE ORAL DAILY
Qty: 90 TABLET | Refills: 2 | Status: SHIPPED | OUTPATIENT
Start: 2024-04-02 | End: 2024-04-22 | Stop reason: ALTCHOICE

## 2024-04-22 ENCOUNTER — OFFICE VISIT (OUTPATIENT)
Facility: CLINIC | Age: 84
End: 2024-04-22
Payer: MEDICARE

## 2024-04-22 VITALS — BODY MASS INDEX: 28.95 KG/M2 | WEIGHT: 191 LBS | HEIGHT: 68 IN

## 2024-04-22 DIAGNOSIS — R22.9 SUBCUTANEOUS MASS: Primary | ICD-10-CM

## 2024-04-22 PROCEDURE — G8417 CALC BMI ABV UP PARAM F/U: HCPCS | Performed by: FAMILY MEDICINE

## 2024-04-22 PROCEDURE — 1036F TOBACCO NON-USER: CPT | Performed by: FAMILY MEDICINE

## 2024-04-22 PROCEDURE — G8427 DOCREV CUR MEDS BY ELIG CLIN: HCPCS | Performed by: FAMILY MEDICINE

## 2024-04-22 PROCEDURE — 1123F ACP DISCUSS/DSCN MKR DOCD: CPT | Performed by: FAMILY MEDICINE

## 2024-04-22 PROCEDURE — G8399 PT W/DXA RESULTS DOCUMENT: HCPCS | Performed by: FAMILY MEDICINE

## 2024-04-22 PROCEDURE — 1090F PRES/ABSN URINE INCON ASSESS: CPT | Performed by: FAMILY MEDICINE

## 2024-04-22 PROCEDURE — 99213 OFFICE O/P EST LOW 20 MIN: CPT | Performed by: FAMILY MEDICINE

## 2024-04-22 RX ORDER — FUROSEMIDE 20 MG/1
20 TABLET ORAL DAILY PRN
COMMUNITY

## 2024-04-22 RX ORDER — SPIRONOLACTONE 25 MG/1
25 TABLET ORAL DAILY
COMMUNITY

## 2024-04-22 RX ORDER — CARVEDILOL PHOSPHATE 40 MG/1
40 CAPSULE, EXTENDED RELEASE ORAL
COMMUNITY

## 2024-04-22 RX ORDER — ACETAMINOPHEN 160 MG
2000 TABLET,DISINTEGRATING ORAL DAILY
COMMUNITY

## 2024-04-22 RX ORDER — SULFAMETHOXAZOLE AND TRIMETHOPRIM 800; 160 MG/1; MG/1
1 TABLET ORAL 2 TIMES DAILY
Qty: 14 TABLET | Refills: 0 | Status: SHIPPED | OUTPATIENT
Start: 2024-04-22 | End: 2024-04-29

## 2024-04-22 ASSESSMENT — ENCOUNTER SYMPTOMS
SHORTNESS OF BREATH: 0
DIARRHEA: 0
RHINORRHEA: 0
COUGH: 0
VOMITING: 0
NAUSEA: 0

## 2024-04-22 ASSESSMENT — PATIENT HEALTH QUESTIONNAIRE - PHQ9
SUM OF ALL RESPONSES TO PHQ QUESTIONS 1-9: 0
SUM OF ALL RESPONSES TO PHQ9 QUESTIONS 1 & 2: 0
1. LITTLE INTEREST OR PLEASURE IN DOING THINGS: NOT AT ALL
SUM OF ALL RESPONSES TO PHQ QUESTIONS 1-9: 0

## 2024-04-22 NOTE — PROGRESS NOTES
General: Tenderness present.      Comments: R medial lower extremity with 6x3 cm region of moderate tenderness to fibula; moderate edema noted; +fluctuance; no induration noted   Skin:     Findings: No rash.   Neurological:      General: No focal deficit present.      Mental Status: She is alert and oriented to person, place, and time.   Psychiatric:         Mood and Affect: Mood normal.         Behavior: Behavior normal.         Thought Content: Thought content normal.         Judgment: Judgment normal.         I have discussed the diagnosis with the patient and the intended plan as seen in the above orders.  The patient has received an After-Visit Summary and questions were answered concerning future plans.     An After Visit Summary was printed and given to the patient.    All diagnoses have been discussed with the patient and all of the patient's questions have been answered.           Karey Duque MD  [facility]  [address] [address]

## 2024-05-17 ENCOUNTER — TELEPHONE (OUTPATIENT)
Facility: CLINIC | Age: 84
End: 2024-05-17

## 2024-05-17 DIAGNOSIS — R22.9 SUBCUTANEOUS MASS: Primary | ICD-10-CM

## 2024-05-17 NOTE — TELEPHONE ENCOUNTER
Called patient gave her the US results. She told me she has blood cancer but couldn't remember the diagnosis.

## 2024-05-17 NOTE — TELEPHONE ENCOUNTER
Please advise pt that ultrasound should possible inflammation of her veins at that site; can put in a referral to Vascular if she wants to get further evaluated for it

## 2024-05-20 ENCOUNTER — TELEPHONE (OUTPATIENT)
Facility: CLINIC | Age: 84
End: 2024-05-20

## 2024-05-20 NOTE — TELEPHONE ENCOUNTER
Pt states she was wondering about the vascular referral information; diagnosed through her hematology specialist for blood disorder, recently diagnosed with multiple myeloma; pt has long history with [facility]; answered her questions and advised her to await referral coordinator to give her a name to reach out to Vascular

## 2024-05-20 NOTE — TELEPHONE ENCOUNTER
----- Message from Yue Nielsen sent at 5/17/2024  2:47 PM EDT -----  Subject: Message to Provider    QUESTIONS  Information for Provider? Pt would like for Dr Duque to know that Dr Maurer informed her she has myloma. Pt states she was made aware of this on   Wed 5/15/24. Pt is requesting for someone to call her back regarding this.  ---------------------------------------------------------------------------  --------------  CALL BACK INFO  [phone]; OK to leave message on voicemail  ---------------------------------------------------------------------------  --------------  SCRIPT ANSWERS  Relationship to Patient? Self

## 2024-05-23 LAB
ALBUMIN SERPL-MCNC: 3.7 G/DL (ref 3.7–4.7)
ALBUMIN/CREAT UR: 9 MG/G CREAT (ref 0–29)
ALBUMIN/GLOB SERPL: 0.9 {RATIO} (ref 1.2–2.2)
ALP SERPL-CCNC: 40 IU/L (ref 44–121)
ALT SERPL-CCNC: 14 IU/L (ref 0–32)
APPEARANCE UR: CLEAR
AST SERPL-CCNC: 15 IU/L (ref 0–40)
BACTERIA #/AREA URNS HPF: NORMAL /[HPF]
BASOPHILS # BLD AUTO: 0 X10E3/UL (ref 0–0.2)
BASOPHILS NFR BLD AUTO: 0 %
BILIRUB SERPL-MCNC: 0.5 MG/DL (ref 0–1.2)
BILIRUB UR QL STRIP: NEGATIVE
BUN SERPL-MCNC: 13 MG/DL (ref 8–27)
BUN/CREAT SERPL: 14 (ref 12–28)
CALCIUM SERPL-MCNC: 9.2 MG/DL (ref 8.7–10.3)
CASTS URNS QL MICRO: NORMAL /LPF
CHLORIDE SERPL-SCNC: 101 MMOL/L (ref 96–106)
CHOLEST SERPL-MCNC: 141 MG/DL (ref 100–199)
CO2 SERPL-SCNC: 25 MMOL/L (ref 20–29)
COLOR UR: YELLOW
CREAT SERPL-MCNC: 0.93 MG/DL (ref 0.57–1)
CREAT UR-MCNC: 115.3 MG/DL
EGFRCR SERPLBLD CKD-EPI 2021: 62 ML/MIN/1.73
EOSINOPHIL # BLD AUTO: 0 X10E3/UL (ref 0–0.4)
EOSINOPHIL NFR BLD AUTO: 0 %
EPI CELLS #/AREA URNS HPF: NORMAL /HPF (ref 0–10)
ERYTHROCYTE [DISTWIDTH] IN BLOOD BY AUTOMATED COUNT: 13.7 % (ref 11.7–15.4)
GLOBULIN SER CALC-MCNC: 4.3 G/DL (ref 1.5–4.5)
GLUCOSE SERPL-MCNC: 118 MG/DL (ref 70–99)
GLUCOSE UR QL STRIP: NEGATIVE
HBA1C MFR BLD: 6 % (ref 4.8–5.6)
HCT VFR BLD AUTO: 24.3 % (ref 34–46.6)
HDLC SERPL-MCNC: 45 MG/DL
HGB BLD-MCNC: 8.2 G/DL (ref 11.1–15.9)
HGB UR QL STRIP: NEGATIVE
IMM GRANULOCYTES # BLD AUTO: 0 X10E3/UL (ref 0–0.1)
IMM GRANULOCYTES NFR BLD AUTO: 0 %
IMP & REVIEW OF LAB RESULTS: NORMAL
KETONES UR QL STRIP: NEGATIVE
LDLC SERPL CALC-MCNC: 74 MG/DL (ref 0–99)
LEUKOCYTE ESTERASE UR QL STRIP: NEGATIVE
LYMPHOCYTES # BLD AUTO: 1.5 X10E3/UL (ref 0.7–3.1)
LYMPHOCYTES NFR BLD AUTO: 60 %
Lab: NORMAL
MCH RBC QN AUTO: 36.3 PG (ref 26.6–33)
MCHC RBC AUTO-ENTMCNC: 33.7 G/DL (ref 31.5–35.7)
MCV RBC AUTO: 108 FL (ref 79–97)
MICRO URNS: NORMAL
MICRO URNS: NORMAL
MICROALBUMIN UR-MCNC: 9.9 UG/ML
MONOCYTES # BLD AUTO: 0.1 X10E3/UL (ref 0.1–0.9)
MONOCYTES NFR BLD AUTO: 2 %
MORPHOLOGY BLD-IMP: ABNORMAL
NEUTROPHILS # BLD AUTO: 1 X10E3/UL (ref 1.4–7)
NEUTROPHILS NFR BLD AUTO: 38 %
NITRITE UR QL STRIP: NEGATIVE
PH UR STRIP: 7 [PH] (ref 5–7.5)
PLATELET # BLD AUTO: 184 X10E3/UL (ref 150–450)
POTASSIUM SERPL-SCNC: 4.1 MMOL/L (ref 3.5–5.2)
PROT SERPL-MCNC: 8 G/DL (ref 6–8.5)
PROT UR QL STRIP: NEGATIVE
RBC # BLD AUTO: 2.26 X10E6/UL (ref 3.77–5.28)
RBC #/AREA URNS HPF: NORMAL /HPF (ref 0–2)
SODIUM SERPL-SCNC: 138 MMOL/L (ref 134–144)
SP GR UR STRIP: 1.01 (ref 1–1.03)
SPECIMEN STATUS REPORT: NORMAL
TRIGL SERPL-MCNC: 122 MG/DL (ref 0–149)
TSH SERPL DL<=0.005 MIU/L-ACNC: 2.43 UIU/ML (ref 0.45–4.5)
UROBILINOGEN UR STRIP-MCNC: 0.2 MG/DL (ref 0.2–1)
VLDLC SERPL CALC-MCNC: 22 MG/DL (ref 5–40)
WBC # BLD AUTO: 2.6 X10E3/UL (ref 3.4–10.8)
WBC #/AREA URNS HPF: NORMAL /HPF (ref 0–5)

## 2024-05-26 PROBLEM — D47.2 MGUS (MONOCLONAL GAMMOPATHY OF UNKNOWN SIGNIFICANCE): Status: ACTIVE | Noted: 2024-05-26

## 2024-05-26 PROBLEM — C90.00 MULTIPLE MYELOMA NOT HAVING ACHIEVED REMISSION (HCC): Status: ACTIVE | Noted: 2024-05-26

## 2024-05-26 PROBLEM — E11.69 TYPE 2 DIABETES MELLITUS WITH OTHER SPECIFIED COMPLICATION, WITHOUT LONG-TERM CURRENT USE OF INSULIN (HCC): Status: ACTIVE | Noted: 2023-07-19

## 2024-05-28 ENCOUNTER — OFFICE VISIT (OUTPATIENT)
Facility: CLINIC | Age: 84
End: 2024-05-28
Payer: MEDICARE

## 2024-05-28 VITALS
DIASTOLIC BLOOD PRESSURE: 78 MMHG | BODY MASS INDEX: 28.95 KG/M2 | TEMPERATURE: 97.4 F | HEART RATE: 72 BPM | HEIGHT: 68 IN | WEIGHT: 191 LBS | OXYGEN SATURATION: 95 % | SYSTOLIC BLOOD PRESSURE: 131 MMHG | RESPIRATION RATE: 16 BRPM

## 2024-05-28 DIAGNOSIS — G56.03 BILATERAL CARPAL TUNNEL SYNDROME: ICD-10-CM

## 2024-05-28 DIAGNOSIS — C90.00 MULTIPLE MYELOMA NOT HAVING ACHIEVED REMISSION (HCC): ICD-10-CM

## 2024-05-28 DIAGNOSIS — N18.2 CONTROLLED TYPE 2 DIABETES MELLITUS WITH STAGE 2 CHRONIC KIDNEY DISEASE, WITHOUT LONG-TERM CURRENT USE OF INSULIN (HCC): Primary | ICD-10-CM

## 2024-05-28 DIAGNOSIS — E11.22 CONTROLLED TYPE 2 DIABETES MELLITUS WITH STAGE 2 CHRONIC KIDNEY DISEASE, WITHOUT LONG-TERM CURRENT USE OF INSULIN (HCC): Primary | ICD-10-CM

## 2024-05-28 DIAGNOSIS — E78.2 MIXED HYPERLIPIDEMIA: ICD-10-CM

## 2024-05-28 DIAGNOSIS — I50.32 CHRONIC DIASTOLIC HEART FAILURE (HCC): ICD-10-CM

## 2024-05-28 DIAGNOSIS — D57.3 SICKLE-CELL TRAIT (HCC): ICD-10-CM

## 2024-05-28 DIAGNOSIS — D70.9 NEUTROPENIA, UNSPECIFIED TYPE (HCC): ICD-10-CM

## 2024-05-28 DIAGNOSIS — D47.2 MGUS (MONOCLONAL GAMMOPATHY OF UNKNOWN SIGNIFICANCE): ICD-10-CM

## 2024-05-28 DIAGNOSIS — C50.919 INVASIVE CARCINOMA OF BREAST (HCC): ICD-10-CM

## 2024-05-28 PROCEDURE — 3078F DIAST BP <80 MM HG: CPT | Performed by: FAMILY MEDICINE

## 2024-05-28 PROCEDURE — 3075F SYST BP GE 130 - 139MM HG: CPT | Performed by: FAMILY MEDICINE

## 2024-05-28 PROCEDURE — G8417 CALC BMI ABV UP PARAM F/U: HCPCS | Performed by: FAMILY MEDICINE

## 2024-05-28 PROCEDURE — 1123F ACP DISCUSS/DSCN MKR DOCD: CPT | Performed by: FAMILY MEDICINE

## 2024-05-28 PROCEDURE — 3044F HG A1C LEVEL LT 7.0%: CPT | Performed by: FAMILY MEDICINE

## 2024-05-28 PROCEDURE — 1036F TOBACCO NON-USER: CPT | Performed by: FAMILY MEDICINE

## 2024-05-28 PROCEDURE — 99214 OFFICE O/P EST MOD 30 MIN: CPT | Performed by: FAMILY MEDICINE

## 2024-05-28 PROCEDURE — G8427 DOCREV CUR MEDS BY ELIG CLIN: HCPCS | Performed by: FAMILY MEDICINE

## 2024-05-28 PROCEDURE — G8399 PT W/DXA RESULTS DOCUMENT: HCPCS | Performed by: FAMILY MEDICINE

## 2024-05-28 PROCEDURE — 1090F PRES/ABSN URINE INCON ASSESS: CPT | Performed by: FAMILY MEDICINE

## 2024-05-28 RX ORDER — PREGABALIN 100 MG/1
100 CAPSULE ORAL
Qty: 30 CAPSULE | Refills: 5 | Status: SHIPPED | OUTPATIENT
Start: 2024-05-28 | End: 2024-11-24

## 2024-05-28 RX ORDER — ATORVASTATIN CALCIUM 10 MG/1
10 TABLET, FILM COATED ORAL
Qty: 90 TABLET | Refills: 2 | Status: SHIPPED | OUTPATIENT
Start: 2024-05-28 | End: 2025-02-22

## 2024-05-28 RX ORDER — LENALIDOMIDE 25 MG/1
25 CAPSULE ORAL DAILY
COMMUNITY

## 2024-05-28 SDOH — ECONOMIC STABILITY: FOOD INSECURITY: WITHIN THE PAST 12 MONTHS, YOU WORRIED THAT YOUR FOOD WOULD RUN OUT BEFORE YOU GOT MONEY TO BUY MORE.: NEVER TRUE

## 2024-05-28 SDOH — ECONOMIC STABILITY: INCOME INSECURITY: HOW HARD IS IT FOR YOU TO PAY FOR THE VERY BASICS LIKE FOOD, HOUSING, MEDICAL CARE, AND HEATING?: NOT HARD AT ALL

## 2024-05-28 SDOH — ECONOMIC STABILITY: FOOD INSECURITY: WITHIN THE PAST 12 MONTHS, THE FOOD YOU BOUGHT JUST DIDN'T LAST AND YOU DIDN'T HAVE MONEY TO GET MORE.: NEVER TRUE

## 2024-05-28 ASSESSMENT — PATIENT HEALTH QUESTIONNAIRE - PHQ9
SUM OF ALL RESPONSES TO PHQ QUESTIONS 1-9: 0
SUM OF ALL RESPONSES TO PHQ QUESTIONS 1-9: 0
1. LITTLE INTEREST OR PLEASURE IN DOING THINGS: NOT AT ALL
2. FEELING DOWN, DEPRESSED OR HOPELESS: NOT AT ALL
SUM OF ALL RESPONSES TO PHQ QUESTIONS 1-9: 0
SUM OF ALL RESPONSES TO PHQ QUESTIONS 1-9: 0
SUM OF ALL RESPONSES TO PHQ9 QUESTIONS 1 & 2: 0

## 2024-05-28 ASSESSMENT — ENCOUNTER SYMPTOMS: SHORTNESS OF BREATH: 0

## 2024-05-28 NOTE — PROGRESS NOTES
Ana Luisa Samuels presents today for   Chief Complaint   Patient presents with    Follow-up     Lab results       Is someone accompanying this pt? No    Is the patient using any DME equipment during OV? No    Depression Screenin/28/2024    10:45 AM   PHQ-9    Little interest or pleasure in doing things 0   Feeling down, depressed, or hopeless 0   PHQ-2 Score 0   PHQ-9 Total Score 0               Health Maintenance reviewed and discussed and ordered per Provider.    Health Maintenance Due   Topic Date Due    Annual Wellness Visit (Medicare Advantage)  2024    COVID-19 Vaccine ( season) 2024   .        1. \"Have you been to the ER, urgent care clinic since your last visit?  Hospitalized since your last visit?\" No    2. \"Have you seen or consulted any other health care providers outside of the [facility] since your last visit?\" No    3. For patients over 45: Has the patient had a colonoscopy? NA - based on age or sex     If the patient is female:    4. For patients over 40: Has the patient had a mammogram? Yes - no Care Gap present    5. For patients over 21: Has the patient had a pap smear? NA - based on age or sex    
Normal range of motion.      Right lower leg: Edema present.      Left lower leg: Edema present.      Comments: Trace pitting edema b/l LE   Skin:     Findings: Bruising and lesion present.      Comments: 2x2 cm hyperpigmented subcutaneous cyst with mild ttp noted of R medial LE   Neurological:      Mental Status: She is alert and oriented to person, place, and time.      Gait: Gait normal.   Psychiatric:         Mood and Affect: Mood normal.         Behavior: Behavior normal.         Thought Content: Thought content normal.         Judgment: Judgment normal.           PHQ-9 Total Score: 0 (5/28/2024 10:45 AM)          I have discussed the diagnosis with the patient and the intended plan as seen in the above orders.  The patient has received an After-Visit Summary and questions were answered concerning future plans.     An After Visit Summary was printed and given to the patient.    All diagnoses have been discussed with the patient and all of the patient's questions have been answered.           Karey Duque MD  [facility]  [address] [address]

## 2024-06-13 PROBLEM — I82.511 CHRONIC DEEP VEIN THROMBOSIS (DVT) OF FEMORAL VEIN OF RIGHT LOWER EXTREMITY (HCC): Status: ACTIVE | Noted: 2024-06-13

## 2024-06-13 PROBLEM — I82.441 ACUTE DEEP VEIN THROMBOSIS (DVT) OF RIGHT TIBIAL VEIN (HCC): Status: ACTIVE | Noted: 2024-06-13

## 2024-06-13 PROBLEM — I82.531 CHRONIC DEEP VEIN THROMBOSIS (DVT) OF POPLITEAL VEIN OF RIGHT LOWER EXTREMITY (HCC): Status: ACTIVE | Noted: 2024-06-13

## 2024-06-13 PROBLEM — I80.01 THROMBOPHLEBITIS OF SUPERFICIAL VEINS OF RIGHT LOWER EXTREMITY: Status: ACTIVE | Noted: 2024-06-13

## 2024-06-13 RX ORDER — CARVEDILOL PHOSPHATE 20 MG/1
20 CAPSULE, EXTENDED RELEASE ORAL
COMMUNITY
Start: 2024-04-29

## 2024-06-24 DIAGNOSIS — M79.641 PAIN IN BOTH HANDS: ICD-10-CM

## 2024-06-24 DIAGNOSIS — M79.642 PAIN IN BOTH HANDS: ICD-10-CM

## 2024-06-24 RX ORDER — GABAPENTIN 600 MG/1
TABLET ORAL
Qty: 30 TABLET | OUTPATIENT
Start: 2024-06-24

## 2024-06-25 ENCOUNTER — OFFICE VISIT (OUTPATIENT)
Facility: CLINIC | Age: 84
End: 2024-06-25
Payer: MEDICARE

## 2024-06-25 VITALS
TEMPERATURE: 98 F | WEIGHT: 197 LBS | OXYGEN SATURATION: 95 % | BODY MASS INDEX: 30.92 KG/M2 | RESPIRATION RATE: 16 BRPM | HEART RATE: 63 BPM | SYSTOLIC BLOOD PRESSURE: 114 MMHG | DIASTOLIC BLOOD PRESSURE: 70 MMHG | HEIGHT: 67 IN

## 2024-06-25 DIAGNOSIS — G56.03 BILATERAL CARPAL TUNNEL SYNDROME: ICD-10-CM

## 2024-06-25 DIAGNOSIS — R06.02 SHORTNESS OF BREATH: ICD-10-CM

## 2024-06-25 DIAGNOSIS — I50.32 CHRONIC DIASTOLIC HEART FAILURE (HCC): ICD-10-CM

## 2024-06-25 DIAGNOSIS — I82.531 CHRONIC DEEP VEIN THROMBOSIS (DVT) OF POPLITEAL VEIN OF RIGHT LOWER EXTREMITY (HCC): ICD-10-CM

## 2024-06-25 DIAGNOSIS — R06.09 DOE (DYSPNEA ON EXERTION): Primary | ICD-10-CM

## 2024-06-25 PROCEDURE — 3074F SYST BP LT 130 MM HG: CPT | Performed by: FAMILY MEDICINE

## 2024-06-25 PROCEDURE — 3078F DIAST BP <80 MM HG: CPT | Performed by: FAMILY MEDICINE

## 2024-06-25 PROCEDURE — 1036F TOBACCO NON-USER: CPT | Performed by: FAMILY MEDICINE

## 2024-06-25 PROCEDURE — G8417 CALC BMI ABV UP PARAM F/U: HCPCS | Performed by: FAMILY MEDICINE

## 2024-06-25 PROCEDURE — G8399 PT W/DXA RESULTS DOCUMENT: HCPCS | Performed by: FAMILY MEDICINE

## 2024-06-25 PROCEDURE — 99214 OFFICE O/P EST MOD 30 MIN: CPT | Performed by: FAMILY MEDICINE

## 2024-06-25 PROCEDURE — 1090F PRES/ABSN URINE INCON ASSESS: CPT | Performed by: FAMILY MEDICINE

## 2024-06-25 PROCEDURE — 1123F ACP DISCUSS/DSCN MKR DOCD: CPT | Performed by: FAMILY MEDICINE

## 2024-06-25 PROCEDURE — G8427 DOCREV CUR MEDS BY ELIG CLIN: HCPCS | Performed by: FAMILY MEDICINE

## 2024-06-25 RX ORDER — PREGABALIN 100 MG/1
100 CAPSULE ORAL
Qty: 30 CAPSULE | Refills: 5 | Status: SHIPPED | OUTPATIENT
Start: 2024-06-25 | End: 2024-12-22

## 2024-06-25 SDOH — ECONOMIC STABILITY: FOOD INSECURITY: WITHIN THE PAST 12 MONTHS, YOU WORRIED THAT YOUR FOOD WOULD RUN OUT BEFORE YOU GOT MONEY TO BUY MORE.: NEVER TRUE

## 2024-06-25 SDOH — ECONOMIC STABILITY: FOOD INSECURITY: WITHIN THE PAST 12 MONTHS, THE FOOD YOU BOUGHT JUST DIDN'T LAST AND YOU DIDN'T HAVE MONEY TO GET MORE.: NEVER TRUE

## 2024-06-25 SDOH — ECONOMIC STABILITY: INCOME INSECURITY: HOW HARD IS IT FOR YOU TO PAY FOR THE VERY BASICS LIKE FOOD, HOUSING, MEDICAL CARE, AND HEATING?: NOT HARD AT ALL

## 2024-06-25 ASSESSMENT — PATIENT HEALTH QUESTIONNAIRE - PHQ9
2. FEELING DOWN, DEPRESSED OR HOPELESS: NOT AT ALL
SUM OF ALL RESPONSES TO PHQ QUESTIONS 1-9: 0
SUM OF ALL RESPONSES TO PHQ9 QUESTIONS 1 & 2: 0
SUM OF ALL RESPONSES TO PHQ QUESTIONS 1-9: 0
1. LITTLE INTEREST OR PLEASURE IN DOING THINGS: NOT AT ALL

## 2024-06-25 ASSESSMENT — ENCOUNTER SYMPTOMS
WHEEZING: 0
SHORTNESS OF BREATH: 1

## 2024-06-25 NOTE — PROGRESS NOTES
[address] [place] [address]               [phone]        Ana Luisa Samuels is a 83 y.o. female and presents with Follow-up (Referral for Cardiologist/)           Assessment/Plan:  Ana Luisa was seen today for follow-up.    Diagnoses and all orders for this visit:    CUNNING[place]AM (dyspnea on exertion)  -     Echo (TTE) complete (PRN contrast/bubble/strain/3D); Future  -     CTA CHEST W WO CONTRAST; Future  -     External Referral To Cardiology    Chronic deep vein thrombosis (DVT) of popliteal vein of right lower extremity (HCC)  -     CTA CHEST W WO CONTRAST; Future    Shortness of breath  -     Echo (TTE) complete (PRN contrast/bubble/strain/3D); Future    Bilateral carpal tunnel syndrome  -     pregabalin (LYRICA) 100 MG capsule; Take 1 capsule by mouth nightly for 180 days. Max Daily Amount: 100 mg    Chronic diastolic heart failure (HCC)  -     External Referral To Cardiology      CUNN[place]AM: EKG with no signs of MI; update echo--last performed 2019 with diastolic dysfunction noted; increase lasix to 40 mg daily the next 3 days; f/u in 3 days for re-evaluation; refer for stat echo/CTA to rule out PE given hx of DVT; has been taking xarelto    Continue lyrica for CTS; requested a refill on gabapentin recently--advised pt she is no longer on this; lyrica sent          Follow up and disposition:   Return in about 3 days (around 6/28/2024), or if symptoms worsen or fail to improve, for follow up -15 min.      Health Maintenance:   Health Maintenance   Topic Date Due    Annual Wellness Visit (Medicare Advantage)  01/01/2024    COVID-19 Vaccine (6 - 2023-24 season) 04/08/2024    Lipids  05/22/2025    Depression Screen  05/28/2025    Breast cancer screen  03/04/2026    DTaP/Tdap/Td vaccine (2 - Td or Tdap) 01/11/2031    DEXA (modify frequency per FRAX score)  Completed    Flu vaccine  Completed    Shingles vaccine  Completed    Pneumococcal 65+ years Vaccine  Completed    Respiratory Syncytial Virus

## 2024-06-25 NOTE — PATIENT INSTRUCTIONS
Call [phone] to schedule your imaging study with Jessica    Take 2 furosemide 20 mg tabs/day for the next 3 days

## 2024-06-28 ENCOUNTER — OFFICE VISIT (OUTPATIENT)
Facility: CLINIC | Age: 84
End: 2024-06-28
Payer: MEDICARE

## 2024-06-28 VITALS
WEIGHT: 194 LBS | HEIGHT: 67 IN | DIASTOLIC BLOOD PRESSURE: 71 MMHG | HEART RATE: 62 BPM | SYSTOLIC BLOOD PRESSURE: 115 MMHG | RESPIRATION RATE: 12 BRPM | BODY MASS INDEX: 30.45 KG/M2 | OXYGEN SATURATION: 95 %

## 2024-06-28 DIAGNOSIS — R06.09 DOE (DYSPNEA ON EXERTION): Primary | ICD-10-CM

## 2024-06-28 DIAGNOSIS — I50.32 CHRONIC DIASTOLIC HEART FAILURE (HCC): ICD-10-CM

## 2024-06-28 DIAGNOSIS — I27.20 PULMONARY HYPERTENSION (HCC): ICD-10-CM

## 2024-06-28 LAB
ANION GAP SERPL CALCULATED.3IONS-SCNC: 6 MMOL/L (ref 3–15)
BUN BLDV-MCNC: 10 MG/DL (ref 6–22)
CALCIUM SERPL-MCNC: 9 MG/DL (ref 8.4–10.5)
CHLORIDE BLD-SCNC: 104 MMOL/L (ref 98–110)
CO2: 32 MMOL/L (ref 20–32)
CREAT SERPL-MCNC: 1.1 MG/DL (ref 0.8–1.4)
GFR, ESTIMATED: 48.3 ML/MIN/1.73 SQ.M.
GLUCOSE: 114 MG/DL (ref 70–99)
POTASSIUM SERPL-SCNC: 3.4 MMOL/L (ref 3.5–5.5)
SODIUM BLD-SCNC: 142 MMOL/L (ref 133–145)

## 2024-06-28 PROCEDURE — G8417 CALC BMI ABV UP PARAM F/U: HCPCS | Performed by: FAMILY MEDICINE

## 2024-06-28 PROCEDURE — 1036F TOBACCO NON-USER: CPT | Performed by: FAMILY MEDICINE

## 2024-06-28 PROCEDURE — 1123F ACP DISCUSS/DSCN MKR DOCD: CPT | Performed by: FAMILY MEDICINE

## 2024-06-28 PROCEDURE — 3074F SYST BP LT 130 MM HG: CPT | Performed by: FAMILY MEDICINE

## 2024-06-28 PROCEDURE — G8399 PT W/DXA RESULTS DOCUMENT: HCPCS | Performed by: FAMILY MEDICINE

## 2024-06-28 PROCEDURE — 1090F PRES/ABSN URINE INCON ASSESS: CPT | Performed by: FAMILY MEDICINE

## 2024-06-28 PROCEDURE — 99214 OFFICE O/P EST MOD 30 MIN: CPT | Performed by: FAMILY MEDICINE

## 2024-06-28 PROCEDURE — 3078F DIAST BP <80 MM HG: CPT | Performed by: FAMILY MEDICINE

## 2024-06-28 PROCEDURE — G8427 DOCREV CUR MEDS BY ELIG CLIN: HCPCS | Performed by: FAMILY MEDICINE

## 2024-06-28 RX ORDER — FUROSEMIDE 40 MG/1
40 TABLET ORAL DAILY
Qty: 90 TABLET | Refills: 2 | Status: SHIPPED | OUTPATIENT
Start: 2024-06-28

## 2024-06-28 ASSESSMENT — PATIENT HEALTH QUESTIONNAIRE - PHQ9
SUM OF ALL RESPONSES TO PHQ QUESTIONS 1-9: 0
SUM OF ALL RESPONSES TO PHQ9 QUESTIONS 1 & 2: 0
SUM OF ALL RESPONSES TO PHQ QUESTIONS 1-9: 0
1. LITTLE INTEREST OR PLEASURE IN DOING THINGS: NOT AT ALL
SUM OF ALL RESPONSES TO PHQ QUESTIONS 1-9: 0
SUM OF ALL RESPONSES TO PHQ QUESTIONS 1-9: 0
2. FEELING DOWN, DEPRESSED OR HOPELESS: NOT AT ALL

## 2024-06-28 ASSESSMENT — ENCOUNTER SYMPTOMS
VOMITING: 0
SHORTNESS OF BREATH: 0
NAUSEA: 0
DIARRHEA: 0
RHINORRHEA: 0
COUGH: 0

## 2024-06-28 NOTE — PROGRESS NOTES
[address] [place] [address]               [phone]        Ana Luisa Samuels is a 83 y.o. female and presents with Follow-up           Assessment/Plan:  Ana Luisa was seen today for follow-up.    Diagnoses and all orders for this visit:    CUNNING[place]AM (dyspnea on exertion)  -     External Referral To Pulmonology  -     [facility] - Manny Duke MD, Cardiology, [place] ([address]    Chronic diastolic heart failure (HCC)  -     furosemide (LASIX) 40 MG tablet; Take 1 tablet by mouth daily  -     [facility] - Manny Duke MD, Cardiology, [place] ([address]  -     Basic Metabolic Panel; Future    Pulmonary hypertension (HCC)  -     External Referral To Pulmonology        CUNNING[place]AM: much improved after lasix/transfusion; refer to Cardiology and continue lasix 40 mg daily; update bmp to monitor renal function/electrolytes; advised to perform within the next 7 days    Refer to Cardiology for continued management for diastolic heart failure    Pulmonary HTN: refer to pulmonary specialist; requests local provider    Follow up and disposition:   Return in about 8 weeks (around 8/20/2024), or if symptoms worsen or fail to improve, for labs.      Health Maintenance:   Health Maintenance   Topic Date Due    Annual Wellness Visit (Medicare Advantage)  01/01/2024    COVID-19 Vaccine (6 - 2023-24 season) 04/08/2024    Lipids  05/22/2025    Depression Screen  06/25/2025    Breast cancer screen  03/04/2026    DTaP/Tdap/Td vaccine (2 - Td or Tdap) 01/11/2031    DEXA (modify frequency per FRAX score)  Completed    Flu vaccine  Completed    Shingles vaccine  Completed    Pneumococcal 65+ years Vaccine  Completed    Respiratory Syncytial Virus (RSV) Pregnant or age 60 yrs+  Completed    Hepatitis A vaccine  Aged Out    Hepatitis B vaccine  Aged Out    Hib vaccine  Aged Out    Polio vaccine  Aged Out    Meningococcal (ACWY) vaccine  Aged Out    Hepatitis C screen  Discontinued        Subjective     82 y/o female here for f/u on

## 2024-07-08 LAB
ANION GAP SERPL CALCULATED.3IONS-SCNC: 7 MMOL/L (ref 3–15)
BUN BLDV-MCNC: 20 MG/DL (ref 6–22)
CALCIUM SERPL-MCNC: 9.7 MG/DL (ref 8.4–10.5)
CHLORIDE BLD-SCNC: 101 MMOL/L (ref 98–110)
CO2: 33 MMOL/L (ref 20–32)
CREAT SERPL-MCNC: 1 MG/DL (ref 0.8–1.4)
GFR, ESTIMATED: 53.7 ML/MIN/1.73 SQ.M.
GLUCOSE: 115 MG/DL (ref 70–99)
POTASSIUM SERPL-SCNC: 3.9 MMOL/L (ref 3.5–5.5)
SODIUM BLD-SCNC: 141 MMOL/L (ref 133–145)

## 2024-08-02 DIAGNOSIS — I10 ESSENTIAL HYPERTENSION: ICD-10-CM

## 2024-08-02 DIAGNOSIS — N18.31 STAGE 3A CHRONIC KIDNEY DISEASE (HCC): ICD-10-CM

## 2024-08-02 RX ORDER — VALSARTAN 40 MG/1
40 TABLET ORAL DAILY
Qty: 90 TABLET | Refills: 3 | OUTPATIENT
Start: 2024-08-02

## 2024-08-08 ENCOUNTER — OFFICE VISIT (OUTPATIENT)
Age: 84
End: 2024-08-08

## 2024-08-08 DIAGNOSIS — E87.6 HYPOKALEMIA: ICD-10-CM

## 2024-08-08 DIAGNOSIS — R06.09 DOE (DYSPNEA ON EXERTION): Primary | ICD-10-CM

## 2024-08-08 DIAGNOSIS — I10 ESSENTIAL HYPERTENSION: ICD-10-CM

## 2024-08-08 DIAGNOSIS — N18.31 STAGE 3A CHRONIC KIDNEY DISEASE (HCC): ICD-10-CM

## 2024-08-08 DIAGNOSIS — I50.32 CHRONIC DIASTOLIC HEART FAILURE (HCC): ICD-10-CM

## 2024-08-08 RX ORDER — FUROSEMIDE 40 MG
40 TABLET ORAL DAILY
Qty: 90 TABLET | Refills: 2 | Status: CANCELLED | OUTPATIENT
Start: 2024-08-08

## 2024-08-08 RX ORDER — VALSARTAN 40 MG/1
40 TABLET ORAL DAILY
Qty: 90 TABLET | Refills: 2 | Status: CANCELLED | OUTPATIENT
Start: 2024-08-08

## 2024-08-08 RX ORDER — BUMETANIDE 1 MG/1
1 TABLET ORAL DAILY
Qty: 90 TABLET | Refills: 2 | Status: SHIPPED | OUTPATIENT
Start: 2024-08-08

## 2024-08-08 RX ORDER — LETROZOLE 2.5 MG/1
2.5 TABLET, FILM COATED ORAL DAILY
Qty: 30 TABLET | Status: CANCELLED | OUTPATIENT
Start: 2024-08-08

## 2024-08-08 RX ORDER — VALSARTAN 40 MG/1
40 TABLET ORAL DAILY
Qty: 90 TABLET | Refills: 2 | Status: SHIPPED | OUTPATIENT
Start: 2024-08-08 | End: 2024-08-08

## 2024-08-08 RX ORDER — POTASSIUM CHLORIDE 1500 MG/1
20 TABLET, EXTENDED RELEASE ORAL DAILY
Qty: 90 TABLET | Refills: 2 | Status: CANCELLED | OUTPATIENT
Start: 2024-08-08

## 2024-08-08 RX ORDER — POTASSIUM CHLORIDE 20 MEQ/1
20 TABLET, EXTENDED RELEASE ORAL DAILY
Qty: 90 TABLET | Refills: 2 | Status: SHIPPED | OUTPATIENT
Start: 2024-08-08

## 2024-08-08 RX ORDER — FUROSEMIDE 40 MG
40 TABLET ORAL DAILY
Qty: 90 TABLET | Refills: 2 | Status: SHIPPED | OUTPATIENT
Start: 2024-08-08 | End: 2024-08-08 | Stop reason: ALTCHOICE

## 2024-08-08 ASSESSMENT — PATIENT HEALTH QUESTIONNAIRE - PHQ9
SUM OF ALL RESPONSES TO PHQ QUESTIONS 1-9: 0
1. LITTLE INTEREST OR PLEASURE IN DOING THINGS: NOT AT ALL
SUM OF ALL RESPONSES TO PHQ QUESTIONS 1-9: 0
2. FEELING DOWN, DEPRESSED OR HOPELESS: NOT AT ALL
SUM OF ALL RESPONSES TO PHQ QUESTIONS 1-9: 0
SUM OF ALL RESPONSES TO PHQ QUESTIONS 1-9: 0
SUM OF ALL RESPONSES TO PHQ9 QUESTIONS 1 & 2: 0

## 2024-08-08 NOTE — PROGRESS NOTES
Identified pt with two pt identifiers(name and ). Reviewed record in preparation for visit and have obtained necessary documentation.    Ana Luisa A Samuels presents today for   Chief Complaint   Patient presents with    New Patient     CUNNING[place]AM/CHF       Pt c/o  SWELLING.             Ana Luisa Samuels preferred language for health care discussion is english/other.    Personal Protective Equipment:   Personal Protective Equipment was used including: mask-surgical and hands-gloves. Patient was placed on no precaution(s). Patient was NOT masked.    Precautions:   Patient currently on None  Patient currently roomed with door closed.    Is someone accompanying this pt? NO    Is the patient using any DME equipment during OV? NO    Depression Screenin/8/2024     2:52 PM 2024     9:32 AM 2024    12:47 PM 2024    10:45 AM 2024     4:27 PM 2/15/2024     1:31 PM 2023    11:42 AM   PHQ-9 Questionaire   Little interest or pleasure in doing things 0 0 0 0 0 0 0   Feeling down, depressed, or hopeless 0 0 0 0 0 0 0   PHQ-9 Total Score 0 0 0 0 0 0 0        Learning Assessment:  Who is the primary learner? Patient    What is the preferred language for health care of the primary learner? ENGLISH    How does the primary learner prefer to learn new concepts? DEMONSTRATION    Answered By self    Relationship to Learner SELF        Abuse Screenin/8/2024     2:00 PM 2023     8:00 AM   AMB Abuse Screening   Do you ever feel afraid of your partner? N N   Are you in a relationship with someone who physically or mentally threatens you? N N   Is it safe for you to go home? Y Y          Fall Risk      2024     2:52 PM 2/15/2024     1:31 PM 2023    11:42 AM 10/4/2023    10:24 AM 2023    10:11 AM 2023    11:09 AM 2023     3:04 PM   Fall Risk   Do you feel unsteady or are you worried about falling?  no yes no no no no no   2 or more falls in past year? no no no no no no no   Fall with 
discussed.    Hyperlipidemia: Currently on atorvastatin.  Continue same.    DVT: Currently on apixaban.  Defer to PCP    Breast cancer and myeloma: Status post breast surgery in 2023.  Currently receiving therapy for myeloma under supervision of hematology oncology team.  Defer to them    This plan was discussed with patient who is in agreement.    Thank you for allowing me to participate in patient care. Please feel free to call me if you have any question or concern.     Manny Duke MD  Please note: This document has been produced using voice recognition software. Unrecognized errors in transcription may be present.

## 2024-08-08 NOTE — TELEPHONE ENCOUNTER
Please contact [facility] and advise them that eliquis and letrozole are not prescribed by this provider; will need to come from specialist

## 2024-08-08 NOTE — PATIENT INSTRUCTIONS
Testing   Echo/ Nuclear Stress/ Treadmill Stress/ 24/48 HR Holter    Please call [facility] central scheduling at [phone]/ [facility] central scheduling at [phone] to schedule testing.     Echo  PATIENT PREPS:   -Wear easy to remove shirt to your appointment for easier accessibility.    Nuclear Stress Instructions-  PATIENT PREPS:   -NPO (Nothing to eat or drink) after midnight the night prior to the exam.    -No medications on the day of exam. You may bring them with you.   -Wear comfortable clothing and shoes suitable for walking on a treadmill. (NO sandals, flip flops, high heels, etc)   -The duration of this exam is approximately 2 to 4 hours.      **call office 3-5 days after testing is completed for results** Please ensure testing is completed prior to scheduled follow up appointment. If it is not completed your appointment may be rescheduled**    New Medication/Medication Changes/Medication Refill      **please allow 24-48 hrs for medication to be escribed to pharmacy** If you need any refills on medications please contact your pharmacy so that the request can be escribed to the provider for review seven to 10 days prior to being out of medication.

## 2024-08-23 LAB
ANION GAP SERPL CALCULATED.3IONS-SCNC: 11 MMOL/L (ref 3–15)
BUN BLDV-MCNC: 17 MG/DL (ref 6–22)
CALCIUM SERPL-MCNC: 9.1 MG/DL (ref 8.4–10.5)
CHLORIDE BLD-SCNC: 99 MMOL/L (ref 98–110)
CHOLESTEROL, TOTAL: 157 MG/DL (ref 110–200)
CHOLESTEROL/HDL RATIO: 3.1 (ref 0–5)
CO2: 29 MMOL/L (ref 20–32)
CREAT SERPL-MCNC: 1.2 MG/DL (ref 0.8–1.4)
GFR, ESTIMATED: 43.8 ML/MIN/1.73 SQ.M.
GLUCOSE: 207 MG/DL (ref 70–99)
HDLC SERPL-MCNC: 50 MG/DL
LDL CHOLESTEROL: 77 MG/DL (ref 50–99)
LDL/HDL RATIO: 1.5
NON-HDL CHOLESTEROL: 107 MG/DL
POTASSIUM SERPL-SCNC: 3.6 MMOL/L (ref 3.5–5.5)
SODIUM BLD-SCNC: 139 MMOL/L (ref 133–145)
TRIGL SERPL-MCNC: 150 MG/DL (ref 40–149)
VLDLC SERPL CALC-MCNC: 30 MG/DL (ref 8–30)

## 2024-08-27 ENCOUNTER — OFFICE VISIT (OUTPATIENT)
Facility: CLINIC | Age: 84
End: 2024-08-27
Payer: MEDICARE

## 2024-08-27 VITALS
HEART RATE: 84 BPM | SYSTOLIC BLOOD PRESSURE: 106 MMHG | RESPIRATION RATE: 18 BRPM | WEIGHT: 191.2 LBS | TEMPERATURE: 98.2 F | HEIGHT: 67 IN | OXYGEN SATURATION: 99 % | BODY MASS INDEX: 30.01 KG/M2 | DIASTOLIC BLOOD PRESSURE: 69 MMHG

## 2024-08-27 DIAGNOSIS — E11.69 HYPERLIPIDEMIA ASSOCIATED WITH TYPE 2 DIABETES MELLITUS (HCC): ICD-10-CM

## 2024-08-27 DIAGNOSIS — Z78.9 COPY OF ADVANCED DIRECTIVE OBTAINED: ICD-10-CM

## 2024-08-27 DIAGNOSIS — I50.32 CHRONIC DIASTOLIC HEART FAILURE (HCC): ICD-10-CM

## 2024-08-27 DIAGNOSIS — R42 LIGHTHEADEDNESS: ICD-10-CM

## 2024-08-27 DIAGNOSIS — E78.5 HYPERLIPIDEMIA ASSOCIATED WITH TYPE 2 DIABETES MELLITUS (HCC): ICD-10-CM

## 2024-08-27 DIAGNOSIS — R06.09 DOE (DYSPNEA ON EXERTION): ICD-10-CM

## 2024-08-27 DIAGNOSIS — Z00.00 MEDICARE ANNUAL WELLNESS VISIT, SUBSEQUENT: Primary | ICD-10-CM

## 2024-08-27 DIAGNOSIS — E11.65 TYPE 2 DIABETES MELLITUS WITH HYPERGLYCEMIA, WITHOUT LONG-TERM CURRENT USE OF INSULIN (HCC): ICD-10-CM

## 2024-08-27 DIAGNOSIS — R60.0 PERIPHERAL EDEMA: ICD-10-CM

## 2024-08-27 PROCEDURE — G8399 PT W/DXA RESULTS DOCUMENT: HCPCS | Performed by: FAMILY MEDICINE

## 2024-08-27 PROCEDURE — 1090F PRES/ABSN URINE INCON ASSESS: CPT | Performed by: FAMILY MEDICINE

## 2024-08-27 PROCEDURE — G8427 DOCREV CUR MEDS BY ELIG CLIN: HCPCS | Performed by: FAMILY MEDICINE

## 2024-08-27 PROCEDURE — 1123F ACP DISCUSS/DSCN MKR DOCD: CPT | Performed by: FAMILY MEDICINE

## 2024-08-27 PROCEDURE — 1036F TOBACCO NON-USER: CPT | Performed by: FAMILY MEDICINE

## 2024-08-27 PROCEDURE — 3078F DIAST BP <80 MM HG: CPT | Performed by: FAMILY MEDICINE

## 2024-08-27 PROCEDURE — G0439 PPPS, SUBSEQ VISIT: HCPCS | Performed by: FAMILY MEDICINE

## 2024-08-27 PROCEDURE — G8417 CALC BMI ABV UP PARAM F/U: HCPCS | Performed by: FAMILY MEDICINE

## 2024-08-27 PROCEDURE — 3044F HG A1C LEVEL LT 7.0%: CPT | Performed by: FAMILY MEDICINE

## 2024-08-27 PROCEDURE — 99214 OFFICE O/P EST MOD 30 MIN: CPT | Performed by: FAMILY MEDICINE

## 2024-08-27 PROCEDURE — 3074F SYST BP LT 130 MM HG: CPT | Performed by: FAMILY MEDICINE

## 2024-08-27 RX ORDER — ATORVASTATIN CALCIUM 20 MG/1
20 TABLET, FILM COATED ORAL NIGHTLY
Qty: 90 TABLET | Refills: 2 | Status: SHIPPED | OUTPATIENT
Start: 2024-08-27

## 2024-08-27 RX ORDER — BUMETANIDE 0.5 MG/1
0.5 TABLET ORAL DAILY
Qty: 90 TABLET | Refills: 2 | Status: SHIPPED | OUTPATIENT
Start: 2024-08-27

## 2024-08-27 ASSESSMENT — PATIENT HEALTH QUESTIONNAIRE - PHQ9
SUM OF ALL RESPONSES TO PHQ QUESTIONS 1-9: 0
1. LITTLE INTEREST OR PLEASURE IN DOING THINGS: NOT AT ALL
SUM OF ALL RESPONSES TO PHQ QUESTIONS 1-9: 0
SUM OF ALL RESPONSES TO PHQ9 QUESTIONS 1 & 2: 0
2. FEELING DOWN, DEPRESSED OR HOPELESS: NOT AT ALL

## 2024-08-27 NOTE — PROGRESS NOTES
[address] [place] [address]               [phone]        Ana Luisa Samuels is a 83 y.o. female and presents with Medicare AWV           Assessment/Plan:  Ana Luisa was seen today for medicare awv.    Diagnoses and all orders for this visit:    Medicare annual wellness visit, subsequent    Chronic diastolic heart failure (HCC)  -     bumetanide (BUMEX) 0.5 MG tablet; Take 1 tablet by mouth daily    CUNNINGHAM (dyspnea on exertion)    Peripheral edema    Type 2 diabetes mellitus with hyperglycemia, without long-term current use of insulin (HCC)  -     atorvastatin (LIPITOR) 20 MG tablet; Take 1 tablet by mouth at bedtime    Lightheadedness  -     bumetanide (BUMEX) 0.5 MG tablet; Take 1 tablet by mouth daily    Copy of advanced directive obtained    Hyperlipidemia associated with type 2 diabetes mellitus (HCC)  -     Hepatic Function Panel; Future  -     Lipid Panel; Future        Heart failure/lightheadedness: reduce bumex; symtpoms likely from orthostatic hypotension given bp today  CUNN[place]HAM: continue conditioning; pending further workup  DM LDL goal <70; increase statin to 20 mg nightly and repeat labs in 2 months  Advanced directives obtained from patient and given to staff to scan in    Follow up and disposition:   Return in about 2 months (around 11/1/2024), or if symptoms worsen or fail to improve, for follow up -15 min, review lab results.      Health Maintenance:   Health Maintenance   Topic Date Due    Flu vaccine (1) 08/01/2024    COVID-19 Vaccine (6 - 2023-24 season) 09/01/2024    Lipids  08/23/2025    Depression Screen  08/27/2025    Breast cancer screen  08/14/2026    DTaP/Tdap/Td vaccine (2 - Td or Tdap) 01/11/2031    DEXA (modify frequency per FRAX score)  Completed    Annual Wellness Visit (Medicare Advantage)  Completed    Shingles vaccine  Completed    Pneumococcal 65+ years Vaccine  Completed    Respiratory Syncytial Virus (RSV) Pregnant or age 60 yrs+  Completed    
Room 5     Ana Luisa Samuels had concerns including Medicare AWV. for today's visit .     When asked if patient has any concerns she would like to address with Dr. Duque patient states I have am dizzy . Dr. Duqeu has been notified  of patient concerns .      1. \"Have you been to the ER, urgent care clinic since your last visit?  Hospitalized since your last visit?\" .NO    2. \"Have you seen or consulted any other health care providers outside of the [facility] since your last visit?\" No     3. For patients aged 45-75: Has the patient had a colonoscopy / FIT/ Cologuard? N/A      If the patient is female:    4. For patients aged 40-74: Has the patient had a mammogram within the past 2 years? N/A      5. For patients aged 21-65: Has the patient had a pap smear? {Cancer Care Gap present? N/A          8/27/2024    11:46 AM   Amb Fall Risk Assessment and TUG Test   Do you feel unsteady or are you worried about falling?  yes   2 or more falls in past year? yes   Fall with injury in past year? no              8/8/2024     2:52 PM   PHQ-9    Little interest or pleasure in doing things 0   Feeling down, depressed, or hopeless 0   PHQ-2 Score 0   PHQ-9 Total Score 0          Health Maintenance Due   Topic Date Due    Annual Wellness Visit (Medicare Advantage)  01/01/2024    COVID-19 Vaccine (6 - 2023-24 season) 04/08/2024    Flu vaccine (1) 08/01/2024         No question data found.          
always fasten your seatbelt when you are in a car? Yes Yes   ADLs     In the past 7 days, did you need help from others to perform any of the following everyday activities: Eating, dressing, grooming, bathing, toileting, or walking/balance? No No   Select all that apply -- --   In the past 7 days, did you need help from others to take care of any of the following: Laundry, housekeeping, banking/finances, shopping, telephone use, food preparation, transportation, or taking medications? No No   Select all that apply -- --   Living Will     Do you have a Living Will? Yes Yes     Health Care Decision Makers    There are no Health Care Decision Makers on file.  Current Code Status    This patient does not have a recorded code status. Follow your organizational policy for patients in this situation.     Advance Directives    Power of  Living Will ACP-Advance Directive ACP-Power of     Not on File  Not on File  Not on File  Not on File     Advance Care Planning Notes       Date of Service  Author Author Type Status   08/25/20 1415 Addend   Physician Signed   05/03/18 0830 Addend   Physician Signed      Create ACP Note   Medicare Annual Wellness Visit    Ana Luisa Samuels is here for Medicare AWV    Assessment & Plan   Medicare annual wellness visit, subsequent  Chronic diastolic heart failure (HCC)  CUNNINGHAM (dyspnea on exertion)  Peripheral edema  Type 2 diabetes mellitus with hyperglycemia, without long-term current use of insulin (HCC)    Recommendations for Preventive Services Due: see orders and patient instructions/AVS.  Recommended screening schedule for the next 5-10 years is provided to the patient in written form: see Patient Instructions/AVS.     No follow-ups on file.     Subjective       Patient's complete Health Risk Assessment and screening values have been reviewed and are found in Flowsheets. The following problems were reviewed today and where indicated follow up appointments were made and/or

## 2024-08-27 NOTE — PATIENT INSTRUCTIONS
[facility]  [address]  [address]  [place] [address]  ph: [phone]  Fax: [phone] (Per office)       Preventing Falls: Care Instructions  Injuries and health problems such as trouble walking or poor eyesight can increase your risk of falling. So can some medicines. But there are things you can do to help prevent falls. You can exercise to get stronger. You can also arrange your home to make it safer.    Talk to your doctor about the medicines you take. Ask if any of them increase the risk of falls and whether they can be changed or stopped.   Try to exercise regularly. It can help improve your strength and balance. This can help lower your risk of falling.         Practice fall safety and prevention.   Wear low-heeled shoes that fit well and give your feet good support. Talk to your doctor if you have foot problems that make this hard.  Carry a cellphone or wear a medical alert device that you can use to call for help.  Use stepladders instead of chairs to reach high objects. Don't climb if you're at risk for falls. Ask for help, if needed.  Wear the correct eyeglasses, if you need them.        Make your home safer.   Remove rugs, cords, clutter, and furniture from walkways.  Keep your house well lit. Use night-lights in hallways and bathrooms.  Install and use sturdy handrails on stairways.  Wear nonskid footwear, even inside. Don't walk barefoot or in socks without shoes.        Be safe outside.   Use handrails, curb cuts, and ramps whenever possible.  Keep your hands free by using a shoulder bag or backpack.  Try to walk in well-lit areas. Watch out for uneven ground, changes in pavement, and debris.  Be careful in the winter. Walk on the grass or gravel when sidewalks are slippery. Use de-icer on steps and walkways. Add non-slip devices to shoes.    Put grab bars and nonskid mats in your shower or tub and near the toilet. Try to use a shower

## 2024-08-31 NOTE — TELEPHONE ENCOUNTER
Patient pharmacy is requesting a med refill of following:    Atorvastatin 10 mg - 5/3/18- [facility]  Metoprolol  mg -4/9/18- [facility]  Potassium chloride ER 20 meq - 4/30/18- [facility]  Trazodone 100 mg 4/30/18 -[facility]    Ofloxacin 0.3% ear drops- not on med lists    Last visit: 6/7/18
show

## 2024-10-09 LAB
A/G RATIO: 1.1 RATIO (ref 1.1–2.6)
ALBUMIN: 3.6 G/DL (ref 3.5–5)
ALP BLD-CCNC: 55 U/L (ref 40–120)
ALT SERPL-CCNC: 14 U/L (ref 5–40)
AST SERPL-CCNC: 15 U/L (ref 10–37)
BILIRUB SERPL-MCNC: 0.6 MG/DL (ref 0.2–1.2)
BILIRUBIN DIRECT: <0.2 MG/DL (ref 0–0.3)
CHOLESTEROL, TOTAL: 132 MG/DL (ref 110–200)
CHOLESTEROL/HDL RATIO: 2.5 (ref 0–5)
GLOBULIN: 3.2 G/DL (ref 2–4)
HDLC SERPL-MCNC: 53 MG/DL
LDL CHOLESTEROL: 58 MG/DL (ref 50–99)
LDL/HDL RATIO: 1.1
NON-HDL CHOLESTEROL: 79 MG/DL
TOTAL PROTEIN: 6.8 G/DL (ref 6.2–8.1)
TRIGL SERPL-MCNC: 104 MG/DL (ref 40–149)
VLDLC SERPL CALC-MCNC: 21 MG/DL (ref 8–30)

## 2024-11-20 ENCOUNTER — OFFICE VISIT (OUTPATIENT)
Facility: CLINIC | Age: 84
End: 2024-11-20
Payer: MEDICARE

## 2024-11-20 VITALS
RESPIRATION RATE: 17 BRPM | BODY MASS INDEX: 29.51 KG/M2 | HEIGHT: 67 IN | WEIGHT: 188 LBS | OXYGEN SATURATION: 98 % | HEART RATE: 80 BPM | SYSTOLIC BLOOD PRESSURE: 82 MMHG | DIASTOLIC BLOOD PRESSURE: 49 MMHG | TEMPERATURE: 98 F

## 2024-11-20 DIAGNOSIS — E11.69 HYPERLIPIDEMIA ASSOCIATED WITH TYPE 2 DIABETES MELLITUS (HCC): ICD-10-CM

## 2024-11-20 DIAGNOSIS — I10 ESSENTIAL HYPERTENSION: ICD-10-CM

## 2024-11-20 DIAGNOSIS — E78.5 HYPERLIPIDEMIA ASSOCIATED WITH TYPE 2 DIABETES MELLITUS (HCC): ICD-10-CM

## 2024-11-20 DIAGNOSIS — R42 LIGHTHEADEDNESS: ICD-10-CM

## 2024-11-20 DIAGNOSIS — I95.2 HYPOTENSION DUE TO DRUGS: Primary | ICD-10-CM

## 2024-11-20 PROCEDURE — G8417 CALC BMI ABV UP PARAM F/U: HCPCS | Performed by: FAMILY MEDICINE

## 2024-11-20 PROCEDURE — 1159F MED LIST DOCD IN RCRD: CPT | Performed by: FAMILY MEDICINE

## 2024-11-20 PROCEDURE — 1123F ACP DISCUSS/DSCN MKR DOCD: CPT | Performed by: FAMILY MEDICINE

## 2024-11-20 PROCEDURE — G8482 FLU IMMUNIZE ORDER/ADMIN: HCPCS | Performed by: FAMILY MEDICINE

## 2024-11-20 PROCEDURE — 3078F DIAST BP <80 MM HG: CPT | Performed by: FAMILY MEDICINE

## 2024-11-20 PROCEDURE — G8427 DOCREV CUR MEDS BY ELIG CLIN: HCPCS | Performed by: FAMILY MEDICINE

## 2024-11-20 PROCEDURE — G8399 PT W/DXA RESULTS DOCUMENT: HCPCS | Performed by: FAMILY MEDICINE

## 2024-11-20 PROCEDURE — 1090F PRES/ABSN URINE INCON ASSESS: CPT | Performed by: FAMILY MEDICINE

## 2024-11-20 PROCEDURE — 3044F HG A1C LEVEL LT 7.0%: CPT | Performed by: FAMILY MEDICINE

## 2024-11-20 PROCEDURE — 1160F RVW MEDS BY RX/DR IN RCRD: CPT | Performed by: FAMILY MEDICINE

## 2024-11-20 PROCEDURE — 3074F SYST BP LT 130 MM HG: CPT | Performed by: FAMILY MEDICINE

## 2024-11-20 PROCEDURE — 99214 OFFICE O/P EST MOD 30 MIN: CPT | Performed by: FAMILY MEDICINE

## 2024-11-20 PROCEDURE — 1126F AMNT PAIN NOTED NONE PRSNT: CPT | Performed by: FAMILY MEDICINE

## 2024-11-20 PROCEDURE — 1036F TOBACCO NON-USER: CPT | Performed by: FAMILY MEDICINE

## 2024-11-20 RX ORDER — SPIRONOLACTONE 25 MG/1
25 TABLET ORAL DAILY
Qty: 30 TABLET | Status: CANCELLED | OUTPATIENT
Start: 2024-11-20

## 2024-11-20 RX ORDER — SACUBITRIL AND VALSARTAN 15; 16 MG/1; MG/1
1 PELLET ORAL DAILY
Qty: 90 CAPSULE | Refills: 2 | Status: SHIPPED | OUTPATIENT
Start: 2024-11-20

## 2024-11-20 ASSESSMENT — PATIENT HEALTH QUESTIONNAIRE - PHQ9
SUM OF ALL RESPONSES TO PHQ QUESTIONS 1-9: 0
SUM OF ALL RESPONSES TO PHQ9 QUESTIONS 1 & 2: 0
2. FEELING DOWN, DEPRESSED OR HOPELESS: NOT AT ALL
SUM OF ALL RESPONSES TO PHQ QUESTIONS 1-9: 0
1. LITTLE INTEREST OR PLEASURE IN DOING THINGS: NOT AT ALL
SUM OF ALL RESPONSES TO PHQ QUESTIONS 1-9: 0
SUM OF ALL RESPONSES TO PHQ QUESTIONS 1-9: 0

## 2024-11-20 ASSESSMENT — ENCOUNTER SYMPTOMS: SHORTNESS OF BREATH: 0

## 2024-11-20 NOTE — PROGRESS NOTES
Ana Luisa Samuels is a 83 y.o. female (: 1940) presenting to address:    Chief Complaint   Patient presents with    Follow-up     Patient states that she continues to get light headed and she doesn't know why.       Vitals:    24 1124   BP: (!) 82/49   Pulse: 80   Resp: 17   Temp: 98 °F (36.7 °C)   SpO2: 98%       Coordination of Care Questionaire:     \"Have you been to the ER, urgent care clinic since your last visit?  Hospitalized since your last visit?\"    No     “Have you seen or consulted any other health care providers outside our system since your last visit?”    No

## 2024-12-09 ENCOUNTER — OFFICE VISIT (OUTPATIENT)
Facility: CLINIC | Age: 84
End: 2024-12-09
Payer: MEDICARE

## 2024-12-09 VITALS
WEIGHT: 189 LBS | HEIGHT: 67 IN | SYSTOLIC BLOOD PRESSURE: 138 MMHG | HEART RATE: 64 BPM | DIASTOLIC BLOOD PRESSURE: 62 MMHG | RESPIRATION RATE: 12 BRPM | BODY MASS INDEX: 29.66 KG/M2

## 2024-12-09 DIAGNOSIS — R20.0 NUMBNESS: ICD-10-CM

## 2024-12-09 DIAGNOSIS — M50.20 HERNIATED CERVICAL DISC: ICD-10-CM

## 2024-12-09 DIAGNOSIS — R73.03 PREDIABETES: ICD-10-CM

## 2024-12-09 DIAGNOSIS — I10 ESSENTIAL HYPERTENSION: Primary | ICD-10-CM

## 2024-12-09 PROCEDURE — 3075F SYST BP GE 130 - 139MM HG: CPT | Performed by: FAMILY MEDICINE

## 2024-12-09 PROCEDURE — 99214 OFFICE O/P EST MOD 30 MIN: CPT | Performed by: FAMILY MEDICINE

## 2024-12-09 PROCEDURE — 1090F PRES/ABSN URINE INCON ASSESS: CPT | Performed by: FAMILY MEDICINE

## 2024-12-09 PROCEDURE — G8399 PT W/DXA RESULTS DOCUMENT: HCPCS | Performed by: FAMILY MEDICINE

## 2024-12-09 PROCEDURE — 1123F ACP DISCUSS/DSCN MKR DOCD: CPT | Performed by: FAMILY MEDICINE

## 2024-12-09 PROCEDURE — G8482 FLU IMMUNIZE ORDER/ADMIN: HCPCS | Performed by: FAMILY MEDICINE

## 2024-12-09 PROCEDURE — 1036F TOBACCO NON-USER: CPT | Performed by: FAMILY MEDICINE

## 2024-12-09 PROCEDURE — G8427 DOCREV CUR MEDS BY ELIG CLIN: HCPCS | Performed by: FAMILY MEDICINE

## 2024-12-09 PROCEDURE — G8417 CALC BMI ABV UP PARAM F/U: HCPCS | Performed by: FAMILY MEDICINE

## 2024-12-09 PROCEDURE — 1159F MED LIST DOCD IN RCRD: CPT | Performed by: FAMILY MEDICINE

## 2024-12-09 PROCEDURE — 3078F DIAST BP <80 MM HG: CPT | Performed by: FAMILY MEDICINE

## 2024-12-09 PROCEDURE — 1160F RVW MEDS BY RX/DR IN RCRD: CPT | Performed by: FAMILY MEDICINE

## 2024-12-09 SDOH — ECONOMIC STABILITY: FOOD INSECURITY: WITHIN THE PAST 12 MONTHS, THE FOOD YOU BOUGHT JUST DIDN'T LAST AND YOU DIDN'T HAVE MONEY TO GET MORE.: NEVER TRUE

## 2024-12-09 SDOH — ECONOMIC STABILITY: INCOME INSECURITY: HOW HARD IS IT FOR YOU TO PAY FOR THE VERY BASICS LIKE FOOD, HOUSING, MEDICAL CARE, AND HEATING?: NOT HARD AT ALL

## 2024-12-09 SDOH — ECONOMIC STABILITY: FOOD INSECURITY: WITHIN THE PAST 12 MONTHS, YOU WORRIED THAT YOUR FOOD WOULD RUN OUT BEFORE YOU GOT MONEY TO BUY MORE.: NEVER TRUE

## 2024-12-09 ASSESSMENT — ENCOUNTER SYMPTOMS
COUGH: 0
DIARRHEA: 0
RHINORRHEA: 0
SHORTNESS OF BREATH: 0
VOMITING: 0
NAUSEA: 0

## 2024-12-09 ASSESSMENT — PATIENT HEALTH QUESTIONNAIRE - PHQ9
SUM OF ALL RESPONSES TO PHQ9 QUESTIONS 1 & 2: 0
SUM OF ALL RESPONSES TO PHQ QUESTIONS 1-9: 0
SUM OF ALL RESPONSES TO PHQ QUESTIONS 1-9: 0
2. FEELING DOWN, DEPRESSED OR HOPELESS: NOT AT ALL
SUM OF ALL RESPONSES TO PHQ QUESTIONS 1-9: 0
1. LITTLE INTEREST OR PLEASURE IN DOING THINGS: NOT AT ALL
SUM OF ALL RESPONSES TO PHQ QUESTIONS 1-9: 0

## 2024-12-09 NOTE — PROGRESS NOTES
symptoms improved after she took lower dose    States she has paresthesias to her hands--all fingertips; completed nerve conduction test in the past but was too painful; declines to perform; CT cervical spine 2009 \"Small central disc protrusions at C3/4 and C4/5 appear to indent the ventral CSF space and may contact the ventral cord without definite compression\"; states difficult to pick things up periodically due to lack of sensation; denies pain    Labs obtained prior to visit? No  Reviewed with patient? N/A      ROS:     Review of Systems   Constitutional:  Negative for chills, diaphoresis, fatigue and fever.   HENT:  Negative for congestion and rhinorrhea.    Respiratory:  Negative for cough and shortness of breath.    Cardiovascular:  Negative for chest pain.   Gastrointestinal:  Negative for diarrhea, nausea and vomiting.   Genitourinary:  Negative for dysuria.   Skin:  Negative for rash.   Neurological:  Negative for light-headedness.   Psychiatric/Behavioral:  Negative for suicidal ideas.          The problem list was updated as a part of today's visit.  Patient Active Problem List   Diagnosis    Mixed hyperlipidemia    Complete rotator cuff tear of left shoulder    Osteoarthritis of right knee    DDD (degenerative disc disease), lumbar    Essential hypertension    Primary osteoarthritis of left knee    Vitamin D deficiency    Osteopenia    Abnormal mammogram    Sickle-cell trait (HCC)    Neutropenia (HCC)    Chronic anemia    Mass of upper outer quadrant of right breast    Keloid    Type 2 diabetes mellitus with other specified complication, without long-term current use of insulin (HCC)    Hypertriglyceridemia    Low serum HDL    Pain in both hands    Chronic diastolic heart failure (HCC)    Hypokalemia    Peripheral edema    Invasive carcinoma of breast (HCC)    CARMEN (acute kidney injury) ([facility])    Numbness and tingling in both hands    Bilateral carpal tunnel syndrome    Status post partial mastectomy

## 2024-12-17 ENCOUNTER — OFFICE VISIT (OUTPATIENT)
Age: 84
End: 2024-12-17
Payer: MEDICARE

## 2024-12-17 VITALS
WEIGHT: 181 LBS | OXYGEN SATURATION: 100 % | HEIGHT: 67 IN | DIASTOLIC BLOOD PRESSURE: 75 MMHG | BODY MASS INDEX: 28.41 KG/M2 | SYSTOLIC BLOOD PRESSURE: 147 MMHG | HEART RATE: 69 BPM

## 2024-12-17 DIAGNOSIS — R06.09 DOE (DYSPNEA ON EXERTION): Primary | ICD-10-CM

## 2024-12-17 DIAGNOSIS — I10 ESSENTIAL HYPERTENSION WITH GOAL BLOOD PRESSURE LESS THAN 140/90: ICD-10-CM

## 2024-12-17 PROCEDURE — G8399 PT W/DXA RESULTS DOCUMENT: HCPCS | Performed by: INTERNAL MEDICINE

## 2024-12-17 PROCEDURE — 99214 OFFICE O/P EST MOD 30 MIN: CPT | Performed by: INTERNAL MEDICINE

## 2024-12-17 PROCEDURE — 1090F PRES/ABSN URINE INCON ASSESS: CPT | Performed by: INTERNAL MEDICINE

## 2024-12-17 PROCEDURE — G8482 FLU IMMUNIZE ORDER/ADMIN: HCPCS | Performed by: INTERNAL MEDICINE

## 2024-12-17 PROCEDURE — 1159F MED LIST DOCD IN RCRD: CPT | Performed by: INTERNAL MEDICINE

## 2024-12-17 PROCEDURE — 3078F DIAST BP <80 MM HG: CPT | Performed by: INTERNAL MEDICINE

## 2024-12-17 PROCEDURE — 1126F AMNT PAIN NOTED NONE PRSNT: CPT | Performed by: INTERNAL MEDICINE

## 2024-12-17 PROCEDURE — G8427 DOCREV CUR MEDS BY ELIG CLIN: HCPCS | Performed by: INTERNAL MEDICINE

## 2024-12-17 PROCEDURE — 3077F SYST BP >= 140 MM HG: CPT | Performed by: INTERNAL MEDICINE

## 2024-12-17 PROCEDURE — 1036F TOBACCO NON-USER: CPT | Performed by: INTERNAL MEDICINE

## 2024-12-17 PROCEDURE — 1123F ACP DISCUSS/DSCN MKR DOCD: CPT | Performed by: INTERNAL MEDICINE

## 2024-12-17 PROCEDURE — G8417 CALC BMI ABV UP PARAM F/U: HCPCS | Performed by: INTERNAL MEDICINE

## 2024-12-17 NOTE — PROGRESS NOTES
[facility]    Ana Luisa Samuels is 84 y.o. female     Patient is here today for follow-up appointment  Denies prior history of MI  Because of dyspnea and edema, patient underwent echocardiogram in 06/2024 which showed normal EF however grade 2 diastolic dysfunction  NST in 9/2024: Low risk, EF normal, no significant reversible perfusion defect    Continues to have on and off lower extremity swelling and also dyspnea.  Using 1-2 pillows at night.  No significant palpitation.  No chest pain or chest tightness.  Taking medication as prescribed    Past Medical History:   Diagnosis Date    Arthritis     Breast cancer (HCC)     S/P surgery 2023    Cataract     Diastolic CHF (HCC)     DVT (deep venous thrombosis) ([facility]) 2024    Hypercholesterolemia     Hypertension     Myeloma ([facility])        Review of Systems:  Cardiac symptoms as noted above in HPI. All others negative.  Denies fatigue, malaise, skin rash, joint pain, blurring vision, photophobia, neck pain, hemoptysis, chronic cough, nausea, vomiting, hematuria, burning micturition, BRBPR, chronic headaches.    Current Outpatient Medications   Medication Sig    Sacubitril-Valsartan (ENTRESTO) 15-16 MG CPSP Take 1 capsule by mouth daily    bumetanide (BUMEX) 0.5 MG tablet Take 1 tablet by mouth daily    atorvastatin (LIPITOR) 20 MG tablet Take 1 tablet by mouth at bedtime    potassium chloride (KLOR-CON M) 20 MEQ extended release tablet Take 1 tablet by mouth daily With lasix    apixaban (ELIQUIS) 5 MG TABS tablet Take 1 tablet by mouth    carvedilol (COREG CR) 20 MG CP24 extended release capsule Take 1 capsule by mouth daily (with breakfast)    pregabalin (LYRICA) 100 MG capsule Take 1 capsule by mouth nightly for 180 days. Max Daily Amount: 100 mg    lenalidomide (REVLIMID) 25 MG chemo capsule Take 1 capsule by mouth daily    vitamin D (VITAMIN D3) 50 MCG (2000 UT) CAPS capsule Take 1 capsule by mouth daily

## 2024-12-17 NOTE — PROGRESS NOTES
Identified pt with two pt identifiers(name and ). Reviewed record in preparation for visit and have obtained necessary documentation.    Ana Luisa Samuels presents today for   Chief Complaint   Patient presents with    Follow-up      4mo f/u post NST         Pt c/o DIZZINESS, SOB, SWELLING.             Ana Luisa Samuels preferred language for health care discussion is english/other.    Personal Protective Equipment:   Personal Protective Equipment was used including: mask-surgical and hands-gloves. Patient was placed on no precaution(s). Patient was not masked.    Precautions:   Patient currently on None  Patient currently roomed with door closed.    Is someone accompanying this pt? no    Is the patient using any DME equipment during OV? no    Depression Screenin/9/2024     1:01 PM 2024    11:38 AM 2024    11:51 AM 2024     2:52 PM 2024     9:32 AM 2024    12:47 PM 2024    10:45 AM   PHQ-9 Questionaire   Little interest or pleasure in doing things 0 0 0 0 0 0 0   Feeling down, depressed, or hopeless 0 0 0 0 0 0 0   PHQ-9 Total Score 0 0 0 0 0 0 0        Learning Assessment:  Completed     Abuse Screenin/8/2024     2:00 PM 2023     8:00 AM   AMB Abuse Screening   Do you ever feel afraid of your partner? N N   Are you in a relationship with someone who physically or mentally threatens you? N N   Is it safe for you to go home? Y Y          Fall Risk      2024    11:46 AM 2024     2:52 PM 2/15/2024     1:31 PM 2023    11:42 AM 10/4/2023    10:24 AM 2023    10:11 AM 2023    11:09 AM   Fall Risk   Do you feel unsteady or are you worried about falling?  yes no yes no no no no   2 or more falls in past year? yes no no no no no no   Fall with injury in past year? no no no no no no no         Pt currently taking Anticoagulant /Antiplatelet therapy? no    Coordination of Care:  1. Have you been to the ER, urgent care clinic since your last visit?

## 2025-01-06 ENCOUNTER — TELEPHONE (OUTPATIENT)
Facility: CLINIC | Age: 85
End: 2025-01-06

## 2025-01-06 DIAGNOSIS — G56.03 BILATERAL CARPAL TUNNEL SYNDROME: ICD-10-CM

## 2025-01-06 RX ORDER — PREGABALIN 100 MG/1
CAPSULE ORAL
Qty: 30 CAPSULE | OUTPATIENT
Start: 2025-01-06

## 2025-01-06 RX ORDER — PREGABALIN 100 MG/1
100 CAPSULE ORAL
Qty: 30 CAPSULE | Refills: 5 | Status: SHIPPED | OUTPATIENT
Start: 2025-01-06 | End: 2025-07-05

## 2025-01-06 NOTE — TELEPHONE ENCOUNTER
Dr. Duque, Mrs. Samuels wants to know if she can have a virtual visit with you. She said that she has a chest cold and coughing. What would you like me to do?

## 2025-01-07 ENCOUNTER — TELEMEDICINE (OUTPATIENT)
Facility: CLINIC | Age: 85
End: 2025-01-07
Payer: MEDICARE

## 2025-01-07 DIAGNOSIS — R09.89 CHEST CONGESTION: ICD-10-CM

## 2025-01-07 DIAGNOSIS — J01.00 ACUTE NON-RECURRENT MAXILLARY SINUSITIS: Primary | ICD-10-CM

## 2025-01-07 PROCEDURE — G8427 DOCREV CUR MEDS BY ELIG CLIN: HCPCS | Performed by: FAMILY MEDICINE

## 2025-01-07 PROCEDURE — 99213 OFFICE O/P EST LOW 20 MIN: CPT | Performed by: FAMILY MEDICINE

## 2025-01-07 PROCEDURE — 1123F ACP DISCUSS/DSCN MKR DOCD: CPT | Performed by: FAMILY MEDICINE

## 2025-01-07 PROCEDURE — G8399 PT W/DXA RESULTS DOCUMENT: HCPCS | Performed by: FAMILY MEDICINE

## 2025-01-07 PROCEDURE — 1090F PRES/ABSN URINE INCON ASSESS: CPT | Performed by: FAMILY MEDICINE

## 2025-01-07 PROCEDURE — 1159F MED LIST DOCD IN RCRD: CPT | Performed by: FAMILY MEDICINE

## 2025-01-07 PROCEDURE — 1160F RVW MEDS BY RX/DR IN RCRD: CPT | Performed by: FAMILY MEDICINE

## 2025-01-07 RX ORDER — SOD CHLOR,BICARB/SQUEEZ BOTTLE
PACKET, WITH RINSE DEVICE NASAL
Qty: 1 EACH | Refills: 0 | Status: SHIPPED | OUTPATIENT
Start: 2025-01-07

## 2025-01-07 RX ORDER — DOXYCYCLINE HYCLATE 100 MG
100 TABLET ORAL 2 TIMES DAILY
Qty: 14 TABLET | Refills: 0 | Status: SHIPPED | OUTPATIENT
Start: 2025-01-07 | End: 2025-01-14

## 2025-01-07 ASSESSMENT — PATIENT HEALTH QUESTIONNAIRE - PHQ9
SUM OF ALL RESPONSES TO PHQ QUESTIONS 1-9: 0
1. LITTLE INTEREST OR PLEASURE IN DOING THINGS: NOT AT ALL
SUM OF ALL RESPONSES TO PHQ QUESTIONS 1-9: 0
2. FEELING DOWN, DEPRESSED OR HOPELESS: NOT AT ALL
SUM OF ALL RESPONSES TO PHQ9 QUESTIONS 1 & 2: 0
SUM OF ALL RESPONSES TO PHQ QUESTIONS 1-9: 0
SUM OF ALL RESPONSES TO PHQ QUESTIONS 1-9: 0

## 2025-01-07 ASSESSMENT — ENCOUNTER SYMPTOMS
WHEEZING: 0
SHORTNESS OF BREATH: 0
SORE THROAT: 1
COUGH: 1

## 2025-01-07 NOTE — PROGRESS NOTES
[address] [place] [address]               [phone]        Ana Luisa Samuels is a 84 y.o. female and presents with Cough           Assessment/Plan:  Ana Luisa was seen today for cough.    Diagnoses and all orders for this visit:    Acute non-recurrent maxillary sinusitis  -     doxycycline hyclate (VIBRA-TABS) 100 MG tablet; Take 1 tablet by mouth 2 times daily for 7 days  -     Hypertonic Nasal Wash (SINUS RINSE BOTTLE KIT) PACK; Wash sinus 1-3x daily prn sinus congestion or pressure    Chest congestion  -     Hypertonic Nasal Wash (SINUS RINSE BOTTLE KIT) PACK; Wash sinus 1-3x daily prn sinus congestion or pressure      Acute sinusitis: pt demonstrating signs of bacterial infection; begin doxy/NeilMed sinus rinse bid; continue mucinex; f/u in 1 week if no improvement      Follow up and disposition:   Return in about 1 week (around 1/14/2025), or if symptoms worsen or fail to improve.      Health Maintenance:   Health Maintenance   Topic Date Due    COVID-19 Vaccine (8 - 2023-24 season) 11/28/2024    Annual Wellness Visit (Medicare Advantage)  01/01/2025    Diabetic Alb to Cr ratio (uACR) test  05/22/2025    GFR test (Diabetes, CKD 3-4, OR last GFR 15-59)  08/23/2025    Lipids  10/09/2025    Depression Screen  12/09/2025    Breast cancer screen  08/14/2026    DTaP/Tdap/Td vaccine (2 - Td or Tdap) 01/11/2031    DEXA (modify frequency per FRAX score)  Completed    Flu vaccine  Completed    Shingles vaccine  Completed    Pneumococcal 65+ years Vaccine  Completed    Respiratory Syncytial Virus (RSV) Pregnant or age 60 yrs+  Completed    Hepatitis A vaccine  Aged Out    Hepatitis B vaccine  Aged Out    Hib vaccine  Aged Out    Polio vaccine  Aged Out    Meningococcal (ACWY) vaccine  Aged Out    Hepatitis C screen  Discontinued        Subjective     83 y/o female here for URI symptoms; states she has noted productive cough x 1 week but unable to bring it up; states her chest is \"talking

## 2025-01-22 DIAGNOSIS — I10 ESSENTIAL HYPERTENSION: ICD-10-CM

## 2025-01-22 DIAGNOSIS — R42 LIGHTHEADEDNESS: ICD-10-CM

## 2025-01-22 RX ORDER — SACUBITRIL AND VALSARTAN 15; 16 MG/1; MG/1
1 PELLET ORAL DAILY
Qty: 90 CAPSULE | Refills: 2 | Status: CANCELLED | OUTPATIENT
Start: 2025-01-22

## 2025-01-22 NOTE — TELEPHONE ENCOUNTER
Patient called to report that the Entresto 15-16 mg  is not in stock at the pharmacy.  She has not been taking anything and now her feet are swelling.  Please call    Call back [phone]

## 2025-01-23 DIAGNOSIS — R42 LIGHTHEADEDNESS: ICD-10-CM

## 2025-01-23 DIAGNOSIS — I10 ESSENTIAL HYPERTENSION: ICD-10-CM

## 2025-01-23 NOTE — TELEPHONE ENCOUNTER
Please advise her to take an additional dose of her Bumex and call other pharmacies to see who has it in stock so we can send prescription there

## 2025-01-23 NOTE — TELEPHONE ENCOUNTER
Mrs. Samuels called and stated that the medication sacubitril-valsartan (ENTRESTOL) 15-16 MG is on back order for a month. Is there any thing elae that can be done? She said that she is having a lot of fluid in her feet and stated if you could up her dosage (bumetanide (BUMEX) 0.5 MG tablet. Thanks

## 2025-01-23 NOTE — TELEPHONE ENCOUNTER
Please see prior encounter      Please advise her to take an additional dose of her Bumex and call other pharmacies to see who has it in stock so we can send prescription there

## 2025-01-31 DIAGNOSIS — I10 ESSENTIAL HYPERTENSION: ICD-10-CM

## 2025-01-31 DIAGNOSIS — R42 LIGHTHEADEDNESS: ICD-10-CM

## 2025-01-31 RX ORDER — SACUBITRIL AND VALSARTAN 15; 16 MG/1; MG/1
1 PELLET ORAL DAILY
Qty: 90 CAPSULE | Refills: 2 | Status: CANCELLED | OUTPATIENT
Start: 2025-01-31

## 2025-01-31 NOTE — TELEPHONE ENCOUNTER
Ms. Samuels called and stated that her pharmacy is out of sacubitril-valsartan (ENTRESTOL) 15-16 MG. She is asking if she can get a substitute sent in for this medication. She stated this medication has been out  at her pharmacy for over a month. Thank you

## 2025-02-03 RX ORDER — SACUBITRIL AND VALSARTAN 15; 16 MG/1; MG/1
1 PELLET ORAL DAILY
Qty: 90 CAPSULE | Refills: 2 | OUTPATIENT
Start: 2025-02-03

## 2025-02-03 NOTE — TELEPHONE ENCOUNTER
Please see prior encounters from 1/22 and 1/23  Please have her contact alternative pharmacies to see if they have it in stock    Rx was sent to [facility] for 9 month supply in November to [facility]--no refills needed to the same pharmacy;

## 2025-02-04 ENCOUNTER — TELEPHONE (OUTPATIENT)
Facility: CLINIC | Age: 85
End: 2025-02-04

## 2025-02-04 ENCOUNTER — HOSPITAL ENCOUNTER (OUTPATIENT)
Facility: HOSPITAL | Age: 85
Setting detail: SPECIMEN
Discharge: HOME | End: 2025-02-07
Payer: MEDICARE

## 2025-02-04 DIAGNOSIS — R73.03 PREDIABETES: ICD-10-CM

## 2025-02-04 DIAGNOSIS — I10 ESSENTIAL HYPERTENSION: ICD-10-CM

## 2025-02-04 LAB
ALBUMIN SERPL-MCNC: 3.4 G/DL (ref 3.4–5)
ALBUMIN/GLOB SERPL: 1.1 (ref 0.8–1.7)
ALP SERPL-CCNC: 55 U/L (ref 45–117)
ALT SERPL-CCNC: 23 U/L (ref 13–56)
ANION GAP SERPL CALC-SCNC: 6 MMOL/L (ref 3–18)
APPEARANCE UR: CLEAR
AST SERPL-CCNC: 11 U/L (ref 10–38)
BACTERIA URNS QL MICRO: ABNORMAL /HPF
BASOPHILS # BLD: 0.05 K/UL (ref 0–0.1)
BASOPHILS NFR BLD: 1.6 % (ref 0–2)
BILIRUB SERPL-MCNC: 0.6 MG/DL (ref 0.2–1)
BILIRUB UR QL: NEGATIVE
BUN SERPL-MCNC: 16 MG/DL (ref 7–18)
BUN/CREAT SERPL: 15 (ref 12–20)
CALCIUM SERPL-MCNC: 9.1 MG/DL (ref 8.5–10.1)
CHLORIDE SERPL-SCNC: 107 MMOL/L (ref 100–111)
CHOLEST SERPL-MCNC: 152 MG/DL
CO2 SERPL-SCNC: 30 MMOL/L (ref 21–32)
COLOR UR: YELLOW
CREAT SERPL-MCNC: 1.08 MG/DL (ref 0.6–1.3)
DIFFERENTIAL METHOD BLD: ABNORMAL
EOSINOPHIL # BLD: 0.04 K/UL (ref 0–0.4)
EOSINOPHIL NFR BLD: 1.3 % (ref 0–5)
EPITH CASTS URNS QL MICRO: ABNORMAL /LPF (ref 0–5)
ERYTHROCYTE [DISTWIDTH] IN BLOOD BY AUTOMATED COUNT: 16.7 % (ref 11.6–14.5)
EST. AVERAGE GLUCOSE BLD GHB EST-MCNC: 120 MG/DL
GLOBULIN SER CALC-MCNC: 3.1 G/DL (ref 2–4)
GLUCOSE SERPL-MCNC: 201 MG/DL (ref 74–99)
GLUCOSE UR STRIP.AUTO-MCNC: NEGATIVE MG/DL
HBA1C MFR BLD: 5.8 % (ref 4.2–5.6)
HCT VFR BLD AUTO: 30.6 % (ref 35–45)
HDLC SERPL-MCNC: 65 MG/DL (ref 40–60)
HDLC SERPL: 2.3 (ref 0–5)
HGB BLD-MCNC: 9.4 G/DL (ref 12–16)
HGB UR QL STRIP: NEGATIVE
IMM GRANULOCYTES # BLD AUTO: 0.01 K/UL (ref 0–0.04)
IMM GRANULOCYTES NFR BLD AUTO: 0.3 % (ref 0–0.5)
KETONES UR QL STRIP.AUTO: NEGATIVE MG/DL
LDLC SERPL CALC-MCNC: 55.6 MG/DL (ref 0–100)
LEUKOCYTE ESTERASE UR QL STRIP.AUTO: ABNORMAL
LIPID PANEL: ABNORMAL
LYMPHOCYTES # BLD: 1.23 K/UL (ref 0.9–3.6)
LYMPHOCYTES NFR BLD: 38.9 % (ref 21–52)
MCH RBC QN AUTO: 32.2 PG (ref 24–34)
MCHC RBC AUTO-ENTMCNC: 30.7 G/DL (ref 31–37)
MCV RBC AUTO: 104.8 FL (ref 78–100)
MONOCYTES # BLD: 0.31 K/UL (ref 0.05–1.2)
MONOCYTES NFR BLD: 9.8 % (ref 3–10)
MUCOUS THREADS URNS QL MICRO: ABNORMAL /LPF
NEUTS SEG # BLD: 1.52 K/UL (ref 1.8–8)
NEUTS SEG NFR BLD: 48.1 % (ref 40–73)
NITRITE UR QL STRIP.AUTO: NEGATIVE
NRBC # BLD: 0 K/UL (ref 0–0.01)
NRBC BLD-RTO: 0 PER 100 WBC
PH UR STRIP: 6.5 (ref 5–8)
PLATELET # BLD AUTO: 200 K/UL (ref 135–420)
PMV BLD AUTO: 11.5 FL (ref 9.2–11.8)
POTASSIUM SERPL-SCNC: 4.3 MMOL/L (ref 3.5–5.5)
PROT SERPL-MCNC: 6.5 G/DL (ref 6.4–8.2)
PROT UR STRIP-MCNC: ABNORMAL MG/DL
RBC # BLD AUTO: 2.92 M/UL (ref 4.2–5.3)
RBC #/AREA URNS HPF: ABNORMAL /HPF (ref 0–5)
SODIUM SERPL-SCNC: 143 MMOL/L (ref 136–145)
SP GR UR REFRACTOMETRY: 1.02 (ref 1–1.03)
TRIGL SERPL-MCNC: 157 MG/DL
TSH SERPL DL<=0.05 MIU/L-ACNC: 1.43 UIU/ML (ref 0.36–3.74)
UROBILINOGEN UR QL STRIP.AUTO: 0.2 EU/DL (ref 0.2–1)
VLDLC SERPL CALC-MCNC: 31.4 MG/DL
WBC # BLD AUTO: 3.2 K/UL (ref 4.6–13.2)
WBC URNS QL MICRO: ABNORMAL /HPF (ref 0–5)

## 2025-02-04 PROCEDURE — 80061 LIPID PANEL: CPT

## 2025-02-04 PROCEDURE — 80053 COMPREHEN METABOLIC PANEL: CPT

## 2025-02-04 PROCEDURE — 85025 COMPLETE CBC W/AUTO DIFF WBC: CPT

## 2025-02-04 PROCEDURE — 36415 COLL VENOUS BLD VENIPUNCTURE: CPT

## 2025-02-04 PROCEDURE — 84443 ASSAY THYROID STIM HORMONE: CPT

## 2025-02-04 PROCEDURE — 83036 HEMOGLOBIN GLYCOSYLATED A1C: CPT

## 2025-02-04 PROCEDURE — 81001 URINALYSIS AUTO W/SCOPE: CPT

## 2025-02-04 NOTE — TELEPHONE ENCOUNTER
Called both numbers for patient and left VM for her to call back regarding her medication that she is having trouble finding.

## 2025-02-10 NOTE — PROGRESS NOTES
[address] [place] [id number]               [phone]        Ana Luisa Samuels is a 84 y.o. female and presents with Follow-up           Assessment/Plan:  Ana Luisa was seen today for follow-up.    Diagnoses and all orders for this visit:    Essential hypertension  -     sacubitril-valsartan (ENTRESTO) 24-26 MG per tablet; Take half tab po daily    Chronic diastolic heart failure (HCC)  -     sacubitril-valsartan (ENTRESTO) 24-26 MG per tablet; Take half tab po daily    Hyperlipidemia associated with type 2 diabetes mellitus (HCC)    Hypertriglyceridemia    Type 2 diabetes mellitus with other specified complication, without long-term current use of insulin (HCC)    Neutropenia, unspecified type (HCC)      HTN: uncontrolled; pharmacy will fill higher dose tablet; pt advised to use half tab po daily; lower dose than before but bp borderline elevated today and this will continue to prevent hypotension symptoms  Chronic heart failure: episode of edema recently; seen by nephrology who updated  LDL at goal; continue current meds  hyperTG: encouraged to limit carbs in her diet to improve her sugars/diabetes  Neutropenia: likely medication related; continue f/u with hematology for management    Follow up and disposition:   Return in about 4 weeks (around 3/11/2025), or if symptoms worsen or fail to improve, for physical 30 min.      Health Maintenance:   Health Maintenance   Topic Date Due    COVID-19 Vaccine (8 - 2024-25 season) 11/28/2024    Annual Wellness Visit (Medicare Advantage)  01/01/2025    Diabetic Alb to Cr ratio (uACR) test  05/22/2025    Depression Screen  01/07/2026    Lipids  02/04/2026    GFR test (Diabetes, CKD 3-4, OR last GFR 15-59)  02/04/2026    Breast cancer screen  08/14/2026    DTaP/Tdap/Td vaccine (2 - Td or Tdap) 01/11/2031    DEXA (modify frequency per FRAX score)  Completed    Flu vaccine  Completed    Shingles vaccine  Completed    Pneumococcal 50+ years Vaccine

## 2025-02-11 ENCOUNTER — OFFICE VISIT (OUTPATIENT)
Facility: CLINIC | Age: 85
End: 2025-02-11
Payer: MEDICARE

## 2025-02-11 VITALS
RESPIRATION RATE: 12 BRPM | HEIGHT: 67 IN | DIASTOLIC BLOOD PRESSURE: 68 MMHG | BODY MASS INDEX: 29.19 KG/M2 | SYSTOLIC BLOOD PRESSURE: 146 MMHG | HEART RATE: 66 BPM | WEIGHT: 186 LBS

## 2025-02-11 DIAGNOSIS — I50.32 CHRONIC DIASTOLIC HEART FAILURE (HCC): ICD-10-CM

## 2025-02-11 DIAGNOSIS — E78.1 HYPERTRIGLYCERIDEMIA: ICD-10-CM

## 2025-02-11 DIAGNOSIS — D70.9 NEUTROPENIA, UNSPECIFIED TYPE (HCC): ICD-10-CM

## 2025-02-11 DIAGNOSIS — E78.5 HYPERLIPIDEMIA ASSOCIATED WITH TYPE 2 DIABETES MELLITUS (HCC): ICD-10-CM

## 2025-02-11 DIAGNOSIS — E11.69 HYPERLIPIDEMIA ASSOCIATED WITH TYPE 2 DIABETES MELLITUS (HCC): ICD-10-CM

## 2025-02-11 DIAGNOSIS — E11.69 TYPE 2 DIABETES MELLITUS WITH OTHER SPECIFIED COMPLICATION, WITHOUT LONG-TERM CURRENT USE OF INSULIN (HCC): ICD-10-CM

## 2025-02-11 DIAGNOSIS — I10 ESSENTIAL HYPERTENSION: Primary | ICD-10-CM

## 2025-02-11 PROCEDURE — G8417 CALC BMI ABV UP PARAM F/U: HCPCS | Performed by: FAMILY MEDICINE

## 2025-02-11 PROCEDURE — G8399 PT W/DXA RESULTS DOCUMENT: HCPCS | Performed by: FAMILY MEDICINE

## 2025-02-11 PROCEDURE — 1090F PRES/ABSN URINE INCON ASSESS: CPT | Performed by: FAMILY MEDICINE

## 2025-02-11 PROCEDURE — 3044F HG A1C LEVEL LT 7.0%: CPT | Performed by: FAMILY MEDICINE

## 2025-02-11 PROCEDURE — 1123F ACP DISCUSS/DSCN MKR DOCD: CPT | Performed by: FAMILY MEDICINE

## 2025-02-11 PROCEDURE — 1159F MED LIST DOCD IN RCRD: CPT | Performed by: FAMILY MEDICINE

## 2025-02-11 PROCEDURE — 99214 OFFICE O/P EST MOD 30 MIN: CPT | Performed by: FAMILY MEDICINE

## 2025-02-11 PROCEDURE — G8427 DOCREV CUR MEDS BY ELIG CLIN: HCPCS | Performed by: FAMILY MEDICINE

## 2025-02-11 PROCEDURE — 1160F RVW MEDS BY RX/DR IN RCRD: CPT | Performed by: FAMILY MEDICINE

## 2025-02-11 PROCEDURE — 1036F TOBACCO NON-USER: CPT | Performed by: FAMILY MEDICINE

## 2025-02-11 PROCEDURE — 3078F DIAST BP <80 MM HG: CPT | Performed by: FAMILY MEDICINE

## 2025-02-11 PROCEDURE — 3077F SYST BP >= 140 MM HG: CPT | Performed by: FAMILY MEDICINE

## 2025-02-11 RX ORDER — BUMETANIDE 1 MG/1
1 TABLET ORAL 2 TIMES DAILY
COMMUNITY
Start: 2025-01-28

## 2025-02-11 RX ORDER — POTASSIUM CHLORIDE 1500 MG/1
20 TABLET, EXTENDED RELEASE ORAL 2 TIMES DAILY
COMMUNITY
Start: 2025-01-27

## 2025-02-11 ASSESSMENT — ENCOUNTER SYMPTOMS
NAUSEA: 0
DIARRHEA: 0
SHORTNESS OF BREATH: 0
VOMITING: 0
RHINORRHEA: 0
COUGH: 0

## 2025-02-21 ENCOUNTER — TELEPHONE (OUTPATIENT)
Facility: CLINIC | Age: 85
End: 2025-02-21

## 2025-02-21 RX ORDER — BUMETANIDE 0.5 MG/1
0.5 TABLET ORAL 2 TIMES DAILY
COMMUNITY
End: 2025-03-04 | Stop reason: SDUPTHER

## 2025-02-21 NOTE — TELEPHONE ENCOUNTER
Phone call from patient stating her medication was changed on her last appointment 2/11/2025 (Entresto 24-26 mg taking 1/2 pill day and Bumetanide 1 mg bid).    She stated since she has been taking the medication all the fluid she was retaining is gone but she feels bad all day. She reports to being lightheaded, dizzy and has to walk with a cane.  She also reported that she did not take any of the medication today because she wanted to have a good day. Please call to advise.      Call back number is [phone]

## 2025-03-04 ENCOUNTER — OFFICE VISIT (OUTPATIENT)
Facility: CLINIC | Age: 85
End: 2025-03-04

## 2025-03-04 VITALS
OXYGEN SATURATION: 97 % | WEIGHT: 187 LBS | DIASTOLIC BLOOD PRESSURE: 52 MMHG | HEIGHT: 67 IN | BODY MASS INDEX: 29.35 KG/M2 | HEART RATE: 70 BPM | RESPIRATION RATE: 12 BRPM | SYSTOLIC BLOOD PRESSURE: 103 MMHG

## 2025-03-04 DIAGNOSIS — C90.00 MULTIPLE MYELOMA NOT HAVING ACHIEVED REMISSION (HCC): ICD-10-CM

## 2025-03-04 DIAGNOSIS — I50.32 CHRONIC DIASTOLIC HEART FAILURE (HCC): ICD-10-CM

## 2025-03-04 DIAGNOSIS — R26.89 BALANCE PROBLEM: ICD-10-CM

## 2025-03-04 DIAGNOSIS — E87.6 HYPOKALEMIA: ICD-10-CM

## 2025-03-04 DIAGNOSIS — R42 VERTIGO: ICD-10-CM

## 2025-03-04 DIAGNOSIS — Z00.00 MEDICARE ANNUAL WELLNESS VISIT, SUBSEQUENT: Primary | ICD-10-CM

## 2025-03-04 RX ORDER — BUMETANIDE 0.5 MG/1
0.5 TABLET ORAL 2 TIMES DAILY
Qty: 180 TABLET | Refills: 2 | Status: SHIPPED | OUTPATIENT
Start: 2025-03-04

## 2025-03-04 RX ORDER — LIFITEGRAST 50 MG/ML
SOLUTION/ DROPS OPHTHALMIC
COMMUNITY
Start: 2025-02-01

## 2025-03-04 RX ORDER — DEXAMETHASONE 4 MG/1
20 TABLET ORAL WEEKLY
COMMUNITY
Start: 2024-12-30

## 2025-03-04 ASSESSMENT — PATIENT HEALTH QUESTIONNAIRE - PHQ9
2. FEELING DOWN, DEPRESSED OR HOPELESS: NOT AT ALL
SUM OF ALL RESPONSES TO PHQ QUESTIONS 1-9: 0
SUM OF ALL RESPONSES TO PHQ QUESTIONS 1-9: 0
1. LITTLE INTEREST OR PLEASURE IN DOING THINGS: NOT AT ALL
SUM OF ALL RESPONSES TO PHQ QUESTIONS 1-9: 0
SUM OF ALL RESPONSES TO PHQ QUESTIONS 1-9: 0

## 2025-03-04 ASSESSMENT — LIFESTYLE VARIABLES
HOW OFTEN DO YOU HAVE A DRINK CONTAINING ALCOHOL: NEVER
HOW MANY STANDARD DRINKS CONTAINING ALCOHOL DO YOU HAVE ON A TYPICAL DAY: PATIENT DOES NOT DRINK

## 2025-03-04 ASSESSMENT — ENCOUNTER SYMPTOMS: SHORTNESS OF BREATH: 0

## 2025-03-04 NOTE — PROGRESS NOTES
[address] [place] [address]               [phone]        Ana Luisa Samuels is a 84 y.o. female and presents with Medicare AWV           Assessment/Plan:  Ana Luisa was seen today for medicare awv.    Diagnoses and all orders for this visit:    Medicare annual wellness visit, subsequent    Multiple myeloma not having achieved remission (HCC)    Balance problem  -     bumetanide (BUMEX) 0.5 MG tablet; Take 1 tablet by mouth 2 times daily    Chronic diastolic heart failure (HCC)  -     bumetanide (BUMEX) 0.5 MG tablet; Take 1 tablet by mouth 2 times daily  -     Cancel: Renal Function Panel; Future    Vertigo    Hypokalemia  -     Cancel: Renal Function Panel; Future      Advised to eat healthy diet and begin regular exercise with cardio 2.5 hours/week mixed with strength training;encouraged to do exercises in her chair to prevent falls due to vertigo; plans to use stationary bike  Multiple myeloma: on 2 week break from [facility];follow along with [facility]; notes reviewed  CHF/HTN: continue bumex but reduce dose down to 0.5 mg bid; bps very well controlled 103/52 and has vertigo; educated on Epley maneuvers--advised to perform tid unitl 24 hours asymptomatic; encouraged hydration; labs done for potassium/electrolytes at [facility] yesterday; added to patient's AVS so she can show results at upcoming Nephrology appt        Cancel 3/28/2025 appointment  Schedule 2 month 15 min follow up      Health Maintenance:   Health Maintenance   Topic Date Due    COVID-19 Vaccine (8 - 2024-25 season) 11/28/2024    Diabetic Alb to Cr ratio (uACR) test  05/22/2025    Lipids  02/04/2026    GFR test (Diabetes, CKD 3-4, OR last GFR 15-59)  02/04/2026    Depression Screen  03/04/2026    Breast cancer screen  08/14/2026    DTaP/Tdap/Td vaccine (2 - Td or Tdap) 01/11/2031    DEXA (modify frequency per FRAX score)  Completed    Annual Wellness Visit (Medicare Advantage)  Completed    Flu vaccine  Completed    Shingles 
appointments were made and/or referrals ordered.    Positive Risk Factor Screenings with Interventions:              Inactivity:  On average, how many days per week do you engage in moderate to strenuous exercise (like a brisk walk)?: 0 days (!) Abnormal  On average, how many minutes do you engage in exercise at this level?: 0 min  Interventions:  Encouraged nonweight bearing exercises                         Objective   Vitals:    03/04/25 1315   BP: (!) 103/52   Pulse: 70   Resp: 12   SpO2: 97%   Weight: 84.8 kg (187 lb)   Height: 1.702 m (5' 7\")      Body mass index is 29.29 kg/m².                  No Known Allergies  Prior to Visit Medications    Medication Sig Taking? Authorizing Provider   dexAMETHasone (DECADRON) 4 MG tablet Take 5 tablets by mouth once a week Yes Isabelle Caldera MD   XIIDRA 5 % SOLN  Yes Isabelle Caldera MD   bumetanide (BUMEX) 0.5 MG tablet Take 1 tablet by mouth 2 times daily Yes Karey Duque MD   potassium chloride (K-TAB) 20 MEQ TBCR extended release tablet Take 1 tablet by mouth 2 times daily Yes Isabelle Caldera MD   sacubitril-valsartan (ENTRESTO) 24-26 MG per tablet Take half tab po daily Yes Karey Duque MD   pregabalin (LYRICA) 100 MG capsule Take 1 capsule by mouth nightly for 180 days. Max Daily Amount: 100 mg Yes Karey Duque MD   atorvastatin (LIPITOR) 20 MG tablet Take 1 tablet by mouth at bedtime Yes Karey Duque MD   apixaban (ELIQUIS) 5 MG TABS tablet Take 1 tablet by mouth Yes Isabelle Caldera MD   carvedilol (COREG CR) 20 MG CP24 extended release capsule Take 1 capsule by mouth daily (with breakfast) Yes Isabelle Caldera MD   lenalidomide (REVLIMID) 25 MG chemo capsule Take 1 capsule by mouth daily Yes Isabelle Caldera MD   vitamin D (VITAMIN D3) 50 MCG (2000 UT) CAPS capsule Take 1 capsule by mouth daily Yes Isabelle Caldera MD   Calcium Carb-Cholecalciferol (CALCIUM/VITAMIN D PO) Take by mouth Yes Werner

## 2025-03-04 NOTE — PATIENT INSTRUCTIONS
It's frustrating when you can't do the things you want. Being more active can help. What's holding you back?  Getting started.  Have a goal, but break it into easy tasks. Small steps build into big accomplishments.  Staying motivated.  If you feel like skipping your activity, remember your goal. Maybe you want to move better and stay independent. Every activity gets you one step closer.  Not feeling your best.  Start with 5 minutes of an activity you enjoy. Prove to yourself you can do it. As you get comfortable, increase your time.  You may not be where you want to be. But you're in the process of getting there. Everyone starts somewhere.  How can you find safe ways to stay active?  Talk with your doctor about any physical challenges you're facing. Make a plan with your doctor if you have a health problem or aren't sure how to get started with activity.  If you're already active, ask your doctor if there is anything you should change to stay safe as your body and health change.  If you tend to feel dizzy after you take medicine, avoid activity at that time. Try being active before you take your medicine. This will reduce your risk of falls.  If you plan to be active at home, make sure to clear your space before you get started. Remove things like TV cords, coffee tables, and throw rugs. It's safest to have plenty of space to move freely.  The key to getting more active is to take it slow and steady. Try to improve only a little bit at a time. Pick just one area to improve on at first. And if an activity hurts, stop and talk to your doctor.  Where can you learn more?  Go to https://www.[employer].net/patientEd and enter P600 to learn more about \"Learning About Being Active as an Older Adult.\"  Current as of: July 31, 2024  Content Version: 14.3  © 2024 [employer].   Care instructions adapted under license by [employer]. If you have questions about a medical condition or this instruction, always ask

## 2025-03-06 ENCOUNTER — TELEPHONE (OUTPATIENT)
Facility: CLINIC | Age: 85
End: 2025-03-06

## 2025-03-11 DIAGNOSIS — R26.89 BALANCE PROBLEM: ICD-10-CM

## 2025-03-11 DIAGNOSIS — I50.32 CHRONIC DIASTOLIC HEART FAILURE (HCC): ICD-10-CM

## 2025-03-11 RX ORDER — BUMETANIDE 0.5 MG/1
TABLET ORAL
Qty: 270 TABLET | Refills: 2 | Status: SHIPPED | OUTPATIENT
Start: 2025-03-11

## 2025-03-11 NOTE — PROGRESS NOTES
Received nephrology notes 3/10 increased back to 1mg bumex in am/0.5 qhs for swelling; bp normal 138/70

## 2025-04-16 ENCOUNTER — OFFICE VISIT (OUTPATIENT)
Facility: CLINIC | Age: 85
End: 2025-04-16

## 2025-04-16 VITALS
HEART RATE: 51 BPM | SYSTOLIC BLOOD PRESSURE: 126 MMHG | BODY MASS INDEX: 27.62 KG/M2 | DIASTOLIC BLOOD PRESSURE: 68 MMHG | WEIGHT: 176 LBS | HEIGHT: 67 IN | TEMPERATURE: 97 F | OXYGEN SATURATION: 95 % | RESPIRATION RATE: 18 BRPM

## 2025-04-16 DIAGNOSIS — I50.32 CHRONIC DIASTOLIC HEART FAILURE (HCC): ICD-10-CM

## 2025-04-16 DIAGNOSIS — R06.2 WHEEZING: ICD-10-CM

## 2025-04-16 DIAGNOSIS — I10 ESSENTIAL HYPERTENSION: ICD-10-CM

## 2025-04-16 DIAGNOSIS — J06.9 VIRAL URI: Primary | ICD-10-CM

## 2025-04-16 DIAGNOSIS — R63.4 WEIGHT LOSS: ICD-10-CM

## 2025-04-16 DIAGNOSIS — J20.9 ACUTE BRONCHITIS, UNSPECIFIED ORGANISM: ICD-10-CM

## 2025-04-16 LAB
EXP DATE SOLUTION: NORMAL
EXP DATE SWAB: NORMAL
EXPIRATION DATE: NORMAL
LOT NUMBER POC: NORMAL
LOT NUMBER SOLUTION: NORMAL
LOT NUMBER SWAB: NORMAL
QUICKVUE INFLUENZA TEST: NEGATIVE
SARS-COV-2 RNA, POC: NEGATIVE
VALID INTERNAL CONTROL, POC: YES

## 2025-04-16 RX ORDER — ALBUTEROL SULFATE 90 UG/1
2 INHALANT RESPIRATORY (INHALATION) 4 TIMES DAILY PRN
Qty: 18 G | Refills: 0 | Status: SHIPPED | OUTPATIENT
Start: 2025-04-16

## 2025-04-16 RX ORDER — GUAIFENESIN 600 MG/1
1200 TABLET, EXTENDED RELEASE ORAL 2 TIMES DAILY
Qty: 40 TABLET | Refills: 0 | Status: SHIPPED | OUTPATIENT
Start: 2025-04-16 | End: 2025-04-26

## 2025-04-16 RX ORDER — ALBUTEROL SULFATE 1.25 MG/3ML
1.25 SOLUTION RESPIRATORY (INHALATION) ONCE
Status: COMPLETED | OUTPATIENT
Start: 2025-04-16 | End: 2025-04-16

## 2025-04-16 RX ADMIN — ALBUTEROL SULFATE 1.25 MG: 1.25 SOLUTION RESPIRATORY (INHALATION) at 10:46

## 2025-04-16 SDOH — ECONOMIC STABILITY: FOOD INSECURITY: WITHIN THE PAST 12 MONTHS, YOU WORRIED THAT YOUR FOOD WOULD RUN OUT BEFORE YOU GOT MONEY TO BUY MORE.: NEVER TRUE

## 2025-04-16 SDOH — ECONOMIC STABILITY: FOOD INSECURITY: WITHIN THE PAST 12 MONTHS, THE FOOD YOU BOUGHT JUST DIDN'T LAST AND YOU DIDN'T HAVE MONEY TO GET MORE.: NEVER TRUE

## 2025-04-16 ASSESSMENT — PATIENT HEALTH QUESTIONNAIRE - PHQ9
1. LITTLE INTEREST OR PLEASURE IN DOING THINGS: NOT AT ALL
SUM OF ALL RESPONSES TO PHQ QUESTIONS 1-9: 0
DEPRESSION UNABLE TO ASSESS: FUNCTIONAL CAPACITY MOTIVATION LIMITS ACCURACY
SUM OF ALL RESPONSES TO PHQ QUESTIONS 1-9: 0
2. FEELING DOWN, DEPRESSED OR HOPELESS: NOT AT ALL

## 2025-04-16 ASSESSMENT — ENCOUNTER SYMPTOMS
NAUSEA: 0
VOMITING: 0
COUGH: 1
SHORTNESS OF BREATH: 0

## 2025-04-16 NOTE — PROGRESS NOTES
[address] [place] [address]               [phone]        Ana Luisa Samuels is a 84 y.o. female and presents with Cough (SOB; Congestion for two weeks now)           Assessment/Plan:  Ana Luisa was seen today for cough.    Diagnoses and all orders for this visit:    Viral URI  -     AMB POC RAPID INFLUENZA TEST  -     AMB POC COVID-19 COV    Essential hypertension    Chronic diastolic heart failure (HCC)    Acute bronchitis, unspecified organism  -     albuterol sulfate HFA (VENTOLIN HFA) 108 (90 Base) MCG/ACT inhaler; Inhale 2 puffs into the lungs 4 times daily as needed for Wheezing or Shortness of Breath  -     guaiFENesin (MUCINEX) 600 MG extended release tablet; Take 2 tablets by mouth 2 times daily for 10 days    Weight loss    Wheezing  -     albuterol (ACCUNEB) nebulizer solution 1.25 mg      Viral URI: covid/flu swabs negative; normal xray; wheezing today on exam; albuterol nebulizer provided with improvement in symptoms; begin albuterol every 6 hours x1 day then as needed; begin mucinex 1200 mg bid for congestion and vitamin C supplement  Continue salt restriction for CHF; HTN controlled  Lost 11 lbs due to decreased appetite; offered remeron but declined due to side effect profile; will start adding Glucerna with meals; f/u in May as scheduled      Follow up and disposition:   Return in about 19 days (around 5/5/2025), or if symptoms worsen or fail to improve.      Health Maintenance:   Health Maintenance   Topic Date Due    COVID-19 Vaccine (8 - Moderna risk 2024-25 season) 04/03/2025    Diabetic Alb to Cr ratio (uACR) test  05/22/2025    Lipids  02/04/2026    GFR test (Diabetes, CKD 3-4, OR last GFR 15-59)  02/04/2026    Depression Screen  03/04/2026    Breast cancer screen  08/14/2026    DTaP/Tdap/Td vaccine (2 - Td or Tdap) 01/11/2031    DEXA (modify frequency per FRAX score)  Completed    Annual Wellness Visit (Medicare Advantage)  Completed    Flu vaccine

## 2025-04-16 NOTE — PATIENT INSTRUCTIONS
Glucerna nutrition supplement 1-2x/day     Mucinex 600 mg (take 2 twice/day for congestion)    Start Vitamin C supplement

## 2025-04-16 NOTE — PROGRESS NOTES
Ana Luisa Samuels is a 84 y.o. female (: 1940) presenting to address:    Chief Complaint   Patient presents with    Cough     SOB; Congestion for two weeks now       Vitals:    25 0959   BP: 126/68   Pulse:    Resp:    Temp:    SpO2:        \"Have you been to the ER, urgent care clinic since your last visit?  Hospitalized since your last visit?\"    NO    “Have you seen or consulted any other health care providers outside of [facility] since your last visit?”    Yes, Oncology

## 2025-04-29 DIAGNOSIS — E11.65 TYPE 2 DIABETES MELLITUS WITH HYPERGLYCEMIA, WITHOUT LONG-TERM CURRENT USE OF INSULIN (HCC): ICD-10-CM

## 2025-04-29 NOTE — TELEPHONE ENCOUNTER
Requested Prescriptions     Pending Prescriptions Disp Refills    atorvastatin (LIPITOR) 20 MG tablet [Pharmacy Med Name: Atorvastatin Calcium Oral Tablet 20 MG] 90 tablet 3     Sig: TAKE 1 TABLET AT BEDTIME     Last seen: 4/16/25  Next seen: 5/5/25    Last filled: 8/27/24 Qty 90 w/2 refills

## 2025-04-30 RX ORDER — ATORVASTATIN CALCIUM 20 MG/1
20 TABLET, FILM COATED ORAL NIGHTLY
Qty: 90 TABLET | Refills: 3 | Status: SHIPPED | OUTPATIENT
Start: 2025-04-30

## 2025-05-03 NOTE — PROGRESS NOTES
[address] [place] [address]               [phone]        Ana Luisa Samuels is a 84 y.o. female and presents with Follow-up and Shortness of Breath           Assessment/Plan:  Ana Luisa was seen today for follow-up and shortness of breath.    Diagnoses and all orders for this visit:    Essential hypertension    Weight loss    Chronic diastolic heart failure (HCC)    At high risk for falls    [place] (dyspnea on exertion)  -     PFT SPIROMETRY W/BRONCHODILATOR AND DLCO; Future    Palpitations  -     Extended cardiac holter monitor (3 days-14 day); Future    Vertigo        HTN: controlled; continue to monitor  Vertigo: encouraged Epley maneuvers tid until 24 hours asymptomatic; advised to begin using rolling walker to assist with balance more  [place]: update PFT; encouraged albuterol as needed; stop if ineffective  Check holter for palpitations at night  Advised of importance of food/water to help with energy/balance; advised to boost low appetite with Glucerna, increase hydration; f/u at next visit in June    Follow up and disposition:   Return in about 8 weeks (around 7/1/2025), or if symptoms worsen or fail to improve, for follow up -15 min.      Health Maintenance:   Health Maintenance   Topic Date Due    COVID-19 Vaccine (8 - Moderna risk 2024-25 season) 04/03/2025    Diabetic Alb to Cr ratio (uACR) test  05/22/2025    Lipids  02/04/2026    GFR test (Diabetes, CKD 3-4, OR last GFR 15-59)  02/04/2026    Depression Screen  04/16/2026    Breast cancer screen  08/14/2026    DTaP/Tdap/Td vaccine (2 - Td or Tdap) 01/11/2031    DEXA (modify frequency per FRAX score)  Completed    Annual Wellness Visit (Medicare Advantage)  Completed    Flu vaccine  Completed    Shingles vaccine  Completed    Pneumococcal 50+ years Vaccine  Completed    Respiratory Syncytial Virus (RSV) Pregnant or age 60 yrs+  Completed    Hepatitis A vaccine  Aged Out    Hepatitis B vaccine  Aged Out    Hib vaccine  Aged Out

## 2025-05-05 ENCOUNTER — OFFICE VISIT (OUTPATIENT)
Facility: CLINIC | Age: 85
End: 2025-05-05
Payer: MEDICARE

## 2025-05-05 VITALS
SYSTOLIC BLOOD PRESSURE: 120 MMHG | WEIGHT: 172 LBS | HEIGHT: 67 IN | TEMPERATURE: 97.1 F | RESPIRATION RATE: 18 BRPM | OXYGEN SATURATION: 99 % | BODY MASS INDEX: 27 KG/M2 | HEART RATE: 69 BPM | DIASTOLIC BLOOD PRESSURE: 72 MMHG

## 2025-05-05 DIAGNOSIS — R00.2 PALPITATIONS: ICD-10-CM

## 2025-05-05 DIAGNOSIS — R63.4 WEIGHT LOSS: ICD-10-CM

## 2025-05-05 DIAGNOSIS — I10 ESSENTIAL HYPERTENSION: Primary | ICD-10-CM

## 2025-05-05 DIAGNOSIS — I50.32 CHRONIC DIASTOLIC HEART FAILURE (HCC): ICD-10-CM

## 2025-05-05 DIAGNOSIS — R06.09 DOE (DYSPNEA ON EXERTION): ICD-10-CM

## 2025-05-05 DIAGNOSIS — R42 VERTIGO: ICD-10-CM

## 2025-05-05 DIAGNOSIS — Z91.81 AT HIGH RISK FOR FALLS: ICD-10-CM

## 2025-05-05 PROCEDURE — 1126F AMNT PAIN NOTED NONE PRSNT: CPT | Performed by: FAMILY MEDICINE

## 2025-05-05 PROCEDURE — G8428 CUR MEDS NOT DOCUMENT: HCPCS | Performed by: FAMILY MEDICINE

## 2025-05-05 PROCEDURE — 1123F ACP DISCUSS/DSCN MKR DOCD: CPT | Performed by: FAMILY MEDICINE

## 2025-05-05 PROCEDURE — 99214 OFFICE O/P EST MOD 30 MIN: CPT | Performed by: FAMILY MEDICINE

## 2025-05-05 PROCEDURE — 1036F TOBACCO NON-USER: CPT | Performed by: FAMILY MEDICINE

## 2025-05-05 PROCEDURE — 1090F PRES/ABSN URINE INCON ASSESS: CPT | Performed by: FAMILY MEDICINE

## 2025-05-05 PROCEDURE — G8417 CALC BMI ABV UP PARAM F/U: HCPCS | Performed by: FAMILY MEDICINE

## 2025-05-05 PROCEDURE — G8399 PT W/DXA RESULTS DOCUMENT: HCPCS | Performed by: FAMILY MEDICINE

## 2025-05-05 PROCEDURE — 3078F DIAST BP <80 MM HG: CPT | Performed by: FAMILY MEDICINE

## 2025-05-05 PROCEDURE — 3074F SYST BP LT 130 MM HG: CPT | Performed by: FAMILY MEDICINE

## 2025-05-05 ASSESSMENT — ENCOUNTER SYMPTOMS: SHORTNESS OF BREATH: 1

## 2025-05-05 ASSESSMENT — PATIENT HEALTH QUESTIONNAIRE - PHQ9
2. FEELING DOWN, DEPRESSED OR HOPELESS: SEVERAL DAYS
SUM OF ALL RESPONSES TO PHQ QUESTIONS 1-9: 2
1. LITTLE INTEREST OR PLEASURE IN DOING THINGS: SEVERAL DAYS
SUM OF ALL RESPONSES TO PHQ QUESTIONS 1-9: 2

## 2025-05-05 NOTE — PROGRESS NOTES
Ana Luisa Samuels presents today for   Chief Complaint   Patient presents with    Follow-up    Shortness of Breath       Is someone accompanying this pt? No     Is the patient using any DME equipment during OV? Cane         5/5/2025    11:06 AM   PHQ-9    Little interest or pleasure in doing things 1   Feeling down, depressed, or hopeless 1   PHQ-2 Score 2   PHQ-9 Total Score 2            Health Maintenance reviewed and discussed and ordered per Provider.    Health Maintenance Due   Topic Date Due    COVID-19 Vaccine (8 - Moderna risk 2024-25 season) 04/03/2025    Diabetic Alb to Cr ratio (uACR) test  05/22/2025   .      \"Have you been to the ER, urgent care clinic since your last visit?  Hospitalized since your last visit?\"    No     “Have you seen or consulted any other health care providers outside our system since your last visit?”    Vascular

## 2025-06-05 ENCOUNTER — TELEPHONE (OUTPATIENT)
Facility: CLINIC | Age: 85
End: 2025-06-05

## 2025-06-05 DIAGNOSIS — G56.03 BILATERAL CARPAL TUNNEL SYNDROME: ICD-10-CM

## 2025-06-05 NOTE — TELEPHONE ENCOUNTER
----- Message from Trevor MARQUEZ sent at 6/5/2025  9:37 AM EDT -----  Regarding: ECC Escalation To Practice  ECC Escalation To Practice      Type of Escalation: Red Flag Symptom  --------------------------------------------------------------------------------------------------------------------------    Information for Provider:  Patient is looking for appointment for: Symptom swollen legs.  Reasons for Message: Unable to reach practice     Additional Information patient mentioned that her right legs are swollen again, and she also wanted to have assistance for her medication to be refilled requested another copy of an order that her provider referred to her. She mentioned that she tried using my chart but she is having trouble dealing with it.  --------------------------------------------------------------------------------------------------------------------------    Relationship to Patient: Self  Call Back Info: OK to leave message on [employer]  Preferred Call Back Number: Phone  [phone]

## 2025-06-05 NOTE — TELEPHONE ENCOUNTER
Requested Prescriptions     Pending Prescriptions Disp Refills    pregabalin (LYRICA) 100 MG capsule 30 capsule 5     Sig: Take 1 capsule by mouth nightly for 180 days. Max Daily Amount: 100 mg     Last seen: 5/5/25  Next seen: 7/7/25    Last filled: 1/6/25 Qty 30 w/5 refills   There are no Wet Read(s) to document.

## 2025-06-06 RX ORDER — PREGABALIN 100 MG/1
100 CAPSULE ORAL
Qty: 30 CAPSULE | Refills: 5 | Status: SHIPPED | OUTPATIENT
Start: 2025-06-06 | End: 2025-12-03

## 2025-06-17 ENCOUNTER — OFFICE VISIT (OUTPATIENT)
Age: 85
End: 2025-06-17
Payer: MEDICARE

## 2025-06-17 VITALS
HEIGHT: 67 IN | OXYGEN SATURATION: 100 % | SYSTOLIC BLOOD PRESSURE: 125 MMHG | HEART RATE: 72 BPM | BODY MASS INDEX: 28.41 KG/M2 | DIASTOLIC BLOOD PRESSURE: 74 MMHG | WEIGHT: 181 LBS

## 2025-06-17 DIAGNOSIS — I10 ESSENTIAL HYPERTENSION WITH GOAL BLOOD PRESSURE LESS THAN 140/90: Primary | ICD-10-CM

## 2025-06-17 DIAGNOSIS — R42 DIZZINESS: ICD-10-CM

## 2025-06-17 DIAGNOSIS — R00.2 PALPITATIONS: ICD-10-CM

## 2025-06-17 DIAGNOSIS — R06.09 DOE (DYSPNEA ON EXERTION): ICD-10-CM

## 2025-06-17 DIAGNOSIS — I50.32 CHRONIC HEART FAILURE WITH PRESERVED EJECTION FRACTION (HFPEF) (HCC): ICD-10-CM

## 2025-06-17 PROCEDURE — 3074F SYST BP LT 130 MM HG: CPT | Performed by: PHYSICIAN ASSISTANT

## 2025-06-17 PROCEDURE — 99214 OFFICE O/P EST MOD 30 MIN: CPT | Performed by: PHYSICIAN ASSISTANT

## 2025-06-17 PROCEDURE — 93000 ELECTROCARDIOGRAM COMPLETE: CPT | Performed by: PHYSICIAN ASSISTANT

## 2025-06-17 PROCEDURE — G8417 CALC BMI ABV UP PARAM F/U: HCPCS | Performed by: PHYSICIAN ASSISTANT

## 2025-06-17 PROCEDURE — 3078F DIAST BP <80 MM HG: CPT | Performed by: PHYSICIAN ASSISTANT

## 2025-06-17 PROCEDURE — 1123F ACP DISCUSS/DSCN MKR DOCD: CPT | Performed by: PHYSICIAN ASSISTANT

## 2025-06-17 PROCEDURE — 1090F PRES/ABSN URINE INCON ASSESS: CPT | Performed by: PHYSICIAN ASSISTANT

## 2025-06-17 PROCEDURE — G8428 CUR MEDS NOT DOCUMENT: HCPCS | Performed by: PHYSICIAN ASSISTANT

## 2025-06-17 PROCEDURE — 1126F AMNT PAIN NOTED NONE PRSNT: CPT | Performed by: PHYSICIAN ASSISTANT

## 2025-06-17 PROCEDURE — G8399 PT W/DXA RESULTS DOCUMENT: HCPCS | Performed by: PHYSICIAN ASSISTANT

## 2025-06-17 PROCEDURE — 1036F TOBACCO NON-USER: CPT | Performed by: PHYSICIAN ASSISTANT

## 2025-06-17 ASSESSMENT — PATIENT HEALTH QUESTIONNAIRE - PHQ9
SUM OF ALL RESPONSES TO PHQ QUESTIONS 1-9: 0
SUM OF ALL RESPONSES TO PHQ QUESTIONS 1-9: 0
1. LITTLE INTEREST OR PLEASURE IN DOING THINGS: NOT AT ALL
2. FEELING DOWN, DEPRESSED OR HOPELESS: NOT AT ALL
SUM OF ALL RESPONSES TO PHQ QUESTIONS 1-9: 0
SUM OF ALL RESPONSES TO PHQ QUESTIONS 1-9: 0

## 2025-06-17 NOTE — PATIENT INSTRUCTIONS
Biotel-(21  days)  Service for [employer]):  [phone]   Ana Luisa Samuels was seen in our office today for a  day cardiac monitor placement.      Plan:   Cardiac monitor was applied. Ana Luisa Samuels was given verbal and written instructions. Consent sheet signed electronically. Advised to wear monitor for 21 days continuously. Keep the phone monitor within 30 ft of you and if you go anywhere, have it with you at all times. Remove and replace the patch every 5 days, you have extras in box 2. Charge the monitor daily. Showed and asked her to record symptoms as she feels them. Continue daily activities as normal, but don't submerge yourself in water or any kind of liquid. Don't swim or bathe, the patch is water-resistant not waterproof. Advised to return the monitor on the _22 th day, put everything back in the box: , monitor, phone, and anything taken out of the box and place it in the [employer] prepaid shipping bag, seal it and drop it off at [employer]. Call [phone] with any questions or concerns. Ana Luisa Samuels verbalized an understanding and states he/she will comply.

## 2025-06-17 NOTE — PROGRESS NOTES
[facility] Cardiology     Ana Luisa Samuels is a 84 y.o. female    Because of dyspnea and edema, patient underwent echocardiogram in 06/2024 which showed normal EF however grade 2 diastolic dysfunction  NST in 9/2024: Low risk, EF normal, no significant reversible perfusion defect     Presents to the office today for follow up. Reports improvement in her leg swelling since her nephrologist changed her bumex dosing. She reports intermittent palpitations that occur mostly at night. These last for a few seconds to minutes in duration. Denies associated near syncope or syncope. She reports being dizzy with quick position changes. She reports ongoing SOB with moderate exertion which is not new for her.     Denies CP, SOB, diaphoresis, N/V/D, weight gain, LE edema, orthopnea, PND, palpitations, near syncope or syncope    Past Medical History:   Diagnosis Date    Arthritis     Breast cancer (HCC)     S/P surgery 2023    Cataract     Diastolic CHF (HCC)     DVT (deep venous thrombosis) (HCC) 2024    Hypercholesterolemia     Hypertension     Myeloma (HCC)        Past Surgical History:   Procedure Laterality Date    APPENDECTOMY      CATARACT REMOVAL      COLONOSCOPY  12/2009    Dr. Miranda, 10 yr follow up    HYSTERECTOMY, TOTAL ABDOMINAL (CERVIX REMOVED)         Current Outpatient Medications   Medication Sig    pregabalin (LYRICA) 100 MG capsule Take 1 capsule by mouth nightly for 180 days. Max Daily Amount: 100 mg    atorvastatin (LIPITOR) 20 MG tablet TAKE 1 TABLET AT BEDTIME    albuterol sulfate HFA (VENTOLIN HFA) 108 (90 Base) MCG/ACT inhaler Inhale 2 puffs into the lungs 4 times daily as needed for Wheezing or Shortness of Breath    bumetanide (BUMEX) 0.5 MG tablet Take 2 tabs po qam per Nephrology and 1 tab po qhs (Patient taking differently: 2 tablets 2 times daily Take 2 tabs po qam per Nephrology and 1 tab po qhs)    dexAMETHasone (DECADRON) 4 MG tablet Take 5 tablets by mouth once a week    XIIDRA 5 % SOLN

## 2025-06-17 NOTE — PROGRESS NOTES
Biotel-(21  days)  Service for [employer]:  [phone]   Ana Luisa Samuels was seen in our office today for a  day cardiac monitor placement.      Plan:   Cardiac monitor was applied. Ana Luisa Samuels was given verbal and written instructions. Consent sheet signed electronically. Advised to wear monitor for 21 days continuously. Keep the phone monitor within 30 ft of you and if you go anywhere, have it with you at all times. Remove and replace the patch every 5 days, you have extras in box 2. Charge the monitor daily. Showed and asked her to record symptoms as she feels them. Continue daily activities as normal, but don't submerge yourself in water or any kind of liquid. Don't swim or bathe, the patch is water-resistant not waterproof. Advised to return the monitor on the _22 th day, put everything back in the box: , monitor, phone, and anything taken out of the box and place it in the UPS prepaid shipping bag, seal it and drop it off at [employer]. Call [phone] with any questions or concerns. Ana Luisa Samuels verbalized an understanding and states he/she will comply.

## 2025-07-05 PROBLEM — R63.4 WEIGHT LOSS: Status: ACTIVE | Noted: 2025-07-05

## 2025-07-05 PROBLEM — R42 VERTIGO: Status: ACTIVE | Noted: 2025-07-05

## 2025-07-07 ENCOUNTER — OFFICE VISIT (OUTPATIENT)
Facility: CLINIC | Age: 85
End: 2025-07-07
Payer: MEDICARE

## 2025-07-07 VITALS
SYSTOLIC BLOOD PRESSURE: 106 MMHG | DIASTOLIC BLOOD PRESSURE: 68 MMHG | RESPIRATION RATE: 16 BRPM | HEIGHT: 67 IN | BODY MASS INDEX: 28.75 KG/M2 | WEIGHT: 183.2 LBS | OXYGEN SATURATION: 98 % | HEART RATE: 60 BPM

## 2025-07-07 DIAGNOSIS — D70.9 NEUTROPENIA, UNSPECIFIED TYPE: ICD-10-CM

## 2025-07-07 DIAGNOSIS — I10 ESSENTIAL HYPERTENSION: ICD-10-CM

## 2025-07-07 DIAGNOSIS — E87.6 HYPOKALEMIA: ICD-10-CM

## 2025-07-07 DIAGNOSIS — I50.32 CHRONIC DIASTOLIC HEART FAILURE (HCC): ICD-10-CM

## 2025-07-07 DIAGNOSIS — E11.22 TYPE 2 DIABETES MELLITUS WITH STAGE 2 CHRONIC KIDNEY DISEASE, WITHOUT LONG-TERM CURRENT USE OF INSULIN (HCC): ICD-10-CM

## 2025-07-07 DIAGNOSIS — N18.2 TYPE 2 DIABETES MELLITUS WITH STAGE 2 CHRONIC KIDNEY DISEASE, WITHOUT LONG-TERM CURRENT USE OF INSULIN (HCC): ICD-10-CM

## 2025-07-07 DIAGNOSIS — G56.03 BILATERAL CARPAL TUNNEL SYNDROME: ICD-10-CM

## 2025-07-07 DIAGNOSIS — D64.9 CHRONIC ANEMIA: Primary | ICD-10-CM

## 2025-07-07 PROCEDURE — 1126F AMNT PAIN NOTED NONE PRSNT: CPT | Performed by: FAMILY MEDICINE

## 2025-07-07 PROCEDURE — 99214 OFFICE O/P EST MOD 30 MIN: CPT | Performed by: FAMILY MEDICINE

## 2025-07-07 PROCEDURE — 1090F PRES/ABSN URINE INCON ASSESS: CPT | Performed by: FAMILY MEDICINE

## 2025-07-07 PROCEDURE — 3074F SYST BP LT 130 MM HG: CPT | Performed by: FAMILY MEDICINE

## 2025-07-07 PROCEDURE — 3044F HG A1C LEVEL LT 7.0%: CPT | Performed by: FAMILY MEDICINE

## 2025-07-07 PROCEDURE — 1123F ACP DISCUSS/DSCN MKR DOCD: CPT | Performed by: FAMILY MEDICINE

## 2025-07-07 PROCEDURE — 1159F MED LIST DOCD IN RCRD: CPT | Performed by: FAMILY MEDICINE

## 2025-07-07 PROCEDURE — G8427 DOCREV CUR MEDS BY ELIG CLIN: HCPCS | Performed by: FAMILY MEDICINE

## 2025-07-07 PROCEDURE — 1036F TOBACCO NON-USER: CPT | Performed by: FAMILY MEDICINE

## 2025-07-07 PROCEDURE — G8399 PT W/DXA RESULTS DOCUMENT: HCPCS | Performed by: FAMILY MEDICINE

## 2025-07-07 PROCEDURE — 3078F DIAST BP <80 MM HG: CPT | Performed by: FAMILY MEDICINE

## 2025-07-07 PROCEDURE — G8417 CALC BMI ABV UP PARAM F/U: HCPCS | Performed by: FAMILY MEDICINE

## 2025-07-07 PROCEDURE — 1160F RVW MEDS BY RX/DR IN RCRD: CPT | Performed by: FAMILY MEDICINE

## 2025-07-07 RX ORDER — POTASSIUM CHLORIDE 1500 MG/1
20 TABLET, EXTENDED RELEASE ORAL 2 TIMES DAILY
COMMUNITY
Start: 2025-05-31

## 2025-07-07 RX ORDER — PREGABALIN 100 MG/1
100 CAPSULE ORAL
Qty: 30 CAPSULE | Refills: 5 | Status: SHIPPED | OUTPATIENT
Start: 2025-07-07 | End: 2026-01-03

## 2025-07-07 RX ORDER — BUMETANIDE 1 MG/1
1 TABLET ORAL 2 TIMES DAILY
COMMUNITY

## 2025-07-07 RX ORDER — CARVEDILOL 12.5 MG/1
12.5 TABLET ORAL 2 TIMES DAILY
COMMUNITY
Start: 2025-05-13

## 2025-07-07 RX ORDER — TORSEMIDE 10 MG/1
10 TABLET ORAL DAILY
COMMUNITY
Start: 2025-06-19

## 2025-07-07 SDOH — ECONOMIC STABILITY: FOOD INSECURITY: WITHIN THE PAST 12 MONTHS, THE FOOD YOU BOUGHT JUST DIDN'T LAST AND YOU DIDN'T HAVE MONEY TO GET MORE.: NEVER TRUE

## 2025-07-07 SDOH — ECONOMIC STABILITY: FOOD INSECURITY: WITHIN THE PAST 12 MONTHS, YOU WORRIED THAT YOUR FOOD WOULD RUN OUT BEFORE YOU GOT MONEY TO BUY MORE.: NEVER TRUE

## 2025-07-07 ASSESSMENT — PATIENT HEALTH QUESTIONNAIRE - PHQ9
SUM OF ALL RESPONSES TO PHQ QUESTIONS 1-9: 0
SUM OF ALL RESPONSES TO PHQ QUESTIONS 1-9: 0
1. LITTLE INTEREST OR PLEASURE IN DOING THINGS: NOT AT ALL
SUM OF ALL RESPONSES TO PHQ QUESTIONS 1-9: 0
2. FEELING DOWN, DEPRESSED OR HOPELESS: NOT AT ALL
SUM OF ALL RESPONSES TO PHQ QUESTIONS 1-9: 0

## 2025-07-07 ASSESSMENT — ENCOUNTER SYMPTOMS: SHORTNESS OF BREATH: 0

## 2025-07-07 NOTE — PROGRESS NOTES
Ana Luisa Samuels is a 84 y.o. female (: 1940) presenting to address:    Chief Complaint   Patient presents with    Dizziness     Medication refills please    Weight Loss    Shortness of Breath     Pretty good       Vitals:    25 1032   BP: 106/68   Pulse: 60   Resp: 16   SpO2: 98%       \"Have you been to the ER, urgent care clinic since your last visit?  Hospitalized since your last visit?\"    NO    “Have you seen or consulted any other health care providers outside of [facility] since your last visit?”    Yes, Cardiology and Nephrology           
each 0    apixaban (ELIQUIS) 5 MG TABS tablet Take 1 tablet by mouth      lenalidomide (REVLIMID) 25 MG chemo capsule Take 1 capsule by mouth daily      vitamin D (VITAMIN D3) 50 MCG (2000 UT) CAPS capsule Take 1 capsule by mouth daily      Calcium Carb-Cholecalciferol (CALCIUM/VITAMIN D PO) Take by mouth      Multiple Vitamin (MULTIVITAMIN PO) Take by mouth      letrozole (FEMARA) 2.5 MG tablet Take 1 tablet by mouth daily      acetaminophen (TYLENOL) 650 MG extended release tablet Take 1 tablet by mouth every 8 hours as needed      timolol (TIMOPTIC) 0.5 % ophthalmic solution INSTILL 1 DROP IN RIGHT EYE EVERY MORNING       No current facility-administered medications for this visit.         No Known Allergies    Objective:  /68 (BP Site: Left Upper Arm, Patient Position: Sitting, BP Cuff Size: Large Adult)   Pulse 60   Resp 16   Ht 1.702 m (5' 7\")   Wt 83.1 kg (183 lb 3.2 oz)   SpO2 98%   BMI 28.69 kg/m²  Body mass index is 28.69 kg/m².    Physical assessment  Physical Exam  Vitals reviewed.   Constitutional:       General: She is not in acute distress.     Appearance: Normal appearance. She is obese. She is not ill-appearing, toxic-appearing or diaphoretic.   HENT:      Head: Normocephalic and atraumatic.      Right Ear: External ear normal.      Left Ear: External ear normal.   Eyes:      Extraocular Movements: Extraocular movements intact.      Conjunctiva/sclera: Conjunctivae normal.   Cardiovascular:      Rate and Rhythm: Normal rate and regular rhythm.      Heart sounds: Normal heart sounds. No murmur heard.     No friction rub. No gallop.   Pulmonary:      Effort: Pulmonary effort is normal. No respiratory distress.      Breath sounds: Normal breath sounds. No stridor. No wheezing, rhonchi or rales.   Musculoskeletal:      Cervical back: Normal range of motion.      Right lower leg: Edema present.      Left lower leg: Edema present.      Comments: 1+ pitting edema b/l LE, largest in ankles

## 2025-07-23 LAB
A/G RATIO: 1.5 RATIO (ref 1.1–2.6)
ALBUMIN: 3.7 G/DL (ref 3.5–5)
ALP BLD-CCNC: 66 U/L (ref 40–120)
ALT SERPL-CCNC: 14 U/L (ref 5–40)
ANION GAP SERPL CALCULATED.3IONS-SCNC: 9 MMOL/L (ref 3–15)
AST SERPL-CCNC: 20 U/L (ref 10–37)
BACTERIA, URINE: NEGATIVE
BASOPHILS # BLD: 0 % (ref 0–2)
BASOPHILS ABSOLUTE: 0 K/UL (ref 0–0.2)
BILIRUB SERPL-MCNC: 0.7 MG/DL (ref 0.2–1.2)
BILIRUBIN, URINE: NEGATIVE
BUN BLDV-MCNC: 13 MG/DL (ref 6–22)
CALCIUM SERPL-MCNC: 8.9 MG/DL (ref 8.4–10.5)
CHLORIDE BLD-SCNC: 100 MMOL/L (ref 98–110)
CHOLESTEROL, TOTAL: 154 MG/DL (ref 110–200)
CHOLESTEROL/HDL RATIO: 2.4 (ref 0–5)
CLARITY, UA: CLEAR
CO2: 29 MMOL/L (ref 20–32)
COLOR, UA: YELLOW
CREAT SERPL-MCNC: 1 MG/DL (ref 0.8–1.4)
EOSINOPHIL # BLD: 6 % (ref 0–6)
EOSINOPHILS ABSOLUTE: 0.2 K/UL (ref 0–0.5)
GFR, ESTIMATED: 53.4 ML/MIN/1.73 SQ.M.
GLOBULIN: 2.5 G/DL (ref 2–4)
GLUCOSE URINE: NEGATIVE MG/DL
GLUCOSE: 120 MG/DL (ref 70–99)
HCT VFR BLD CALC: 27.8 % (ref 35.1–48.3)
HDLC SERPL-MCNC: 64 MG/DL
HEMOGLOBIN: 9.1 G/DL (ref 11.7–16.1)
HYALINE CASTS: NORMAL /LPF (ref 0–2)
KETONES, URINE: NEGATIVE MG/DL
LDL CHOLESTEROL: 64 MG/DL (ref 50–99)
LDL/HDL RATIO: 1
LEUKOCYTE ESTERASE, URINE: NEGATIVE
LYMPHOCYTES # BLD: 36 % (ref 20–45)
LYMPHOCYTES ABSOLUTE: 0.9 K/UL (ref 1–4.8)
MCH RBC QN AUTO: 32 PG (ref 26–34)
MCHC RBC AUTO-ENTMCNC: 33 G/DL (ref 31–36)
MCV RBC AUTO: 97 FL (ref 80–99)
MONOCYTES ABSOLUTE: 0.3 K/UL (ref 0.1–1)
MONOCYTES: 12 % (ref 3–12)
NEUTROPHILS ABSOLUTE: 1.2 K/UL (ref 1.8–7.7)
NEUTROPHILS SEGMENTED: 46 % (ref 40–75)
NITRITE, URINE: NEGATIVE
NON-HDL CHOLESTEROL: 90 MG/DL
OCCULT BLOOD,URINE: NEGATIVE
PDW BLD-RTO: 16 % (ref 10–15.5)
PH, URINE: 8 PH (ref 5–8)
PLATELET # BLD: 142 K/UL (ref 140–440)
PMV BLD AUTO: 11.5 FL (ref 9–13)
POTASSIUM SERPL-SCNC: 3.7 MMOL/L (ref 3.5–5.5)
PROTEIN, URINE: NEGATIVE MG/DL
RBC # BLD: 2.86 M/UL (ref 3.8–5.2)
RBC URINE: NORMAL /HPF
SODIUM BLD-SCNC: 138 MMOL/L (ref 133–145)
SPECIFIC GRAVITY UA: 1.01 (ref 1–1.03)
SQUAMOUS EPITHELIAL CELLS: NORMAL /HPF
TOTAL PROTEIN: 6.2 G/DL (ref 6.2–8.1)
TRIGL SERPL-MCNC: 130 MG/DL (ref 40–149)
TSH SERPL DL<=0.05 MIU/L-ACNC: 1.13 MCU/ML (ref 0.27–4.2)
UROBILINOGEN, URINE: 1 MG/DL
VLDLC SERPL CALC-MCNC: 26 MG/DL (ref 8–30)
WBC # BLD: 2.6 K/UL (ref 4–11)
WBC URINE: NORMAL /HPF (ref 0–5)

## 2025-07-24 LAB
CREATININE, URINE  MG/DL: 108 MG/DL
MICROALBUMIN/CREAT 24H UR: 16.8 MG/L (ref 0.1–17)
MICROALBUMIN/CREAT UR-RTO: 15.6 (ref 0–30)

## 2025-07-25 ENCOUNTER — TELEPHONE (OUTPATIENT)
Age: 85
End: 2025-07-25

## 2025-07-25 LAB
ESTIMATED AVERAGE GLUCOSE: 126 MG/DL (ref 91–123)
HBA1C MFR BLD: 6 % (ref 4.8–5.6)

## 2025-07-30 ENCOUNTER — OFFICE VISIT (OUTPATIENT)
Facility: CLINIC | Age: 85
End: 2025-07-30
Payer: MEDICARE

## 2025-07-30 ENCOUNTER — TELEPHONE (OUTPATIENT)
Facility: CLINIC | Age: 85
End: 2025-07-30

## 2025-07-30 VITALS — SYSTOLIC BLOOD PRESSURE: 125 MMHG | DIASTOLIC BLOOD PRESSURE: 76 MMHG | HEART RATE: 81 BPM

## 2025-07-30 DIAGNOSIS — M79.674 PAIN OF TOE OF RIGHT FOOT: Primary | ICD-10-CM

## 2025-07-30 DIAGNOSIS — M79.89 LEG SWELLING: ICD-10-CM

## 2025-07-30 PROCEDURE — 1159F MED LIST DOCD IN RCRD: CPT | Performed by: FAMILY MEDICINE

## 2025-07-30 PROCEDURE — G8417 CALC BMI ABV UP PARAM F/U: HCPCS | Performed by: FAMILY MEDICINE

## 2025-07-30 PROCEDURE — 3074F SYST BP LT 130 MM HG: CPT | Performed by: FAMILY MEDICINE

## 2025-07-30 PROCEDURE — 99214 OFFICE O/P EST MOD 30 MIN: CPT | Performed by: FAMILY MEDICINE

## 2025-07-30 PROCEDURE — 1123F ACP DISCUSS/DSCN MKR DOCD: CPT | Performed by: FAMILY MEDICINE

## 2025-07-30 PROCEDURE — 1160F RVW MEDS BY RX/DR IN RCRD: CPT | Performed by: FAMILY MEDICINE

## 2025-07-30 PROCEDURE — 1036F TOBACCO NON-USER: CPT | Performed by: FAMILY MEDICINE

## 2025-07-30 PROCEDURE — G8399 PT W/DXA RESULTS DOCUMENT: HCPCS | Performed by: FAMILY MEDICINE

## 2025-07-30 PROCEDURE — G8427 DOCREV CUR MEDS BY ELIG CLIN: HCPCS | Performed by: FAMILY MEDICINE

## 2025-07-30 PROCEDURE — 3078F DIAST BP <80 MM HG: CPT | Performed by: FAMILY MEDICINE

## 2025-07-30 PROCEDURE — 1090F PRES/ABSN URINE INCON ASSESS: CPT | Performed by: FAMILY MEDICINE

## 2025-07-30 RX ORDER — TORSEMIDE 20 MG/1
20 TABLET ORAL DAILY
Qty: 1 TABLET | Refills: 0
Start: 2025-07-30

## 2025-07-30 RX ORDER — PREDNISONE 20 MG/1
TABLET ORAL
Qty: 30 TABLET | Refills: 0 | Status: SHIPPED | OUTPATIENT
Start: 2025-07-30

## 2025-07-30 ASSESSMENT — PATIENT HEALTH QUESTIONNAIRE - PHQ9
1. LITTLE INTEREST OR PLEASURE IN DOING THINGS: NOT AT ALL
SUM OF ALL RESPONSES TO PHQ QUESTIONS 1-9: 0
SUM OF ALL RESPONSES TO PHQ QUESTIONS 1-9: 0
2. FEELING DOWN, DEPRESSED OR HOPELESS: NOT AT ALL
SUM OF ALL RESPONSES TO PHQ QUESTIONS 1-9: 0
SUM OF ALL RESPONSES TO PHQ QUESTIONS 1-9: 0

## 2025-07-30 ASSESSMENT — ENCOUNTER SYMPTOMS: SHORTNESS OF BREATH: 0

## 2025-07-30 NOTE — PROGRESS NOTES
[address] [place] [address]               [phone]        Ana Luisa Samuels is a 84 y.o. female and presents with Foot Pain           Assessment/Plan:  Ana Luisa was seen today for foot pain.    Diagnoses and all orders for this visit:    Pain of toe of right foot  -     Uric Acid; Future  -     predniSONE (DELTASONE) 20 MG tablet; Take 2 tabs po daily until pain resolves then take 1 tab po daily for 3 days then half tab po daily for 3 days then discontinue    Leg swelling  -     torsemide (DEMADEX) 20 MG tablet; Take 1 tablet by mouth daily  -     predniSONE (DELTASONE) 20 MG tablet; Take 2 tabs po daily until pain resolves then take 1 tab po daily for 3 days then half tab po daily for 3 days then discontinue        Begin prednisone burst for possible gout; encouraged to take additional dose of torsemide 20 mg at home today for swelling; encouraged compression stockings--she will find some online that fit her better; f/u progress at next visit    Follow up and disposition:   Return in about 11 weeks (around 10/15/2025), or if symptoms worsen or fail to improve, for follow up -15 min.      Health Maintenance:   Health Maintenance   Topic Date Due    COVID-19 Vaccine (7 - Moderna risk 2024-25 season) 04/03/2025    Flu vaccine (1) 08/01/2025    Depression Screen  07/07/2026    Diabetic Alb to Cr ratio (uACR) test  07/23/2026    Lipids  07/23/2026    GFR test (Diabetes, CKD 3-4, OR last GFR 15-59)  07/23/2026    Breast cancer screen  08/14/2026    DTaP/Tdap/Td vaccine (2 - Td or Tdap) 01/11/2031    DEXA (modify frequency per FRAX score)  Completed    Annual Wellness Visit (Medicare Advantage)  Completed    Shingles vaccine  Completed    Pneumococcal 50+ years Vaccine  Completed    Respiratory Syncytial Virus (RSV) Pregnant or age 60 yrs+  Completed    Hepatitis A vaccine  Aged Out    Hepatitis B vaccine  Aged Out    Hib vaccine  Aged Out    Polio vaccine  Aged Out    Meningococcal

## 2025-08-11 ENCOUNTER — TELEPHONE (OUTPATIENT)
Facility: CLINIC | Age: 85
End: 2025-08-11

## 2025-08-11 ENCOUNTER — TELEPHONE (OUTPATIENT)
Age: 85
End: 2025-08-11

## 2025-08-11 ENCOUNTER — OFFICE VISIT (OUTPATIENT)
Facility: CLINIC | Age: 85
End: 2025-08-11
Payer: MEDICARE

## 2025-08-11 VITALS
HEIGHT: 67 IN | WEIGHT: 177 LBS | SYSTOLIC BLOOD PRESSURE: 130 MMHG | DIASTOLIC BLOOD PRESSURE: 77 MMHG | BODY MASS INDEX: 27.78 KG/M2 | HEART RATE: 75 BPM | RESPIRATION RATE: 14 BRPM | OXYGEN SATURATION: 98 %

## 2025-08-11 DIAGNOSIS — R25.2 LEG CRAMPS: Primary | ICD-10-CM

## 2025-08-11 DIAGNOSIS — R00.2 PALPITATIONS: ICD-10-CM

## 2025-08-11 LAB
ANION GAP SERPL CALCULATED.3IONS-SCNC: 8 MMOL/L (ref 3–15)
BUN BLDV-MCNC: 17 MG/DL (ref 6–22)
CALCIUM SERPL-MCNC: 9.3 MG/DL (ref 8.4–10.5)
CHLORIDE BLD-SCNC: 98 MMOL/L (ref 98–110)
CO2: 31 MMOL/L (ref 20–32)
CPK: 43 U/L (ref 30–165)
CREAT SERPL-MCNC: 0.9 MG/DL (ref 0.8–1.4)
GFR, ESTIMATED: 60 ML/MIN/1.73 SQ.M.
GLUCOSE: 139 MG/DL (ref 70–99)
MAGNESIUM: 1.8 MG/DL (ref 1.6–2.5)
POTASSIUM SERPL-SCNC: 3.5 MMOL/L (ref 3.5–5.5)
SODIUM BLD-SCNC: 137 MMOL/L (ref 133–145)

## 2025-08-11 PROCEDURE — G8417 CALC BMI ABV UP PARAM F/U: HCPCS | Performed by: FAMILY MEDICINE

## 2025-08-11 PROCEDURE — 3078F DIAST BP <80 MM HG: CPT | Performed by: FAMILY MEDICINE

## 2025-08-11 PROCEDURE — 3075F SYST BP GE 130 - 139MM HG: CPT | Performed by: FAMILY MEDICINE

## 2025-08-11 PROCEDURE — 1036F TOBACCO NON-USER: CPT | Performed by: FAMILY MEDICINE

## 2025-08-11 PROCEDURE — 99214 OFFICE O/P EST MOD 30 MIN: CPT | Performed by: FAMILY MEDICINE

## 2025-08-11 PROCEDURE — 1159F MED LIST DOCD IN RCRD: CPT | Performed by: FAMILY MEDICINE

## 2025-08-11 PROCEDURE — 1160F RVW MEDS BY RX/DR IN RCRD: CPT | Performed by: FAMILY MEDICINE

## 2025-08-11 PROCEDURE — G8399 PT W/DXA RESULTS DOCUMENT: HCPCS | Performed by: FAMILY MEDICINE

## 2025-08-11 PROCEDURE — G8427 DOCREV CUR MEDS BY ELIG CLIN: HCPCS | Performed by: FAMILY MEDICINE

## 2025-08-11 PROCEDURE — 1126F AMNT PAIN NOTED NONE PRSNT: CPT | Performed by: FAMILY MEDICINE

## 2025-08-11 PROCEDURE — 1123F ACP DISCUSS/DSCN MKR DOCD: CPT | Performed by: FAMILY MEDICINE

## 2025-08-11 PROCEDURE — 1090F PRES/ABSN URINE INCON ASSESS: CPT | Performed by: FAMILY MEDICINE

## 2025-08-11 ASSESSMENT — PATIENT HEALTH QUESTIONNAIRE - PHQ9
SUM OF ALL RESPONSES TO PHQ QUESTIONS 1-9: 0
2. FEELING DOWN, DEPRESSED OR HOPELESS: NOT AT ALL
SUM OF ALL RESPONSES TO PHQ QUESTIONS 1-9: 0
1. LITTLE INTEREST OR PLEASURE IN DOING THINGS: NOT AT ALL

## 2025-08-11 ASSESSMENT — ENCOUNTER SYMPTOMS: SHORTNESS OF BREATH: 0

## 2025-08-21 ENCOUNTER — TELEPHONE (OUTPATIENT)
Facility: CLINIC | Age: 85
End: 2025-08-21

## 2025-09-04 ENCOUNTER — OFFICE VISIT (OUTPATIENT)
Age: 85
End: 2025-09-04
Payer: MEDICARE

## 2025-09-04 VITALS
WEIGHT: 180 LBS | SYSTOLIC BLOOD PRESSURE: 97 MMHG | HEART RATE: 71 BPM | DIASTOLIC BLOOD PRESSURE: 64 MMHG | BODY MASS INDEX: 28.25 KG/M2 | OXYGEN SATURATION: 93 % | HEIGHT: 67 IN

## 2025-09-04 DIAGNOSIS — M79.89 LEG SWELLING: ICD-10-CM

## 2025-09-04 PROCEDURE — G8428 CUR MEDS NOT DOCUMENT: HCPCS | Performed by: INTERNAL MEDICINE

## 2025-09-04 PROCEDURE — 3078F DIAST BP <80 MM HG: CPT | Performed by: INTERNAL MEDICINE

## 2025-09-04 PROCEDURE — 1036F TOBACCO NON-USER: CPT | Performed by: INTERNAL MEDICINE

## 2025-09-04 PROCEDURE — G8399 PT W/DXA RESULTS DOCUMENT: HCPCS | Performed by: INTERNAL MEDICINE

## 2025-09-04 PROCEDURE — 1126F AMNT PAIN NOTED NONE PRSNT: CPT | Performed by: INTERNAL MEDICINE

## 2025-09-04 PROCEDURE — G8417 CALC BMI ABV UP PARAM F/U: HCPCS | Performed by: INTERNAL MEDICINE

## 2025-09-04 PROCEDURE — 1090F PRES/ABSN URINE INCON ASSESS: CPT | Performed by: INTERNAL MEDICINE

## 2025-09-04 PROCEDURE — 3074F SYST BP LT 130 MM HG: CPT | Performed by: INTERNAL MEDICINE

## 2025-09-04 PROCEDURE — 99214 OFFICE O/P EST MOD 30 MIN: CPT | Performed by: INTERNAL MEDICINE

## 2025-09-04 PROCEDURE — 1123F ACP DISCUSS/DSCN MKR DOCD: CPT | Performed by: INTERNAL MEDICINE

## 2025-09-04 RX ORDER — TORSEMIDE 20 MG/1
20 TABLET ORAL PRN
Qty: 30 TABLET | Refills: 2 | Status: SHIPPED | OUTPATIENT
Start: 2025-09-04

## 2025-09-04 RX ORDER — CARVEDILOL 6.25 MG/1
6.25 TABLET ORAL 2 TIMES DAILY
Qty: 180 TABLET | Refills: 2 | Status: SHIPPED | OUTPATIENT
Start: 2025-09-04

## 2025-09-04 RX ORDER — CARVEDILOL 12.5 MG/1
12.5 TABLET ORAL 2 TIMES DAILY
COMMUNITY
End: 2025-09-04 | Stop reason: ALTCHOICE

## 2025-09-04 ASSESSMENT — PATIENT HEALTH QUESTIONNAIRE - PHQ9
SUM OF ALL RESPONSES TO PHQ QUESTIONS 1-9: 0
SUM OF ALL RESPONSES TO PHQ QUESTIONS 1-9: 0
2. FEELING DOWN, DEPRESSED OR HOPELESS: NOT AT ALL
SUM OF ALL RESPONSES TO PHQ QUESTIONS 1-9: 0
1. LITTLE INTEREST OR PLEASURE IN DOING THINGS: NOT AT ALL
SUM OF ALL RESPONSES TO PHQ QUESTIONS 1-9: 0